# Patient Record
Sex: MALE | Race: WHITE | NOT HISPANIC OR LATINO | Employment: OTHER | URBAN - METROPOLITAN AREA
[De-identification: names, ages, dates, MRNs, and addresses within clinical notes are randomized per-mention and may not be internally consistent; named-entity substitution may affect disease eponyms.]

---

## 2017-01-30 ASSESSMENT — ENCOUNTER SYMPTOMS
CONSTIPATION: 1
SINUS PAIN: 1
SINUS CONGESTION: 1
SORE THROAT: 1
DIARRHEA: 1
HEARTBURN: 1
ABDOMINAL PAIN: 1
VOMITING: 0

## 2017-02-09 ENCOUNTER — PRE VISIT (OUTPATIENT)
Dept: CARDIOLOGY | Facility: CLINIC | Age: 62
End: 2017-02-09

## 2017-02-09 DIAGNOSIS — Z13.6 CARDIOVASCULAR SCREENING; LDL GOAL LESS THAN 160: Primary | ICD-10-CM

## 2017-02-10 ENCOUNTER — OFFICE VISIT (OUTPATIENT)
Dept: CARDIOLOGY | Facility: CLINIC | Age: 62
End: 2017-02-10
Attending: INTERNAL MEDICINE
Payer: COMMERCIAL

## 2017-02-10 VITALS
SYSTOLIC BLOOD PRESSURE: 147 MMHG | BODY MASS INDEX: 22.9 KG/M2 | DIASTOLIC BLOOD PRESSURE: 91 MMHG | HEART RATE: 52 BPM | WEIGHT: 151.1 LBS | HEIGHT: 68 IN | OXYGEN SATURATION: 98 %

## 2017-02-10 DIAGNOSIS — Z13.6 CARDIOVASCULAR SCREENING; LDL GOAL LESS THAN 160: ICD-10-CM

## 2017-02-10 PROCEDURE — 99214 OFFICE O/P EST MOD 30 MIN: CPT | Mod: ZP | Performed by: NURSE PRACTITIONER

## 2017-02-10 PROCEDURE — 93005 ELECTROCARDIOGRAM TRACING: CPT | Mod: ZF

## 2017-02-10 PROCEDURE — 99213 OFFICE O/P EST LOW 20 MIN: CPT | Mod: ZF

## 2017-02-10 PROCEDURE — 93010 ELECTROCARDIOGRAM REPORT: CPT | Mod: ZP | Performed by: INTERNAL MEDICINE

## 2017-02-10 ASSESSMENT — PAIN SCALES - GENERAL: PAINLEVEL: NO PAIN (0)

## 2017-02-10 NOTE — NURSING NOTE
"Chief Complaint   Patient presents with     Follow Up For     EKG; last visit 11/16; negative ECHO     /86 mmHg  Pulse 50  Ht 1.724 m (5' 7.88\")  Wt 68.539 kg (151 lb 1.6 oz)  BMI 23.06 kg/m2  SpO2 98%  /91 mmHg  Pulse 52  Ht 1.724 m (5' 7.88\")  Wt 68.539 kg (151 lb 1.6 oz)  BMI 23.06 kg/m2  SpO2 98%    "

## 2017-02-10 NOTE — PROGRESS NOTES
Clinical Cardiac Electrophysiology      CC- palpitations    HPI-  Happy to see Mr. Begum back in the office today. He has had symptoms of dyspnea on exertion associated with blunted HR response to exercise. He was also having a sensation of irregular HRs with exercise. He had a treadmill study that showed excellent exercise capacity. The report mentions PACs and PVCs during exercise and rest. The note I sent him mentions atrial fibrillation during exercise. I will have the stress test ECGs pulled to review again.    Overall, he reports he has been feeling well. He was on vacation, walking up hills and climbing stairs without difficulty. He will occasion notice some shortness of breath with exercise or stairs but this is much less frequent than before. He initially noted this symptom last winter while cross country skiing. He is wondering if it will return this winter.    [id number]: Jogging again, stairs don't bother him like they use to. It had been a couple years since he was in to see me and felt he should touch base.     18Nov2016:  Mr. Begum feels well since his last visit in 09/2015. He continues to bicycle, jog, ski, and run frequently. His symptoms of dizziness/lightheadedness have not precluded him from his personal hobbies or his professional work as an  for the [place] [employer]. He did not a period of dyspnea on exertion over the last few summers that actually improved when winter came about. Mr. Begum feels that his exercise tolerance is now at baseline. Mr. Begum still has some dizziness/lightheadedness upon waking, but he prevents this by standing slowly. He had a single episode of pre-syncope several months ago. No syncope, chest pain, exertional dyspnea at present.    2/10/17: Since last visit, he was admitted under ED observation for chest pain rule out on 11/20/16. Serial troponin x3 negative, stress echocardiogram with normal baseline echo and appropriate rise in LV systolic function at  peak exercise. He did not have any symptoms during the exercise portion of the study. Since discharge, he has felt well, and denies any limitations to exercise capacity such as chest pain/pressure or dyspnea. He is not experiencing palpitations. He is not on a B-blocker as his resting HR is very low at baseline. He exercises regularly, including [employer] and running. He feels well and denies any complaints currently. Unfortunately, his wife was recently diagnosed with frontotemporal dementia, so he plans to retire soon and travel back to [facility] more frequently.       Current Outpatient Prescriptions   Medication Sig Dispense Refill     ibuprofen (ADVIL,MOTRIN) 200 MG tablet Take 400 mg by mouth every 6 hours as needed for mild pain       Acetaminophen (TYLENOL ARTHRITIS PAIN PO) Take 650 mg by mouth every 6 hours as needed       buPROPion (WELLBUTRIN SR) 150 MG 12 hr tablet Take 1 tablet (150 mg) by mouth daily 1 time daily 90 tablet 3     FLUoxetine (PROZAC) 20 MG capsule Take 1 capsule (20 mg) by mouth daily 90 capsule 3     amphetamine-dextroamphetamine (ADDERALL XR) 10 MG per capsule Take 1 capsule (10 mg) by mouth daily 90 capsule 0     Multiple Vitamin (MULTI-DAY VITAMINS) TABS Take  by mouth.       Calcium Carbonate-Vitamin D (CALCIUM + D) 600-200 MG-UNIT per tablet Take 1 tablet by mouth daily.       Allergies   Allergen Reactions     Metoclopramide Rash     Past Medical History   Diagnosis Date     Backache, unspecified      Allergic rhinitis, cause unspecified      Junctional ectopic contractions (H)      Narcolepsy      Sleep apnea      Diverticulitis      Palpitations      Mild intermittent asthma 9/28/2005     Depressive disorder      Osteoarthritis      Past Surgical History   Procedure Laterality Date     C nonspecific procedure       s/p Appendectomy     Appendectomy       Orthopedic surgery       left hip replacement, right ankle,     Septorhinoplasty  4/7/2011     Procedure:SEPTORHINOPLASTY;  "Septoplasty withNasal Reconstruction with  Grafts Harvested from [employer]; Surgeon:ALFREDO UMANA; Location: OR     Social History   Substance Use Topics     Smoking status: Never Smoker      Smokeless tobacco: Never Used     Alcohol Use: Yes      Comment: 14 glass of wine     His an  with the [place] [employer].    Family History   Problem Relation Age of Onset     Arthritis Mother      osteo     Dementia Mother      Rheumatoid Arthritis Father      Depression Sister      Hypertension Mother      CEREBROVASCULAR DISEASE Mother      Not confirmed     Depression Father      Depression Sister      Substance Abuse Sister        ROS- the ten system review is negative except as noted in the HPI. He has noted increased episodes of feeling light-headed with standing.     Physical examination:  /91 mmHg  Pulse 52  Ht 1.724 m (5' 7.88\")  Wt 68.539 kg (151 lb 1.6 oz)  BMI 23.06 kg/m2  SpO2 98%  GEN: A & O, healthy appearing male, resting comfortably in exam chair, NAD, cooperative and conversational   HEENT: PERRL, no scleral icterus   NECK: Supple, no LAD, JVP not elevated   RESP: CTA bilaterally, no wheezing, no crackles, no increased work of breathing   CV: Regular, S1 and S2 without m/r/g   ABD: Soft, nontender to palpation, no HSM, +BS   EXT: No peripheral edema, warm/well perfused   NEURO: CN II-XII intact  SKIN: Normal skin turgor    Laboratory:  EKG 2/10/17:  Sinus bradycardia, normal axis, normal intervals, tall precordial T-waves (not peaked) which are unchanged from prior, no ischemic ST-T changes or Q-waves. 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 Personally reviewed [id number]    Exercise Echocardiogram... 11/22/16:  Normal exercise echocardiogram without evidence of inducible ischemia. Target " "heart rate was achieved. Heart rate and blood pressure response to exercise were normal. With stress, the left ventricular ejection fraction increased from 55-60% to greater than 65% and the left ventricular size decreased appropriately. There was no ECG evidence of ischemia.  Stress ECG is difficult to interpret due to artifact. There are frequent PAC's with excercise follow by the development of a narrow complex tachycardia, unknown type.  There is mild to moderate mitral regurgitation. MR does not appear to worsen with stress.    48 hr HOLTER... 7 September 2012  The rhythm was predominately sinus with periods of accelerated junctional pacemaker manifesting in normal sinus rhythm. There were 21 pauses greater than 2.0 sec in length. Non-sustained atrial tachycardia noted. The heart rate varied though not always appropriate for the activities described in the diary. Min rate was 30 bpm, max rate 176 bpm, average 53 bpm. Occasional supraventricular ectopy noted (2%). Numerous atrial pairs which may be capture beats followed by a sinus beat during periods of accelerated junctional pacemaker manifestations. No ventricular ectopy was observed. No ST-T wave changes. Pt reported two symptoms of \"shortness of breath\" which correlated with sinus bradycardia during exercise.   STRESS ECHO ([employer])... 29 May 2009   No significant worsening of MR noted with exercise. However, the patient developed isorhythmic AV dissociation with peak exercise, and coincident with the rhythm disturbance developed shortness of breath. The AV node tachycardia (and AV dissociation) started at a heart rate of 80 and persisted throughout exercise and resolved after exercise at a heart rate of 100 bpm. In retrospect, this same abnormality was present on the first stress test. Baseline EKG/SymptomsSinus rhythm, no pathologic Q waves or resting ST segment abnormalities. Left Ventricle systolic function is normal.The left ventricle is normal in " size.There is normal left ventricular wall thickness. The left atrial size is normal.Right atrial size is normal.   ECHOCARDIOGRAM... 22 April 2009   Left ventricular function, chamber size, wall motion, and wall thickness are normal. The EF is 55-60%. Mild to moderate mitral insufficiency is present. Global right ventricular function is normal. Pulmonary artery systolic pressure is normal.    =====================================================================    Assessment and recommendations:  60 y/o M with history of exercise-induced palpitations and resting sinus bradycardia, dyspnea on exertion. Recent CAD evaluation with exercise echocardiography was reassuring. 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 Overall, he is doing well, and has not current limiting cardiovascular symptoms. We encouraged him to continue to exercise as able. No medication changes today.    1] Palpitations - currently asymptomatic. Of note, during exercise echocardiogram he did experience a brief run of narrow complex tachycardia, but did not have symptoms. This has been demonstrated on prior Holter monitor as likely atrial tachycardia. He is intolerant to B-blocker due to slow resting HR.      2] Sins Bradycardia - asymptomatic    3] Mitral Regurgitation - mild MR (quantified on recent echo w/ ERO 0.09 cm2, regurgitant volume 20 mL/beat). Not symptomatic; notably did not worsen at peak exercise. Continue to monitor w/ periodic echocardiograms.     RTC 1 year, sooner if problems arise.     Seen and examined with Dr. Marquez.    Natali Gallegos MD  Cardiology Fellow  [phone]    Attending: Patient seen and examined with Dr. Gallegos. The H&P is accurate as recorded. Any additional findings have been incorporated into the body of the note.  All labs and imaging studies reviewed personally. The assessment and recommendations outlined reflect our joint plans, arrived at after careful review and discussion.    You Marquez MD

## 2017-02-10 NOTE — MR AVS SNAPSHOT
After Visit Summary   2/10/2017    Salbador Begum    MRN: [id number]           Patient Information     Date Of Birth          1955        Visit Information        Provider Department      2/10/2017 4:30 PM You Marquez MD [facility]        Today's Diagnoses     CARDIOVASCULAR SCREENING; LDL GOAL LESS THAN 160           Care Instructions    Cardiology Provider you saw in clinic today: Dr. Marquez  Medication Changes:  None    Labs/Tests needed:  None     Follow-up Visit:  1 year    Further Instructions:      You will receive all normal lab and testing results via MyChart or mail if not reviewed in clinic today. Please contact our office if you need assistance with setting up MyChart.    If you need a medication refill please contact your pharmacy. Please allow 3 business days for your refill to be completed.     As always, thank you for trusting us with your health care needs!    If you have any questions regarding your visit please contact your care team:   Cardiology  Telephone Number    EP RN  Electrophysiology Nurse Coordinator [phone]     Call for EP procedure scheduling concerns  TRUE Munoz  [phone]           Device Clinic (Pacemakers, ICDs, Loop)   RN's : Mary Jo Vasquez Connie, Dawn During business hours: [phone]    After business hours:   [phone]- select option 4 and ask for job code [phone].              Follow-ups after your visit        Follow-up notes from your care team     Return in about 1 year (around 2/10/2018) for Jimmy.      Who to contact     If you have questions or need follow up information about today's clinic visit or your schedule please contact [facility] directly at [phone].  Normal or non-critical lab and imaging results will be communicated to you by MyChart, letter or phone within 4 business days after the clinic has received the results. If you do not hear from us within 7 days, please contact  "the clinic through ALLG[employer] or phone. If you have a critical or abnormal lab result, we will notify you by phone as soon as possible.  Submit refill requests through 66. [employer] or call your pharmacy and they will forward the refill request to us. Please allow 3 business days for your refill to be completed.          Additional Information About Your Visit        ALLG[employer] Information     [employer] gives you secure access to your electronic health record. If you see a primary care provider, you can also send messages to your care team and make appointments. If you have questions, please call your primary care clinic.  If you do not have a primary care provider, please call [phone] and they will assist you.        Care EveryWhere ID     This is your Care EveryWhere ID. This could be used by other organizations to access your [place] medical records  [id number]        Your Vitals Were     Pulse Height BMI (Body Mass Index) Pulse Oximetry          52 1.724 m (5' 7.88\") 23.06 kg/m2 98%         Blood Pressure from Last 3 Encounters:   02/10/17 147/91   11/21/16 108/73   11/18/16 138/81    Weight from Last 3 Encounters:   02/10/17 68.539 kg (151 lb 1.6 oz)   11/18/16 68.266 kg (150 lb 8 oz)   11/11/16 63.957 kg (141 lb)              We Performed the Following     EKG 12-lead, tracing only (Future)        Primary Care Provider Office Phone # Fax #    Kobe Pierce [phone]       [address] [address]        Thank you!     Thank you for choosing [facility]  for your care. Our goal is always to provide you with excellent care. Hearing back from our patients is one way we can continue to improve our services. Please take a few minutes to complete the written survey that you may receive in the mail after your visit with us. Thank you!             Your Updated Medication List - Protect others around you: Learn how to safely use, store and throw away your medicines " at www.disposemymeds.org.          This list is accurate as of: 2/10/17  5:01 PM.  Always use your most recent med list.                   Brand Name Dispense Instructions for use    amphetamine-dextroamphetamine 10 MG per 24 hr capsule    ADDERALL XR    90 capsule    Take 1 capsule (10 mg) by mouth daily       buPROPion 150 MG 12 hr tablet    WELLBUTRIN SR    90 tablet    Take 1 tablet (150 mg) by mouth daily 1 time daily       calcium + D 600-200 MG-UNIT Tabs   Generic drug:  calcium carbonate-vitamin D      Take 1 tablet by mouth daily.       FLUoxetine 20 MG capsule    PROZAC    90 capsule    Take 1 capsule (20 mg) by mouth daily       ibuprofen 200 MG tablet    ADVIL/MOTRIN     Take 400 mg by mouth every 6 hours as needed for mild pain       MULTI-DAY vitamin  S Tabs      Take  by mouth.       TYLENOL ARTHRITIS PAIN PO      Take 650 mg by mouth every 6 hours as needed

## 2017-02-10 NOTE — NURSING NOTE
EKG: Patient was given results of EKG performed in clinic.   Patient demonstrated understanding of this information and agreed to call with further questions or concerns.  Med Reconcile: Reviewed and verified all current medications with the patient. The updated medication list was printed and given to the patient.  Return Appointment: 1 year with Dr. Marquez.  Patient given instructions regarding scheduling next clinic visit. Patient demonstrated understanding of this information and agreed to call with further questions or concerns.  Patient stated he understood all health information given and agreed to call with further questions or concerns.

## 2017-02-10 NOTE — PATIENT INSTRUCTIONS
Cardiology Provider you saw in clinic today: Dr. Marquez  Medication Changes:  None    Labs/Tests needed:  None     Follow-up Visit:  1 year    Further Instructions:      You will receive all normal lab and testing results via Sk[employer] or mail if not reviewed in clinic today. Please contact our office if you need assistance with setting up MyChart.    If you need a medication refill please contact your pharmacy. Please allow 3 business days for your refill to be completed.     As always, thank you for trusting us with your health care needs!    If you have any questions regarding your visit please contact your care team:   Cardiology  Telephone Number    EP RN  Electrophysiology Nurse Coordinator [phone]     Call for EP procedure scheduling concerns  TRUE Munoz  [phone]           Device Clinic (Pacemakers, ICDs, Loop)   RN's : Mary Jo Vasquez Connie, Dawn During business hours: [phone]    After business hours:   [phone]- select option 4 and ask for job code [phone].

## 2017-02-10 NOTE — PROGRESS NOTES
Reason for Visit - Follow up of palpitations    HPI-  Happy to see Mr. Begum back in the office today. He has had symptoms of dyspnea on exertion associated with blunted HR response to exercise. He was also having a sensation of irregular HRs with exercise. He had a treadmill study that showed excellent exercise capacity. The report mentions PACs and PVCs during exercise and rest. The note I sent him mentions atrial fibrillation during exercise. I will have the stress test ECGs pulled to review again.    Overall, he reports he has been feeling well. He was on vacation, walking up hills and climbing stairs without difficulty. He will occasion notice some shortness of breath with exercise or stairs but this is much less frequent than before. He initially noted this symptom last winter while cross country skiing. He is wondering if it will return this winter.    09Sep2015: Jogging again, stairs don't bother him like they use to. It had been a couple years since he was in to see me and felt he should touch base.     18Nov2016 Interval History: Mr. Begum feels well since his last visit in 09/2015. He continues to bicycle, jog, ski, and run frequently. His symptoms of dizziness/lightheadedness have not precluded him from his personal hobbies or his professional work as an  for the [place] [employer]. He did not a period of dyspnea on exertion over the last few summers that actually improved when winter came about. Mr. Begum feels that his exercise tolerance is now at baseline. Mr. Begum still has some dizziness/lightheadedness upon waking, but he prevents this by standing slowly. He had a single episode of pre-syncope several months ago. No syncope, chest pain, exertional dyspnea at present.    2/10/2017   Today he presents after being in the ED in November 2016.    He presented to the ED due to a dull sensation described as pressure behind his midsternum.  He had these sensations in the past which he would ignore  and attribute to gas. He when to the ED day because the before had an EKG which indicated Q wave.   He regularly exercises (running, Nord[employer]rack) and denies chest pain, PHELPS, dizziness, syncope, or another symptoms.   In the past he had been monitoring his heart rate with exercise and had intermittent spikes in HR at initiation of exercise.   Denies any SOB walking up stairs (this was a complaint in the past).         Current Outpatient Prescriptions   Medication Sig Dispense Refill     ibuprofen (ADVIL,MOTRIN) 200 MG tablet Take 400 mg by mouth every 6 hours as needed for mild pain       Acetaminophen (TYLENOL ARTHRITIS PAIN PO) Take 650 mg by mouth every 6 hours as needed       buPROPion (WELLBUTRIN SR) 150 MG 12 hr tablet Take 1 tablet (150 mg) by mouth daily 1 time daily 90 tablet 3     FLUoxetine (PROZAC) 20 MG capsule Take 1 capsule (20 mg) by mouth daily 90 capsule 3     amphetamine-dextroamphetamine (ADDERALL XR) 10 MG per capsule Take 1 capsule (10 mg) by mouth daily 90 capsule 0     Multiple Vitamin (MULTI-DAY VITAMINS) TABS Take  by mouth.       Calcium Carbonate-Vitamin D (CALCIUM + D) 600-200 MG-UNIT per tablet Take 1 tablet by mouth daily.       Allergies   Allergen Reactions     Metoclopramide Rash     Past Medical History   Diagnosis Date     Backache, unspecified      Allergic rhinitis, cause unspecified      Junctional ectopic contractions (H)      Narcolepsy      Sleep apnea      Diverticulitis      Palpitations      Mild intermittent asthma 9/28/2005     Depressive disorder      Osteoarthritis      Past Surgical History   Procedure Laterality Date     C nonspecific procedure       s/p Appendectomy     Appendectomy       Orthopedic surgery       left hip replacement, right ankle,     Septorhinoplasty  4/7/2011     Procedure:SEPTORHINOPLASTY; Septoplasty withNasal Reconstruction with  Grafts Harvested from [employer]; Surgeon:ALFREDO UMANA; Location: OR     Social History   Substance Use  "Topics     Smoking status: Never Smoker      Smokeless tobacco: Never Used     Alcohol Use: Yes      Comment: 14 glass of wine     His an  with the [place] [employer].    Family History   Problem Relation Age of Onset     Arthritis Mother      osteo     Dementia Mother      Rheumatoid Arthritis Father      Depression Sister      Hypertension Mother      CEREBROVASCULAR DISEASE Mother      Not confirmed     Depression Father      Depression Sister      Substance Abuse Sister        ROS- the ten system review is negative except as noted in the HPI. He has noted increased episodes of feeling light-headed with standing.     Answers for HPI/ROS submitted by the patient on 1/30/2017   General Symptoms: No  Skin Symptoms: No  HENT Symptoms: Yes  EYE SYMPTOMS: No  HEART SYMPTOMS: No  LUNG SYMPTOMS: No  INTESTINAL SYMPTOMS: Yes  URINARY SYMPTOMS: No  REPRODUCTIVE SYMPTOMS: No  SKELETAL SYMPTOMS: No  BLOOD SYMPTOMS: No  NERVOUS SYSTEM SYMPTOMS: No  MENTAL HEALTH SYMPTOMS: No  Congestion: Yes  Sinus pain: Yes  Sore throat: Yes  Heart burn or indigestion: Yes  Vomiting: No  Abdominal pain: Yes  Constipation: Yes  Diarrhea: Yes  Hemorrhoids: Yes          Physical examination:  /91 mmHg  Pulse 52  Ht 1.724 m (5' 7.88\")  Wt 68.539 kg (151 lb 1.6 oz)  BMI 23.06 kg/m2  SpO2 98%     GENERAL APPEARANCE: healthy, alert and no distress  NECK: no venous distention  RESPIRATORY: lungs clear to auscultation - no rales, rhonchi or wheezes  ABDO: ND, S, NT, +BS  CARDIOVASCULAR: regular, normal S1 S2, with 2/6 PSM loudest at apex without radiation to axilla; no S3/S4, click or rub, with normal PMI      Laboratory:  ECHO Stress 11/20/2016  Procedure  Stress Echo Bike with two dimensional, color and spectral Doppler performed.  Contrast Definity.  ______________________________________________________________________________     Interpretation Summary  Normal exercise echocardiogram without evidence of inducible ischemia. " Target  heart rate was achieved. Heart rate and blood pressure response to exercise  were normal. With stress, the left ventricular ejection fraction increased  from 55-60% to greater than 65% and the left ventricular size decreased  appropriately. There was no ECG evidence of ischemia.  Stress ECG is difficult to interpret due to artifact. There are frequent  PAC's with excercise follow by the development of a narrow complex  tachycardia, unknown type.  There is mild to moderate mitral regurgitation.  MR does not appear to worsen with stress.  ______________________________________________________________________________     Stress  There was a normal BP response to exercise.  Definity (NDC #44573-693-10) given intravenously.  Patient was given 5ml mixture of 1.5ml Definity and 8.5ml saline.  5 ml wasted.  Stress ECG is difficult to interpret due to artifact. There are frequent  PAC's with excercise follow by the development of a narrow complex  tachycardia, unknown type.  Limiting Sympton: fatigue.  Peak MVO2 30.9 ml/kg/min .  Percent predicted MVO2 119 %.  RPP [id number].  Maximum workload 150 christian.  Target Heart Rate was achieved.  Exercise was stopped due to fatigue.  The patient did not exhibit any symptoms during exercise.     Rest  Sinus rhythm, no pathologic Q waves or resting ST segment abnormalities.     Stress Results                                       Maximum Predicted HR:  160 bpm            Target HR: 136 bpm        % Maximum Predicted HR:  98 %                      +--------+--------+----------+------+----+                   :  Stage :Duration:Heart Rate:  BPDos   e:                   :        : (mm:ss):   (bpm)  :      :    :                   +--------+--------+----------+------+----+                   :BASELINE:  0:00 15      6   :190/84:0.00:                   +--------+--------+----------+------+----+                   :  PEAK  :  8:53 15      6   :190/84:0.00:                    "+--------+--------+----------+------+----+                         Stress Duration:  8:53 mm:ss *                     Maximum Stress HR:  156 bpm *        Left Ventricle  Left ventricular systolic function is normal. Ejection Fraction = >55%.  Aortic Valve  The aortic valve is normal in structure and function.     Mitral Valve  There is mild to moderate mitral regurgitation.     Tricuspid Valve  The tricuspid valve is normal.     Right Ventricle  The right ventricular systolic function is normal.  ______________________________________________________________________________     MMode/2D Measurements & Calculations  IVSd: 0.98 cm  LVIDd: 5.5 cm  LVIDs: 3.5 cm  LVPWd: 0.77 cm  FS: 36.8 %  EDV(Teich): 149.6 ml  ESV(Teich): 50.8 ml  asc Aorta Diam: 2.9 cm     48 hr HOLTER... 7 September 2012  The rhythm was predominately sinus with periods of accelerated junctional pacemaker manifesting in normal sinus rhythm. There were 21 pauses greater than 2.0 sec in length. Non-sustained atrial tachycardia noted. The heart rate varied though not always appropriate for the activities described in the diary. Min rate was 30 bpm, max rate 176 bpm, average 53 bpm. Occasional supraventricular ectopy noted (2%). Numerous atrial pairs which may be capture beats followed by a sinus beat during periods of accelerated junctional pacemaker manifestations. No ventricular ectopy was observed. No ST-T wave changes. Pt reported two symptoms of \"shortness of breath\" which correlated with sinus bradycardia during exercise.   STRESS ECHO ([employer])... 29 May 2009   No significant worsening of MR noted with exercise. However, the patient developed isorhythmic AV dissociation with peak exercise, and coincident with the rhythm disturbance developed shortness of breath. The AV node tachycardia (and AV dissociation) started at a heart rate of 80 and persisted throughout exercise and resolved after exercise at a heart rate of 100 bpm. In retrospect, this " same abnormality was present on the first stress test. Baseline EKG/SymptomsSinus rhythm, no pathologic Q waves or resting ST segment abnormalities. Left Ventricle systolic function is normal.The left ventricle is normal in size.There is normal left ventricular wall thickness. The left atrial size is normal.Right atrial size is normal.   ECHOCARDIOGRAM... 22 April 2009   Left ventricular function, chamber size, wall motion, and wall thickness are normal. The EF is 55-60%. Mild to moderate mitral insufficiency is present. Global right ventricular function is normal. Pulmonary artery systolic pressure is normal.    Assessment and recommendations:    1)  Pathologic Q waves on EKG on 11/2016 EKG -  Today's EKG does not have Q waves; the thought is on the 11/2016 EKG L arm and L leg were switched and The Q wave in AVF is a combination of L arm and L leg vectors.  This does not explain the ST elevation in leads V1 and V2.    Today's EKG Sinus Bradycardia with ventricular rate of 48.      2) Palpitations - no palpitations since last visit.    3) Sinus bradycardia - asymptomatic    4) Mitral regurgitaiton - no changes from previous ECHO.  Will continue to follow with an ECHO in 2018 or if he becomes more symptomatic.      Return to clinic in 1 year.          AYDEN Landry

## 2017-02-10 NOTE — LETTER
2/10/2017      RE: Salbador Begum  [address] [place] [address]       Dear Colleague,    Thank you for the opportunity to participate in the care of your patient, Salbador Begum, at the [facility] HEART [facility] at [facility]. Please see a copy of my visit note below.    Reason for Visit - Follow up of palpitations    HPI-  Happy to see Mr. Begum back in the office today. He has had symptoms of dyspnea on exertion associated with blunted HR response to exercise. He was also having a sensation of irregular HRs with exercise. He had a treadmill study that showed excellent exercise capacity. The report mentions PACs and PVCs during exercise and rest. The note I sent him mentions atrial fibrillation during exercise. I will have the stress test ECGs pulled to review again.    Overall, he reports he has been feeling well. He was on vacation, walking up hills and climbing stairs without difficulty. He will occasion notice some shortness of breath with exercise or stairs but this is much less frequent than before. He initially noted this symptom last winter while cross country skiing. He is wondering if it will return this winter.    [id number]: Jogging again, stairs don't bother him like they use to. It had been a couple years since he was in to see me and felt he should touch base.     [id number] Interval History: Mr. Begum feels well since his last visit in 09/2015. He continues to bicycle, jog, ski, and run frequently. His symptoms of dizziness/lightheadedness have not precluded him from his personal hobbies or his professional work as an  for the [place] [employer]. He did not a period of dyspnea on exertion over the last few summers that actually improved when winter came about. Mr. Begum feels that his exercise tolerance is now at baseline. Mr. Begum still has some dizziness/lightheadedness upon waking, but he prevents this by standing slowly. He  had a single episode of pre-syncope several months ago. No syncope, chest pain, exertional dyspnea at present.    2/10/2017   Today he presents after being in the ED in November 2016.    He presented to the ED due to a dull sensation described as pressure behind his midsternum.  He had these sensations in the past which he would ignore and attribute to gas. He when to the ED day because the before had an EKG which indicated Q wave.   He regularly exercises (running, Nordatrack) and denies chest pain, PHELPS, dizziness, syncope, or another symptoms.   In the past he had been monitoring his heart rate with exercise and had intermittent spikes in HR at initiation of exercise.   Denies any SOB walking up stairs (this was a complaint in the past).         Current Outpatient Prescriptions   Medication Sig Dispense Refill     ibuprofen (ADVIL,MOTRIN) 200 MG tablet Take 400 mg by mouth every 6 hours as needed for mild pain       Acetaminophen (TYLENOL ARTHRITIS PAIN PO) Take 650 mg by mouth every 6 hours as needed       buPROPion (WELLBUTRIN SR) 150 MG 12 hr tablet Take 1 tablet (150 mg) by mouth daily 1 time daily 90 tablet 3     FLUoxetine (PROZAC) 20 MG capsule Take 1 capsule (20 mg) by mouth daily 90 capsule 3     amphetamine-dextroamphetamine (ADDERALL XR) 10 MG per capsule Take 1 capsule (10 mg) by mouth daily 90 capsule 0     Multiple Vitamin (MULTI-DAY VITAMINS) TABS Take  by mouth.       Calcium Carbonate-Vitamin D (CALCIUM + D) 600-200 MG-UNIT per tablet Take 1 tablet by mouth daily.       Allergies   Allergen Reactions     Metoclopramide Rash     Past Medical History   Diagnosis Date     Backache, unspecified      Allergic rhinitis, cause unspecified      Junctional ectopic contractions (H)      Narcolepsy      Sleep apnea      Diverticulitis      Palpitations      Mild intermittent asthma 9/28/2005     Depressive disorder      Osteoarthritis      Past Surgical History   Procedure Laterality Date     C nonspecific  "procedure       s/p Appendectomy     Appendectomy       Orthopedic surgery       left hip replacement, right ankle,     Septorhinoplasty  4/7/2011     Procedure:SEPTORHINOPLASTY; Septoplasty withNasal Reconstruction with  Grafts Harvested from [employer]; Surgeon:ALFREDO UMANA; Location: OR     Social History   Substance Use Topics     Smoking status: Never Smoker      Smokeless tobacco: Never Used     Alcohol Use: Yes      Comment: 14 glass of wine     His an  with the [place] [employer].    Family History   Problem Relation Age of Onset     Arthritis Mother      osteo     Dementia Mother      Rheumatoid Arthritis Father      Depression Sister      Hypertension Mother      CEREBROVASCULAR DISEASE Mother      Not confirmed     Depression Father      Depression Sister      Substance Abuse Sister        ROS- the ten system review is negative except as noted in the HPI. He has noted increased episodes of feeling light-headed with standing.     Answers for HPI/ROS submitted by the patient on 1/30/2017   General Symptoms: No  Skin Symptoms: No  HENT Symptoms: Yes  EYE SYMPTOMS: No  HEART SYMPTOMS: No  LUNG SYMPTOMS: No  INTESTINAL SYMPTOMS: Yes  URINARY SYMPTOMS: No  REPRODUCTIVE SYMPTOMS: No  SKELETAL SYMPTOMS: No  BLOOD SYMPTOMS: No  NERVOUS SYSTEM SYMPTOMS: No  MENTAL HEALTH SYMPTOMS: No  Congestion: Yes  Sinus pain: Yes  Sore throat: Yes  Heart burn or indigestion: Yes  Vomiting: No  Abdominal pain: Yes  Constipation: Yes  Diarrhea: Yes  Hemorrhoids: Yes          Physical examination:  /91 mmHg  Pulse 52  Ht 1.724 m (5' 7.88\")  Wt 68.539 kg (151 lb 1.6 oz)  BMI 23.06 kg/m2  SpO2 98%     GENERAL APPEARANCE: healthy, alert and no distress  NECK: no venous distention  RESPIRATORY: lungs clear to auscultation - no rales, rhonchi or wheezes  ABDO: ND, S, NT, +BS  CARDIOVASCULAR: regular, normal S1 S2, with 2/6 PSM loudest at apex without radiation to axilla; no S3/S4, click or rub, with " normal PMI      Laboratory:  ECHO Stress 11/20/2016  Procedure  Stress Echo Bike with two dimensional, color and spectral Doppler performed.  Contrast Definity.  ______________________________________________________________________________     Interpretation Summary  Normal exercise echocardiogram without evidence of inducible ischemia. Target  heart rate was achieved. Heart rate and blood pressure response to exercise  were normal. With stress, the left ventricular ejection fraction increased  from 55-60% to greater than 65% and the left ventricular size decreased  appropriately. There was no ECG evidence of ischemia.  Stress ECG is difficult to interpret due to artifact. There are frequent  PAC's with excercise follow by the development of a narrow complex  tachycardia, unknown type.  There is mild to moderate mitral regurgitation.  MR does not appear to worsen with stress.  ______________________________________________________________________________     Stress  There was a normal BP response to exercise.  Definity (NDC #88496-761-28) given intravenously.  Patient was given 5ml mixture of 1.5ml Definity and 8.5ml saline.  5 ml wasted.  Stress ECG is difficult to interpret due to artifact. There are frequent  PAC's with excercise follow by the development of a narrow complex  tachycardia, unknown type.  Limiting Sympton: fatigue.  Peak MVO2 30.9 ml/kg/min .  Percent predicted MVO2 119 %.  RPP 80420.  Maximum workload 150 christian.  Target Heart Rate was achieved.  Exercise was stopped due to fatigue.  The patient did not exhibit any symptoms during exercise.     Rest  Sinus rhythm, no pathologic Q waves or resting ST segment abnormalities.     Stress Results                                       Maximum Predicted HR:  160 bpm            Target HR: 136 bpm        % Maximum Predicted HR:  98 %                      +--------+--------+----------+------+----+                   :  Stage :Duration:Heart Rate:  BPDos    "e:                   :        : (mm:ss):   (bpm)  :      :    :                   +--------+--------+----------+------+----+                   :BASELINE:  0:00 15      6   :190/84:0.00:                   +--------+--------+----------+------+----+                   :  PEAK  :  8:53 15      6   :190/84:0.00:                   +--------+--------+----------+------+----+                         Stress Duration:  8:53 mm:ss *                     Maximum Stress HR:  156 bpm *        Left Ventricle  Left ventricular systolic function is normal. Ejection Fraction = >55%.  Aortic Valve  The aortic valve is normal in structure and function.     Mitral Valve  There is mild to moderate mitral regurgitation.     Tricuspid Valve  The tricuspid valve is normal.     Right Ventricle  The right ventricular systolic function is normal.  ______________________________________________________________________________     MMode/2D Measurements & Calculations  IVSd: 0.98 cm  LVIDd: 5.5 cm  LVIDs: 3.5 cm  LVPWd: 0.77 cm  FS: 36.8 %  EDV(Teich): 149.6 ml  ESV(Teich): 50.8 ml  asc Aorta Diam: 2.9 cm     48 hr HOLTER... 7 September 2012  The rhythm was predominately sinus with periods of accelerated junctional pacemaker manifesting in normal sinus rhythm. There were 21 pauses greater than 2.0 sec in length. Non-sustained atrial tachycardia noted. The heart rate varied though not always appropriate for the activities described in the diary. Min rate was 30 bpm, max rate 176 bpm, average 53 bpm. Occasional supraventricular ectopy noted (2%). Numerous atrial pairs which may be capture beats followed by a sinus beat during periods of accelerated junctional pacemaker manifestations. No ventricular ectopy was observed. No ST-T wave changes. Pt reported two symptoms of \"shortness of breath\" which correlated with sinus bradycardia during exercise.   STRESS ECHO ([facility])... 29 May 2009   No significant worsening of MR noted with exercise. However, the " patient developed isorhythmic AV dissociation with peak exercise, and coincident with the rhythm disturbance developed shortness of breath. The AV node tachycardia (and AV dissociation) started at a heart rate of 80 and persisted throughout exercise and resolved after exercise at a heart rate of 100 bpm. In retrospect, this same abnormality was present on the first stress test. Baseline EKG/SymptomsSinus rhythm, no pathologic Q waves or resting ST segment abnormalities. Left Ventricle systolic function is normal.The left ventricle is normal in size.There is normal left ventricular wall thickness. The left atrial size is normal.Right atrial size is normal.   ECHOCARDIOGRAM... 22 April 2009   Left ventricular function, chamber size, wall motion, and wall thickness are normal. The EF is 55-60%. Mild to moderate mitral insufficiency is present. Global right ventricular function is normal. Pulmonary artery systolic pressure is normal.    Assessment and recommendations:    1)  Pathologic Q waves on EKG on 11/2016 EKG -  Today's EKG does not have Q waves; the thought is on the 11/2016 EKG L arm and L leg were switched and The Q wave in AVF is a combination of L arm and L leg vectors.  This does not explain the ST elevation in leads V1 and V2.    Today's EKG Sinus Bradycardia with ventricular rate of 48.      2) Palpitations - no palpitations since last visit.    3) Sinus bradycardia - asymptomatic    4) Mitral regurgitaiton - no changes from previous ECHO.  Will continue to follow with an ECHO in 2018 or if he becomes more symptomatic.      Return to clinic in 1 year.          AYDEN Landry            Clinical Cardiac Electrophysiology      CC- palpitations    HPI-  Happy to see Mr. Begum back in the office today. He has had symptoms of dyspnea on exertion associated with blunted HR response to exercise. He was also having a sensation of irregular HRs with exercise. He had a treadmill study that showed excellent  exercise capacity. The report mentions PACs and PVCs during exercise and rest. The note I sent him mentions atrial fibrillation during exercise. I will have the stress test ECGs pulled to review again.    Overall, he reports he has been feeling well. He was on vacation, walking up hills and climbing stairs without difficulty. He will occasion notice some shortness of breath with exercise or stairs but this is much less frequent than before. He initially noted this symptom last winter while cross country skiing. He is wondering if it will return this winter.    09Sep2015: Jogging again, stairs don't bother him like they use to. It had been a couple years since he was in to see me and felt he should touch base.     18Nov2016:  Mr. Begum feels well since his last visit in 09/2015. He continues to bicycle, jog, ski, and run frequently. His symptoms of dizziness/lightheadedness have not precluded him from his personal hobbies or his professional work as an  for the [place] [employer]. He did not a period of dyspnea on exertion over the last few summers that actually improved when winter came about. Mr. Begum feels that his exercise tolerance is now at baseline. Mr. Begum still has some dizziness/lightheadedness upon waking, but he prevents this by standing slowly. He had a single episode of pre-syncope several months ago. No syncope, chest pain, exertional dyspnea at present.    2/10/17: Since last visit, he was admitted under ED observation for chest pain rule out on 11/20/16. Serial troponin x3 negative, stress echocardiogram with normal baseline echo and appropriate rise in LV systolic function at peak exercise. He did not have any symptoms during the exercise portion of the study. Since discharge, he has felt well, and denies any limitations to exercise capacity such as chest pain/pressure or dyspnea. He is not experiencing palpitations. He is not on a B-blocker as his resting HR is very low at baseline. He  exercises regularly, including NordicTrak and running. He feels well and denies any complaints currently. Unfortunately, his wife was recently diagnosed with frontotemporal dementia, so he plans to retire soon and travel back to [place] more frequently.       Current Outpatient Prescriptions   Medication Sig Dispense Refill     ibuprofen (ADVIL,MOTRIN) 200 MG tablet Take 400 mg by mouth every 6 hours as needed for mild pain       Acetaminophen (TYLENOL ARTHRITIS PAIN PO) Take 650 mg by mouth every 6 hours as needed       buPROPion (WELLBUTRIN SR) 150 MG 12 hr tablet Take 1 tablet (150 mg) by mouth daily 1 time daily 90 tablet 3     FLUoxetine (PROZAC) 20 MG capsule Take 1 capsule (20 mg) by mouth daily 90 capsule 3     amphetamine-dextroamphetamine (ADDERALL XR) 10 MG per capsule Take 1 capsule (10 mg) by mouth daily 90 capsule 0     Multiple Vitamin (MULTI-DAY VITAMINS) TABS Take  by mouth.       Calcium Carbonate-Vitamin D (CALCIUM + D) 600-200 MG-UNIT per tablet Take 1 tablet by mouth daily.       Allergies   Allergen Reactions     Metoclopramide Rash     Past Medical History   Diagnosis Date     Backache, unspecified      Allergic rhinitis, cause unspecified      Junctional ectopic contractions (H)      Narcolepsy      Sleep apnea      Diverticulitis      Palpitations      Mild intermittent asthma 9/28/2005     Depressive disorder      Osteoarthritis      Past Surgical History   Procedure Laterality Date     C nonspecific procedure       s/p Appendectomy     Appendectomy       Orthopedic surgery       left hip replacement, right ankle,     Septorhinoplasty  4/7/2011     Procedure:SEPTORHINOPLASTY; Septoplasty withNasal Reconstruction with  Grafts Harvested from [employer]; Surgeon:ALFREDO UMANA; Location: OR     Social History   Substance Use Topics     Smoking status: Never Smoker      Smokeless tobacco: Never Used     Alcohol Use: Yes      Comment: 14 glass of wine     His an  with the [place]  [employer] [employer].    Family History   Problem Relation Age of Onset     Arthritis Mother      osteo     Dementia Mother      Rheumatoid Arthritis Father      Depression Sister      Hypertension Mother      CEREBROVASCULAR DISEASE Mother      Not confirmed     Depression Father      Depression Sister      Substance Abuse Sister        ROS- the ten system review is negative except as noted in the HPI. He has noted increased episodes of feeling light-headed with standing.     Physical examination:  /91 mmHg  Pulse 52  Ht 1.724 m (5' 7.88\")  Wt 68.539 kg (151 lb 1.6 oz)  BMI 23.06 kg/m2  SpO2 98%  GEN: A & O, healthy appearing male, resting comfortably in exam chair, NAD, cooperative and conversational   HEENT: PERRL, no scleral icterus   NECK: Supple, no LAD, JVP not elevated   RESP: CTA bilaterally, no wheezing, no crackles, no increased work of breathing   CV: Regular, S1 and S2 without m/r/g   ABD: Soft, nontender to palpation, no HSM, +BS   EXT: No peripheral edema, warm/well perfused   NEURO: CN II-XII intact  SKIN: Normal skin turgor    Laboratory:  EKG 2/10/17:  Sinus bradycardia, normal axis, normal intervals, tall precordial T-waves (not peaked) which are unchanged from prior, no ischemic ST-T changes or Q-waves. 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 Personally reviewed [id number]    Exercise Echocardiogram... 11/22/16:  Normal exercise echocardiogram without evidence of inducible ischemia. Target heart rate was achieved. Heart rate and blood pressure response to exercise were normal. With stress, the left ventricular ejection fraction increased from 55-60% to greater than 65% and the left ventricular size decreased appropriately. There was no ECG evidence of ischemia.  Stress ECG is difficult to interpret due to " "artifact. There are frequent PAC's with excercise follow by the development of a narrow complex tachycardia, unknown type.  There is mild to moderate mitral regurgitation. MR does not appear to worsen with stress.    48 hr HOLTER... 7 September 2012  The rhythm was predominately sinus with periods of accelerated junctional pacemaker manifesting in normal sinus rhythm. There were 21 pauses greater than 2.0 sec in length. Non-sustained atrial tachycardia noted. The heart rate varied though not always appropriate for the activities described in the diary. Min rate was 30 bpm, max rate 176 bpm, average 53 bpm. Occasional supraventricular ectopy noted (2%). Numerous atrial pairs which may be capture beats followed by a sinus beat during periods of accelerated junctional pacemaker manifestations. No ventricular ectopy was observed. No ST-T wave changes. Pt reported two symptoms of \"shortness of breath\" which correlated with sinus bradycardia during exercise.   STRESS ECHO ([employer])... 29 May 2009   No significant worsening of MR noted with exercise. However, the patient developed isorhythmic AV dissociation with peak exercise, and coincident with the rhythm disturbance developed shortness of breath. The AV node tachycardia (and AV dissociation) started at a heart rate of 80 and persisted throughout exercise and resolved after exercise at a heart rate of 100 bpm. In retrospect, this same abnormality was present on the first stress test. Baseline EKG/SymptomsSinus rhythm, no pathologic Q waves or resting ST segment abnormalities. Left Ventricle systolic function is normal.The left ventricle is normal in size.There is normal left ventricular wall thickness. The left atrial size is normal.Right atrial size is normal.   ECHOCARDIOGRAM... 22 April 2009   Left ventricular function, chamber size, wall motion, and wall thickness are normal. The EF is 55-60%. Mild to moderate mitral insufficiency is present. Global right ventricular " function is normal. Pulmonary artery systolic pressure is normal.    =====================================================================    Assessment and recommendations:  60 y/o M with history of exercise-induced palpitations and resting sinus bradycardia, dyspnea on exertion. Recent CAD evaluation with exercise echocardiography was reassuring. 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 Overall, he is doing well, and has not current limiting cardiovascular symptoms. We encouraged him to continue to exercise as able. No medication changes today.    1] Palpitations - currently asymptomatic. Of note, during exercise echocardiogram he did experience a brief run of narrow complex tachycardia, but did not have symptoms. This has been demonstrated on prior Holter monitor as likely atrial tachycardia. He is intolerant to B-blocker due to slow resting HR.      2] Sins Bradycardia - asymptomatic    3] Mitral Regurgitation - mild MR (quantified on recent echo w/ ERO 0.09 cm2, regurgitant volume 20 mL/beat). Not symptomatic; notably did not worsen at peak exercise. Continue to monitor w/ periodic echocardiograms.     RTC 1 year, sooner if problems arise.     Seen and examined with Dr. Marquez.    Natali Gallegos MD  Cardiology Fellow  [phone]    Attending: Patient seen and examined with Dr. Gallegos. The H&P is accurate as recorded. Any additional findings have been incorporated into the body of the note. All labs and imaging studies reviewed personally. The assessment and recommendations outlined reflect our joint plans, arrived at after careful review and discussion.    You Marquez MD

## 2017-02-14 ENCOUNTER — E-VISIT (OUTPATIENT)
Dept: FAMILY MEDICINE | Facility: CLINIC | Age: 62
End: 2017-02-14
Payer: COMMERCIAL

## 2017-02-14 DIAGNOSIS — G47.11 IDIOPATHIC HYPERSOMNOLENCE: ICD-10-CM

## 2017-02-14 LAB — INTERPRETATION ECG - MUSE: NORMAL

## 2017-02-14 PROCEDURE — 99444 ZZC PHYSICIAN ONLINE EVALUATION & MANAGEMENT SERVICE: CPT | Performed by: FAMILY MEDICINE

## 2017-02-14 RX ORDER — DEXTROAMPHETAMINE SACCHARATE, AMPHETAMINE ASPARTATE MONOHYDRATE, DEXTROAMPHETAMINE SULFATE AND AMPHETAMINE SULFATE 2.5; 2.5; 2.5; 2.5 MG/1; MG/1; MG/1; MG/1
10 CAPSULE, EXTENDED RELEASE ORAL DAILY
Qty: 90 CAPSULE | Refills: 0 | Status: SHIPPED | OUTPATIENT
Start: 2017-02-14 | End: 2017-05-19

## 2017-05-19 ENCOUNTER — E-VISIT (OUTPATIENT)
Dept: FAMILY MEDICINE | Facility: CLINIC | Age: 62
End: 2017-05-19
Payer: COMMERCIAL

## 2017-05-19 DIAGNOSIS — G47.11 IDIOPATHIC HYPERSOMNOLENCE: ICD-10-CM

## 2017-05-19 PROCEDURE — 99444 ZZC PHYSICIAN ONLINE EVALUATION & MANAGEMENT SERVICE: CPT | Performed by: FAMILY MEDICINE

## 2017-05-19 NOTE — MR AVS SNAPSHOT
After Visit Summary   5/19/2017    Salbador Begum    MRN: [id number]           Patient Information     Date Of Birth          1955        Visit Information        Provider Department      5/19/2017 2:23 PM Kobe Pierce MD [facility]        Today's Diagnoses     Idiopathic hypersomnolence           Follow-ups after your visit        Who to contact     If you have questions or need follow up information about today's clinic visit or your schedule please contact [facility] directly at [phone].  Normal or non-critical lab and imaging results will be communicated to you by [employer], letter or phone within 4 business days after the clinic has received the results. If you do not hear from us within 7 days, please contact the clinic through YUPIQt or phone. If you have a critical or abnormal lab result, we will notify you by phone as soon as possible.  Submit refill requests through [employer] or call your pharmacy and they will forward the refill request to us. Please allow 3 business days for your refill to be completed.          Additional Information About Your Visit        MyChart Information     [employer] gives you secure access to your electronic health record. If you see a primary care provider, you can also send messages to your care team and make appointments. If you have questions, please call your primary care clinic.  If you do not have a primary care provider, please call [phone] and they will assist you.        Care EveryWhere ID     This is your Care EveryWhere ID. This could be used by other organizations to access your [place] medical records  [id number]         Blood Pressure from Last 3 Encounters:   02/10/17 (!) 147/91   11/21/16 108/73   11/18/16 138/81    Weight from Last 3 Encounters:   02/10/17 151 lb 1.6 oz (68.5 kg)   11/18/16 150 lb 8 oz (68.3 kg)   11/11/16 141 lb (64 kg)              Today, you had the following      No orders found for display         Where to get your medicines      Some of these will need a paper prescription and others can be bought over the counter.  Ask your nurse if you have questions.     Bring a paper prescription for each of these medications     amphetamine-dextroamphetamine 10 MG per 24 hr capsule          Primary Care Provider Office Phone # Fax #    Kobe Pierce [phone]       [address] [address]        Thank you!     Thank you for choosing [facility]  for your care. Our goal is always to provide you with excellent care. Hearing back from our patients is one way we can continue to improve our services. Please take a few minutes to complete the written survey that you may receive in the mail after your visit with us. Thank you!             Your Updated Medication List - Protect others around you: Learn how to safely use, store and throw away your medicines at www.disposemymeds.org.          This list is accurate as of: 5/19/17 11:59 PM.  Always use your most recent med list.                   Brand Name Dispense Instructions for use    amphetamine-dextroamphetamine 10 MG per 24 hr capsule    ADDERALL XR    90 capsule    Take 1 capsule (10 mg) by mouth daily       buPROPion 150 MG 12 hr tablet    WELLBUTRIN SR    90 tablet    Take 1 tablet (150 mg) by mouth daily 1 time daily       calcium + D 600-200 MG-UNIT Tabs   Generic drug:  calcium carbonate-vitamin D      Take 1 tablet by mouth daily.       FLUoxetine 20 MG capsule    PROZAC    90 capsule    Take 1 capsule (20 mg) by mouth daily       ibuprofen 200 MG tablet    ADVIL/MOTRIN     Take 400 mg by mouth every 6 hours as needed for mild pain       MULTI-DAY vitamin  S Tabs      Take  by mouth.       TYLENOL ARTHRITIS PAIN PO      Take 650 mg by mouth every 6 hours as needed

## 2017-05-22 NOTE — TELEPHONE ENCOUNTER
Just seeing this now.   Routing to dr thurston  Pt hasn't responded yet, ok to wait for Dr Thurston today or tomorrow.Tere Chacon RN

## 2017-05-23 RX ORDER — DEXTROAMPHETAMINE SACCHARATE, AMPHETAMINE ASPARTATE MONOHYDRATE, DEXTROAMPHETAMINE SULFATE AND AMPHETAMINE SULFATE 2.5; 2.5; 2.5; 2.5 MG/1; MG/1; MG/1; MG/1
10 CAPSULE, EXTENDED RELEASE ORAL DAILY
Qty: 90 CAPSULE | Refills: 0 | Status: SHIPPED | OUTPATIENT
Start: 2017-05-23 | End: 2017-08-28

## 2017-05-23 NOTE — TELEPHONE ENCOUNTER
Patient, Spouse Kimberly or son Malcom will  Rx Adderall. I called and talked to patient to let the other family members know if they pick it up they will need an ID. Janay Sapp CMA

## 2017-08-28 ENCOUNTER — E-VISIT (OUTPATIENT)
Dept: FAMILY MEDICINE | Facility: CLINIC | Age: 62
End: 2017-08-28
Payer: COMMERCIAL

## 2017-08-28 DIAGNOSIS — G47.11 IDIOPATHIC HYPERSOMNOLENCE: ICD-10-CM

## 2017-08-28 PROCEDURE — 99444 ZZC PHYSICIAN ONLINE EVALUATION & MANAGEMENT SERVICE: CPT | Performed by: FAMILY MEDICINE

## 2017-08-28 NOTE — MR AVS SNAPSHOT
After Visit Summary   8/28/2017    Salbador Begum    MRN: [id number]           Patient Information     Date Of Birth          1955        Visit Information        Provider Department      8/28/2017 12:05 PM Kobe Pierce MD [facility]        Today's Diagnoses     Idiopathic hypersomnolence           Follow-ups after your visit        Your next 10 appointments already scheduled     Aug 31, 2017  8:10 AM CDT   (Arrive by 7:55 AM)   BERTHA Spine with Terence Jackson PT   [facility] [employer] [facility] Physical Therapy ([facility]  )    [address] [place] [address]   [phone]            Sep 12, 2017  7:30 AM CDT   BERTHA Spine with Terence Jackson PT   [facility] Physical Therapy ([facility]  )    [address] [place] [address]   [phone]            Sep 14, 2017  7:30 AM CDT   BERTHA Spine with Terence Jackson PT   [facility] ([facility]  )    [address] [place] [address]   [phone]              Who to contact     If you have questions or need follow up information about today's clinic visit or your schedule please contact [facility] directly at [phone].  Normal or non-critical lab and imaging results will be communicated to you by MyChart, letter or phone within 4 business days after the clinic has received the results. If you do not hear from us within 7 days, please contact the clinic through [employer] or phone. If you have a critical or abnormal lab result, we will notify you by phone as soon as possible.  Submit refill requests through [employer] or call your pharmacy and they will forward the refill request to us. Please allow 3 business days for your refill to be completed.          Additional Information About Your Visit        [employer] Information     [employer] gives you secure  access to your electronic health record. If you see a primary care provider, you can also send messages to your care team and make appointments. If you have questions, please call your primary care clinic.  If you do not have a primary care provider, please call [phone] and they will assist you.        Care EveryWhere ID     This is your Care EveryWhere ID. This could be used by other organizations to access your [place] medical records  [id number]         Blood Pressure from Last 3 Encounters:   02/10/17 (!) 147/91   11/21/16 108/73   11/18/16 138/81    Weight from Last 3 Encounters:   02/10/17 151 lb 1.6 oz (68.5 kg)   11/18/16 150 lb 8 oz (68.3 kg)   11/11/16 141 lb (64 kg)              Today, you had the following     No orders found for display         Where to get your medicines      Some of these will need a paper prescription and others can be bought over the counter.  Ask your nurse if you have questions.     Bring a paper prescription for each of these medications     amphetamine-dextroamphetamine 10 MG per 24 hr capsule          Primary Care Provider Office Phone # Fax #    Kobe Pierce [phone]       [address] [facility] [address]        Equal Access to Services     ANDRES MACKEY : Hadii dewayne clayton hadasho Soomaali, waaxda luqadaha, qaybta kaalmada adeegyada, lalita grey. So [facility] [phone].    ATENCIÓN: Si habla español, tiene a razo disposición servicios gratuitos de asistencia lingüística. Llame al [phone].    We comply with applicable federal civil rights laws and [place] laws. We do not discriminate on the basis of race, color, national origin, age, disability sex, sexual orientation or gender identity.            Thank you!     Thank you for choosing [facility]  for your care. Our goal is always to provide you with excellent care. Hearing back from our patients is one way we can continue to improve our services.  Please take a few minutes to complete the written survey that you may receive in the mail after your visit with us. Thank you!             Your Updated Medication List - Protect others around you: Learn how to safely use, store and throw away your medicines at www.disposemymeds.org.          This list is accurate as of: 8/28/17 11:59 PM.  Always use your most recent med list.                   Brand Name Dispense Instructions for use Diagnosis    amphetamine-dextroamphetamine 10 MG per 24 hr capsule    ADDERALL XR    90 capsule    Take 1 capsule (10 mg) by mouth daily    Idiopathic hypersomnolence       buPROPion 150 MG 12 hr tablet    WELLBUTRIN SR    90 tablet    Take 1 tablet (150 mg) by mouth daily 1 time daily    Major depressive disorder, recurrent, mild (H)       calcium + D 600-200 MG-UNIT Tabs   Generic drug:  calcium carbonate-vitamin D      Take 1 tablet by mouth daily.        FLUoxetine 20 MG capsule    PROZAC    90 capsule    Take 1 capsule (20 mg) by mouth daily    Major depressive disorder, recurrent, mild (H)       ibuprofen 200 MG tablet    ADVIL/MOTRIN     Take 400 mg by mouth every 6 hours as needed for mild pain        MULTI-DAY vitamin  S Tabs      Take  by mouth.        TYLENOL ARTHRITIS PAIN PO      Take 650 mg by mouth every 6 hours as needed

## 2017-08-29 RX ORDER — DEXTROAMPHETAMINE SACCHARATE, AMPHETAMINE ASPARTATE MONOHYDRATE, DEXTROAMPHETAMINE SULFATE AND AMPHETAMINE SULFATE 2.5; 2.5; 2.5; 2.5 MG/1; MG/1; MG/1; MG/1
10 CAPSULE, EXTENDED RELEASE ORAL DAILY
Qty: 90 CAPSULE | Refills: 0 | Status: SHIPPED | OUTPATIENT
Start: 2017-08-29 | End: 2017-11-28

## 2017-08-29 NOTE — TELEPHONE ENCOUNTER
Tried calling phone, but patient's voice mail is full. My chart patient that script is ready and place up front for pick-up. Script is at the .    Lisa Dawkins MA

## 2017-08-31 ENCOUNTER — THERAPY VISIT (OUTPATIENT)
Dept: PHYSICAL THERAPY | Facility: CLINIC | Age: 62
End: 2017-08-31
Payer: COMMERCIAL

## 2017-08-31 DIAGNOSIS — M25.552 BILATERAL HIP PAIN: ICD-10-CM

## 2017-08-31 DIAGNOSIS — M54.50 ACUTE BILATERAL LOW BACK PAIN WITHOUT SCIATICA: Primary | ICD-10-CM

## 2017-08-31 DIAGNOSIS — M25.551 BILATERAL HIP PAIN: ICD-10-CM

## 2017-08-31 PROCEDURE — 97161 PT EVAL LOW COMPLEX 20 MIN: CPT | Mod: GP | Performed by: PHYSICAL THERAPIST

## 2017-08-31 PROCEDURE — 97112 NEUROMUSCULAR REEDUCATION: CPT | Mod: GP | Performed by: PHYSICAL THERAPIST

## 2017-08-31 PROCEDURE — 97110 THERAPEUTIC EXERCISES: CPT | Mod: GP | Performed by: PHYSICAL THERAPIST

## 2017-08-31 NOTE — PROGRESS NOTES
Subjective:    Patient is a 61 year old male presenting with rehab back hpi.   Salbador Begum is a 61 year old male with a lumbar condition.  Condition occurred with:  Insidious onset.  Condition occurred: for unknown reasons.  This is a new condition  July 2017. Patient started having B anterior hip pain about 1 month ago. His pain started with jogging. He has stopped running. After 1 week of onset, he started having low back pain..    Patient reports pain:  Lumbar spine right and lumbar spine left.  Radiates to:  Thigh right and thigh left (bilateral hips and groins).   and is intermittent and reported as 5/10.  Associated symptoms:  Tingling and numbness (dorsal aspect of L forefoot). Pain is worse in the A.M..  Symptoms are exacerbated by walking and relieved by rest and other (shortening walking stride).  Since onset symptoms are unchanged.  Special tests:  X-ray.      General health as reported by patient is excellent.  Pertinent medical history includes:  Osteoarthritis, heart problems, depression, implanted device and sleep disorder/apnea.  Medical allergies: no.  Other surgeries include:  Orthopedic surgery (R ankle, L ARTIE).  Current medications:  Anti-depressants, anti-inflammatory and other (Adderall).  Current occupation is .  Patient is working in normal job without restrictions.  Primary job tasks include:  Prolonged sitting (computer work).    Barriers include:  None as reported by the patient.    Red flags:  None as reported by the patient.                        Objective:    Standing Alignment:    Cervical/Thoracic:  Forward head  Shoulder/UE:  Depressed scapula L  Lumbar:  Lordosis incr                           Lumbar/SI Evaluation  ROM:    AROM Lumbar:   Flexion:          WNL -  Ext:                    Mod loss + B low back and posterior L thigh   Side Bend:        Left:     Right:   Rotation:           Left:     Right:   Side Glide:        Left:  Min loss -/+    Right:  Min loss  -/+          Lumbar Myotomes:    T12-L3 (Hip Flex):  Left: 5    Right: 5  L2-4 (Quads):  Left:  5    Right:  5  L4 (Ankle DF):  Left:  5    Right:  5  L5 (Great Toe Ext): Left: 5    Right: 5-   S1 (Toe Raise):  Left: 5    Right: 5  Lumbar DTR's:    L4 (Quad):  Left:  2   Right:  2  S1 (Achilles):  Left:  1   Right:  2    Lumbar Dermtomes:        L2 Left:  Normal-light touch     L2 Right:  Normal-light touch  L3 Left:  Normal-light touch     L3 Right:  Normal-light touch  L4 Left:  Normal-light touch       L4 Right:  Normal-light touch  L5 Left:  Normal-light touch     L5 Right:  Normal-light touch  S1 Left:  Normal-light touch     S1 Right:  Normal-light touch  Neural Tension/Mobility:      Left side:Slump  negative.     Right side:   Slump  negative.                                                          Frankie Lumbar Evaluation        Test Movements:        EIL: During: decreases  After: no better    Repeat EIL: During: decreases  After: better  Mechanical Response: IncROM      Static Tests:          Lying Prone in Extension: pain abolished with prone and BALJIT, no better    Conclusion: derangement  Principle of Treatment:  Posture Correction: posture correction    Extension: REIL                                           ROS    Assessment/Plan:      Patient is a 61 year old male with lumbar complaints.    Patient has the following significant findings with corresponding treatment plan.                Diagnosis 1:  Low back, bilateral inguinal pain evidence on x-ray of degneration of L1-2, possible spinal stenosis  Pain -  hot/cold therapy, manual therapy, self management, education, directional preference exercise and home program  Decreased ROM/flexibility - manual therapy, therapeutic exercise and home program  Decreased strength - therapeutic exercise, therapeutic activities and home program  Decreased proprioception - neuro re-education, therapeutic activities and home program  Impaired muscle performance -  neuro re-education and home program  Decreased function - therapeutic activities and home program  Impaired posture - neuro re-education and home program    Therapy Evaluation Codes:   1) History comprised of:   Personal factors that impact the plan of care:      None.    Comorbidity factors that impact the plan of care are:      Depression, Heart problems, Osteoarthritis and Sleep disorder/apnea.     Medications impacting care: Anti-depressant and Anti-inflammatory.  2) Examination of Body Systems comprised of:   Body structures and functions that impact the plan of care:      Hip and Lumbar spine.   Activity limitations that impact the plan of care are:      Running and Walking.  3) Clinical presentation characteristics are:   Stable/Uncomplicated.  4) Decision-Making    Low complexity using standardized patient assessment instrument and/or measureable assessment of functional outcome.  Cumulative Therapy Evaluation is: Low complexity.    Previous and current functional limitations:  (See Goal Flow Sheet for this information)    Short term and Long term goals: (See Goal Flow Sheet for this information)     Communication ability:  Patient appears to be able to clearly communicate and understand verbal and written communication and follow directions correctly.  Treatment Explanation - The following has been discussed with the patient:   RX ordered/plan of care  Anticipated outcomes  Possible risks and side effects  This patient would benefit from PT intervention to resume normal activities.   Rehab potential is good.    Frequency:  1 X week, once daily  Duration:  for 6 weeks tapering to 1 X every other week over 4 weeks  Discharge Plan:  Achieve all LTG.  Independent in home treatment program.  Reach maximal therapeutic benefit.    Please refer to the daily flowsheet for treatment today, total treatment time and time spent performing 1:1 timed codes.

## 2017-08-31 NOTE — MR AVS SNAPSHOT
After Visit Summary   8/31/2017    Salbador Begum    MRN: [id number]           Patient Information     Date Of Birth          1955        Visit Information        Provider Department      8/31/2017 8:10 AM Terence Jackson, PT [facility]        Today's Diagnoses     Acute bilateral low back pain without sciatica    -  1    Bilateral hip pain           Follow-ups after your visit        Your next 10 appointments already scheduled     Sep 07, 2017 11:30 AM CDT   BERTHA Spine with Terence Jackson PT   [facility] Physical Therapy ([facility]  )    [address] [place] [address]   [phone]            Sep 14, 2017  7:30 AM CDT   BERTHA Spine with Terence Jackson, PT   [facility] Physical Therapy ([facility]  )    [address] [place] [id number] [phone]            Sep 21, 2017  8:10 AM CDT   BERTHA Spine with Terence Jackson, PT   [facility] Physical Therapy ([facility]  )    [address] [place] [address]   [phone]              Who to contact     If you have questions or need follow up information about today's clinic visit or your schedule please contact [facility] PHYSICAL THERAPY directly at [phone].  Normal or non-critical lab and imaging results will be communicated to you by MyChart, letter or phone within 4 business days after the clinic has received the results. If you do not hear from us within 7 days, please contact the clinic through MyChart or phone. If you have a critical or abnormal lab result, we will notify you by phone as soon as possible.  Submit refill requests through [employer] or call your pharmacy and they will forward the refill request to us. Please allow 3 business days for your refill to be completed.           Additional Information About Your Visit        MyChart Information     [employer] gives you secure access to your electronic health record. If you see a primary care provider, you can also send messages to your care team and make appointments. If you have questions, please call your primary care clinic.  If you do not have a primary care provider, please call [phone] and they will assist you.        Care EveryWhere ID     This is your Care EveryWhere ID. This could be used by other organizations to access your [place] medical records  [id number]         Blood Pressure from Last 3 Encounters:   02/10/17 (!) 147/91   11/21/16 108/73   11/18/16 138/81    Weight from Last 3 Encounters:   02/10/17 68.5 kg (151 lb 1.6 oz)   11/18/16 68.3 kg (150 lb 8 oz)   11/11/16 64 kg (141 lb)              We Performed the Following     BERTHA Inital Eval Report     Neuromuscular Re-Education     PT Eval, Low Complexity (41638)     Therapeutic Exercises        Primary Care Provider Office Phone # Fax #    Kobe Phani Pierce [phone]       [address]  [facility] [address]        Equal Access to Services     [facility] AH: Hadii aad ku hadasho Soomaali, waaxda luqadaha, qaybta kaalmada adeegyada, waxay leonardin haypebblesn bryan avelar . So [facility] [phone].    ATENCIÓN: Si habla español, tiene a razo disposición servicios gratuitos de asistencia lingüística. Llame al [phone].    We comply with applicable federal civil rights laws and [place] laws. We do not discriminate on the basis of race, color, national origin, age, disability sex, sexual orientation or gender identity.            Thank you!     Thank you for choosing [facility] PHYSICAL THERAP[facility]  for your care. Our goal is always to provide you with excellent care. Hearing back from our patients is one way we can continue to improve our services. Please take a few minutes to complete the written survey that you may  receive in the mail after your visit with us. Thank you!             Your Updated Medication List - Protect others around you: Learn how to safely use, store and throw away your medicines at www.disposemymeds.org.          This list is accurate as of: 8/31/17 11:59 PM.  Always use your most recent med list.                   Brand Name Dispense Instructions for use Diagnosis    amphetamine-dextroamphetamine 10 MG per 24 hr capsule    ADDERALL XR    90 capsule    Take 1 capsule (10 mg) by mouth daily    Idiopathic hypersomnolence       buPROPion 150 MG 12 hr tablet    WELLBUTRIN SR    90 tablet    Take 1 tablet (150 mg) by mouth daily 1 time daily    Major depressive disorder, recurrent, mild (H)       calcium + D 600-200 MG-UNIT Tabs   Generic drug:  calcium carbonate-vitamin D      Take 1 tablet by mouth daily.        FLUoxetine 20 MG capsule    PROZAC    90 capsule    Take 1 capsule (20 mg) by mouth daily    Major depressive disorder, recurrent, mild (H)       ibuprofen 200 MG tablet    ADVIL/MOTRIN     Take 400 mg by mouth every 6 hours as needed for mild pain        MULTI-DAY vitamin  S Tabs      Take  by mouth.        TYLENOL ARTHRITIS PAIN PO      Take 650 mg by mouth every 6 hours as needed

## 2017-08-31 NOTE — LETTER
[facility] ATHLETIC [facility] PHYSICAL [facility]  [address] [address]  [phone]    2017    Re: Salbador Begum   :   1955  MRN:  [id number]   REFERRING PHYSICIAN:   Norris Spencer    [facility] ATHLETIC [facility] PHYSICAL [facility]    Date of Initial Evaluation:  2017  Visits:  1  Rxs Used: 1  Reason for Referral:     Acute bilateral low back pain without sciatica  Bilateral hip pain    EVALUATION SUMMARY    Subjective:  Patient is a 61 year old male presenting with rehab back hpi.   Salbador Begum is a 61 year old male with a lumbar condition.  Condition occurred with:  Insidious onset.  Condition occurred: for unknown reasons.  This is a new condition  2017. Patient started having B anterior hip pain about 1 month ago. His pain started with jogging. He has stopped running. After 1 week of onset, he started having low back pain..    Patient reports pain:  Lumbar spine right and lumbar spine left.  Radiates to:  Thigh right and thigh left (bilateral hips and groins).   and is intermittent and reported as 5/10.  Associated symptoms:  Tingling and numbness (dorsal aspect of L forefoot). Pain is worse in the A.M..  Symptoms are exacerbated by walking and relieved by rest and other (shortening walking stride).  Since onset symptoms are unchanged.  Special tests:  X-ray.      General health as reported by patient is excellent.  Pertinent medical history includes:  Osteoarthritis, heart problems, depression, implanted device and sleep disorder/apnea.  Medical allergies: no.  Other surgeries include:  Orthopedic surgery (R ankle, L ARTIE).  Current medications:  Anti-depressants, anti-inflammatory and other (Adderall).  Current occupation is .  Patient is working in normal job without restrictions.  Primary job tasks include:  Prolonged sitting (computer work).  Barriers include:  None as reported by the patient.  Red  flags:  None as reported by the patient.             Objective:  Standing Alignment:    Cervical/Thoracic:  Forward head  Shoulder/UE:  Depressed scapula L  Lumbar:  Lordosis incr  Lumbar/SI Evaluation  ROM:    AROM Lumbar:   Flexion:          WNL -  Ext:                    Mod loss + B low back and posterior L thigh   Re: Salbador Begum   :   1955    Side Bend:        Left:     Right:   Rotation:           Left:     Right:   Side Glide:        Left:  Min loss -/+    Right:  Min loss -/+  Lumbar Myotomes:    T12-L3 (Hip Flex):  Left: 5    Right: 5  L2-4 (Quads):  Left:  5    Right:  5  L4 (Ankle DF):  Left:  5    Right:  5  L5 (Great Toe Ext): Left: 5    Right: 5-   S1 (Toe Raise):  Left: 5    Right: 5  Lumbar DTR's:    L4 (Quad):  Left:  2   Right:  2  S1 (Achilles):  Left:  1   Right:  2  Lumbar Dermtomes:    L2 Left:  Normal-light touch     L2 Right:  Normal-light touch  L3 Left:  Normal-light touch     L3 Right:  Normal-light touch  L4 Left:  Normal-light touch  L4 Right:  Normal-light touch  L5 Left:  Normal-light touch     L5 Right:  Normal-light touch  S1 Left:  Normal-light touch     S1 Right:  Normal-light touch  Neural Tension/Mobility:    Left side:Slump  negative.   Right side:   Slump  negative.   Frankie Lumbar Evaluation  Test Movements:  EIL: During: decreases  After: no better    Repeat EIL: During: decreases  After: better  Mechanical Response: IncROM  Static Tests:  Lying Prone in Extension: pain abolished with prone and BALJIT, no better  Conclusion: derangement  Principle of Treatment:  Posture Correction: posture correction  Extension: REIL    Assessment/Plan:    Patient is a 61 year old male with lumbar complaints.    Patient has the following significant findings with corresponding treatment plan.                Diagnosis 1:  Low back, bilateral inguinal pain evidence on x-ray of degneration of L1-2, possible spinal stenosis  Pain -  hot/cold therapy, manual therapy, self  management, education, directional preference exercise and home program  Decreased ROM/flexibility - manual therapy, therapeutic exercise and home program  Decreased strength - therapeutic exercise, therapeutic activities and home program  Decreased proprioception - neuro re-education, therapeutic activities and home program  Impaired muscle performance - neuro re-education and home program  Decreased function - therapeutic activities and home program  Impaired posture - neuro re-education and home program  Therapy Evaluation Codes:   1) History comprised of:   Personal factors that impact the plan of care:      None.    Comorbidity factors that impact the plan of care are:      Re: Salbador Begum   :   1955      Depression, Heart problems, Osteoarthritis and Sleep disorder/apnea.     Medications impacting care: Anti-depressant and Anti-inflammatory.  2) Examination of Body Systems comprised of:   Body structures and functions that impact the plan of care:      Hip and Lumbar spine.   Activity limitations that impact the plan of care are:      Running and Walking.  3) Clinical presentation characteristics are:   Stable/Uncomplicated.  4) Decision-Making    Low complexity using standardized patient assessment instrument and/or measureable assessment of functional outcome.  Cumulative Therapy Evaluation is: Low complexity.  Previous and current functional limitations:  (See Goal Flow Sheet for this information)    Short term and Long term goals: (See Goal Flow Sheet for this information)   Communication ability:  Patient appears to be able to clearly communicate and understand verbal and written communication and follow directions correctly.  Treatment Explanation - The following has been discussed with the patient:   RX ordered/plan of care  Anticipated outcomes  Possible risks and side effects  This patient would benefit from PT intervention to resume normal activities.   Rehab potential is  good.  Frequency:  1 X week, once daily  Duration:  for 6 weeks tapering to 1 X every other week over 4 weeks  Discharge Plan:  Achieve all LTG.  Independent in home treatment program.  Reach maximal therapeutic benefit.              Thank you for your referral.    INQUIRIES  Therapist: Terence Jackson,   [facility]  [address] [place] [address]  Phone: [phone]  Fax: [phone]

## 2017-09-07 ENCOUNTER — THERAPY VISIT (OUTPATIENT)
Dept: PHYSICAL THERAPY | Facility: CLINIC | Age: 62
End: 2017-09-07
Payer: COMMERCIAL

## 2017-09-07 DIAGNOSIS — M54.50 ACUTE BILATERAL LOW BACK PAIN WITHOUT SCIATICA: ICD-10-CM

## 2017-09-07 DIAGNOSIS — M25.552 BILATERAL HIP PAIN: ICD-10-CM

## 2017-09-07 DIAGNOSIS — M25.551 BILATERAL HIP PAIN: ICD-10-CM

## 2017-09-07 PROCEDURE — 97112 NEUROMUSCULAR REEDUCATION: CPT | Mod: GP | Performed by: PHYSICAL THERAPIST

## 2017-09-07 PROCEDURE — 97110 THERAPEUTIC EXERCISES: CPT | Mod: GP | Performed by: PHYSICAL THERAPIST

## 2017-09-14 ENCOUNTER — THERAPY VISIT (OUTPATIENT)
Dept: PHYSICAL THERAPY | Facility: CLINIC | Age: 62
End: 2017-09-14
Payer: COMMERCIAL

## 2017-09-14 DIAGNOSIS — M25.551 BILATERAL HIP PAIN: ICD-10-CM

## 2017-09-14 DIAGNOSIS — M54.50 ACUTE BILATERAL LOW BACK PAIN WITHOUT SCIATICA: ICD-10-CM

## 2017-09-14 DIAGNOSIS — M25.552 BILATERAL HIP PAIN: ICD-10-CM

## 2017-09-14 PROCEDURE — 97112 NEUROMUSCULAR REEDUCATION: CPT | Mod: GP | Performed by: PHYSICAL THERAPIST

## 2017-09-14 PROCEDURE — 97110 THERAPEUTIC EXERCISES: CPT | Mod: GP | Performed by: PHYSICAL THERAPIST

## 2017-09-16 ENCOUNTER — TRANSFERRED RECORDS (OUTPATIENT)
Dept: HEALTH INFORMATION MANAGEMENT | Facility: CLINIC | Age: 62
End: 2017-09-16

## 2017-09-21 ENCOUNTER — THERAPY VISIT (OUTPATIENT)
Dept: PHYSICAL THERAPY | Facility: CLINIC | Age: 62
End: 2017-09-21
Payer: COMMERCIAL

## 2017-09-21 DIAGNOSIS — M25.552 BILATERAL HIP PAIN: ICD-10-CM

## 2017-09-21 DIAGNOSIS — M25.551 BILATERAL HIP PAIN: ICD-10-CM

## 2017-09-21 DIAGNOSIS — M54.50 ACUTE BILATERAL LOW BACK PAIN WITHOUT SCIATICA: ICD-10-CM

## 2017-09-21 PROCEDURE — 97112 NEUROMUSCULAR REEDUCATION: CPT | Mod: GP | Performed by: PHYSICAL THERAPIST

## 2017-09-21 PROCEDURE — 97110 THERAPEUTIC EXERCISES: CPT | Mod: GP | Performed by: PHYSICAL THERAPIST

## 2017-09-21 PROCEDURE — 97140 MANUAL THERAPY 1/> REGIONS: CPT | Mod: GP | Performed by: PHYSICAL THERAPIST

## 2017-09-28 ENCOUNTER — TRANSFERRED RECORDS (OUTPATIENT)
Dept: HEALTH INFORMATION MANAGEMENT | Facility: CLINIC | Age: 62
End: 2017-09-28

## 2017-10-05 ENCOUNTER — THERAPY VISIT (OUTPATIENT)
Dept: PHYSICAL THERAPY | Facility: CLINIC | Age: 62
End: 2017-10-05
Payer: COMMERCIAL

## 2017-10-05 DIAGNOSIS — M25.551 BILATERAL HIP PAIN: ICD-10-CM

## 2017-10-05 DIAGNOSIS — M54.50 ACUTE BILATERAL LOW BACK PAIN WITHOUT SCIATICA: ICD-10-CM

## 2017-10-05 DIAGNOSIS — M25.552 BILATERAL HIP PAIN: ICD-10-CM

## 2017-10-05 PROCEDURE — 97110 THERAPEUTIC EXERCISES: CPT | Mod: GP | Performed by: PHYSICAL THERAPIST

## 2017-10-05 PROCEDURE — 97112 NEUROMUSCULAR REEDUCATION: CPT | Mod: GP | Performed by: PHYSICAL THERAPIST

## 2017-10-05 NOTE — PROGRESS NOTES
Subjective:    HPI                    Objective:    Standing Alignment:    Cervical/Thoracic:  Forward head  Shoulder/UE:  Rounded shoulders  Lumbar:  Normal            Gait:    Assistive Devices:  None  Deviations:  Hip:  Excessive flexion L and excessive flexion R               Lumbar/SI Evaluation    Lumbar Myotomes:    T12-L3 (Hip Flex):  Left: 5    Right: 5  L2-4 (Quads):  Left:  5    Right:  5  L4 (Ankle DF):  Left:  5    Right:  5  L5 (Great Toe Ext): Left: 5    Right: 5   S1 (Toe Raise):  Left: 5    Right: 5                                                               General     ROS    Assessment/Plan:      PROGRESS  REPORT    Progress reporting period is from 8/31/17 to 10/5/17.       SUBJECTIVE  Subjective changes noted by patient:  Patient had L low back injection at L4-5 yesterday. He did not note significant pain improvement yesterday. His L leg felt weak afterward. L leg feels good this morning. He denies L low back pain this morning.       Current Pain level: 0/10.     Initial Pain level: 5/10.   Changes in function:  Yes (See Goal flowsheet attached for changes in current functional level)  Adverse reaction to treatment or activity: None    OBJECTIVE  Changes noted in objective findings:  Yes, see objective findings.    ASSESSMENT/PLAN  Updated problem list and treatment plan: Diagnosis 1:  Low back, bilateral inguinal pain evidence on x-ray of degneration of L1-2, possible spinal stenosis  Pain -  hot/cold therapy, manual therapy, self management, education, directional preference exercise and home program  Decreased ROM/flexibility - manual therapy, therapeutic exercise and home program  Decreased strength - therapeutic exercise, therapeutic activities and home program  Decreased proprioception - neuro re-education, therapeutic activities and home program  Impaired muscle performance - neuro re-education and home program  Decreased function - therapeutic activities and home program  Impaired  posture - neuro re-education and home program  STG/LTGs have been met or progress has been made towards goals:  Yes (See Goal flow sheet completed today.)  Assessment of Progress: The patient's condition is improving.  Self Management Plans:  Patient has been instructed in a home treatment program.  Patient  has been instructed in self management of symptoms.  I have re-evaluated this patient and find that the nature, scope, duration and intensity of the therapy is appropriate for the medical condition of the patient.  Salbador continues to require the following intervention to meet STG and LTG's:  PT    Recommendations:  This patient would benefit from continued therapy.     Frequency:  1 X week, once daily  Duration:  for 2 weeks tapering to 1 X every other week over 4 weeks          Please refer to the daily flowsheet for treatment today, total treatment time and time spent performing 1:1 timed codes.

## 2017-10-05 NOTE — MR AVS SNAPSHOT
After Visit Summary   10/5/2017    Salbador Begum    MRN: [id number]           Patient Information     Date Of Birth          1955        Visit Information        Provider Department      10/5/2017 7:30 AM Terence Jackson, PT [facility] Physical [facility]        Today's Diagnoses     Acute bilateral low back pain without sciatica        Bilateral hip pain           Follow-ups after your visit        Your next 10 appointments already scheduled     Oct 12, 2017  8:10 AM CDT   BERTHA Spine with Terence Jackson PT   [facility] Physical Therapy ([facility]  )    [address] [place] [address]   [phone]            Oct 26, 2017  8:50 AM CDT   BERTHA Spine with Terence Jackson PT   [facility] ([facility]  )    [address] [place] [id number] [phone]              Who to contact     If you have questions or need follow up information about today's clinic visit or your schedule please contact [facility] PHYSICAL THERAPY directly at [phone].  Normal or non-critical lab and imaging results will be communicated to you by MyChart, letter or phone within 4 business days after the clinic has received the results. If you do not hear from us within 7 days, please contact the clinic through [employer] or phone. If you have a critical or abnormal lab result, we will notify you by phone as soon as possible.  Submit refill requests through [employer] or call your pharmacy and they will forward the refill request to us. Please allow 3 business days for your refill to be completed.          Additional Information About Your Visit        MyChart Information     [employer] gives you secure access to your electronic health record. If you see a primary care provider, you can also send messages to your care team and make  appointments. If you have questions, please call your primary care clinic.  If you do not have a primary care provider, please call [phone] and they will assist you.        Care EveryWhere ID     This is your Care EveryWhere ID. This could be used by other organizations to access your [facility] medical records  [id number]         Blood Pressure from Last 3 Encounters:   02/10/17 (!) 147/91   11/21/16 108/73   11/18/16 138/81    Weight from Last 3 Encounters:   02/10/17 68.5 kg (151 lb 1.6 oz)   11/18/16 68.3 kg (150 lb 8 oz)   11/11/16 64 kg (141 lb)              We Performed the Following     Neuromuscular Re-Education     Therapeutic Exercises        Primary Care Provider Office Phone # Fax #    Kobe Pierce [phone] [address]  [facility] [address]        Equal Access to Services     ANDRES MACKEY : Hadii aad ku hadasho Soomaali, waaxda luqadaha, qaybta kaalmada adeegyada, waxay leonardin haypebblesn bryan avelar . So [facility] [phone].    ATENCIÓN: Si habla español, tiene a razo disposición servicios gratuitos de asistencia lingüística. Monae al [phone].    We comply with applicable federal civil rights laws and [place] laws. We do not discriminate on the basis of race, color, national origin, age, disability, sex, sexual orientation, or gender identity.            Thank you!     Thank you for choosing [facility] PHYSICAL THERAPY  for your care. Our goal is always to provide you with excellent care. Hearing back from our patients is one way we can continue to improve our services. Please take a few minutes to complete the written survey that you may receive in the mail after your visit with us. Thank you!             Your Updated Medication List - Protect others around you: Learn how to safely use, store and throw away your medicines at www.disposemymeds.org.          This list is accurate as of: 10/5/17 11:59 PM.  Always use your most  recent med list.                   Brand Name Dispense Instructions for use Diagnosis    amphetamine-dextroamphetamine 10 MG per 24 hr capsule    ADDERALL XR    90 capsule    Take 1 capsule (10 mg) by mouth daily    Idiopathic hypersomnolence       buPROPion 150 MG 12 hr tablet    WELLBUTRIN SR    90 tablet    Take 1 tablet (150 mg) by mouth daily 1 time daily    Major depressive disorder, recurrent, mild (H)       calcium + D 600-200 MG-UNIT Tabs   Generic drug:  calcium carbonate-vitamin D      Take 1 tablet by mouth daily.        FLUoxetine 20 MG capsule    PROZAC    90 capsule    Take 1 capsule (20 mg) by mouth daily    Major depressive disorder, recurrent, mild (H)       ibuprofen 200 MG tablet    ADVIL/MOTRIN     Take 400 mg by mouth every 6 hours as needed for mild pain        MULTI-DAY vitamin  S Tabs      Take  by mouth.        TYLENOL ARTHRITIS PAIN PO      Take 650 mg by mouth every 6 hours as needed

## 2017-10-12 ENCOUNTER — THERAPY VISIT (OUTPATIENT)
Dept: PHYSICAL THERAPY | Facility: CLINIC | Age: 62
End: 2017-10-12
Payer: COMMERCIAL

## 2017-10-12 DIAGNOSIS — M25.552 BILATERAL HIP PAIN: ICD-10-CM

## 2017-10-12 DIAGNOSIS — M25.551 BILATERAL HIP PAIN: ICD-10-CM

## 2017-10-12 DIAGNOSIS — M54.50 ACUTE BILATERAL LOW BACK PAIN WITHOUT SCIATICA: ICD-10-CM

## 2017-10-12 PROCEDURE — 97140 MANUAL THERAPY 1/> REGIONS: CPT | Mod: GP | Performed by: PHYSICAL THERAPIST

## 2017-10-12 PROCEDURE — 97110 THERAPEUTIC EXERCISES: CPT | Mod: GP | Performed by: PHYSICAL THERAPIST

## 2017-10-12 PROCEDURE — 97112 NEUROMUSCULAR REEDUCATION: CPT | Mod: GP | Performed by: PHYSICAL THERAPIST

## 2017-10-20 ENCOUNTER — MYC MEDICAL ADVICE (OUTPATIENT)
Dept: FAMILY MEDICINE | Facility: CLINIC | Age: 62
End: 2017-10-20

## 2017-10-26 ENCOUNTER — THERAPY VISIT (OUTPATIENT)
Dept: PHYSICAL THERAPY | Facility: CLINIC | Age: 62
End: 2017-10-26
Payer: COMMERCIAL

## 2017-10-26 DIAGNOSIS — M25.551 BILATERAL HIP PAIN: ICD-10-CM

## 2017-10-26 DIAGNOSIS — M25.552 BILATERAL HIP PAIN: ICD-10-CM

## 2017-10-26 DIAGNOSIS — M54.50 ACUTE BILATERAL LOW BACK PAIN WITHOUT SCIATICA: ICD-10-CM

## 2017-10-26 PROCEDURE — 97112 NEUROMUSCULAR REEDUCATION: CPT | Mod: GP | Performed by: PHYSICAL THERAPIST

## 2017-10-26 PROCEDURE — 97140 MANUAL THERAPY 1/> REGIONS: CPT | Mod: GP | Performed by: PHYSICAL THERAPIST

## 2017-10-26 PROCEDURE — 97110 THERAPEUTIC EXERCISES: CPT | Mod: GP | Performed by: PHYSICAL THERAPIST

## 2017-11-09 ENCOUNTER — THERAPY VISIT (OUTPATIENT)
Dept: PHYSICAL THERAPY | Facility: CLINIC | Age: 62
End: 2017-11-09
Payer: COMMERCIAL

## 2017-11-09 DIAGNOSIS — M25.552 BILATERAL HIP PAIN: ICD-10-CM

## 2017-11-09 DIAGNOSIS — M25.551 BILATERAL HIP PAIN: ICD-10-CM

## 2017-11-09 DIAGNOSIS — M54.50 ACUTE BILATERAL LOW BACK PAIN WITHOUT SCIATICA: ICD-10-CM

## 2017-11-09 PROCEDURE — 97110 THERAPEUTIC EXERCISES: CPT | Mod: GP | Performed by: PHYSICAL THERAPIST

## 2017-11-09 PROCEDURE — 97530 THERAPEUTIC ACTIVITIES: CPT | Mod: GP | Performed by: PHYSICAL THERAPIST

## 2017-11-09 NOTE — LETTER
[facility] ATHLETIC MEDICINE [facility] PHYSICAL [facility]  [address] [address]  [phone]    2017    Re: Salbador Begum   :   1955  MRN:  [id number]   REFERRING PHYSICIAN:   Norris Spencer    [facility]THLETIC [facility]    Date of Initial Evaluation:  2017  Visits:  8  Rxs Used: 8  Reason for Referral:     Acute bilateral low back pain without sciatica  Bilateral hip pain    PROGRESS  REPORT    Progress reporting period is from 10/5/17 to 17.       SUBJECTIVE  Subjective changes noted by patient: Patient reports his low back, hip, and L leg symptoms worsened in afternoon on 17. He recalls working longer hours that day and was working in less normal positions. Feels better for about 1 hour after performing press ups. His pain increases w/ bending forward and standing. Locates pain over central low back which radiates out to B low back, hips, and groin (L>R). Feels better in morning and gets worse later in day. Locates numbness over dorsum of L foot.    Current Pain level: 5-7/10.     Initial Pain level: 5/10.   Changes in function:  Yes (See Goal flowsheet attached for changes in current functional level)  Adverse reaction to treatment or activity: None    OBJECTIVE  Changes noted in objective findings:  Yes, see objective findings.    ASSESSMENT/PLAN  Updated problem list and treatment plan: Diagnosis 1:  Low back, bilateral inguinal pain evidence on x-ray of degneration of L1-2, possible spinal stenosis  Pain -  hot/cold therapy, manual therapy, self management, education, directional preference exercise and home program  Decreased ROM/flexibility - manual therapy, therapeutic exercise and home program  Decreased strength - therapeutic exercise, therapeutic activities and home program  Decreased proprioception - neuro re-education, therapeutic activities and home program  Impaired muscle performance - neuro  re-education and home program  Decreased function - therapeutic activities and home program  Impaired posture - neuro re-education and home program  STG/LTGs have been met or progress has been made towards goals:  See Goal flow sheet completed today.  Re: Salbador Begum   :   1955    Assessment of Progress: The patient's condition has potential to improve.  The patient has had set backs in their progress.  Self Management Plans:  Patient has been instructed in a home treatment program.  Patient  has been instructed in self management of symptoms.  I have re-evaluated this patient and find that the nature, scope, duration and intensity of the therapy is appropriate for the medical condition of the patient.  Salbador continues to require the following intervention to meet STG and LTG's:  PT    Recommendations:  This patient would benefit from further evaluation.  Patient was instructed to return to MD for further evaluation. Patient agreed with this plan and may return to PT pending MD orders.              Thank you for your referral.    INQUIRIES  Therapist: Terence Jackson, PT  [facility] PHYSICAL THERAPY  [address] [place] [address]  Phone: [phone]  Fax: [phone]

## 2017-11-09 NOTE — MR AVS SNAPSHOT
After Visit Summary   11/9/2017    Salbador Begum    MRN: [id number]           Patient Information     Date Of Birth          1955        Visit Information        Provider Department      11/9/2017 8:10 AM Terence Jackson, PT [facility] Physical Therapy        Today's Diagnoses     Acute bilateral low back pain without sciatica        Bilateral hip pain           Follow-ups after your visit        Your next 10 appointments already scheduled     Nov 30, 2017  8:10 AM CST   BERTHA Spine with Terence Jackson PT   [facility] Physical Therapy ([facility]  )    [address] [place] [id number] [phone]              Who to contact     If you have questions or need follow up information about today's clinic visit or your schedule please contact [facility] ATHLETIC [facility] PHYSICAL THERAPY directly at [phone].  Normal or non-critical lab and imaging results will be communicated to you by [employer], letter or phone within 4 business days after the clinic has received the results. If you do not hear from us within 7 days, please contact the clinic through [employer] or phone. If you have a critical or abnormal lab result, we will notify you by phone as soon as possible.  Submit refill requests through [employer] or call your pharmacy and they will forward the refill request to us. Please allow 3 business days for your refill to be completed.          Additional Information About Your Visit        MyChart Information     [employer] gives you secure access to your electronic health record. If you see a primary care provider, you can also send messages to your care team and make appointments. If you have questions, please call your primary care clinic.  If you do not have a primary care provider, please call [phone] and they will assist you.        Care EveryWhere ID     This is your Care  EveryWhere ID. This could be used by other organizations to access your [facility] medical records  [id number]         Blood Pressure from Last 3 Encounters:   02/10/17 (!) 147/91   11/21/16 108/73   11/18/16 138/81    Weight from Last 3 Encounters:   02/10/17 68.5 kg (151 lb 1.6 oz)   11/18/16 68.3 kg (150 lb 8 oz)   11/11/16 64 kg (141 lb)              We Performed the Following     BERTHA Progress Notes Report     Therapeutic Activities     Therapeutic Exercises        Primary Care Provider Office Phone # Fax #    Kobe Pierce [phone] [address]  [facility] [address]        Equal Access to Services     ANDRES MACKEY : Hadii aad ku hadasho Soomaali, waaxda luqadaha, qaybta kaalmada adeegyada, waxay leonardin jimmy avelar . So [facility] [phone].    ATENCIÓN: Si habla español, tiene a razo disposición servicios gratuitos de asistencia lingüística. [facility] [phone].    We comply with applicable federal civil rights laws and [place] laws. We do not discriminate on the basis of race, color, national origin, age, disability, sex, sexual orientation, or gender identity.            Thank you!     Thank you for choosing [facility] PHYSICAL THERAPY  for your care. Our goal is always to provide you with excellent care. Hearing back from our patients is one way we can continue to improve our services. Please take a few minutes to complete the written survey that you may receive in the mail after your visit with us. Thank you!             Your Updated Medication List - Protect others around you: Learn how to safely use, store and throw away your medicines at www.disposemymeds.org.          This list is accurate as of: 11/9/17 11:59 PM.  Always use your most recent med list.                   Brand Name Dispense Instructions for use Diagnosis    amphetamine-dextroamphetamine 10 MG per 24 hr capsule    ADDERALL XR    90 capsule    Take 1 capsule (10  mg) by mouth daily    Idiopathic hypersomnolence       buPROPion 150 MG 12 hr tablet    WELLBUTRIN SR    90 tablet    Take 1 tablet (150 mg) by mouth daily 1 time daily    Major depressive disorder, recurrent, mild (H)       calcium + D 600-200 MG-UNIT Tabs   Generic drug:  calcium carbonate-vitamin D      Take 1 tablet by mouth daily.        FLUoxetine 20 MG capsule    PROZAC    90 capsule    Take 1 capsule (20 mg) by mouth daily    Major depressive disorder, recurrent, mild (H)       ibuprofen 200 MG tablet    ADVIL/MOTRIN     Take 400 mg by mouth every 6 hours as needed for mild pain        MULTI-DAY vitamin  S Tabs      Take  by mouth.        TYLENOL ARTHRITIS PAIN PO      Take 650 mg by mouth every 6 hours as needed

## 2017-11-09 NOTE — PROGRESS NOTES
Subjective:    HPI                    Objective:    System         Lumbar/SI Evaluation  ROM:    AROM Lumbar:   Flexion:          WNL + L low back, hip, and groin  Ext:                    Mod loss + low back and L leg   Side Bend:        Left:     Right:   Rotation:           Left:     Right:   Side Glide:        Left:  Min loss -/+    Right:  Min loss -/+          Lumbar Myotomes:  normal                Lumbar Dermtomes:        L2 Left:  Normal-light touch     L2 Right:  Normal-light touch  L3 Left:  Normal-light touch     L3 Right:  Normal-light touch  L4 Left:  Hypo-light touch       L4 Right:  Normal-light touch  L5 Left:  Normal-light touch     L5 Right:  Normal-light touch  S1 Left:  Normal-light touch     S1 Right:  Normal-light touch                                                         General     ROS    Assessment/Plan:      PROGRESS  REPORT    Progress reporting period is from 10/5/17 to 11/9/17.       SUBJECTIVE  Subjective changes noted by patient: Patient reports his low back, hip, and L leg symptoms worsened in afternoon on 11/7/17. He recalls working longer hours that day and was working in less normal positions. Feels better for about 1 hour after performing press ups. His pain increases w/ bending forward and standing. Locates pain over central low back which radiates out to B low back, hips, and groin (L>R). Feels better in morning and gets worse later in day. Locates numbness over dorsum of L foot.    Current Pain level: 5-7/10.     Initial Pain level: 5/10.   Changes in function:  Yes (See Goal flowsheet attached for changes in current functional level)  Adverse reaction to treatment or activity: None    OBJECTIVE  Changes noted in objective findings:  Yes, see objective findings.    ASSESSMENT/PLAN  Updated problem list and treatment plan: Diagnosis 1:  Low back, bilateral inguinal pain evidence on x-ray of degneration of L1-2, possible spinal stenosis  Pain -  hot/cold therapy, manual  therapy, self management, education, directional preference exercise and home program  Decreased ROM/flexibility - manual therapy, therapeutic exercise and home program  Decreased strength - therapeutic exercise, therapeutic activities and home program  Decreased proprioception - neuro re-education, therapeutic activities and home program  Impaired muscle performance - neuro re-education and home program  Decreased function - therapeutic activities and home program  Impaired posture - neuro re-education and home program  STG/LTGs have been met or progress has been made towards goals:  See Goal flow sheet completed today.  Assessment of Progress: The patient's condition has potential to improve.  The patient has had set backs in their progress.  Self Management Plans:  Patient has been instructed in a home treatment program.  Patient  has been instructed in self management of symptoms.  I have re-evaluated this patient and find that the nature, scope, duration and intensity of the therapy is appropriate for the medical condition of the patient.  Salbador continues to require the following intervention to meet STG and LTG's:  PT    Recommendations:  This patient would benefit from further evaluation.  Patient was instructed to return to MD for further evaluation. Patient agreed with this plan and may return to PT pending MD orders.    Please refer to the daily flowsheet for treatment today, total treatment time and time spent performing 1:1 timed codes.

## 2017-11-28 ENCOUNTER — E-VISIT (OUTPATIENT)
Dept: FAMILY MEDICINE | Facility: CLINIC | Age: 62
End: 2017-11-28
Payer: COMMERCIAL

## 2017-11-28 DIAGNOSIS — G47.11 IDIOPATHIC HYPERSOMNOLENCE: ICD-10-CM

## 2017-11-28 PROCEDURE — 99444 ZZC PHYSICIAN ONLINE EVALUATION & MANAGEMENT SERVICE: CPT | Performed by: FAMILY MEDICINE

## 2017-11-28 RX ORDER — DEXTROAMPHETAMINE SACCHARATE, AMPHETAMINE ASPARTATE MONOHYDRATE, DEXTROAMPHETAMINE SULFATE AND AMPHETAMINE SULFATE 2.5; 2.5; 2.5; 2.5 MG/1; MG/1; MG/1; MG/1
10 CAPSULE, EXTENDED RELEASE ORAL DAILY
Qty: 90 CAPSULE | Refills: 0 | Status: SHIPPED | OUTPATIENT
Start: 2017-11-28 | End: 2018-01-09

## 2017-11-30 ENCOUNTER — TELEPHONE (OUTPATIENT)
Dept: FAMILY MEDICINE | Facility: CLINIC | Age: 62
End: 2017-11-30

## 2017-11-30 ENCOUNTER — THERAPY VISIT (OUTPATIENT)
Dept: PHYSICAL THERAPY | Facility: CLINIC | Age: 62
End: 2017-11-30
Payer: COMMERCIAL

## 2017-11-30 DIAGNOSIS — M25.551 BILATERAL HIP PAIN: ICD-10-CM

## 2017-11-30 DIAGNOSIS — M54.5 LOW BACK PAIN, UNSPECIFIED BACK PAIN LATERALITY, UNSPECIFIED CHRONICITY, WITH SCIATICA PRESENCE UNSPECIFIED: Primary | ICD-10-CM

## 2017-11-30 DIAGNOSIS — M25.552 BILATERAL HIP PAIN: ICD-10-CM

## 2017-11-30 DIAGNOSIS — M54.50 ACUTE BILATERAL LOW BACK PAIN WITHOUT SCIATICA: ICD-10-CM

## 2017-11-30 PROCEDURE — 97112 NEUROMUSCULAR REEDUCATION: CPT | Mod: GP | Performed by: PHYSICAL THERAPIST

## 2017-11-30 PROCEDURE — 97110 THERAPEUTIC EXERCISES: CPT | Mod: GP | Performed by: PHYSICAL THERAPIST

## 2017-11-30 PROCEDURE — 97140 MANUAL THERAPY 1/> REGIONS: CPT | Mod: GP | Performed by: PHYSICAL THERAPIST

## 2017-11-30 NOTE — TELEPHONE ENCOUNTER
Dr. Ramirez from [facility] is req an order for pt to be seen for PT regarding his lower back pain.  Beatrice Gaytan

## 2018-01-09 ENCOUNTER — OFFICE VISIT (OUTPATIENT)
Dept: FAMILY MEDICINE | Facility: CLINIC | Age: 63
End: 2018-01-09
Payer: COMMERCIAL

## 2018-01-09 ENCOUNTER — RADIANT APPOINTMENT (OUTPATIENT)
Dept: GENERAL RADIOLOGY | Facility: CLINIC | Age: 63
End: 2018-01-09
Attending: FAMILY MEDICINE
Payer: COMMERCIAL

## 2018-01-09 VITALS
DIASTOLIC BLOOD PRESSURE: 76 MMHG | HEART RATE: 53 BPM | WEIGHT: 149.8 LBS | TEMPERATURE: 97.6 F | SYSTOLIC BLOOD PRESSURE: 117 MMHG | BODY MASS INDEX: 22.7 KG/M2 | RESPIRATION RATE: 16 BRPM | HEIGHT: 68 IN | OXYGEN SATURATION: 97 %

## 2018-01-09 DIAGNOSIS — Z23 NEED FOR PROPHYLACTIC VACCINATION AND INOCULATION AGAINST INFLUENZA: ICD-10-CM

## 2018-01-09 DIAGNOSIS — Z12.5 SPECIAL SCREENING FOR MALIGNANT NEOPLASM OF PROSTATE: Primary | ICD-10-CM

## 2018-01-09 DIAGNOSIS — M25.50 PAIN IN JOINT, MULTIPLE SITES: ICD-10-CM

## 2018-01-09 DIAGNOSIS — Z00.00 ROUTINE GENERAL MEDICAL EXAMINATION AT A HEALTH CARE FACILITY: ICD-10-CM

## 2018-01-09 DIAGNOSIS — F33.0 MAJOR DEPRESSIVE DISORDER, RECURRENT, MILD (H): ICD-10-CM

## 2018-01-09 LAB
ERYTHROCYTE [DISTWIDTH] IN BLOOD BY AUTOMATED COUNT: 11.8 % (ref 10–15)
ERYTHROCYTE [SEDIMENTATION RATE] IN BLOOD BY WESTERGREN METHOD: 5 MM/H (ref 0–20)
HCT VFR BLD AUTO: 40.7 % (ref 40–53)
HGB BLD-MCNC: 14.3 G/DL (ref 13.3–17.7)
MCH RBC QN AUTO: 31 PG (ref 26.5–33)
MCHC RBC AUTO-ENTMCNC: 35.1 G/DL (ref 31.5–36.5)
MCV RBC AUTO: 88 FL (ref 78–100)
PLATELET # BLD AUTO: 162 10E9/L (ref 150–450)
RBC # BLD AUTO: 4.62 10E12/L (ref 4.4–5.9)
WBC # BLD AUTO: 5.9 10E9/L (ref 4–11)

## 2018-01-09 PROCEDURE — G0103 PSA SCREENING: HCPCS | Performed by: FAMILY MEDICINE

## 2018-01-09 PROCEDURE — 36415 COLL VENOUS BLD VENIPUNCTURE: CPT | Performed by: FAMILY MEDICINE

## 2018-01-09 PROCEDURE — 84550 ASSAY OF BLOOD/URIC ACID: CPT | Performed by: FAMILY MEDICINE

## 2018-01-09 PROCEDURE — 85027 COMPLETE CBC AUTOMATED: CPT | Performed by: FAMILY MEDICINE

## 2018-01-09 PROCEDURE — 80053 COMPREHEN METABOLIC PANEL: CPT | Performed by: FAMILY MEDICINE

## 2018-01-09 PROCEDURE — 86708 HEPATITIS A ANTIBODY: CPT | Performed by: FAMILY MEDICINE

## 2018-01-09 PROCEDURE — 73140 X-RAY EXAM OF FINGER(S): CPT | Mod: RT

## 2018-01-09 PROCEDURE — 99396 PREV VISIT EST AGE 40-64: CPT | Performed by: FAMILY MEDICINE

## 2018-01-09 PROCEDURE — 85652 RBC SED RATE AUTOMATED: CPT | Performed by: FAMILY MEDICINE

## 2018-01-09 PROCEDURE — 86431 RHEUMATOID FACTOR QUANT: CPT | Performed by: FAMILY MEDICINE

## 2018-01-09 RX ORDER — CIPROFLOXACIN 500 MG/1
500 TABLET, FILM COATED ORAL 2 TIMES DAILY
Qty: 6 TABLET | Refills: 0 | Status: SHIPPED | OUTPATIENT
Start: 2018-01-09 | End: 2018-06-29

## 2018-01-09 NOTE — LETTER
My Depression Action Plan  Name: Salbador Begum   Date of Birth 1955  Date: 1/9/2018    My doctor: Kobe Pierce   My clinic: [address] [id number] [phone]          GREEN    ZONE   Good Control    What it looks like:     Things are going generally well. You have normal up s and down s. You may even feel depressed from time to time, but bad moods usually last less than a day.   What you need to do:  1. Continue to care for yourself (see self care plan)  2. Check your depression survival kit and update it as needed  3. Follow your physician s recommendations including any medication.  4. Do not stop taking medication unless you consult with your physician first.           YELLOW         ZONE Getting Worse    What it looks like:     Depression is starting to interfere with your life.     It may be hard to get out of bed; you may be starting to isolate yourself from others.    Symptoms of depression are starting to last most all day and this has happened for several days.     You may have suicidal thoughts but they are not constant.   What you need to do:     1. Call your care team, your response to treatment will improve if you keep your care team informed of your progress. Yellow periods are signs an adjustment may need to be made.     2. Continue your self-care, even if you have to fake it!    3. Talk to someone in your support network    4. Open up your depression survival kit           RED    ZONE Medical Alert - Get Help    What it looks like:     Depression is seriously interfering with your life.     You may experience these or other symptoms: You can t get out of bed most days, can t work or engage in other necessary activities, you have trouble taking care of basic hygiene, or basic responsibilities, thoughts of suicide or death that will not go away, self-injurious behavior.     What you need to do:  1. Call your care team  and request a same-day appointment. If they are not available (weekends or after hours) call your local crisis line, emergency room or 911.      Electronically signed by: Lisa Dawkins, January 9, 2018    Depression Self Care Plan / Survival Kit    Self-Care for Depression  Here s the deal. Your body and mind are really not as separate as most people think.  What you do and think affects how you feel and how you feel influences what you do and think. This means if you do things that people who feel good do, it will help you feel better.  Sometimes this is all it takes.  There is also a place for medication and therapy depending on how severe your depression is, so be sure to consult with your medical provider and/ or Behavioral Health Consultant if your symptoms are worsening or not improving.     In order to better manage my stress, I will:    Exercise  Get some form of exercise, every day. This will help reduce pain and release endorphins, the  feel good  chemicals in your brain. This is almost as good as taking antidepressants!  This is not the same as joining a gym and then never going! (they count on that by the way ) It can be as simple as just going for a walk or doing some gardening, anything that will get you moving.      Hygiene   Maintain good hygiene (Get out of bed in the morning, Make your bed, Brush your teeth, Take a shower, and Get dressed like you were going to work, even if you are unemployed).  If your clothes don't fit try to get ones that do.    Diet  I will strive to eat foods that are good for me, drink plenty of water, and avoid excessive sugar, caffeine, alcohol, and other mood-altering substances.  Some foods that are helpful in depression are: complex carbohydrates, B vitamins, flaxseed, fish or fish oil, fresh fruits and vegetables.    Psychotherapy  I agree to participate in Individual Therapy (if recommended).    Medication  If prescribed medications, I agree to take them.  Missing doses  can result in serious side effects.  I understand that drinking alcohol, or other illicit drug use, may cause potential side effects.  I will not stop my medication abruptly without first discussing it with my provider.    Staying Connected With Others  I will stay in touch with my friends, family members, and my primary care provider/team.    Use your imagination  Be creative.  We all have a creative side; it doesn t matter if it s oil painting, sand castles, or mud pies! This will also kick up the endorphins.    Witness Beauty  (AKA stop and smell the roses) Take a look outside, even in mid-winter. Notice colors, textures. Watch the squirrels and birds.     Service to others  Be of service to others.  There is always someone else in need.  By helping others we can  get out of ourselves  and remember the really important things.  This also provides opportunities for practicing all the other parts of the program.    Humor  Laugh and be silly!  Adjust your TV habits for less news and crime-drama and more comedy.    Control your stress  Try breathing deep, massage therapy, biofeedback, and meditation. Find time to relax each day.     My support system    Clinic Contact:  Phone number:    Contact 1:  Phone number:    Contact 2:  Phone number:    Amish/:  Phone number:    Therapist:  Phone number:    Local crisis center:    Phone number:    Other community support:  Phone number:

## 2018-01-09 NOTE — PROGRESS NOTES
SUBJECTIVE:   CC: Salbador Begum is an 62 year old male who presents for preventative health visit.     Physical   Annual:     Getting at least 3 servings of Calcium per day::  NO    Bi-annual eye exam::  Yes    Dental care twice a year::  Yes    Sleep apnea or symptoms of sleep apnea::  Daytime drowsiness and Sleep apnea    Diet::  Regular (no restrictions)    Taking medications regularly::  Yes    Medication side effects::  None    Additional concerns today::  YES            Other Concern: Discuss about right hand pain and arthritis pain management-using ibuprofen most every day due to pain in his right index finger. Does not swell nor warm nor red. No injury nor overuse.      travel[place] to [place] 6 weeks in about a month.     Today's PHQ-2 Score: PHQ-2 ( 1999 Pfizer) 1/9/2018   Q1: Little interest or pleasure in doing things 0   Q2: Feeling down, depressed or hopeless 0   PHQ-2 Score 0   Q1: Little interest or pleasure in doing things Not at all   Q2: Feeling down, depressed or hopeless Not at all   PHQ-2 Score 0       Abuse: Current or Past(Physical, Sexual or Emotional)- No  Do you feel safe in your environment - Yes    Social History   Substance Use Topics     Smoking status: Never Smoker     Smokeless tobacco: Never Used     Alcohol use Yes      Comment: 14 glass of wine     Alcohol Use 1/9/2018   If you drink alcohol, do you typically have greater than 3 drinks per day OR greater than 7 drinks per week?   Yes   AUDIT SCORE  4     AUDIT - Alcohol Use Disorders Identification Test - Reproduced from the World Health Organization Audit 2001 (Second Edition) 1/9/2018   1.  How often do you have a drink containing alcohol? 4 or more times a week   2.  How many drinks containing alcohol do you have on a typical day when you are drinking? 1 or 2   3.  How often do you have five or more drinks on one occasion? Never   4.  How often during the last year have you found that you were not  "able to stop drinking once you had started? Never   5.  How often during the last year have you failed to do what was normally expected of you because of drinking? Never   6.  How often during the last year have you needed a first drink in the morning to get yourself going after a heavy drinking session? Never   7.  How often during the last year have you had a feeling of guilt or remorse after drinking? Never   8.  How often during the last year have you been unable to remember what happened the night before because of your drinking? Never   9.  Have you or someone else been injured because of your drinking? No   10. Has a relative, friend, doctor or other health care worker been concerned about your drinking or suggested you cut down? No   TOTAL SCORE 4         Last PSA: No results found for: PSA    Reviewed orders with patient. Reviewed health maintenance and updated orders accordingly - Yes  Labs reviewed in EPIC    Reviewed and updated as needed this visit by clinical staff  Tobacco  Allergies  Med Hx  Surg Hx  Fam Hx  Soc Hx        Reviewed and updated as needed this visit by Provider            Review of Systems  C: NEGATIVE for fever, chills, change in weight  I: NEGATIVE for worrisome rashes, moles or lesions  E: NEGATIVE for vision changes or irritation  ENT: NEGATIVE for ear, mouth and throat problems  R: NEGATIVE for significant cough or SOB  CV: NEGATIVE for chest pain, palpitations or peripheral edema  GI: NEGATIVE for nausea, abdominal pain, heartburn, or change in bowel habits   male: negative for dysuria, hematuria, decreased urinary stream, erectile dysfunction, urethral discharge  M: NEGATIVE for significant arthralgias or myalgia  N: NEGATIVE for weakness, dizziness or paresthesias  P: NEGATIVE for changes in mood or affect    OBJECTIVE:   /76 (BP Location: Left arm, Patient Position: Sitting, Cuff Size: Adult Regular)  Pulse 53  Temp 97.6  F (36.4  C) (Oral)  Resp 16  Ht 5' 7.5\" " (1.715 m)  Wt 149 lb 12.8 oz (67.9 kg)  SpO2 97%  BMI 23.12 kg/m2    Physical Exam  GENERAL: healthy, alert and no distress  EYES: Eyes grossly normal to inspection, PERRL and conjunctivae and sclerae normal  HENT: ear canals and TM's normal, nose and mouth without ulcers or lesions  NECK: no adenopathy, no asymmetry, masses, or scars and thyroid normal to palpation  RESP: lungs clear to auscultation - no rales, rhonchi or wheezes  CV: regular rate and rhythm, normal S1 S2, no S3 or S4, no murmur, click or rub, no peripheral edema and peripheral pulses strong  ABDOMEN: soft, nontender, no hepatosplenomegaly, no masses and bowel sounds normal  MS: finger not red nor swollen but there is bony enlargement.   SKIN: no suspicious lesions or rashes  NEURO: Normal strength and tone, mentation intact and speech normal  PSYCH: mentation appears normal, affect normal/bright    xryay shows bony djd changes.   ASSESSMENT/PLAN:       ICD-10-CM    1. Special screening for malignant neoplasm of prostate Z12.5 PSA, screen   2. Routine general medical examination at a health care facility Z00.00 Hepatitis Antibody A IgG     Comprehensive metabolic panel     CBC with platelets     ciprofloxacin (CIPRO) 500 MG tablet     typhoid (VIVOTIF) CR capsule   3. Need for prophylactic vaccination and inoculation against influenza Z23    4. Major depressive disorder, recurrent, mild (H) F33.0    5. Pain in joint, multiple sites M25.50 Hepatitis Antibody A IgG     Comprehensive metabolic panel     CBC with platelets     Uric acid     ESR: Erythrocyte sedimentation rate     Rheumatoid factor     XR Finger Right G/E 2 Views   ? Degernative. Doubt it is gout related. Symptomatic therapy and follow up discussed. Conservative treatment measures discussed.      His depression .are controlled without meds now that not working.     COUNSELING:   Reviewed preventive health counseling, as reflected in patient instructions       Regular exercise        "Healthy diet/nutrition       Immunizations    Vaccinated for: typhoid sent in.            Colon cancer screening       Prostate cancer screening             reports that he has never smoked. He has never used smokeless tobacco.      Estimated body mass index is 23.12 kg/(m^2) as calculated from the following:    Height as of this encounter: 5' 7.5\" (1.715 m).    Weight as of this encounter: 149 lb 12.8 oz (67.9 kg).         Counseling Resources:  ATP IV Guidelines  Pooled Cohorts Equation Calculator  FRAX Risk Assessment  ICSI Preventive Guidelines  Dietary Guidelines for Americans, 2010  [email or web address]'s MyPlate  ASA Prophylaxis  Lung CA Screening    Kobe Pierce MD  [facility] for HPI/ROS submitted by the patient on 1/9/2018   PHQ-2 Score: 0    "

## 2018-01-09 NOTE — MR AVS SNAPSHOT
After Visit Summary   1/9/2018    Salbador Begum    MRN: [id number]           Patient Information     Date Of Birth          1955        Visit Information        Provider Department      1/9/2018 1:20 PM Kobe Pierce MD [facility]        Today's Diagnoses     Special screening for malignant neoplasm of prostate    -  1    Routine general medical examination at a health care facility        Need for prophylactic vaccination and inoculation against influenza        Major depressive disorder, recurrent, mild (H)        Pain in joint, multiple sites          Care Instructions      Preventive Health Recommendations  Male Ages 50 - 64    Yearly exam:             See your health care provider every year in order to  o   Review health changes.   o   Discuss preventive care.    o   Review your medicines if your doctor has prescribed any.     Have a cholesterol test every 5 years, or more frequently if you are at risk for high cholesterol/heart disease.     Have a diabetes test (fasting glucose) every three years. If you are at risk for diabetes, you should have this test more often.     Have a colonoscopy at age 50, or have a yearly FIT test (stool test). These exams will check for colon cancer.      Talk with your health care provider about whether or not a prostate cancer screening test (PSA) is right for you.    You should be tested each year for STDs (sexually transmitted diseases), if you re at risk.     Shots: Get a flu shot each year. Get a tetanus shot every 10 years.     Nutrition:    Eat at least 5 servings of fruits and vegetables daily.     Eat whole-grain bread, whole-wheat pasta and brown rice instead of white grains and rice.     Talk to your provider about Calcium and Vitamin D.     Lifestyle    Exercise for at least 150 minutes a week (30 minutes a day, 5 days a week). This will help you control your weight and prevent disease.     Limit alcohol to one drink  "per day.     No smoking.     Wear sunscreen to prevent skin cancer.     See your dentist every six months for an exam and cleaning.     See your eye doctor every 1 to 2 years.            Follow-ups after your visit        Who to contact     If you have questions or need follow up information about today's clinic visit or your schedule please contact [facility] directly at [phone].  Normal or non-critical lab and imaging results will be communicated to you by KRAFTWERKhart, letter or phone within 4 business days after the clinic has received the results. If you do not hear from us within 7 days, please contact the clinic through [email or web address] or phone. If you have a critical or abnormal lab result, we will notify you by phone as soon as possible.  Submit refill requests through OwlTing ??? or call your pharmacy and they will forward the refill request to us. Please allow 3 business days for your refill to be completed.          Additional Information About Your Visit        KRAFTWERKharOverinteractive Media Information     OwlTing ??? gives you secure access to your electronic health record. If you see a primary care provider, you can also send messages to your care team and make appointments. If you have questions, please call your primary care clinic.  If you do not have a primary care provider, please call [phone] and they will assist you.        Care EveryWhere ID     This is your Care EveryWhere ID. This could be used by other organizations to access your [place] medical records  [id number]        Your Vitals Were     Pulse Temperature Respirations Height Pulse Oximetry BMI (Body Mass Index)    53 97.6  F (36.4  C) (Oral) 16 5' 7.5\" (1.715 m) 97% 23.12 kg/m2       Blood Pressure from Last 3 Encounters:   01/09/18 117/76   02/10/17 (!) 147/91   11/21/16 108/73    Weight from Last 3 Encounters:   01/09/18 149 lb 12.8 oz (67.9 kg)   02/10/17 151 lb 1.6 oz (68.5 kg)   11/18/16 150 lb 8 oz (68.3 kg)              We Performed the " Following     CBC with platelets     Comprehensive metabolic panel     ESR: Erythrocyte sedimentation rate     Hepatitis Antibody A IgG     PSA, screen     Rheumatoid factor     Uric acid          Today's Medication Changes          These changes are accurate as of: 1/9/18  2:39 PM.  If you have any questions, ask your nurse or doctor.               Start taking these medicines.        Dose/Directions    ciprofloxacin 500 MG tablet   Commonly known as:  CIPRO   Used for:  Routine general medical examination at a health care facility   Started by:  Kobe Pierce MD        Dose:  500 mg   Take 1 tablet (500 mg) by mouth 2 times daily   Quantity:  6 tablet   Refills:  0       typhoid CR capsule   Commonly known as:  VIVOTIF   Used for:  Routine general medical examination at a health care facility   Started by:  Kobe Pierce MD        Dose:  1 capsule   Take 1 capsule by mouth every other day   Quantity:  4 capsule   Refills:  0            Where to get your medicines      These medications were sent to [facility] - [place], [place] - [address]  [address], [place] [address]     Phone:  [phone]     ciprofloxacin 500 MG tablet    typhoid CR capsule                Primary Care Provider Office Phone # Fax #    Kobe Pierce [phone]       [address]  [facility] [address]        Equal Access to Services     ANDRES MACKEY AH: Hadii dewayne clayton hadasho Soomaali, waaxda luqadaha, qaybta kaalmada adeegyada, lalita grey. So [facility] [phone].    ATENCIÓN: Si habla español, tiene a razo disposición servicios gratuitos de asistencia lingüística. Monae al [phone].    We comply with applicable federal civil rights laws and [place] laws. We do not discriminate on the basis of race, color, national origin, age, disability, sex, sexual orientation, or gender identity.            Thank you!     Thank you for choosing [facility]  for your care. Our goal is always to provide you with excellent care. Hearing back from our patients is one way we can continue to improve our services. Please take a few minutes to complete the written survey that you may receive in the mail after your visit with us. Thank you!             Your Updated Medication List - Protect others around you: Learn how to safely use, store and throw away your medicines at www.disposemymeds.org.          This list is accurate as of: 1/9/18  2:39 PM.  Always use your most recent med list.                   Brand Name Dispense Instructions for use Diagnosis    calcium + D 600-200 MG-UNIT Tabs   Generic drug:  calcium carbonate-vitamin D      Take 1 tablet by mouth daily.        ciprofloxacin 500 MG tablet    CIPRO    6 tablet    Take 1 tablet (500 mg) by mouth 2 times daily    Routine general medical examination at a health care facility       ibuprofen 200 MG tablet    ADVIL/MOTRIN     Take 400 mg by mouth every 6 hours as needed for mild pain        MULTI-DAY vitamin  S Tabs      Take  by mouth.        TYLENOL ARTHRITIS PAIN PO      Take 650 mg by mouth every 6 hours as needed        typhoid CR capsule    VIVOTIF    4 capsule    Take 1 capsule by mouth every other day    Routine general medical examination at a health care facility

## 2018-01-09 NOTE — NURSING NOTE
"Chief Complaint   Patient presents with     Physical       Initial /76 (BP Location: Left arm, Patient Position: Sitting, Cuff Size: Adult Regular)  Pulse 53  Temp 97.6  F (36.4  C) (Oral)  Resp 16  Ht 5' 7.5\" (1.715 m)  Wt 149 lb 12.8 oz (67.9 kg)  SpO2 97%  BMI 23.12 kg/m2 Estimated body mass index is 23.12 kg/(m^2) as calculated from the following:    Height as of this encounter: 5' 7.5\" (1.715 m).    Weight as of this encounter: 149 lb 12.8 oz (67.9 kg).  Medication Reconciliation: complete     Lisa Dawkins MA      "

## 2018-01-10 LAB
ALBUMIN SERPL-MCNC: 3.8 G/DL (ref 3.4–5)
ALP SERPL-CCNC: 67 U/L (ref 40–150)
ALT SERPL W P-5'-P-CCNC: 25 U/L (ref 0–70)
ANION GAP SERPL CALCULATED.3IONS-SCNC: 10 MMOL/L (ref 3–14)
AST SERPL W P-5'-P-CCNC: 26 U/L (ref 0–45)
BILIRUB SERPL-MCNC: 0.7 MG/DL (ref 0.2–1.3)
BUN SERPL-MCNC: 18 MG/DL (ref 7–30)
CALCIUM SERPL-MCNC: 8.3 MG/DL (ref 8.5–10.1)
CHLORIDE SERPL-SCNC: 100 MMOL/L (ref 94–109)
CO2 SERPL-SCNC: 23 MMOL/L (ref 20–32)
CREAT SERPL-MCNC: 1.14 MG/DL (ref 0.66–1.25)
GFR SERPL CREATININE-BSD FRML MDRD: 65 ML/MIN/1.7M2
GLUCOSE SERPL-MCNC: 81 MG/DL (ref 70–99)
HAV IGG SER QL IA: NONREACTIVE
POTASSIUM SERPL-SCNC: 4.6 MMOL/L (ref 3.4–5.3)
PROT SERPL-MCNC: 6.8 G/DL (ref 6.8–8.8)
PSA SERPL-ACNC: 0.2 UG/L (ref 0–4)
RHEUMATOID FACT SER NEPH-ACNC: <20 IU/ML (ref 0–20)
SODIUM SERPL-SCNC: 133 MMOL/L (ref 133–144)
URATE SERPL-MCNC: 7.6 MG/DL (ref 3.5–7.2)

## 2018-01-13 ENCOUNTER — OFFICE VISIT (OUTPATIENT)
Dept: URGENT CARE | Facility: URGENT CARE | Age: 63
End: 2018-01-13
Payer: COMMERCIAL

## 2018-01-13 VITALS
HEART RATE: 55 BPM | TEMPERATURE: 97.9 F | WEIGHT: 149 LBS | OXYGEN SATURATION: 99 % | HEIGHT: 68 IN | SYSTOLIC BLOOD PRESSURE: 134 MMHG | DIASTOLIC BLOOD PRESSURE: 76 MMHG | BODY MASS INDEX: 22.58 KG/M2

## 2018-01-13 DIAGNOSIS — R07.0 THROAT PAIN: Primary | ICD-10-CM

## 2018-01-13 DIAGNOSIS — J11.1 INFLUENZA-LIKE ILLNESS: ICD-10-CM

## 2018-01-13 LAB
DEPRECATED S PYO AG THROAT QL EIA: NORMAL
SPECIMEN SOURCE: NORMAL

## 2018-01-13 PROCEDURE — 99213 OFFICE O/P EST LOW 20 MIN: CPT | Performed by: FAMILY MEDICINE

## 2018-01-13 PROCEDURE — 87880 STREP A ASSAY W/OPTIC: CPT | Performed by: FAMILY MEDICINE

## 2018-01-13 PROCEDURE — 87081 CULTURE SCREEN ONLY: CPT | Performed by: FAMILY MEDICINE

## 2018-01-13 RX ORDER — OSELTAMIVIR PHOSPHATE 75 MG/1
75 CAPSULE ORAL 2 TIMES DAILY
Qty: 10 CAPSULE | Refills: 0 | Status: SHIPPED | OUTPATIENT
Start: 2018-01-13 | End: 2018-02-08

## 2018-01-13 NOTE — NURSING NOTE
"Chief Complaint   Patient presents with     Urgent Care     Cough     c/o cough,sore throat and congestion for 1 day       Initial /76  Pulse 55  Temp 97.9  F (36.6  C) (Tympanic)  Ht 5' 7.5\" (1.715 m)  Wt 149 lb (67.6 kg)  SpO2 99%  BMI 22.99 kg/m2 Estimated body mass index is 22.99 kg/(m^2) as calculated from the following:    Height as of this encounter: 5' 7.5\" (1.715 m).    Weight as of this encounter: 149 lb (67.6 kg).  Medication Reconciliation: complete   Pastora Fisher MA    "

## 2018-01-13 NOTE — MR AVS SNAPSHOT
After Visit Summary   1/13/2018    Salbadro Begum    MRN: [id number]           Patient Information     Date Of Birth          1955        Visit Information        Provider Department      1/13/2018 8:40 AM Danielle Olivier MD [facility]        Today's Diagnoses     Throat pain    -  1    Influenza-like illness          Care Instructions      The Flu (Influenza)     The virus that causes the flu spreads through the air in droplets when someone who has the flu coughs, sneezes, laughs, or talks.   The flu (influenza) is an infection that affects your respiratory tract. This tract is made up of your mouth, nose, and lungs, and the passages between them. Unlike a cold, the flu can make you very ill. And it can lead to pneumonia, a serious lung infection. The flu can have serious complications and even cause death.  Who is at risk for the flu?  Anyone can get the flu. But you are more likely to become infected if you:    Have a weakened immune system    Work in a healthcare setting where you may be exposed to flu germs    Live or work with someone who has the flu    Haven t had an annual flu shot  How does the flu spread?  The flu is caused by a virus. The virus spreads through the air in droplets when someone who has the flu coughs, sneezes, laughs, or talks. You can become infected when you inhale these viruses directly. You can also become infected when you touch a surface on which the droplets have landed and then transfer the germs to your eyes, nose, or mouth. Touching used tissues, or sharing utensils, drinking glasses, or a toothbrush from an infected person can expose you to flu viruses, too.  What are the symptoms of the flu?  Flu symptoms tend to come on quickly and may last a few days to a few weeks. They include:    Fever usually higher than 100.4 F  (38 C) and chills    Sore throat and headache    Dry cough    Runny nose    Tiredness and weakness    Muscle  aches  Who is at risk for flu complications?  For some people, the flu can be very serious. The risk for complications is greater for:    Children younger than age 5    Adults ages 65 and older    People with a chronic illness such as diabetes or heart, kidney, or lung disease    People who live in a nursing home or long-term care facility   How is the flu treated?  The flu usually gets better after 7 days or so. In some cases, your healthcare provider may prescribe an antiviral medicine. This may help you get well a little sooner. For the medicine to help, you need to take it as soon as possible (ideally within 48 hours) after your symptoms start. If you develop pneumonia or other serious illness, you may need to stay in the hospital.  Easing flu symptoms    Drink lots of fluids such as water, juice, and warm soup. A good rule is to drink enough so that you urinate your normal amount.    Get plenty of rest.    Ask your healthcare provider what to take for fever and pain.    Call your provider if your fever is 100.4 F (38 C) or higher, or you become dizzy, lightheaded, or short of breath.  Taking steps to protect others    Wash your hands often, especially after coughing or sneezing. Or clean your hands with an alcohol-based hand  containing at least 60% alcohol.    Cough or sneeze into a tissue. Then throw the tissue away and wash your hands. If you don t have a tissue, cough and sneeze into your elbow.    Stay home until at least 24 hours after you no longer have a fever or chills. Be sure the fever isn t being hidden by fever-reducing medicine.    Don t share food, utensils, drinking glasses, or a toothbrush with others.    Ask your healthcare provider if others in your household should get antiviral medicine to help them avoid infection.  How can the flu be prevented?    One of the best ways to avoid the flu is to get a flu vaccine each year. The virus that causes the flu changes from year to year. For  that reason, healthcare providers recommend getting the flu vaccine each year, as soon as it's available in your area. The vaccine is given as a shot. Your healthcare provider can tell you which vaccine is right for you. A nasal spray is also available but is not recommended for the 9117-1471 flu season. The CDC says this is because the nasal spray did not seem to protect against the flu over the last several flu seasons. In the past, it was meant for people ages 2 to 49.    Wash your hands often. Frequent handwashing is a proven way to help prevent infection.    Carry an alcohol-based hand gel containing at least 60% alcohol. Use it when you can't use soap and water. Then wash your hands as soon as you can.    Avoid touching your eyes, nose, and mouth.    At home and work, clean phones, computer keyboards, and toys often with disinfectant wipes.    If possible, avoid close contact with others who have the flu or symptoms of the flu.  Handwashing tips  Handwashing is one of the best ways to prevent many common infections. If you are caring for or visiting someone with the flu, wash your hands each time you enter and leave the room. Follow these steps:    Use warm water and plenty of soap. Rub your hands together well.    Clean the whole hand, including under your nails, between your fingers, and up the wrists.    Wash for at least 15 seconds.    Rinse, letting the water run down your fingers, not up your wrists.    Dry your hands well. Use a paper towel to turn off the faucet and open the door.  Using alcohol-based hand   Alcohol-based hand  are also a good choice. Use them when you can't use soap and water. Follow these steps:    Squeeze about a tablespoon of gel into the palm of one hand.    Rub your hands together briskly, cleaning the backs of your hands, the palms, between your fingers, and up the wrists.    Rub until the gel is gone and your hands are completely dry.  Preventing the flu in  healthcare settings  The flu is a special concern for people in hospitals and long-term care facilities. To help prevent the spread of flu, many hospitals and nursing homes take these steps:    Healthcare providers wash their hands or use an alcohol-based hand  before and after treating each patient.    People with the flu have private rooms and bathrooms or share a room with someone with the same infection.    People who are at high risk for the flu but don't have it are encouraged to get the flu and pneumonia vaccines.    All healthcare workers are encouraged or required to get flu shots.   Date Last Reviewed: 12/1/2016 2000-2017 [employer]. [address] [place] [address], [place] [address]. All rights reserved. This information is not intended as a substitute for professional medical care. Always follow your healthcare professional's instructions.        Self-Care for Sore Throats    Sore throats happen for many reasons, such as colds, allergies, and infections caused by viruses or bacteria. In any case, your throat becomes red and sore. Your goal for self-care is to reduce your discomfort while giving your throat a chance to heal.  Moisten and soothe your throat  Tips include the following:    Try a sip of water first thing after waking up.    Keep your throat moist by drinking 6 or more glasses of clear liquids every day.    Run a cool-air humidifier in your room overnight.    Avoid cigarette smoke.     Suck on throat lozenges, cough drops, hard candy, ice chips, or frozen fruit-juice bars. Use the sugar-free versions if your diet or medical condition requires them.  Gargle to ease irritation  Gargling every hour or 2 can ease irritation. Try gargling with 1 of these solutions:    1/4 teaspoon of salt in 1/2 cup of warm water    An over-the-counter anesthetic gargle  Use medicine for more relief  Over-the-counter medicine can reduce sore throat symptoms. Ask your pharmacist if you have  questions about which medicine to use:    Ease pain with anesthetic sprays. Aspirin or an aspirin substitute also helps. Remember, never give aspirin to anyone 18 or younger, or if you are already taking blood thinners.     For sore throats caused by allergies, try antihistamines to block the allergic reaction.    Remember: unless a sore throat is caused by a bacterial infection, antibiotics won t help you.  Prevent future sore throats  Prevention tips include the following:    Stop smoking or reduce contact with secondhand smoke. Smoke irritates the tender throat lining.    Limit contact with pets and with allergy-causing substances, such as pollen and mold.    When you re around someone with a sore throat or cold, wash your hands often to keep viruses or bacteria from spreading.    Don t strain your vocal cords.  Call your healthcare provider  Contact your healthcare provider if you have:    A temperature over 101 F (38.3 C)    White spots on the throat    Great difficulty swallowing    Trouble breathing    A skin rash    Recent exposure to someone else with strep bacteria    Severe hoarseness and swollen glands in the neck or jaw   Date Last Reviewed: 8/1/2016 2000-2017 [employer]. [address] [place] [address]. All rights reserved. This information is not intended as a substitute for professional medical care. Always follow your healthcare professional's instructions.                Follow-ups after your visit        Who to contact     If you have questions or need follow up information about today's clinic visit or your schedule please contact [facility] directly at [phone].  Normal or non-critical lab and imaging results will be communicated to you by MyChart, letter or phone within 4 business days after the clinic has received the results. If you do not hear from us within 7 days, please contact the clinic through MyChart or phone. If you have a  "critical or abnormal lab result, we will notify you by phone as soon as possible.  Submit refill requests through [employer] or call your pharmacy and they will forward the refill request to us. Please allow 3 business days for your refill to be completed.          Additional Information About Your Visit        [employer] Information     [employer] gives you secure access to your electronic health record. If you see a primary care provider, you can also send messages to your care team and make appointments. If you have questions, please call your primary care clinic.  If you do not have a primary care provider, please call [phone] and they will assist you.        Care EveryWhere ID     This is your Care EveryWhere ID. This could be used by other organizations to access your [facility] medical records  [id number]        Your Vitals Were     Pulse Temperature Height Pulse Oximetry BMI (Body Mass Index)       55 97.9  F (36.6  C) (Tympanic) 5' 7.5\" (1.715 m) 99% 22.99 kg/m2        Blood Pressure from Last 3 Encounters:   01/13/18 134/76   01/09/18 117/76   02/10/17 (!) 147/91    Weight from Last 3 Encounters:   01/13/18 149 lb (67.6 kg)   01/09/18 149 lb 12.8 oz (67.9 kg)   02/10/17 151 lb 1.6 oz (68.5 kg)              We Performed the Following     Beta strep group A culture     Strep, Rapid Screen          Today's Medication Changes          These changes are accurate as of: 1/13/18 10:05 AM.  If you have any questions, ask your nurse or doctor.               Start taking these medicines.        Dose/Directions    oseltamivir 75 MG capsule   Commonly known as:  TAMIFLU   Used for:  Influenza-like illness   Started by:  Danielle Olivier MD        Dose:  75 mg   Take 1 capsule (75 mg) by mouth 2 times daily   Quantity:  10 capsule   Refills:  0            Where to get your medicines      These medications were sent to [facility] - [place], [place] - [address] [facility]  [address], [place] [address] "     Phone:  [phone]     oseltamivir 75 MG capsule                Primary Care Provider Office Phone # Fax #    Kobe Pierce [phone] [address] [facility] [address]        Equal Access to Services     ANDRES MACKEY : Hadii dewayne clayton hadminnieo Sochanelali, waaxda luqadaha, qaybta kaalmada adeegyada, lalita leonardin philipn valeriejarrod bridges rosio grey. So [facility] [phone].    ATENCIÓN: Si habla español, tiene a razo disposición servicios gratuitos de asistencia lingüística. Llame al [phone].    We comply with applicable federal civil rights laws and [place] laws. We do not discriminate on the basis of race, color, national origin, age, disability, sex, sexual orientation, or gender identity.            Thank you!     Thank you for choosing [facility]  for your care. Our goal is always to provide you with excellent care. Hearing back from our patients is one way we can continue to improve our services. Please take a few minutes to complete the written survey that you may receive in the mail after your visit with us. Thank you!             Your Updated Medication List - Protect others around you: Learn how to safely use, store and throw away your medicines at www.disposemymeds.org.          This list is accurate as of: 1/13/18 10:05 AM.  Always use your most recent med list.                   Brand Name Dispense Instructions for use Diagnosis    calcium + D 600-200 MG-UNIT Tabs   Generic drug:  calcium carbonate-vitamin D      Take 1 tablet by mouth daily.        ciprofloxacin 500 MG tablet    CIPRO    6 tablet    Take 1 tablet (500 mg) by mouth 2 times daily    Routine general medical examination at a health care facility       ibuprofen 200 MG tablet    ADVIL/MOTRIN     Take 400 mg by mouth every 6 hours as needed for mild pain        MULTI-DAY vitamin  S Tabs      Take  by mouth.        oseltamivir 75 MG capsule    TAMIFLU    10 capsule    Take 1 capsule (75 mg) by mouth  2 times daily    Influenza-like illness       TYLENOL ARTHRITIS PAIN PO      Take 650 mg by mouth every 6 hours as needed        typhoid CR capsule    VIVOTIF    4 capsule    Take 1 capsule by mouth every other day    Routine general medical examination at a health care facility

## 2018-01-13 NOTE — PATIENT INSTRUCTIONS
The Flu (Influenza)     The virus that causes the flu spreads through the air in droplets when someone who has the flu coughs, sneezes, laughs, or talks.   The flu (influenza) is an infection that affects your respiratory tract. This tract is made up of your mouth, nose, and lungs, and the passages between them. Unlike a cold, the flu can make you very ill. And it can lead to pneumonia, a serious lung infection. The flu can have serious complications and even cause death.  Who is at risk for the flu?  Anyone can get the flu. But you are more likely to become infected if you:    Have a weakened immune system    Work in a healthcare setting where you may be exposed to flu germs    Live or work with someone who has the flu    Haven t had an annual flu shot  How does the flu spread?  The flu is caused by a virus. The virus spreads through the air in droplets when someone who has the flu coughs, sneezes, laughs, or talks. You can become infected when you inhale these viruses directly. You can also become infected when you touch a surface on which the droplets have landed and then transfer the germs to your eyes, nose, or mouth. Touching used tissues, or sharing utensils, drinking glasses, or a toothbrush from an infected person can expose you to flu viruses, too.  What are the symptoms of the flu?  Flu symptoms tend to come on quickly and may last a few days to a few weeks. They include:    Fever usually higher than 100.4 F  (38 C) and chills    Sore throat and headache    Dry cough    Runny nose    Tiredness and weakness    Muscle aches  Who is at risk for flu complications?  For some people, the flu can be very serious. The risk for complications is greater for:    Children younger than age 5    Adults ages 65 and older    People with a chronic illness such as diabetes or heart, kidney, or lung disease    People who live in a nursing home or long-term care facility   How is the flu treated?  The flu usually gets  better after 7 days or so. In some cases, your healthcare provider may prescribe an antiviral medicine. This may help you get well a little sooner. For the medicine to help, you need to take it as soon as possible (ideally within 48 hours) after your symptoms start. If you develop pneumonia or other serious illness, you may need to stay in the hospital.  Easing flu symptoms    Drink lots of fluids such as water, juice, and warm soup. A good rule is to drink enough so that you urinate your normal amount.    Get plenty of rest.    Ask your healthcare provider what to take for fever and pain.    Call your provider if your fever is 100.4 F (38 C) or higher, or you become dizzy, lightheaded, or short of breath.  Taking steps to protect others    Wash your hands often, especially after coughing or sneezing. Or clean your hands with an alcohol-based hand  containing at least 60% alcohol.    Cough or sneeze into a tissue. Then throw the tissue away and wash your hands. If you don t have a tissue, cough and sneeze into your elbow.    Stay home until at least 24 hours after you no longer have a fever or chills. Be sure the fever isn t being hidden by fever-reducing medicine.    Don t share food, utensils, drinking glasses, or a toothbrush with others.    Ask your healthcare provider if others in your household should get antiviral medicine to help them avoid infection.  How can the flu be prevented?    One of the best ways to avoid the flu is to get a flu vaccine each year. The virus that causes the flu changes from year to year. For that reason, healthcare providers recommend getting the flu vaccine each year, as soon as it's available in your area. The vaccine is given as a shot. Your healthcare provider can tell you which vaccine is right for you. A nasal spray is also available but is not recommended for the 8771-7334 flu season. The CDC says this is because the nasal spray did not seem to protect against the flu  over the last several flu seasons. In the past, it was meant for people ages 2 to 49.    Wash your hands often. Frequent handwashing is a proven way to help prevent infection.    Carry an alcohol-based hand gel containing at least 60% alcohol. Use it when you can't use soap and water. Then wash your hands as soon as you can.    Avoid touching your eyes, nose, and mouth.    At home and work, clean phones, computer keyboards, and toys often with disinfectant wipes.    If possible, avoid close contact with others who have the flu or symptoms of the flu.  Handwashing tips  Handwashing is one of the best ways to prevent many common infections. If you are caring for or visiting someone with the flu, wash your hands each time you enter and leave the room. Follow these steps:    Use warm water and plenty of soap. Rub your hands together well.    Clean the whole hand, including under your nails, between your fingers, and up the wrists.    Wash for at least 15 seconds.    Rinse, letting the water run down your fingers, not up your wrists.    Dry your hands well. Use a paper towel to turn off the faucet and open the door.  Using alcohol-based hand   Alcohol-based hand  are also a good choice. Use them when you can't use soap and water. Follow these steps:    Squeeze about a tablespoon of gel into the palm of one hand.    Rub your hands together briskly, cleaning the backs of your hands, the palms, between your fingers, and up the wrists.    Rub until the gel is gone and your hands are completely dry.  Preventing the flu in healthcare settings  The flu is a special concern for people in hospitals and long-term care facilities. To help prevent the spread of flu, many hospitals and nursing homes take these steps:    Healthcare providers wash their hands or use an alcohol-based hand  before and after treating each patient.    People with the flu have private rooms and bathrooms or share a room with someone  with the same infection.    People who are at high risk for the flu but don't have it are encouraged to get the flu and pneumonia vaccines.    All healthcare workers are encouraged or required to get flu shots.   Date Last Reviewed: 12/1/2016 2000-2017 [employer]. [address] [place] [address]. All rights reserved. This information is not intended as a substitute for professional medical care. Always follow your healthcare professional's instructions.        Self-Care for Sore Throats    Sore throats happen for many reasons, such as colds, allergies, and infections caused by viruses or bacteria. In any case, your throat becomes red and sore. Your goal for self-care is to reduce your discomfort while giving your throat a chance to heal.  Moisten and soothe your throat  Tips include the following:    Try a sip of water first thing after waking up.    Keep your throat moist by drinking 6 or more glasses of clear liquids every day.    Run a cool-air humidifier in your room overnight.    Avoid cigarette smoke.     Suck on throat lozenges, cough drops, hard candy, ice chips, or frozen fruit-juice bars. Use the sugar-free versions if your diet or medical condition requires them.  Gargle to ease irritation  Gargling every hour or 2 can ease irritation. Try gargling with 1 of these solutions:    1/4 teaspoon of salt in 1/2 cup of warm water    An over-the-counter anesthetic gargle  Use medicine for more relief  Over-the-counter medicine can reduce sore throat symptoms. Ask your pharmacist if you have questions about which medicine to use:    Ease pain with anesthetic sprays. Aspirin or an aspirin substitute also helps. Remember, never give aspirin to anyone 18 or younger, or if you are already taking blood thinners.     For sore throats caused by allergies, try antihistamines to block the allergic reaction.    Remember: unless a sore throat is caused by a bacterial infection, antibiotics won t  help you.  Prevent future sore throats  Prevention tips include the following:    Stop smoking or reduce contact with secondhand smoke. Smoke irritates the tender throat lining.    Limit contact with pets and with allergy-causing substances, such as pollen and mold.    When you re around someone with a sore throat or cold, wash your hands often to keep viruses or bacteria from spreading.    Don t strain your vocal cords.  Call your healthcare provider  Contact your healthcare provider if you have:    A temperature over 101 F (38.3 C)    White spots on the throat    Great difficulty swallowing    Trouble breathing    A skin rash    Recent exposure to someone else with strep bacteria    Severe hoarseness and swollen glands in the neck or jaw   Date Last Reviewed: 8/1/2016 2000-2017 [employer]. [address] [place] [address], [place] [address]. All rights reserved. This information is not intended as a substitute for professional medical care. Always follow your healthcare professional's instructions.

## 2018-01-14 LAB
BACTERIA SPEC CULT: NORMAL
SPECIMEN SOURCE: NORMAL

## 2018-01-14 NOTE — PROGRESS NOTES
SUBJECTIVE:   Chief Complaint   Patient presents with     Urgent Care     Cough     c/o cough,sore throat and congestion for 1 day     Salbador Begum is a 62 year old male who present complaining of flu-like symptoms: fevers, chills, myalgias, stomach cramps,  headache,  congestion, sore throat and cough for 1 days. Denies    dyspnea /  wheezing.  Has   known exposure to people with influenza      Past Medical History:   Diagnosis Date     Allergic rhinitis, cause unspecified      Backache, unspecified      Depressive disorder      Diverticulitis      Junctional ectopic contractions (H)      Mild intermittent asthma 9/28/2005     Narcolepsy      Osteoarthritis      Palpitations      Sleep apnea        ALLERGIES:  Metoclopramide        Current Outpatient Prescriptions on File Prior to Visit:  ciprofloxacin (CIPRO) 500 MG tablet Take 1 tablet (500 mg) by mouth 2 times daily   typhoid (VIVOTIF) CR capsule Take 1 capsule by mouth every other day   ibuprofen (ADVIL,MOTRIN) 200 MG tablet Take 400 mg by mouth every 6 hours as needed for mild pain   Acetaminophen (TYLENOL ARTHRITIS PAIN PO) Take 650 mg by mouth every 6 hours as needed   Multiple Vitamin (MULTI-DAY VITAMINS) TABS Take  by mouth.   Calcium Carbonate-Vitamin D (CALCIUM + D) 600-200 MG-UNIT per tablet Take 1 tablet by mouth daily.     No current facility-administered medications on file prior to visit.     Social History   Substance Use Topics     Smoking status: Never Smoker     Smokeless tobacco: Never Used     Alcohol use Yes      Comment: 14 glass of wine       Family History   Problem Relation Age of Onset     Arthritis Mother      osteo     Dementia Mother      Hypertension Mother      CEREBROVASCULAR DISEASE Mother      Not confirmed     Rheumatoid Arthritis Father      Depression Father      Depression Sister      Depression Sister      Substance Abuse Sister          ROS:  CONSTITUTIONAL: fever, chills,    INTEGUMENTARY/SKIN: NEGATIVE for  "worrisome rashes,    EYES: NEGATIVE for vision changes or irritation  ENT/MOUTH: NEGATIVE for ear, mouth   Problems-  Has sore throat  RESP:NEGATIVE for significant cough or SOB  GI: NEGATIVE for nausea,   change in bowel habits,  Some stomach cramps     OBJECTIVE  :/76  Pulse 55  Temp 97.9  F (36.6  C) (Tympanic)  Ht 5' 7.5\" (1.715 m)  Wt 149 lb (67.6 kg)  SpO2 99%  BMI 22.99 kg/m2  GENERAL APPEARANCE: alert, moderate distress, flushed and fatigued,  occasional cough  EYES: EOMI,  PERRL, conjunctiva clear  HENT: ear canals and TM's normal.  Nose and mouth without ulcers, erythema or lesions  NECK: supple, nontender, no lymphadenopathy  RESP: lungs clear to auscultation - no rales, rhonchi or wheezes  CV: regular rates and rhythm, normal S1 S2, no murmur noted  NEURO: Normal strength and tone, sensory exam grossly normal,  normal speech and mentation  SKIN: no suspicious lesions or rashes    Results for orders placed or performed in visit on 01/13/18   Strep, Rapid Screen   Result Value Ref Range    Specimen Description Throat     Rapid Strep A Screen       NEGATIVE: No Group A streptococcal antigen detected by immunoassay, await culture report.          ASSESSMENT:  Throat pain     - Strep, Rapid Screen  - Beta strep group A culture    Influenza-like illness     - oseltamivir (TAMIFLU) 75 MG capsule; Take 1 capsule (75 mg) by mouth 2 times daily     We discussed the limitations of influenza testing and limitations of  antiviral therapy against influenza, that treatment should usually be initiated within 2 days of the start of symptoms and is most critical for persons with weak immunity and chronic respiratory illnesses     Symptomatic therapy suggested:  Rest, drink lots of fluids,  Acetaminophen / ibuprofen for body aches and fever,  Salt water gargles for sore throat,  OTC anesthetic lozenges for sore throat,  OTC cough medications.   Call or return to clinic prn if these symptoms worsen or fail to " improve as anticipated.    Treatment and prophylaxis for close contacts  was discussed  Advised that influenza illness usually lasts a week, sometimes more and that the patient should avoid contact with others, and cover the mouth when coughing to limit spread of the infection.    Discussed that influenza is a potent virus that can cause damage to airways making increased risk for developing bronchitis or pneumonia.  In severe cases of chest symptoms and antibiotic can treat the bacterial superinfection, but I explained that the antibiotic is not effective against the influenza virus.

## 2018-01-15 ENCOUNTER — NURSE TRIAGE (OUTPATIENT)
Dept: NURSING | Facility: CLINIC | Age: 63
End: 2018-01-15

## 2018-01-16 ENCOUNTER — E-VISIT (OUTPATIENT)
Dept: FAMILY MEDICINE | Facility: CLINIC | Age: 63
End: 2018-01-16
Payer: COMMERCIAL

## 2018-01-16 DIAGNOSIS — R05.9 COUGH: Primary | ICD-10-CM

## 2018-01-16 PROCEDURE — 99444 ZZC PHYSICIAN ONLINE EVALUATION & MANAGEMENT SERVICE: CPT | Performed by: FAMILY MEDICINE

## 2018-01-16 RX ORDER — CODEINE PHOSPHATE AND GUAIFENESIN 10; 100 MG/5ML; MG/5ML
1 SOLUTION ORAL EVERY 4 HOURS PRN
Qty: 120 ML | Refills: 0 | Status: SHIPPED | OUTPATIENT
Start: 2018-01-16 | End: 2018-06-29

## 2018-01-16 NOTE — TELEPHONE ENCOUNTER
"States, \"I was dx with influenza on 1/13/18, and am coughing a lot.\" \"When I cough my chest hurts a lot.\" Wants cough medicine with codeine since OTC medicine not helping. Informed would need to be seen by a provider for that prescription. He wants to call his clinic in the morning to see if they will write him one to .     Maura Ernst RN, [facility]    Reason for Disposition    [1] Continuous (nonstop) coughing interferes with work or school AND [2] no improvement using cough treatment per protocol    Additional Information    Negative: Severe difficulty breathing (e.g., struggling for each breath, speaks in single words)    Negative: Bluish lips, tongue, or face now    Negative: [1] Rapid onset of cough AND [2] has hives    Negative: Coughing started suddenly after medicine, an allergic food or bee sting    Negative: [1] Difficulty breathing AND [2] exposure to flames, smoke, or fumes    Negative: [1] Stridor AND [2] difficulty breathing    Negative: Sounds like a life-threatening emergency to the triager    Negative: Choked on object of food that could be caught in the throat    Negative: Chest pain is main symptom    Negative: [1] Previous asthma attacks AND [2] this feels like asthma attack    Negative: Cough lasts > 3 weeks    Negative: Wet (productive) cough (i.e., white-yellow, yellow, green, or teresa colored sputum)    Negative: Chest pain  (Exception: MILD central chest pain, present only when coughing)    Negative: Difficulty breathing    Negative: Patient sounds very sick or weak to the triager    Negative: [1] Coughed up blood AND [2] > 1 tablespoon (15 ml) (Exception: blood-tinged sputum)    Negative: Fever > 103 F (39.4 C)    Negative: [1] Fever > 101 F (38.3 C) AND [2] age > 60    Negative: [1] Fever > 101 F (38.3 C) AND [2] bedridden (e.g., nursing home patient, CVA, chronic illness, recovering from surgery)    Negative: [1] Fever > 100.5 F (38.1 C) AND [2] diabetes mellitus or " weak immune system (e.g., HIV positive, cancer chemo, splenectomy, organ transplant, chronic steroids)    Negative: Wheezing is present    Negative: [1] Ankle swelling AND [2] swelling is increasing    Negative: SEVERE coughing spells (e.g., whooping sound after coughing, vomiting after coughing)    Protocols used: COUGH - ACUTE NON-PRODUCTIVE-ADULT-AH

## 2018-01-16 NOTE — TELEPHONE ENCOUNTER
Regarding: patient requesting a prescription for a cough  ----- Message from Temica Sandifer sent at 1/15/2018  7:11 PM CST -----  Reason for call:  Other   Patient called regarding (reason for call): prescription  Additional comments: Patient was diagnosed with influenza and requesting to have a prescription sent for a cough    Phone number to reach patient:  [phone]      Best Time:  any    Can we leave a detailed message on this number?  YES

## 2018-01-26 ENCOUNTER — TELEPHONE (OUTPATIENT)
Dept: FAMILY MEDICINE | Facility: CLINIC | Age: 63
End: 2018-01-26

## 2018-01-26 DIAGNOSIS — R06.2 WHEEZING: Primary | ICD-10-CM

## 2018-01-26 RX ORDER — ALBUTEROL SULFATE 90 UG/1
2 AEROSOL, METERED RESPIRATORY (INHALATION) EVERY 6 HOURS PRN
Qty: 1 INHALER | Refills: 0 | Status: SHIPPED | OUTPATIENT
Start: 2018-01-26 | End: 2018-06-29

## 2018-01-26 NOTE — TELEPHONE ENCOUNTER
Left message on voicemail with details about how to use the inhaler. Call back with questions and/or any worsening of sx.  Tere Chacon RN

## 2018-02-07 ENCOUNTER — E-VISIT (OUTPATIENT)
Dept: FAMILY MEDICINE | Facility: CLINIC | Age: 63
End: 2018-02-07
Payer: COMMERCIAL

## 2018-02-07 DIAGNOSIS — F33.0 MAJOR DEPRESSIVE DISORDER, RECURRENT, MILD (H): Primary | ICD-10-CM

## 2018-02-07 PROCEDURE — 99444 ZZC PHYSICIAN ONLINE EVALUATION & MANAGEMENT SERVICE: CPT | Performed by: FAMILY MEDICINE

## 2018-02-07 ASSESSMENT — ANXIETY QUESTIONNAIRES
6. BECOMING EASILY ANNOYED OR IRRITABLE: SEVERAL DAYS
5. BEING SO RESTLESS THAT IT IS HARD TO SIT STILL: NOT AT ALL
GAD7 TOTAL SCORE: 5
7. FEELING AFRAID AS IF SOMETHING AWFUL MIGHT HAPPEN: NOT AT ALL
2. NOT BEING ABLE TO STOP OR CONTROL WORRYING: SEVERAL DAYS
7. FEELING AFRAID AS IF SOMETHING AWFUL MIGHT HAPPEN: NOT AT ALL
3. WORRYING TOO MUCH ABOUT DIFFERENT THINGS: SEVERAL DAYS
GAD7 TOTAL SCORE: 5
GAD7 TOTAL SCORE: 5
1. FEELING NERVOUS, ANXIOUS, OR ON EDGE: SEVERAL DAYS
4. TROUBLE RELAXING: SEVERAL DAYS

## 2018-02-07 ASSESSMENT — PATIENT HEALTH QUESTIONNAIRE - PHQ9
SUM OF ALL RESPONSES TO PHQ QUESTIONS 1-9: 3
SUM OF ALL RESPONSES TO PHQ QUESTIONS 1-9: 3
10. IF YOU CHECKED OFF ANY PROBLEMS, HOW DIFFICULT HAVE THESE PROBLEMS MADE IT FOR YOU TO DO YOUR WORK, TAKE CARE OF THINGS AT HOME, OR GET ALONG WITH OTHER PEOPLE: NOT DIFFICULT AT ALL

## 2018-02-08 ENCOUNTER — OFFICE VISIT (OUTPATIENT)
Dept: URGENT CARE | Facility: URGENT CARE | Age: 63
End: 2018-02-08
Payer: COMMERCIAL

## 2018-02-08 ENCOUNTER — RADIANT APPOINTMENT (OUTPATIENT)
Dept: GENERAL RADIOLOGY | Facility: CLINIC | Age: 63
End: 2018-02-08
Attending: INTERNAL MEDICINE
Payer: COMMERCIAL

## 2018-02-08 VITALS
OXYGEN SATURATION: 97 % | BODY MASS INDEX: 22.28 KG/M2 | WEIGHT: 147 LBS | HEART RATE: 46 BPM | SYSTOLIC BLOOD PRESSURE: 122 MMHG | TEMPERATURE: 98.3 F | HEIGHT: 68 IN | DIASTOLIC BLOOD PRESSURE: 60 MMHG

## 2018-02-08 DIAGNOSIS — J20.9 ACUTE BRONCHITIS WITH SYMPTOMS > 10 DAYS: Primary | ICD-10-CM

## 2018-02-08 DIAGNOSIS — R05.9 COUGH: ICD-10-CM

## 2018-02-08 PROCEDURE — 71046 X-RAY EXAM CHEST 2 VIEWS: CPT

## 2018-02-08 PROCEDURE — 99214 OFFICE O/P EST MOD 30 MIN: CPT | Performed by: INTERNAL MEDICINE

## 2018-02-08 RX ORDER — AZITHROMYCIN 250 MG/1
TABLET, FILM COATED ORAL
Qty: 6 TABLET | Refills: 0 | Status: SHIPPED | OUTPATIENT
Start: 2018-02-08 | End: 2018-02-14

## 2018-02-08 ASSESSMENT — PATIENT HEALTH QUESTIONNAIRE - PHQ9: SUM OF ALL RESPONSES TO PHQ QUESTIONS 1-9: 3

## 2018-02-08 ASSESSMENT — ANXIETY QUESTIONNAIRES: GAD7 TOTAL SCORE: 5

## 2018-02-08 NOTE — MR AVS SNAPSHOT
After Visit Summary   2/8/2018    Salbador Begum    MRN: [id number]           Patient Information     Date Of Birth          1955        Visit Information        Provider Department      2/8/2018 6:25 PM Kianna Munguia MD [facility]        Today's Diagnoses     Acute bronchitis with symptoms > 10 days    -  1    Cough           Follow-ups after your visit        Your next 10 appointments already scheduled     Feb 14, 2018 10:30 AM CST   SHORT with Ana Manzanares NP   [facility]    [address] [place] [address] [phone]              Who to contact     If you have questions or need follow up information about today's clinic visit or your schedule please contact [facility] directly at [phone].  Normal or non-critical lab and imaging results will be communicated to you by MyChart, letter or phone within 4 business days after the clinic has received the results. If you do not hear from us within 7 days, please contact the clinic through Lovejuicehart or phone. If you have a critical or abnormal lab result, we will notify you by phone as soon as possible.  Submit refill requests through PocketMobile or call your pharmacy and they will forward the refill request to us. Please allow 3 business days for your refill to be completed.          Additional Information About Your Visit        MyChart Information     PocketMobile gives you secure access to your electronic health record. If you see a primary care provider, you can also send messages to your care team and make appointments. If you have questions, please call your primary care clinic.  If you do not have a primary care provider, please call [phone] and they will assist you.        Care EveryWhere ID     This is your Care EveryWhere ID. This could be used by other organizations to access your [facility]  "records  [id number]        Your Vitals Were     Pulse Temperature Height Pulse Oximetry BMI (Body Mass Index)       46 98.3  F (36.8  C) (Oral) 5' 8\" (1.727 m) 97% 22.35 kg/m2        Blood Pressure from Last 3 Encounters:   02/08/18 122/60   01/13/18 134/76   01/09/18 117/76    Weight from Last 3 Encounters:   02/08/18 147 lb (66.7 kg)   01/13/18 149 lb (67.6 kg)   01/09/18 149 lb 12.8 oz (67.9 kg)                 Today's Medication Changes          These changes are accurate as of 2/8/18  7:47 PM.  If you have any questions, ask your nurse or doctor.               Start taking these medicines.        Dose/Directions    azithromycin 250 MG tablet   Commonly known as:  ZITHROMAX   Used for:  Acute bronchitis with symptoms > 10 days   Started by:  Kianna Munguia MD        Two tablets first day, then one tablet daily for four days.   Quantity:  6 tablet   Refills:  0            Where to get your medicines      These medications were sent to [id number] - [place], [place] - [address] [facility] [address]  [facility] [address], [place] [address]    Hours:  24-hours Phone:  [phone]     azithromycin 250 MG tablet                Primary Care Provider Office Phone # Fax #    Kobe Phani Pierce [phone] [address]  [facility] [address]        Equal Access to Services     [facility] KEY AH: Hadii aad ku hadasho Soomaali, waaxda luqadaha, qaybta kaalmada adeegyada, waxay brooks grey. So [facility] [phone].    ATENCIÓN: Si habla español, tiene a razo disposición servicios gratuitos de asistencia lingüística. Llame al [phone].    We comply with applicable federal civil rights laws and [place] laws. We do not discriminate on the basis of race, color, national origin, age, disability, sex, sexual orientation, or gender identity.            Thank you!     Thank you for choosing [facility]  for your care. Our goal " is always to provide you with excellent care. Hearing back from our patients is one way we can continue to improve our services. Please take a few minutes to complete the written survey that you may receive in the mail after your visit with us. Thank you!             Your Updated Medication List - Protect others around you: Learn how to safely use, store and throw away your medicines at www.disposemymeds.org.          This list is accurate as of 2/8/18  7:47 PM.  Always use your most recent med list.                   Brand Name Dispense Instructions for use Diagnosis    albuterol 108 (90 BASE) MCG/ACT Inhaler    PROAIR HFA/PROVENTIL HFA/VENTOLIN HFA    1 Inhaler    Inhale 2 puffs into the lungs every 6 hours as needed for shortness of breath / dyspnea or wheezing    Wheezing       azithromycin 250 MG tablet    ZITHROMAX    6 tablet    Two tablets first day, then one tablet daily for four days.    Acute bronchitis with symptoms > 10 days       ciprofloxacin 500 MG tablet    CIPRO    6 tablet    Take 1 tablet (500 mg) by mouth 2 times daily    Routine general medical examination at a health care facility       guaiFENesin-codeine 100-10 MG/5ML Soln solution    ROBITUSSIN AC    120 mL    Take 5 mLs by mouth every 4 hours as needed for cough    Cough       ibuprofen 200 MG tablet    ADVIL/MOTRIN     Take 400 mg by mouth every 6 hours as needed for mild pain        MULTI-DAY vitamin  S Tabs      Take  by mouth.        TYLENOL ARTHRITIS PAIN PO      Take 650 mg by mouth every 6 hours as needed        typhoid CR capsule    VIVOTIF    4 capsule    Take 1 capsule by mouth every other day    Routine general medical examination at a health care facility

## 2018-02-09 NOTE — PROGRESS NOTES
SUBJECTIVE:   Salbador Begum is a 62 year old male presenting with a chief complaint of   Chief Complaint   Patient presents with     Cough     3 weeks ago had a flu like illness, continued with wheeze and cough, 2 weeks ago got albuterol inhaler and that does help, today feeling lousy again - body aches, sore throat, nausea - feels that the cough is shallow and different has a hard time describing it      Urgent Care   He has been sick for approximately 1 month  Seen 1/13 &  influenza like illness  treatment tamiflu  Lupillo codiene  Then Rx albuterol 1/26/2018 was called in for him    Course of illness is same.    Still wheeZing    He felt a little worse today: cough - non-productive, facial pain/pressure, body aches, fatigue and nausea  Predisposing factors include wheezing and past requiring albuterol inhaler.  Although pulmonary function tests were normal.    Wife worried about heart attack as he was having some nausea    ROS      Past Medical History:   Diagnosis Date     Allergic rhinitis, cause unspecified      Backache, unspecified      Depressive disorder      Diverticulitis      Junctional ectopic contractions (H)      Mild intermittent asthma 9/28/2005     Narcolepsy      Osteoarthritis      Palpitations      Sleep apnea      Current Outpatient Prescriptions   Medication Sig Dispense Refill     azithromycin (ZITHROMAX) 250 MG tablet Two tablets first day, then one tablet daily for four days. 6 tablet 0     albuterol (PROAIR HFA/PROVENTIL HFA/VENTOLIN HFA) 108 (90 BASE) MCG/ACT Inhaler Inhale 2 puffs into the lungs every 6 hours as needed for shortness of breath / dyspnea or wheezing 1 Inhaler 0     ibuprofen (ADVIL,MOTRIN) 200 MG tablet Take 400 mg by mouth every 6 hours as needed for mild pain       Multiple Vitamin (MULTI-DAY VITAMINS) TABS Take  by mouth.       guaiFENesin-codeine (ROBITUSSIN AC) 100-10 MG/5ML SOLN solution Take 5 mLs by mouth every 4 hours as needed for cough (Patient not taking:  "Reported on 2/8/2018) 120 mL 0     ciprofloxacin (CIPRO) 500 MG tablet Take 1 tablet (500 mg) by mouth 2 times daily (Patient not taking: Reported on 2/8/2018) 6 tablet 0     typhoid (VIVOTIF) CR capsule Take 1 capsule by mouth every other day (Patient not taking: Reported on 2/8/2018) 4 capsule 0     Acetaminophen (TYLENOL ARTHRITIS PAIN PO) Take 650 mg by mouth every 6 hours as needed       Social History   Substance Use Topics     Smoking status: Never Smoker     Smokeless tobacco: Never Used     Alcohol use Yes      Comment: 14 glass of wine       OBJECTIVE  /60 (BP Location: Left arm, Patient Position: Chair, Cuff Size: Adult Regular)  Pulse (!) 46  Temp 98.3  F (36.8  C) (Oral)  Ht 5' 8\" (1.727 m)  Wt 147 lb (66.7 kg)  SpO2 97%  BMI 22.35 kg/m2    Physical Exam   Constitutional: He is well-developed, well-nourished, and in no distress.   HENT:   Nose: Nose normal.   Mouth/Throat: Oropharynx is clear and moist. No oropharyngeal exudate.   TM B   Cardiovascular: Regular rhythm, normal heart sounds and intact distal pulses.    Heart rate - deven  No carotid bruits   Pulmonary/Chest: Effort normal and breath sounds normal. No respiratory distress. He has no wheezes.       Labs:  No results found for this or any previous visit (from the past 24 hour(s)).    X-Ray was done, my findings are: clear    ASSESSMENT:      ICD-10-CM    1. Acute bronchitis with symptoms > 10 days J20.9 azithromycin (ZITHROMAX) 250 MG tablet   2. Cough R05 XR Chest 2 Views      Discussed with wife his symptoms are not concerning for cardiac ischemia and consistent with post viral syndrome cough.  Now that his symptoms are worsening it is reasonable to put him on Z-Hosea antibiotic for persistent infectious secondary infection and treat bronchitis    Patient is not worried about his heart, he does have a cardiologist.  Also he states that he has asymptomatic long-standing bradycardia  Medical Decision Making:    Serious Comorbid " Conditions:  None    PLAN:  Rx bronchitis  Amoxicillin 500 mg tid x 7 days    Followup:    Request that he follow-up at end affect next week with clinic appointment for this prolonged cough.  He plans to be traveling out of country the next few weeks.

## 2018-02-09 NOTE — NURSING NOTE
"Salbador Begum;   Chief Complaint   Patient presents with     Cough     3 weeks ago had a flu like illness, continued with wheeze and cough, 2 weeks ago got albuterol inhaler and that does help, today feeling lousy again - body aches, sore throat, nausea - feels that the cough is shallow and different has a hard time describing it      Urgent Care     Initial /60 (BP Location: Left arm, Patient Position: Chair, Cuff Size: Adult Regular)  Pulse (!) 46  Temp 98.3  F (36.8  C) (Oral)  Ht 5' 8\" (1.727 m)  Wt 147 lb (66.7 kg)  SpO2 97%  BMI 22.35 kg/m2 Estimated body mass index is 22.35 kg/(m^2) as calculated from the following:    Height as of this encounter: 5' 8\" (1.727 m).    Weight as of this encounter: 147 lb (66.7 kg)..  BP completed using cuff size regular.  Liberty Dior R.N.  "

## 2018-02-14 ENCOUNTER — OFFICE VISIT (OUTPATIENT)
Dept: FAMILY MEDICINE | Facility: CLINIC | Age: 63
End: 2018-02-14
Payer: COMMERCIAL

## 2018-02-14 VITALS
WEIGHT: 147 LBS | HEART RATE: 44 BPM | BODY MASS INDEX: 22.35 KG/M2 | SYSTOLIC BLOOD PRESSURE: 121 MMHG | OXYGEN SATURATION: 98 % | DIASTOLIC BLOOD PRESSURE: 63 MMHG | TEMPERATURE: 97.8 F

## 2018-02-14 DIAGNOSIS — J20.9 ACUTE BRONCHITIS, UNSPECIFIED ORGANISM: Primary | ICD-10-CM

## 2018-02-14 DIAGNOSIS — M54.2 NECK PAIN: ICD-10-CM

## 2018-02-14 PROCEDURE — 99214 OFFICE O/P EST MOD 30 MIN: CPT | Performed by: NURSE PRACTITIONER

## 2018-02-14 NOTE — MR AVS SNAPSHOT
After Visit Summary   2/14/2018    Salbador Begum    MRN: [id number]           Patient Information     Date Of Birth          1955        Visit Information        Provider Department      2/14/2018 10:30 AM Ana Manzanares NP [facility]        Today's Diagnoses     Acute bronchitis, unspecified organism    -  1    Neck pain           Follow-ups after your visit        Who to contact     If you have questions or need follow up information about today's clinic visit or your schedule please contact [facility] directly at [phone].  Normal or non-critical lab and imaging results will be communicated to you by Vendavohart, letter or phone within 4 business days after the clinic has received the results. If you do not hear from us within 7 days, please contact the clinic through JAB Broadbandt or phone. If you have a critical or abnormal lab result, we will notify you by phone as soon as possible.  Submit refill requests through Pure Technologies or call your pharmacy and they will forward the refill request to us. Please allow 3 business days for your refill to be completed.          Additional Information About Your Visit        MyChart Information     [employer] gives you secure access to your electronic health record. If you see a primary care provider, you can also send messages to your care team and make appointments. If you have questions, please call your primary care clinic.  If you do not have a primary care provider, please call [phone] and they will assist you.        Care EveryWhere ID     This is your Care EveryWhere ID. This could be used by other organizations to access your [place] medical records  [id number]        Your Vitals Were     Pulse Temperature Pulse Oximetry BMI (Body Mass Index)          44 97.8  F (36.6  C) (Oral) 98% 22.35 kg/m2         Blood Pressure from Last 3 Encounters:   02/14/18 121/63   02/08/18 122/60   01/13/18 134/76     Weight from Last 3 Encounters:   02/14/18 147 lb (66.7 kg)   02/08/18 147 lb (66.7 kg)   01/13/18 149 lb (67.6 kg)              Today, you had the following     No orders found for display       Primary Care Provider Office Phone # Fax #    Kobe Pierce [phone]       [address]  [facility] [address]        Equal Access to Services     ANDRES MACKEY : Hadii aad ku hadasho Soomaali, waaxda luqadaha, qaybta kaalmada adeegyada, waxay idiin hayaan adeeg kharash lasingh . So [facility] [place] [phone].    ATENCIÓN: Si shreela espglenna, tiene a razo disposición servicios gratuitos de asistencia lingüística. Llame al [phone].    We comply with applicable federal civil rights laws and [place] laws. We do not discriminate on the basis of race, color, national origin, age, disability, sex, sexual orientation, or gender identity.            Thank you!     Thank you for choosing [facility]  for your care. Our goal is always to provide you with excellent care. Hearing back from our patients is one way we can continue to improve our services. Please take a few minutes to complete the written survey that you may receive in the mail after your visit with us. Thank you!             Your Updated Medication List - Protect others around you: Learn how to safely use, store and throw away your medicines at www.disposemymeds.org.          This list is accurate as of 2/14/18 11:05 AM.  Always use your most recent med list.                   Brand Name Dispense Instructions for use Diagnosis    albuterol 108 (90 BASE) MCG/ACT Inhaler    PROAIR HFA/PROVENTIL HFA/VENTOLIN HFA    1 Inhaler    Inhale 2 puffs into the lungs every 6 hours as needed for shortness of breath / dyspnea or wheezing    Wheezing       ciprofloxacin 500 MG tablet    CIPRO    6 tablet    Take 1 tablet (500 mg) by mouth 2 times daily    Routine general medical examination at a health care facility       guaiFENesin-codeine 100-10 MG/5ML  Soln solution    ROBITUSSIN AC    120 mL    Take 5 mLs by mouth every 4 hours as needed for cough    Cough       ibuprofen 200 MG tablet    ADVIL/MOTRIN     Take 400 mg by mouth every 6 hours as needed for mild pain        MULTI-DAY vitamin  S Tabs      Take  by mouth.        TYLENOL ARTHRITIS PAIN PO      Take 650 mg by mouth every 6 hours as needed        typhoid CR capsule    VIVOTIF    4 capsule    Take 1 capsule by mouth every other day    Routine general medical examination at a health care facility

## 2018-02-14 NOTE — PROGRESS NOTES
SUBJECTIVE:   Salbador Begum is a 62 year old male who presents to clinic today for the following health issues:      UC Followup:    Facility:  [facility]  Date of visit: 2/8/2017  Reason for visit: Bronchitis   Current Status:       Used albuterol inhaler today, finished Zithromax      He is feeling much better.  Cough is improved, still intermittent, worse after skiing in the cold.  No fevers or shortness of breath.   Mild neck pain this morning when he woke up.  No radiating pain, paresthesias, weakness, or dizziness.         Problem list and histories reviewed & adjusted, as indicated.  Additional history: as documented        Reviewed and updated as needed this visit by clinical staff       Reviewed and updated as needed this visit by Provider         ROS:  C: NEGATIVE for fever, chills, change in weight  E/M: NEGATIVE for ear, mouth and throat problems  RESP:see HPI  CV: NEGATIVE for chest pain, palpitations or peripheral edema  GI: NEGATIVE for nausea, abdominal pain, heartburn, or change in bowel habits  MUSCULOSKELETAL: see HPI  NEURO: NEGATIVE for weakness, dizziness or paresthesias    OBJECTIVE:     /63  Pulse (!) 44  Temp 97.8  F (36.6  C) (Oral)  Wt 147 lb (66.7 kg)  SpO2 98%  BMI 22.35 kg/m2  Body mass index is 22.35 kg/(m^2).  GENERAL: healthy, alert and no distress  NECK: no adenopathy, no asymmetry, masses, or scars and thyroid normal to palpation  RESP: lungs clear to auscultation - no rales, rhonchi or wheezes  CV: regular rate and rhythm, normal S1 S2, no S3 or S4, no murmur, click or rub, no peripheral edema and peripheral pulses strong  ABDOMEN: soft, nontender, no hepatosplenomegaly, no masses and bowel sounds normal  MS: no gross musculoskeletal defects noted, no edema  SKIN: no suspicious lesions or rashes  NEURO: Normal strength and tone, mentation intact and speech normal        ASSESSMENT/PLAN:             1. Acute bronchitis, unspecified  organism  Improving.  Discussed using the albuterol inhaler 15-20 minutes prior to exercise.    2. Neck pain  Musculoskeletal.  Use ice, he will do stretches he has learned in PT previously.    Follow up if symptoms are not improving over the next 1-2 weeks.         Ana Manzanares, [place]  [facility]

## 2018-06-29 ENCOUNTER — OFFICE VISIT (OUTPATIENT)
Dept: FAMILY MEDICINE | Facility: CLINIC | Age: 63
End: 2018-06-29
Payer: COMMERCIAL

## 2018-06-29 VITALS
TEMPERATURE: 98.5 F | HEART RATE: 44 BPM | OXYGEN SATURATION: 98 % | SYSTOLIC BLOOD PRESSURE: 124 MMHG | DIASTOLIC BLOOD PRESSURE: 60 MMHG | BODY MASS INDEX: 21.24 KG/M2 | WEIGHT: 143.4 LBS | HEIGHT: 69 IN

## 2018-06-29 DIAGNOSIS — D69.6 THROMBOCYTOPENIA (H): ICD-10-CM

## 2018-06-29 DIAGNOSIS — R50.9 FEVER, UNSPECIFIED FEVER CAUSE: Primary | ICD-10-CM

## 2018-06-29 DIAGNOSIS — F33.0 MAJOR DEPRESSIVE DISORDER, RECURRENT, MILD (H): ICD-10-CM

## 2018-06-29 DIAGNOSIS — M79.10 MYALGIA: ICD-10-CM

## 2018-06-29 LAB
ALBUMIN SERPL-MCNC: 3.5 G/DL (ref 3.4–5)
ALBUMIN UR-MCNC: NEGATIVE MG/DL
ALP SERPL-CCNC: 69 U/L (ref 40–150)
ALT SERPL W P-5'-P-CCNC: 20 U/L (ref 0–70)
ANION GAP SERPL CALCULATED.3IONS-SCNC: 9 MMOL/L (ref 3–14)
APPEARANCE UR: CLEAR
AST SERPL W P-5'-P-CCNC: 19 U/L (ref 0–45)
BASOPHILS # BLD AUTO: 0 10E9/L (ref 0–0.2)
BASOPHILS NFR BLD AUTO: 0.5 %
BILIRUB SERPL-MCNC: 0.5 MG/DL (ref 0.2–1.3)
BILIRUB UR QL STRIP: NEGATIVE
BUN SERPL-MCNC: 10 MG/DL (ref 7–30)
CALCIUM SERPL-MCNC: 9.3 MG/DL (ref 8.5–10.1)
CHLORIDE SERPL-SCNC: 102 MMOL/L (ref 94–109)
CO2 SERPL-SCNC: 26 MMOL/L (ref 20–32)
COLOR UR AUTO: YELLOW
CREAT SERPL-MCNC: 1.03 MG/DL (ref 0.66–1.25)
DIFFERENTIAL METHOD BLD: NORMAL
EOSINOPHIL # BLD AUTO: 0.1 10E9/L (ref 0–0.7)
EOSINOPHIL NFR BLD AUTO: 2.7 %
ERYTHROCYTE [DISTWIDTH] IN BLOOD BY AUTOMATED COUNT: 11.9 % (ref 10–15)
GFR SERPL CREATININE-BSD FRML MDRD: 73 ML/MIN/1.7M2
GLUCOSE SERPL-MCNC: 90 MG/DL (ref 70–99)
GLUCOSE UR STRIP-MCNC: NEGATIVE MG/DL
HCT VFR BLD AUTO: 41.5 % (ref 40–53)
HGB BLD-MCNC: 14.6 G/DL (ref 13.3–17.7)
HGB UR QL STRIP: NEGATIVE
KETONES UR STRIP-MCNC: NEGATIVE MG/DL
LEUKOCYTE ESTERASE UR QL STRIP: NEGATIVE
LYMPHOCYTES # BLD AUTO: 1.2 10E9/L (ref 0.8–5.3)
LYMPHOCYTES NFR BLD AUTO: 28.7 %
MCH RBC QN AUTO: 31.1 PG (ref 26.5–33)
MCHC RBC AUTO-ENTMCNC: 35.2 G/DL (ref 31.5–36.5)
MCV RBC AUTO: 88 FL (ref 78–100)
MONOCYTES # BLD AUTO: 0.6 10E9/L (ref 0–1.3)
MONOCYTES NFR BLD AUTO: 13.9 %
NEUTROPHILS # BLD AUTO: 2.2 10E9/L (ref 1.6–8.3)
NEUTROPHILS NFR BLD AUTO: 54.2 %
NITRATE UR QL: NEGATIVE
PH UR STRIP: 6 PH (ref 5–7)
PLATELET # BLD AUTO: 168 10E9/L (ref 150–450)
POTASSIUM SERPL-SCNC: 4.7 MMOL/L (ref 3.4–5.3)
PROT SERPL-MCNC: 7.5 G/DL (ref 6.8–8.8)
RBC # BLD AUTO: 4.7 10E12/L (ref 4.4–5.9)
RBC #/AREA URNS AUTO: NORMAL /HPF
SODIUM SERPL-SCNC: 137 MMOL/L (ref 133–144)
SOURCE: NORMAL
SP GR UR STRIP: 1.01 (ref 1–1.03)
TSH SERPL DL<=0.005 MIU/L-ACNC: 1.35 MU/L (ref 0.4–4)
UROBILINOGEN UR STRIP-ACNC: 0.2 EU/DL (ref 0.2–1)
WBC # BLD AUTO: 4.1 10E9/L (ref 4–11)
WBC #/AREA URNS AUTO: NORMAL /HPF

## 2018-06-29 PROCEDURE — 80053 COMPREHEN METABOLIC PANEL: CPT | Performed by: FAMILY MEDICINE

## 2018-06-29 PROCEDURE — 99213 OFFICE O/P EST LOW 20 MIN: CPT | Performed by: FAMILY MEDICINE

## 2018-06-29 PROCEDURE — 84443 ASSAY THYROID STIM HORMONE: CPT | Performed by: FAMILY MEDICINE

## 2018-06-29 PROCEDURE — 81001 URINALYSIS AUTO W/SCOPE: CPT | Performed by: FAMILY MEDICINE

## 2018-06-29 PROCEDURE — 36415 COLL VENOUS BLD VENIPUNCTURE: CPT | Performed by: FAMILY MEDICINE

## 2018-06-29 PROCEDURE — 85025 COMPLETE CBC W/AUTO DIFF WBC: CPT | Performed by: FAMILY MEDICINE

## 2018-06-29 NOTE — MR AVS SNAPSHOT
After Visit Summary   6/29/2018    Salbador Begum    MRN: [id number]           Patient Information     Date Of Birth          1955        Visit Information        Provider Department      6/29/2018 9:00 AM Brody Major MD [facility]        Today's Diagnoses     Fever, unspecified fever cause    -  1    Myalgia        Major depressive disorder, recurrent, mild (H)           Follow-ups after your visit        Your next 10 appointments already scheduled     Jul 02, 2018  5:00 PM CDT   (Arrive by 4:45 PM)   RETURN ARRHYTHMIA with You Marquez MD   [facility] ([facility])    [address] [place] [address]  [address] [place] [id number] [phone]              Who to contact     If you have questions or need follow up information about today's clinic visit or your schedule please contact [facility] directly at [phone].  Normal or non-critical lab and imaging results will be communicated to you by MyChart, letter or phone within 4 business days after the clinic has received the results. If you do not hear from us within 7 days, please contact the clinic through [employer] or phone. If you have a critical or abnormal lab result, we will notify you by phone as soon as possible.  Submit refill requests through [employer] or call your pharmacy and they will forward the refill request to us. Please allow 3 business days for your refill to be completed.          Additional Information About Your Visit        MyChart Information     [employer] gives you secure access to your electronic health record. If you see a primary care provider, you can also send messages to your care team and make appointments. If you have questions, please call your primary care clinic.  If you do not have a primary care provider, please call [phone] and they will assist you.        Care EveryWhere ID     This is your Care  "EveryWhere ID. This could be used by other organizations to access your [place] medical records  [id number]        Your Vitals Were     Pulse Temperature Height Pulse Oximetry BMI (Body Mass Index)       44 98.5  F (36.9  C) (Oral) 5' 8.5\" (1.74 m) 98% 21.49 kg/m2        Blood Pressure from Last 3 Encounters:   06/29/18 124/60   02/14/18 121/63   02/08/18 122/60    Weight from Last 3 Encounters:   06/29/18 143 lb 6.4 oz (65 kg)   02/14/18 147 lb (66.7 kg)   02/08/18 147 lb (66.7 kg)              We Performed the Following     CBC with platelets and differential     Comprehensive metabolic panel (BMP + Alb, Alk Phos, ALT, AST, Total. Bili, TP)     TSH with free T4 reflex     UA with Microscopic reflex to Culture        Primary Care Provider Office Phone # Fax #    Kobe Pierce [phone] [address] [facility] [address]        Equal Access to Services     CASSIE [facility] : Hadii aad ku hadasho Soomaali, waaxda luqadaha, qaybta kaalmada adeegyada, lalita guy hayric avelar . So [facility] [phone].    ATENCIÓN: Si habla español, tiene a razo disposición servicios gratuitos de asistencia lingüística. Llame al [phone].    We comply with applicable federal civil rights laws and [place] laws. We do not discriminate on the basis of race, color, national origin, age, disability, sex, sexual orientation, or gender identity.            Thank you!     Thank you for choosing [facility]  for your care. Our goal is always to provide you with excellent care. Hearing back from our patients is one way we can continue to improve our services. Please take a few minutes to complete the written survey that you may receive in the mail after your visit with us. Thank you!             Your Updated Medication List - Protect others around you: Learn how to safely use, store and throw away your medicines at www.disposemymeds.org.          This list is accurate as of 6/29/18  " 1:34 PM.  Always use your most recent med list.                   Brand Name Dispense Instructions for use Diagnosis    ibuprofen 200 MG tablet    ADVIL/MOTRIN     Take 600 mg by mouth every 6 hours as needed for mild pain        MULTI-DAY vitamin  S Tabs      Take  by mouth.        TYLENOL ARTHRITIS PAIN PO      Take 650 mg by mouth every 6 hours as needed

## 2018-06-29 NOTE — PROGRESS NOTES
SUBJECTIVE:   Salbador Begum is a 62 year old male who presents to clinic today for the following health issues:    Concern - fever  Onset: since last week    Description:    tired, achy, chill, interferes with daily activities, it is still continuing, he is concerned that he may have a rheumatoid (seeing a rheumatologist in 2 weeks) , he had a rash last week on his right arm (now gone) he believes that it might be related    Intensity: ranges depending on when he has a fever r     Progression of Symptoms:  Improves overnight then throughout the day it worsens     Previous history of similar problem:   Has arthritis, father has history of rheumatoid     Precipitating factors:   Worsened by: is unsure      Alleviating factors:  Improved by: unsure if anything is helping     Therapies Tried and outcome: has tried to continue on with his daily activities.    Lasts until he goes to sleep. Normal pattern is that when he wakes up he feels OK, but by the end of the day he feels like he's been hit by a truck.    Monday it started early. He went for a run with his son that day in the late morning. Maybe about 6pm or 5:30 on other days it starts. Tuesday evening he took his temperature to be 101.4.    Temperature was 100.3 at 9:30 in the morning Tuesday.     Wednesday - 100.8 evening  Thursday - 100.8 evening    When the fever comes on he feels a pressure in his head     ROS  No palpitations  No cough  He does get muscle aches  He does have chills  No nausea  No diarrhea  No pain with urination  No urinary frequency  He did have abdominal discomfort on Saturday/Sunday - more of a discomfort in his abdomen. Didn't feel like eating anyway.  He does have back pain ongoing  He had a rash on his right arm, itchy, thought it was an insect bite - it was flat. It didn't blister.   No mood concerns - depression has been stable  Hand pain is an issue for him.    EXAM:  /60  Pulse (!) 44  Temp 98.5  F (36.9  C) (Oral)   "Ht 5' 8.5\" (1.74 m)  Wt 143 lb 6.4 oz (65 kg)  SpO2 98%  BMI 21.49 kg/m2  Constitutional: Healthy, alert, no distress   EENT: PERRL, TM's clear bilaterally, oropharynx without erythema, no anterior cervical lymphadenopathy   Cardiovascular: RRR. No murmurs   Respiratory: Clear to auscultation   Gastrointestinal: Bowel sounds active, no masses, rebound, guarding or organomegally           ASSESSMENT    ICD-10-CM    1. Fever, unspecified fever cause R50.9 UA with Microscopic reflex to Culture     CBC with platelets and differential     Comprehensive metabolic panel (BMP + Alb, Alk Phos, ALT, AST, Total. Bili, TP)     TSH with free T4 reflex   2. Myalgia M79.1 UA with Microscopic reflex to Culture     CBC with platelets and differential     Comprehensive metabolic panel (BMP + Alb, Alk Phos, ALT, AST, Total. Bili, TP)     TSH with free T4 reflex   3. Major depressive disorder, recurrent, mild (H) F33.0    4. Thrombocytopenia (H) D69.6       Plan:  Suspect viral gastroenteritis which he seems to be managing well.  Continue rest, fluids.  If not resolving in next week - follow-up.    It is a good idea to follow-up as planned with rheumatology though current symptoms not likely related.    Ok to use ibuprofen/tylenol for symptoms.    Depression stable  Differential could include atypical infection or lyme, but not suggestive of this.    Given his history of thrombocytopenia it seemed reasonable to check for blood dyscrasias, but CBC w diff appears normal and platelet count stable. If not resolving, could consider non-urgent consult with hematology - not expected to contribute to his current symptoms.    Brody Casas MD  Family Medicine Physician   "

## 2018-07-10 ENCOUNTER — TRANSFERRED RECORDS (OUTPATIENT)
Dept: HEALTH INFORMATION MANAGEMENT | Facility: CLINIC | Age: 63
End: 2018-07-10

## 2018-07-30 ENCOUNTER — OFFICE VISIT (OUTPATIENT)
Dept: CARDIOLOGY | Facility: CLINIC | Age: 63
End: 2018-07-30
Attending: INTERNAL MEDICINE
Payer: COMMERCIAL

## 2018-07-30 VITALS
DIASTOLIC BLOOD PRESSURE: 75 MMHG | BODY MASS INDEX: 21.99 KG/M2 | HEART RATE: 46 BPM | WEIGHT: 145.1 LBS | SYSTOLIC BLOOD PRESSURE: 124 MMHG | OXYGEN SATURATION: 98 % | HEIGHT: 68 IN

## 2018-07-30 DIAGNOSIS — I49.8 CHRONOTROPIC INCOMPETENCE WITH SINUS NODE DYSFUNCTION: ICD-10-CM

## 2018-07-30 DIAGNOSIS — R00.1 BRADYCARDIA: Primary | ICD-10-CM

## 2018-07-30 PROCEDURE — 99215 OFFICE O/P EST HI 40 MIN: CPT | Mod: 25 | Performed by: INTERNAL MEDICINE

## 2018-07-30 PROCEDURE — 93010 ELECTROCARDIOGRAM REPORT: CPT | Mod: ZP | Performed by: INTERNAL MEDICINE

## 2018-07-30 ASSESSMENT — PAIN SCALES - GENERAL: PAINLEVEL: NO PAIN (0)

## 2018-07-30 NOTE — LETTER
7/30/2018      RE: Salbador Begum  [address] [place] [address]       Dear Colleague,    Thank you for the opportunity to participate in the care of your patient, Salbador Begum, at the [facility]. Please see a copy of my visit note below.        Clinical Cardiac Electrophysiology      CC- palpitations    HPI-  Happy to see Mr. Begum back in the office today. He has had symptoms of dyspnea on exertion associated with blunted HR response to exercise. He was also having a sensation of irregular HRs with exercise. He had a treadmill study that showed excellent exercise capacity. The report mentions PACs and PVCs during exercise and rest. The note I sent him mentions atrial fibrillation during exercise. I will have the stress test ECGs pulled to review again.    Overall, he reports he has been feeling well. He was on vacation, walking up hills and climbing stairs without difficulty. He will occasion notice some shortness of breath with exercise or stairs but this is much less frequent than before. He initially noted this symptom last winter while cross country skiing. He is wondering if it will return this winter.    [id number]: Jogging again, stairs don't bother him like they use to. It had been a couple years since he was in to see me and felt he should touch base.     18Nov2016:  Mr. Begum feels well since his last visit in 09/2015. He continues to bicycle, jog, ski, and run frequently. His symptoms of dizziness/lightheadedness have not precluded him from his personal hobbies or his professional work as an  for the [place] [employer]. He did not a period of dyspnea on exertion over the last few summers that actually improved when winter came about. Mr. Begum feels that his exercise tolerance is now at baseline. Mr. Begum still has some dizziness/lightheadedness upon waking, but he prevents this by standing slowly. He  had a single episode of pre-syncope several months ago. No syncope, chest pain, exertional dyspnea at present.    2/10/17: Since last visit, he was admitted under ED observation for chest pain rule out on 11/20/16. Serial troponin x3 negative, stress echocardiogram with normal baseline echo and appropriate rise in LV systolic function at peak exercise. He did not have any symptoms during the exercise portion of the study. Since discharge, he has felt well, and denies any limitations to exercise capacity such as chest pain/pressure or dyspnea. He is not experiencing palpitations. He is not on a B-blocker as his resting HR is very low at baseline. He exercises regularly, including [employer] and running. He feels well and denies any complaints currently. Unfortunately, his wife was recently diagnosed with frontotemporal dementia, so he plans to retire soon and travel back to [facility] more frequently.     [id number] Interval history: He reports no increase in his palpitations.  He has noticed that his exercise abilities are beginning to decline, more than he would anticipate related to simple aging.  He notices that on the days where he is feeling a worse with exercise his heart rate tends to be in the 110s to may be 120 using his Fitbit.  Other days he is able to get his heart rate to the 140s 150s and feels much better with exercise.  He has had no syncope or near syncope.    I reviewed the ECG obtained today.  It shows marked sinus bradycardia competing with junctional rhythm.      Current Outpatient Prescriptions   Medication Sig Dispense Refill     Acetaminophen (TYLENOL ARTHRITIS PAIN PO) Take 650 mg by mouth every 6 hours as needed       ibuprofen (ADVIL,MOTRIN) 200 MG tablet Take 600 mg by mouth every 6 hours        Multiple Vitamin (MULTI-DAY VITAMINS) TABS Take  by mouth.       Allergies   Allergen Reactions     Metoclopramide Rash     Past Medical History:   Diagnosis Date     Allergic rhinitis, cause  "unspecified      Backache, unspecified      Depressive disorder      Diverticulitis      Junctional ectopic contractions (H)      Mild intermittent asthma 9/28/2005     Narcolepsy      Osteoarthritis      Palpitations      Sleep apnea      Past Surgical History:   Procedure Laterality Date     APPENDECTOMY       C NONSPECIFIC PROCEDURE      s/p Appendectomy     ORTHOPEDIC SURGERY      left hip replacement, right ankle,     SEPTORHINOPLASTY  4/7/2011    Procedure:SEPTORHINOPLASTY; Septoplasty withNasal Reconstruction with  Grafts Harvested from [employer]; Surgeon:ALFREDO UMANA; Location: OR     Social History   Substance Use Topics     Smoking status: Never Smoker     Smokeless tobacco: Never Used     Alcohol use Yes      Comment: 14 glass of wine     His an  with the [place] [employer].    Family History   Problem Relation Age of Onset     Arthritis Mother      osteo     Dementia Mother      Hypertension Mother      Cerebrovascular Disease Mother      Not confirmed     Seizure Disorder Mother      Rheumatoid Arthritis Father      Depression Father      Coronary Artery Disease Father      Depression Sister      Substance Abuse Sister      Myocardial Infarction Sister        ROS- the ten system review is negative except as noted in the HPI. He has noted occasional episodes of feeling light-headed with standing. [id number]/woa    Physical examination:  /75 (BP Location: Left arm, Patient Position: Chair, Cuff Size: Adult Regular)  Pulse (!) 46  Ht 1.727 m (5' 8\")  Wt 65.8 kg (145 lb 1.6 oz)  SpO2 98%  BMI 22.06 kg/m2  GEN: A & O, healthy appearing male, resting comfortably in exam chair, NAD, cooperative and conversational   HEENT: PERRL, no scleral icterus   NECK: Supple, no LAD, JVP not elevated   RESP: CTA bilaterally, no wheezing, no crackles, no increased work of breathing   CV: Regular, S1 and S2 without m/r/g   ABD: Soft, nontender to palpation, no HSM, +BS   EXT: No peripheral " edema, warm/well perfused   NEURO: CN II-XII intact  SKIN: Normal skin turgor    Laboratory and diagnostic data reviewed [id number]:    ECG obtained today reviewed personally and noted in HPI.    Pre-procedure labs ordered.    Previous studies reviewed 30Jul2018:   EKG 2/10/17:  Sinus bradycardia, normal axis, normal intervals, tall precordial T-waves (not peaked) which are unchanged from prior, no ischemic ST-T changes or Q-waves. 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 Personally reviewed 10Feb2017/woa    Exercise Echocardiogram... 11/22/16:  Normal exercise echocardiogram without evidence of inducible ischemia. Target heart rate was achieved. Heart rate and blood pressure response to exercise were normal. With stress, the left ventricular ejection fraction increased from 55-60% to greater than 65% and the left ventricular size decreased appropriately. There was no ECG evidence of ischemia.  Stress ECG is difficult to interpret due to artifact. There are frequent PAC's with excercise follow by the development of a narrow complex tachycardia, unknown type.  There is mild to moderate mitral regurgitation. MR does not appear to worsen with stress.    48 hr HOLTER... 7 September 2012  The rhythm was predominately sinus with periods of accelerated junctional pacemaker manifesting in normal sinus rhythm. There were 21 pauses greater than 2.0 sec in length. Non-sustained atrial tachycardia noted. The heart rate varied though not always appropriate for the activities described in the diary. Min rate was 30 bpm, max rate 176 bpm, average 53 bpm. Occasional supraventricular ectopy noted (2%). Numerous atrial pairs which may be capture beats followed by a sinus beat during periods of accelerated junctional pacemaker manifestations. No  "ventricular ectopy was observed. No ST-T wave changes. Pt reported two symptoms of \"shortness of breath\" which correlated with sinus bradycardia during exercise.   STRESS ECHO (Bike)... 29 May 2009   No significant worsening of MR noted with exercise. However, the patient developed isorhythmic AV dissociation with peak exercise, and coincident with the rhythm disturbance developed shortness of breath. The AV node tachycardia (and AV dissociation) started at a heart rate of 80 and persisted throughout exercise and resolved after exercise at a heart rate of 100 bpm. In retrospect, this same abnormality was present on the first stress test. Baseline EKG/SymptomsSinus rhythm, no pathologic Q waves or resting ST segment abnormalities. Left Ventricle systolic function is normal.The left ventricle is normal in size.There is normal left ventricular wall thickness. The left atrial size is normal.Right atrial size is normal.   ECHOCARDIOGRAM... 22 April 2009   Left ventricular function, chamber size, wall motion, and wall thickness are normal. The EF is 55-60%. Mild to moderate mitral insufficiency is present. Global right ventricular function is normal. Pulmonary artery systolic pressure is normal.    =====================================================================    Assessment and recommendations:  61 y/o M with history of exercise-induced palpitations and resting sinus bradycardia, dyspnea on exertion.  CAD evaluation with exercise echocardiography was reassuring.     1] Palpitations - unchanged     2] Sins Bradycardia (new symptoms) -sinus bradycardia is now causing symptoms of exercise intolerance consistent with chronotropic incompetence.  On today's resting ECG he has a competing junctional rhythm.  I had a long discussion with Mr. vega as well as his wife regarding possible benefits of permanent pacemaker placement.  We also discussed in detail the risk associated with pacemaker placement.  After considering his " options he would like to proceed with pacemaker placement.  Arrangements for this will be made at his convenience at the [facility].    3] Mitral Regurgitation - mild MR (quantified on recent echo w/ ERO 0.09 cm2, regurgitant volume 20 mL/beat). Not symptomatic; notably did not worsen at peak exercise. Continue to monitor w/ periodic echocardiograms.         Please do not hesitate to contact me if you have any questions/concerns.     Sincerely,     You Marquez MD

## 2018-07-30 NOTE — PROGRESS NOTES
Clinical Cardiac Electrophysiology      CC- palpitations    HPI-  Happy to see Mr. Begum back in the office today. He has had symptoms of dyspnea on exertion associated with blunted HR response to exercise. He was also having a sensation of irregular HRs with exercise. He had a treadmill study that showed excellent exercise capacity. The report mentions PACs and PVCs during exercise and rest. The note I sent him mentions atrial fibrillation during exercise. I will have the stress test ECGs pulled to review again.    Overall, he reports he has been feeling well. He was on vacation, walking up hills and climbing stairs without difficulty. He will occasion notice some shortness of breath with exercise or stairs but this is much less frequent than before. He initially noted this symptom last winter while cross country skiing. He is wondering if it will return this winter.    [id number]: Jogging again, stairs don't bother him like they use to. It had been a couple years since he was in to see me and felt he should touch base.     18Nov2016:  Mr. Begum feels well since his last visit in 09/2015. He continues to bicycle, jog, ski, and run frequently. His symptoms of dizziness/lightheadedness have not precluded him from his personal hobbies or his professional work as an  for the [place] [employer]. He did not a period of dyspnea on exertion over the last few summers that actually improved when winter came about. Mr. Begum feels that his exercise tolerance is now at baseline. Mr. Begum still has some dizziness/lightheadedness upon waking, but he prevents this by standing slowly. He had a single episode of pre-syncope several months ago. No syncope, chest pain, exertional dyspnea at present.    2/10/17: Since last visit, he was admitted under ED observation for chest pain rule out on 11/20/16. Serial troponin x3 negative, stress echocardiogram with normal baseline echo and appropriate rise in LV systolic function at  peak exercise. He did not have any symptoms during the exercise portion of the study. Since discharge, he has felt well, and denies any limitations to exercise capacity such as chest pain/pressure or dyspnea. He is not experiencing palpitations. He is not on a B-blocker as his resting HR is very low at baseline. He exercises regularly, including [employer] and running. He feels well and denies any complaints currently. Unfortunately, his wife was recently diagnosed with frontotemporal dementia, so he plans to retire soon and travel back to [facility] more frequently.     [id number] Interval history: He reports no increase in his palpitations.  He has noticed that his exercise abilities are beginning to decline, more than he would anticipate related to simple aging.  He notices that on the days where he is feeling a worse with exercise his heart rate tends to be in the 110s to may be 120 using his Fitbit.  Other days he is able to get his heart rate to the 140s 150s and feels much better with exercise.  He has had no syncope or near syncope.    I reviewed the ECG obtained today.  It shows marked sinus bradycardia competing with junctional rhythm.      Current Outpatient Prescriptions   Medication Sig Dispense Refill     Acetaminophen (TYLENOL ARTHRITIS PAIN PO) Take 650 mg by mouth every 6 hours as needed       ibuprofen (ADVIL,MOTRIN) 200 MG tablet Take 600 mg by mouth every 6 hours        Multiple Vitamin (MULTI-DAY VITAMINS) TABS Take  by mouth.       Allergies   Allergen Reactions     Metoclopramide Rash     Past Medical History:   Diagnosis Date     Allergic rhinitis, cause unspecified      Backache, unspecified      Depressive disorder      Diverticulitis      Junctional ectopic contractions (H)      Mild intermittent asthma 9/28/2005     Narcolepsy      Osteoarthritis      Palpitations      Sleep apnea      Past Surgical History:   Procedure Laterality Date     APPENDECTOMY       C NONSPECIFIC PROCEDURE      s/p  "Appendectomy     ORTHOPEDIC SURGERY      left hip replacement, right ankle,     SEPTORHINOPLASTY  4/7/2011    Procedure:SEPTORHINOPLASTY; Septoplasty withNasal Reconstruction with  Grafts Harvested from [employer]; Surgeon:ALFREDO UMANA; Location: OR     Social History   Substance Use Topics     Smoking status: Never Smoker     Smokeless tobacco: Never Used     Alcohol use Yes      Comment: 14 glass of wine     His an  with the [place] [employer].    Family History   Problem Relation Age of Onset     Arthritis Mother      osteo     Dementia Mother      Hypertension Mother      Cerebrovascular Disease Mother      Not confirmed     Seizure Disorder Mother      Rheumatoid Arthritis Father      Depression Father      Coronary Artery Disease Father      Depression Sister      Substance Abuse Sister      Myocardial Infarction Sister        ROS- the ten system review is negative except as noted in the HPI. He has noted occasional episodes of feeling light-headed with standing. [id number]/woa    Physical examination:  /75 (BP Location: Left arm, Patient Position: Chair, Cuff Size: Adult Regular)  Pulse (!) 46  Ht 1.727 m (5' 8\")  Wt 65.8 kg (145 lb 1.6 oz)  SpO2 98%  BMI 22.06 kg/m2  GEN: A & O, healthy appearing male, resting comfortably in exam chair, NAD, cooperative and conversational   HEENT: PERRL, no scleral icterus   NECK: Supple, no LAD, JVP not elevated   RESP: CTA bilaterally, no wheezing, no crackles, no increased work of breathing   CV: Regular, S1 and S2 without m/r/g   ABD: Soft, nontender to palpation, no HSM, +BS   EXT: No peripheral edema, warm/well perfused   NEURO: CN II-XII intact  SKIN: Normal skin turgor    Laboratory and diagnostic data reviewed [id number]:    ECG obtained today reviewed personally and noted in HPI.    Pre-procedure labs ordered.    Previous studies reviewed [id number]:   EKG 2/10/17:  Sinus bradycardia, normal axis, normal intervals, tall precordial " "T-waves (not peaked) which are unchanged from prior, no ischemic ST-T changes or Q-waves. 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 Personally reviewed [id number]/woa    Exercise Echocardiogram... 11/22/16:  Normal exercise echocardiogram without evidence of inducible ischemia. Target heart rate was achieved. Heart rate and blood pressure response to exercise were normal. With stress, the left ventricular ejection fraction increased from 55-60% to greater than 65% and the left ventricular size decreased appropriately. There was no ECG evidence of ischemia.  Stress ECG is difficult to interpret due to artifact. There are frequent PAC's with excercise follow by the development of a narrow complex tachycardia, unknown type.  There is mild to moderate mitral regurgitation. MR does not appear to worsen with stress.    48 hr HOLTER... 7 September 2012  The rhythm was predominately sinus with periods of accelerated junctional pacemaker manifesting in normal sinus rhythm. There were 21 pauses greater than 2.0 sec in length. Non-sustained atrial tachycardia noted. The heart rate varied though not always appropriate for the activities described in the diary. Min rate was 30 bpm, max rate 176 bpm, average 53 bpm. Occasional supraventricular ectopy noted (2%). Numerous atrial pairs which may be capture beats followed by a sinus beat during periods of accelerated junctional pacemaker manifestations. No ventricular ectopy was observed. No ST-T wave changes. Pt reported two symptoms of \"shortness of breath\" which correlated with sinus bradycardia during exercise.   STRESS ECHO (Bike)... 29 May 2009   No significant worsening of MR noted with exercise. However, the patient developed isorhythmic AV dissociation with peak exercise, and coincident with the " rhythm disturbance developed shortness of breath. The AV node tachycardia (and AV dissociation) started at a heart rate of 80 and persisted throughout exercise and resolved after exercise at a heart rate of 100 bpm. In retrospect, this same abnormality was present on the first stress test. Baseline EKG/SymptomsSinus rhythm, no pathologic Q waves or resting ST segment abnormalities. Left Ventricle systolic function is normal.The left ventricle is normal in size.There is normal left ventricular wall thickness. The left atrial size is normal.Right atrial size is normal.   ECHOCARDIOGRAM... 22 April 2009   Left ventricular function, chamber size, wall motion, and wall thickness are normal. The EF is 55-60%. Mild to moderate mitral insufficiency is present. Global right ventricular function is normal. Pulmonary artery systolic pressure is normal.    =====================================================================    Assessment and recommendations:  63 y/o M with history of exercise-induced palpitations and resting sinus bradycardia, dyspnea on exertion.  CAD evaluation with exercise echocardiography was reassuring.     1] Palpitations - unchanged     2] Sins Bradycardia (new symptoms) -sinus bradycardia is now causing symptoms of exercise intolerance consistent with chronotropic incompetence.  On today's resting ECG he has a competing junctional rhythm.  I had a long discussion with Mr. vega as well as his wife regarding possible benefits of permanent pacemaker placement.  We also discussed in detail the risk associated with pacemaker placement.  After considering his options he would like to proceed with pacemaker placement.  Arrangements for this will be made at his convenience at the [facility].    3] Mitral Regurgitation - mild MR (quantified on recent echo w/ ERO 0.09 cm2, regurgitant volume 20 mL/beat). Not symptomatic; notably did not worsen at peak exercise. Continue to monitor w/  periodic echocardiograms.

## 2018-07-30 NOTE — NURSING NOTE
Chief Complaint   Patient presents with     Follow Up For      reason for visit: Jimmy: annual FU 92/10/17)- asymptomatic bradycardia, PAC     Vitals were taken and medications were reconciled. EKG was performed.    CHAGO Montalvo  4:38 PM

## 2018-07-30 NOTE — MR AVS SNAPSHOT
After Visit Summary   7/30/2018    Salbador Begum    MRN: [id number]           Patient Information     Date Of Birth          1955        Visit Information        Provider Department      7/30/2018 4:40 PM You Marquez MD [facility]        Today's Diagnoses     Bradycardia    -  1      Care Instructions    Jessi will contact you to discuss your procedure. If you do not hear from her by Wednesday, please call at the number below.     We encourage you to use My Chart as your primary form of communication if possible. If you need assistance in setting this up, please contact our office or ask at your follow up visit.     If you need a medication refill please contact your pharmacy. Please allow at least 3 business days for your refill to be completed.       Cardiology  Telephone Number    Lucila Lopez [phone]   Or send a message to your provider via my chart.   For scheduling procedures:    [place]    Clinic appointments       [phone]   For the Device Clinic (Pacemakers and ICD's)   RN's :   Christina Lopez  During business hours: [phone]    After business hours:   [phone]- select option 4 and ask for job code [id number].          As always, Thank you for trusting us with your health care needs!          Follow-ups after your visit        Who to contact     If you have questions or need follow up information about today's clinic visit or your schedule please contact [facility] directly at [phone].  Normal or non-critical lab and imaging results will be communicated to you by MyChart, letter or phone within 4 business days after the clinic has received the results. If you do not hear from us within 7 days, please contact the clinic through MyChart or phone. If you have a critical or abnormal lab result, we will notify you by phone as soon as possible.  Submit refill requests through [employer] or call your pharmacy  "and they will forward the refill request to us. Please allow 3 business days for your refill to be completed.          Additional Information About Your Visit        Inclinixhart Information     [employer] gives you secure access to your electronic health record. If you see a primary care provider, you can also send messages to your care team and make appointments. If you have questions, please call your primary care clinic.  If you do not have a primary care provider, please call [phone] and they will assist you.        Care EveryWhere ID     This is your Care EveryWhere ID. This could be used by other organizations to access your [facility] medical records  [id number]        Your Vitals Were     Pulse Height Pulse Oximetry BMI (Body Mass Index)          46 1.727 m (5' 8\") 98% 22.06 kg/m2         Blood Pressure from Last 3 Encounters:   07/30/18 124/75   06/29/18 124/60   02/14/18 121/63    Weight from Last 3 Encounters:   07/30/18 65.8 kg (145 lb 1.6 oz)   06/29/18 65 kg (143 lb 6.4 oz)   02/14/18 66.7 kg (147 lb)              We Performed the Following     EKG 12-lead, tracing only (Same Day)        Primary Care Provider Office Phone # Fax #    Kobe Pirece [phone] [address]  [facility] [address]        Equal Access to Services     ANDRES MACKEY : Hadii aad ku hadasho Soomaali, waaxda luqadaha, qaybta kaalmada adeeglobitoda, lalita grey. So [facility] [phone].    ATENCIÓN: Si habla español, tiene a razo disposición servicios gratuitos de asistencia lingüística. Lltan al [phone].    We comply with applicable federal civil rights laws and [place] laws. We do not discriminate on the basis of race, color, national origin, age, disability, sex, sexual orientation, or gender identity.            Thank you!     Thank you for choosing [facility]  for your care. Our goal is always to provide you with excellent care. Hearing back from our patients is one " way we can continue to improve our services. Please take a few minutes to complete the written survey that you may receive in the mail after your visit with us. Thank you!             Your Updated Medication List - Protect others around you: Learn how to safely use, store and throw away your medicines at www.disposemymeds.org.          This list is accurate as of 7/30/18  5:43 PM.  Always use your most recent med list.                   Brand Name Dispense Instructions for use Diagnosis    ibuprofen 200 MG tablet    ADVIL/MOTRIN     Take 600 mg by mouth every 6 hours        MULTI-DAY vitamin  S Tabs      Take  by mouth.        TYLENOL ARTHRITIS PAIN PO      Take 650 mg by mouth every 6 hours as needed

## 2018-07-30 NOTE — PATIENT INSTRUCTIONS
Jessi will contact you to discuss your procedure. If you do not hear from her by Wednesday, please call at the number below.     We encourage you to use My Chart as your primary form of communication if possible. If you need assistance in setting this up, please contact our office or ask at your follow up visit.     If you need a medication refill please contact your pharmacy. Please allow at least 3 business days for your refill to be completed.       Cardiology  Telephone Number    Lucila Lopez [phone]   Or send a message to your provider via my chart.   For scheduling procedures:    Jessi Aranda    Clinic appointments       [phone]   For the Device Clinic (Pacemakers and ICD's)   RN's :   Christina Lopez  During business hours: [phone]    After business hours:   [phone]- select option 4 and ask for job code [id number].          As always, Thank you for trusting us with your health care needs!

## 2018-07-31 LAB — INTERPRETATION ECG - MUSE: NORMAL

## 2018-08-01 ENCOUNTER — CARE COORDINATION (OUTPATIENT)
Dept: CARDIOLOGY | Facility: CLINIC | Age: 63
End: 2018-08-01

## 2018-08-01 ENCOUNTER — HOSPITAL ENCOUNTER (OUTPATIENT)
Facility: CLINIC | Age: 63
End: 2018-08-01
Admitting: INTERNAL MEDICINE
Payer: COMMERCIAL

## 2018-08-01 DIAGNOSIS — Z95.0 CARDIAC PACEMAKER IN SITU: ICD-10-CM

## 2018-08-01 DIAGNOSIS — I49.5 SINOATRIAL NODE DYSFUNCTION (H): Primary | ICD-10-CM

## 2018-08-01 RX ORDER — LIDOCAINE 40 MG/G
CREAM TOPICAL
Status: CANCELLED | OUTPATIENT
Start: 2018-08-01 | End: 2019-08-01

## 2018-08-10 ENCOUNTER — E-VISIT (OUTPATIENT)
Dept: FAMILY MEDICINE | Facility: CLINIC | Age: 63
End: 2018-08-10
Payer: COMMERCIAL

## 2018-08-10 DIAGNOSIS — F33.0 MAJOR DEPRESSIVE DISORDER, RECURRENT, MILD (H): Primary | ICD-10-CM

## 2018-08-10 PROCEDURE — 99444 ZZC PHYSICIAN ONLINE EVALUATION & MANAGEMENT SERVICE: CPT | Performed by: FAMILY MEDICINE

## 2018-08-10 ASSESSMENT — ANXIETY QUESTIONNAIRES
7. FEELING AFRAID AS IF SOMETHING AWFUL MIGHT HAPPEN: NOT AT ALL
1. FEELING NERVOUS, ANXIOUS, OR ON EDGE: MORE THAN HALF THE DAYS
6. BECOMING EASILY ANNOYED OR IRRITABLE: MORE THAN HALF THE DAYS
3. WORRYING TOO MUCH ABOUT DIFFERENT THINGS: MORE THAN HALF THE DAYS
GAD7 TOTAL SCORE: 9
GAD7 TOTAL SCORE: 9
7. FEELING AFRAID AS IF SOMETHING AWFUL MIGHT HAPPEN: NOT AT ALL
2. NOT BEING ABLE TO STOP OR CONTROL WORRYING: MORE THAN HALF THE DAYS
4. TROUBLE RELAXING: SEVERAL DAYS
5. BEING SO RESTLESS THAT IT IS HARD TO SIT STILL: NOT AT ALL
GAD7 TOTAL SCORE: 9

## 2018-08-10 ASSESSMENT — PATIENT HEALTH QUESTIONNAIRE - PHQ9
10. IF YOU CHECKED OFF ANY PROBLEMS, HOW DIFFICULT HAVE THESE PROBLEMS MADE IT FOR YOU TO DO YOUR WORK, TAKE CARE OF THINGS AT HOME, OR GET ALONG WITH OTHER PEOPLE: SOMEWHAT DIFFICULT
SUM OF ALL RESPONSES TO PHQ QUESTIONS 1-9: 4
SUM OF ALL RESPONSES TO PHQ QUESTIONS 1-9: 4

## 2018-08-11 ASSESSMENT — ANXIETY QUESTIONNAIRES: GAD7 TOTAL SCORE: 9

## 2018-08-11 ASSESSMENT — PATIENT HEALTH QUESTIONNAIRE - PHQ9: SUM OF ALL RESPONSES TO PHQ QUESTIONS 1-9: 4

## 2018-08-13 RX ORDER — BUPROPION HYDROCHLORIDE 300 MG/1
300 TABLET ORAL EVERY MORNING
Qty: 90 TABLET | Refills: 1 | Status: SHIPPED | OUTPATIENT
Start: 2018-08-13 | End: 2019-02-04

## 2018-09-07 ENCOUNTER — MYC MEDICAL ADVICE (OUTPATIENT)
Dept: CARDIOLOGY | Facility: CLINIC | Age: 63
End: 2018-09-07

## 2018-09-10 NOTE — TELEPHONE ENCOUNTER
[facility] Call Center    Phone Message    May a detailed message be left on voicemail: yes    Reason for Call: Other: Please read PT's [employer] message.  Per PT is still waiting for a call back and is requesting a call back ASAP due to his procedure is coming up soon.     Action Taken: Message routed to:  [facility] ([facility] Cardiology

## 2018-09-25 ENCOUNTER — ALLIED HEALTH/NURSE VISIT (OUTPATIENT)
Dept: NURSING | Facility: CLINIC | Age: 63
End: 2018-09-25
Payer: COMMERCIAL

## 2018-09-25 DIAGNOSIS — Z23 NEED FOR PROPHYLACTIC VACCINATION AND INOCULATION AGAINST INFLUENZA: Primary | ICD-10-CM

## 2018-09-25 PROCEDURE — 99207 ZZC NO CHARGE NURSE ONLY: CPT

## 2018-09-25 PROCEDURE — 90471 IMMUNIZATION ADMIN: CPT

## 2018-09-25 PROCEDURE — 90682 RIV4 VACC RECOMBINANT DNA IM: CPT

## 2018-09-25 NOTE — PROGRESS NOTES

## 2018-09-25 NOTE — MR AVS SNAPSHOT
After Visit Summary   9/25/2018    Salbador Begum    MRN: [id number]           Patient Information     Date Of Birth          1955        Visit Information        Provider Department      9/25/2018 3:00 PM [facility] MEDICAL ASSISTANT [facility]        Today's Diagnoses     Need for prophylactic vaccination and inoculation against influenza    -  1       Follow-ups after your visit        Who to contact     If you have questions or need follow up information about today's clinic visit or your schedule please contact [facility] directly at [phone].  Normal or non-critical lab and imaging results will be communicated to you by [employer]art, letter or phone within 4 business days after the clinic has received the results. If you do not hear from us within 7 days, please contact the clinic through Zedmo or phone. If you have a critical or abnormal lab result, we will notify you by phone as soon as possible.  Submit refill requests through Zedmo or call your pharmacy and they will forward the refill request to us. Please allow 3 business days for your refill to be completed.          Additional Information About Your Visit        MyChart Information     [employer] gives you secure access to your electronic health record. If you see a primary care provider, you can also send messages to your care team and make appointments. If you have questions, please call your primary care clinic.  If you do not have a primary care provider, please call [phone] and they will assist you.        Care EveryWhere ID     This is your Care EveryWhere ID. This could be used by other organizations to access your [facility] medical records  [id number]         Blood Pressure from Last 3 Encounters:   07/30/18 124/75   06/29/18 124/60   02/14/18 121/63    Weight from Last 3 Encounters:   07/30/18 145 lb 1.6 oz (65.8 kg)   06/29/18 143 lb 6.4 oz (65 kg)   02/14/18 147 lb (66.7 kg)               We Performed the Following     FLU VACCINE, (RIV4) RECOMBINANT HA  , IM (FluBlok, egg free) [77532]- >18 YRS (FMG recommended  50-64 YRS)     Vaccine Administration, Initial [35114]        Primary Care Provider Office Phone # Fax #    Kobe Pierce [phone] [address]  [facility] [address]        Equal Access to Services     ANDRES MACKEY : Hadii aad ku hadasho Soomaali, waaxda luqadaha, qaybta kaalmada adeegyada, waxay idiin hayaan adeeg khshantellsh lalianan ah. So [facility] [phone].    ATENCIÓN: Si rosita casper, tiene a razo disposición servicios gratuitos de asistencia lingüística. Llame al [phone].    We comply with applicable federal civil rights laws and [place] laws. We do not discriminate on the basis of race, color, national origin, age, disability, sex, sexual orientation, or gender identity.            Thank you!     Thank you for choosing [facility]  for your care. Our goal is always to provide you with excellent care. Hearing back from our patients is one way we can continue to improve our services. Please take a few minutes to complete the written survey that you may receive in the mail after your visit with us. Thank you!             Your Updated Medication List - Protect others around you: Learn how to safely use, store and throw away your medicines at www.disposemymeds.org.          This list is accurate as of 9/25/18  3:47 PM.  Always use your most recent med list.                   Brand Name Dispense Instructions for use Diagnosis    buPROPion 300 MG 24 hr tablet    WELLBUTRIN XL    90 tablet    Take 1 tablet (300 mg) by mouth every morning    Major depressive disorder, recurrent, mild (H)       ibuprofen 200 MG tablet    ADVIL/MOTRIN     Take 600 mg by mouth every 6 hours        MULTI-DAY vitamin  S Tabs      Take  by mouth.        TYLENOL ARTHRITIS PAIN PO      Take 650 mg by mouth every 6 hours as needed

## 2018-12-05 ENCOUNTER — OFFICE VISIT (OUTPATIENT)
Dept: FAMILY MEDICINE | Facility: CLINIC | Age: 63
End: 2018-12-05
Payer: COMMERCIAL

## 2018-12-05 VITALS
SYSTOLIC BLOOD PRESSURE: 127 MMHG | OXYGEN SATURATION: 100 % | DIASTOLIC BLOOD PRESSURE: 79 MMHG | HEART RATE: 48 BPM | RESPIRATION RATE: 16 BRPM | TEMPERATURE: 97 F | BODY MASS INDEX: 21.29 KG/M2 | WEIGHT: 140.5 LBS | HEIGHT: 68 IN

## 2018-12-05 DIAGNOSIS — S16.1XXA STRAIN OF NECK MUSCLE, INITIAL ENCOUNTER: Primary | ICD-10-CM

## 2018-12-05 PROCEDURE — 96372 THER/PROPH/DIAG INJ SC/IM: CPT | Performed by: FAMILY MEDICINE

## 2018-12-05 PROCEDURE — 99214 OFFICE O/P EST MOD 30 MIN: CPT | Mod: 25 | Performed by: FAMILY MEDICINE

## 2018-12-05 RX ORDER — KETOROLAC TROMETHAMINE 30 MG/ML
60 INJECTION, SOLUTION INTRAMUSCULAR; INTRAVENOUS ONCE
Qty: 2 ML | Refills: 0 | OUTPATIENT
Start: 2018-12-05 | End: 2018-12-05

## 2018-12-05 NOTE — NURSING NOTE
The following medication was given:     MEDICATION:  Ketorolac Tromethamine 60MG/2ML (30 mg/mL) (Toradol)  ROUTE: IM  SITE: [facility]  DOSE: 60mg/2mL  LOT #: [id number]   : [employer]  EXPIRATION DATE: 08/19  NDC#: 78044-479-72   Was there drug waste? No  Multi-dose vial: No    Lb Gutierrez MA  December 5, 2018

## 2018-12-05 NOTE — PROGRESS NOTES
SUBJECTIVE:   Salbador Begum is a 63 year old male who presents to clinic today for the following health issues:    Neck Pain    Onset: 3 day(s) ago                  Any trauma: YES- possibly sleeping wrong                      Description:   Location: right side of neck   Radiation: radiates into the right shoulder and back of head  Constant/intermittent: constant                 Does movement of neck increase pain: YES    Pain scale: 10/10    History:        Previous neck pain: YES  Previous surgery or injections: no        Previous Imaging (MRI-X ray): no    Accompanying Signs & Symptoms:   Have any of the following:       Fever: no       Headache: YES- back of head        Nausea and/or vomiting: no       Numbness, Burning, prickly sensation (Paresthesia) in arms: no       Any weakness in arms: no    Therapies tried and outcome: Meds ibuprofen and muscle relaxant with minor relief    Tired and sleepy with muscle relaxar.     Neck stiffness that comes and goes. This it not going away. Similar issue in 2014.   Hard to sleep last night. Pain on back of neck and head. No hand numbness or weakness.   Neck movement is painful, worse on right than left.   Wants to settle it down.   Tried some exercises.     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     Problem list and histories reviewed & adjusted, as indicated.  Additional history: as documented    Labs reviewed in EPIC    Reviewed and updated as needed this visit by clinical staff    Reviewed and updated as needed this visit by Provider      Social History     Social History     Marital status:      Spouse name: N/A     Number of children: N/A     Years of education: N/A     Occupational History     Not on file.     Social History Main Topics     Smoking status: Never Smoker     Smokeless tobacco: Never Used     Alcohol use Yes      Comment: 14 glass of wine     Drug use: No     Sexual activity: Yes     Partners: Female     Birth control/  "protection: Post-menopausal      Comment: Other     Other Topics Concern     Parent/Sibling W/ Cabg, Mi Or Angioplasty Before 65f 55m? No     Social History Narrative    Social Documentation:        Balanced Diet: YES    Calcium intake: milk and food  per day, occas mtv    Caffeine: 1 cup per day    Exercise:  type of activity varies;  3-5 times per week    Sunscreen: No -     Seatbelts:  Yes    Self Breast Exam:  No - na    Self Testicular Exam: No -     Physical/Emotional/Sexual Abuse: No -     Do you feel safe in your environment? Yes        Cholesterol screen up to date: Yes    Eye Exam up to date: Yes    Dental Exam up to date: Yes    Pap smear up to date: Does Not Apply    Mammogram up to date: Does Not Apply    Dexa Scan up to date: Does Not Apply    Colonoscopy up to date: Due    Immunizations up to date: Yes    Glucose screen if over 40:  Yes                 Allergies   Allergen Reactions     Metoclopramide Rash     Patient Active Problem List   Diagnosis     Backache     Allergic rhinitis     Major depressive disorder, recurrent, mild (H)     Sinus arrhythmia     Sleep apnea     Diverticulitis of colon     Advanced directives, counseling/discussion     Essential and other specified forms of tremor     Idiopathic hypersomnolence     Acute bilateral low back pain without sciatica     Bilateral hip pain     Reviewed medications, social history and  past medical and surgical history.    Review of system: for general, respiratory, CVS, GI and psychiatry negative except for noted above.     EXAM:  /79 (BP Location: Right arm, Patient Position: Sitting, Cuff Size: Adult Regular)  Pulse (!) 48  Temp 97  F (36.1  C) (Tympanic)  Resp 16  Ht 5' 8\" (1.727 m)  Wt 140 lb 8 oz (63.7 kg)  SpO2 100%  BMI 21.36 kg/m2  Constitutional: healthy, alert and no distress   Psychiatric: mentation appears normal and affect normal/bright  Neck ROM restricted. Both trapezius tenderness, both upper extremities sensation, " strength normal. No midline tenderness.     ASSESSMENT / PLAN:  (S16.1XXA) Strain of neck muscle, initial encounter  (primary encounter diagnosis)  Comment: Acute on chronic.  I suspect he could have some cervical osteoarthritis and it may be contributing to his symptoms too.  He has used Flexeril in the past but made him really groggy and sleepy and wishes to avoid that.  We discussed about trying a different muscle relaxer.  Offered him Norflex but now he says that he is retired he does not mind feeling sleepy or tired and we will try Zanaflex.  Side effects discussed. We discussed about meloxicam versus other pain killer.  He is struggling with her range of motion and he wishes to have something more.  We discussed about getting a Toradol shot and he agreed.  We discussed about side effects.  He has had a good benefit with physical therapy in the past and I strongly recommend him to consider physical therapy again.  Plan: [facility] REFERRAL,         tiZANidine (ZANAFLEX) 4 MG tablet, KETOROLAC         TROMETHAMINE 15MG, INJECTION INTRAMUSCULAR OR         SUB-Q, ketorolac (TORADOL) 60 MG/2ML injection          Follow up: With the physical therapist and if not improving see a sports medicine doctor.    The above note was dictated using voice recognition. Although reviewed after completion, some word and grammatical error may remain .      Patient Instructions   Check your insurance coverage for SHINGRIX vaccine. It is a new shingles vaccine. It is very effective in cutting down chances of shingles. It is  2 shot series that is administered atleast 2 months apart. If it is covered, call our clinic and schedule nurse only appointment to get the vaccine. Some insurance covers it if it is given in the pharmacy. Check with your pharmacy if that is the case.

## 2018-12-17 ENCOUNTER — THERAPY VISIT (OUTPATIENT)
Dept: PHYSICAL THERAPY | Facility: CLINIC | Age: 63
End: 2018-12-17
Payer: COMMERCIAL

## 2018-12-17 DIAGNOSIS — S16.1XXA STRAIN OF NECK MUSCLE, INITIAL ENCOUNTER: ICD-10-CM

## 2018-12-17 PROCEDURE — 97110 THERAPEUTIC EXERCISES: CPT | Mod: GP | Performed by: PHYSICAL THERAPIST

## 2018-12-17 PROCEDURE — 97161 PT EVAL LOW COMPLEX 20 MIN: CPT | Mod: GP | Performed by: PHYSICAL THERAPIST

## 2018-12-17 PROCEDURE — 97140 MANUAL THERAPY 1/> REGIONS: CPT | Mod: GP | Performed by: PHYSICAL THERAPIST

## 2018-12-17 NOTE — PROGRESS NOTES
[facility]  Physical Exam  General   [facility]      Physical Therapy Initial Examination/Evaluation December 17, 2018   Salbador Begum is a 63 year old male referred to physical therapy by Dr. Kieran Zuluaga for treatment of Neck pain  with Precautions/Restrictions/MD instructions E&T    Therapist Assessment:   Clinical Impression: Pt presents to [facility] with primary complaint of neck pain .  Per clinical examination, pt does have limitation in cervical motion.   He did appear to have some relief with repeated cervical retraction.    Pt will benefit from skilled physical therapy for stretching and postural stabilization program as well as trial of directional preference exercise.      Subjective: Two weeks ago today woke up with some pain in neck but was able to go about his day. By the next day, pt had significant pain, so much so that getting out of bed was difficult. Pain did radiate into shoulders. Pain is worse when turning to the R. Leaning head back is painful.   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; has 2 sons; 1 that lives in [place], 1 that lives in [place]   Occupation: Retired   Job duties: Walking    Current HEP/exercise regimen: Stretches for back and hip, walking with wife   Patient's goals are Decrease pain    Other pertinent PMH: Depression, heart problems, apnea General health as reported by patient: Good   Return to MD: prn      Cervical Spine  Evaluation    Posture  Forward Head: WNL  Rounded Shoulders: slight      Range of Motion  Flexion: 17  Extension: 62  Sidebend Left: 22 tightness  Right: 23 pain   Rotation Left: 60  Right: 52 pain   Protraction: WNL  Retraction: Stretch feeling   Upper extremity screen: WNL    Upper Extremity Strength  Shoulder MMT Flexion Scaption ER IR   Left 4+/5 4+/5 4+/5 4+/5   Right 4+/5 4+/5 4+/5 4+/5     Special Tests  Spurling's: slight reproduction of pain with extension and extension/sidebend to the L   Reflexes: NT  Dermatomes: WNL  Myotomes: WNL      Palpation  Tightness in upper trap, cervical paraspinals, along occiput        Assessment/Plan:  Patient is a 63 year old male with cervical complaints.    Patient has the following significant findings with corresponding treatment plan.                Diagnosis 1:  Neck Pain   Pain -  hot/cold therapy, manual therapy, self management, education, directional preference exercise and home program  Decreased ROM/flexibility - manual therapy, therapeutic exercise, therapeutic activity and home program  Decreased strength - therapeutic exercise, therapeutic activities and home program  Impaired muscle performance - neuro re-education and home program  Decreased function - therapeutic activities and home program  Impaired posture - neuro re-education, therapeutic activities and home program    Therapy Evaluation Codes:   1) History comprised of:   Personal factors that impact the plan of care:      Living environment, Past/current experiences and Time since onset of symptoms.    Comorbidity factors that impact the plan of care are:      Depression, Heart problems and history of fracture, apnea.     Medications impacting care: Anti-depressant, Anti-inflammatory and Muscle relaxant.  2) Examination of Body Systems comprised of:   Body structures and functions that impact the plan of care:      Cervical spine.   Activity limitations that impact the plan of care are:      Driving,  Walking and Sleeping.  3) Clinical presentation characteristics are:   Stable/Uncomplicated.  4) Decision-Making    Low complexity using standardized patient assessment instrument and/or measureable assessment of functional outcome.  Cumulative Therapy Evaluation is: Low complexity.    Previous and current functional limitations:  (See Goal Flow Sheet for this information)    Short term and Long term goals: (See Goal Flow Sheet for this information)     Communication ability:  Patient appears to be able to clearly communicate and understand verbal and written communication and follow directions correctly.  Treatment Explanation - The following has been discussed with the patient:   RX ordered/plan of care  Anticipated outcomes  Possible risks and side effects  This patient would benefit from PT intervention to resume normal activities.   Rehab potential is good.    Frequency:  1 X week, once daily  Duration:  for 6 weeks  Discharge Plan:  Achieve all LTG.  Independent in home treatment program.  Reach maximal therapeutic benefit.    Please refer to the daily flowsheet for treatment today, total treatment time and time spent performing 1:1 timed codes.

## 2019-02-04 ENCOUNTER — MYC REFILL (OUTPATIENT)
Dept: FAMILY MEDICINE | Facility: CLINIC | Age: 64
End: 2019-02-04

## 2019-02-04 DIAGNOSIS — F33.0 MAJOR DEPRESSIVE DISORDER, RECURRENT, MILD (H): ICD-10-CM

## 2019-02-06 RX ORDER — BUPROPION HYDROCHLORIDE 300 MG/1
300 TABLET ORAL EVERY MORNING
Qty: 30 TABLET | Refills: 0 | Status: SHIPPED | OUTPATIENT
Start: 2019-02-06 | End: 2019-03-11

## 2019-02-06 NOTE — TELEPHONE ENCOUNTER
Pending Prescriptions:                       Disp   Refills    buPROPion (WELLBUTRIN XL) 300 MG 24 hr ta*30 tab*0            Sig: Take 1 tablet (300 mg) by mouth every morning    [place] per [facility] [place] - 30 day supply - note placed on refill to schedule visit     Due for PHQ9 and office visit / evisit per last notes from provider     My chart message sent to patient with PHQ to update and asked to schedule evisit/office visit     PHQ-9 SCORE 1/16/2018 2/7/2018 8/10/2018   PHQ-9 Total Score - - -   PHQ-9 Total Score MyChart - 3 (Minimal depression) 4 (Minimal depression)   PHQ-9 Total Score 0 3 4     Liberty Dior, Registered Nurse   [facility]

## 2019-03-09 ENCOUNTER — E-VISIT (OUTPATIENT)
Dept: FAMILY MEDICINE | Facility: CLINIC | Age: 64
End: 2019-03-09
Payer: COMMERCIAL

## 2019-03-09 DIAGNOSIS — F33.0 MAJOR DEPRESSIVE DISORDER, RECURRENT, MILD (H): ICD-10-CM

## 2019-03-09 PROCEDURE — 99207 ZZC NO BILLABLE SERVICE THIS VISIT: CPT | Performed by: FAMILY MEDICINE

## 2019-03-09 ASSESSMENT — ANXIETY QUESTIONNAIRES
6. BECOMING EASILY ANNOYED OR IRRITABLE: SEVERAL DAYS
7. FEELING AFRAID AS IF SOMETHING AWFUL MIGHT HAPPEN: NOT AT ALL
7. FEELING AFRAID AS IF SOMETHING AWFUL MIGHT HAPPEN: NOT AT ALL
GAD7 TOTAL SCORE: 3
3. WORRYING TOO MUCH ABOUT DIFFERENT THINGS: NOT AT ALL
4. TROUBLE RELAXING: SEVERAL DAYS
2. NOT BEING ABLE TO STOP OR CONTROL WORRYING: NOT AT ALL
GAD7 TOTAL SCORE: 3
GAD7 TOTAL SCORE: 3
5. BEING SO RESTLESS THAT IT IS HARD TO SIT STILL: NOT AT ALL
1. FEELING NERVOUS, ANXIOUS, OR ON EDGE: SEVERAL DAYS

## 2019-03-09 ASSESSMENT — PATIENT HEALTH QUESTIONNAIRE - PHQ9
SUM OF ALL RESPONSES TO PHQ QUESTIONS 1-9: 3
10. IF YOU CHECKED OFF ANY PROBLEMS, HOW DIFFICULT HAVE THESE PROBLEMS MADE IT FOR YOU TO DO YOUR WORK, TAKE CARE OF THINGS AT HOME, OR GET ALONG WITH OTHER PEOPLE: SOMEWHAT DIFFICULT
SUM OF ALL RESPONSES TO PHQ QUESTIONS 1-9: 3

## 2019-03-10 ASSESSMENT — PATIENT HEALTH QUESTIONNAIRE - PHQ9: SUM OF ALL RESPONSES TO PHQ QUESTIONS 1-9: 3

## 2019-03-10 ASSESSMENT — ANXIETY QUESTIONNAIRES: GAD7 TOTAL SCORE: 3

## 2019-03-11 DIAGNOSIS — F33.0 MAJOR DEPRESSIVE DISORDER, RECURRENT, MILD (H): ICD-10-CM

## 2019-03-11 RX ORDER — BUPROPION HYDROCHLORIDE 300 MG/1
300 TABLET ORAL EVERY MORNING
Qty: 90 TABLET | Refills: 3 | Status: SHIPPED | OUTPATIENT
Start: 2019-03-11 | End: 2020-03-31

## 2019-03-12 RX ORDER — BUPROPION HYDROCHLORIDE 300 MG/1
TABLET ORAL
Qty: 0.01 TABLET | Refills: 0 | OUTPATIENT
Start: 2019-03-12

## 2019-03-21 ENCOUNTER — MYC MEDICAL ADVICE (OUTPATIENT)
Dept: FAMILY MEDICINE | Facility: CLINIC | Age: 64
End: 2019-03-21

## 2019-03-22 ENCOUNTER — E-VISIT (OUTPATIENT)
Dept: FAMILY MEDICINE | Facility: CLINIC | Age: 64
End: 2019-03-22
Payer: COMMERCIAL

## 2019-03-22 DIAGNOSIS — M54.16 LUMBAR RADICULAR PAIN: Primary | ICD-10-CM

## 2019-03-22 PROCEDURE — 99444 ZZC PHYSICIAN ONLINE EVALUATION & MANAGEMENT SERVICE: CPT | Performed by: FAMILY MEDICINE

## 2019-03-22 RX ORDER — CYCLOBENZAPRINE HCL 5 MG
5-10 TABLET ORAL 3 TIMES DAILY PRN
Qty: 40 TABLET | Refills: 0 | Status: SHIPPED | OUTPATIENT
Start: 2019-03-22 | End: 2020-06-09

## 2019-03-22 RX ORDER — METHYLPREDNISOLONE 4 MG
TABLET, DOSE PACK ORAL
Qty: 21 TABLET | Refills: 0 | Status: SHIPPED | OUTPATIENT
Start: 2019-03-22 | End: 2020-06-09

## 2019-07-22 ENCOUNTER — THERAPY VISIT (OUTPATIENT)
Dept: PHYSICAL THERAPY | Facility: CLINIC | Age: 64
End: 2019-07-22
Attending: FAMILY MEDICINE
Payer: COMMERCIAL

## 2019-07-22 DIAGNOSIS — M54.16 LUMBAR RADICULAR PAIN: ICD-10-CM

## 2019-07-22 PROCEDURE — 97530 THERAPEUTIC ACTIVITIES: CPT | Mod: GP | Performed by: PHYSICAL THERAPIST

## 2019-07-22 PROCEDURE — 97110 THERAPEUTIC EXERCISES: CPT | Mod: GP | Performed by: PHYSICAL THERAPIST

## 2019-07-22 PROCEDURE — 97161 PT EVAL LOW COMPLEX 20 MIN: CPT | Mod: GP | Performed by: PHYSICAL THERAPIST

## 2019-07-24 NOTE — PROGRESS NOTES
[facility] Initial Evaluation  Subjective:  Pt presents to PT with c/o groin , lateral hip pain, and anterior thigh pain radiating all the way down to his foot AT TIMES.    Pt reports he also has chronic LBP.  Pt also reports numbness and tingling which Goes away as he walks.    Pt reports his hip suddenly got stiff 2-3 weeks ago.    Pt reports pain with ER and flexion stretches.      Pts wife has dementia who he walks with 10 miles every day .  He reports he has been able to take off the last few days.     PMH: L ARTIE 9 years ago, depression, sleep apnea, bradycardia, CSI lumbar spine      Type of problem:  Lumbar    This is a new condition     Radiates to: n/a. Associated symptoms:  Loss of motion/stiffness, loss of strength and loss of motion. Symptoms are exacerbated by bending, lying down, walking, standing and lifting and relieved by rest.                  Pain is described as aching and is intermittent.    Past treatment: none. There was none improvement following previous treatment.                              Objective:  System         Lumbar/SI Evaluation  ROM:  AROM Lumbar: normal      Lumbar Myotomes:            S1 (Toe Raise):  Left: 5-    Right: 5                                                    Hip Evaluation  Hip PROM:  : 50% limitation throughout. : 100%                          Hip Strength:    Flexion:   Left: 4+/5   Pain:  Right: 5-/5   Pain:                    Extension:  Left: 4-/5  Pain:Right: 5-/5    Pain:    Abduction:  Right: 5-/5    Pain:  Adduction:  Left: 4-/5    Pain:                    Functional Testing:  Functional test hip: Squat anterior knee excursion, SLS L>R unsteadiness  '                           General Evaluation:                              Gait:  Gait wnl general: Pt amb with LLE IR at initial contact.                                         ROS    Assessment/Plan:    Patient is a 63 year old male with lumbar complaints.    Patient has the  following significant findings with corresponding treatment plan.                Diagnosis 1:  LBP  Pain -  hot/cold therapy  Decreased joint mobility - manual therapy and therapeutic exercise  Decreased strength - therapeutic exercise and therapeutic activities  Inflammation - cold therapy  Impaired gait - gait training  Impaired muscle performance - neuro re-education  Decreased function - therapeutic activities  Impaired posture - neuro re-education    Therapy Evaluation Codes:   1) History comprised of:   Personal factors that impact the plan of care:      None.    Comorbidity factors that impact the plan of care are:      Weakness.     Medications impacting care: None.  2) Examination of Body Systems comprised of:   Body structures and functions that impact the plan of care:      Hip.   Activity limitations that impact the plan of care are:      Sitting, Stairs, Standing and Walking.  3) Clinical presentation characteristics are:   Stable/Uncomplicated.  4) Decision-Making    Low complexity using standardized patient assessment instrument and/or measureable assessment of functional outcome.  Cumulative Therapy Evaluation is: Low complexity.    Previous and current functional limitations:  (See Goal Flow Sheet for this information)    Short term and Long term goals: (See Goal Flow Sheet for this information)     Communication ability:  Patient appears to be able to clearly communicate and understand verbal and written communication and follow directions correctly.  Treatment Explanation - The following has been discussed with the patient:   RX ordered/plan of care  Anticipated outcomes  Possible risks and side effects  This patient would benefit from PT intervention to resume normal activities.   Rehab potential is good.    Frequency:  1 X week, once daily  Duration:  for 6 weeks  Discharge Plan:  Achieve all LTG.  Independent in home treatment program.  Return to previous functional level by discharge.  Reach  maximal therapeutic benefit.    Please refer to the daily flowsheet for treatment today, total treatment time and time spent performing 1:1 timed codes.

## 2019-08-16 ENCOUNTER — OFFICE VISIT (OUTPATIENT)
Dept: URGENT CARE | Facility: URGENT CARE | Age: 64
End: 2019-08-16
Payer: COMMERCIAL

## 2019-08-16 ENCOUNTER — THERAPY VISIT (OUTPATIENT)
Dept: PHYSICAL THERAPY | Facility: CLINIC | Age: 64
End: 2019-08-16
Payer: COMMERCIAL

## 2019-08-16 VITALS
TEMPERATURE: 98.3 F | HEIGHT: 68 IN | OXYGEN SATURATION: 100 % | BODY MASS INDEX: 20.46 KG/M2 | RESPIRATION RATE: 12 BRPM | HEART RATE: 54 BPM | WEIGHT: 135 LBS | SYSTOLIC BLOOD PRESSURE: 124 MMHG | DIASTOLIC BLOOD PRESSURE: 82 MMHG

## 2019-08-16 DIAGNOSIS — M54.42 ACUTE LEFT-SIDED LOW BACK PAIN WITH LEFT-SIDED SCIATICA: Primary | ICD-10-CM

## 2019-08-16 DIAGNOSIS — M54.16 LUMBAR RADICULAR PAIN: ICD-10-CM

## 2019-08-16 PROCEDURE — 97140 MANUAL THERAPY 1/> REGIONS: CPT | Mod: GP | Performed by: PHYSICAL THERAPIST

## 2019-08-16 PROCEDURE — 97110 THERAPEUTIC EXERCISES: CPT | Mod: GP | Performed by: PHYSICAL THERAPIST

## 2019-08-16 PROCEDURE — 99214 OFFICE O/P EST MOD 30 MIN: CPT | Performed by: FAMILY MEDICINE

## 2019-08-16 RX ORDER — HYDROCODONE BITARTRATE AND ACETAMINOPHEN 5; 325 MG/1; MG/1
1-2 TABLET ORAL
Qty: 6 TABLET | Refills: 0 | Status: SHIPPED | OUTPATIENT
Start: 2019-08-16 | End: 2019-08-16

## 2019-08-16 RX ORDER — HYDROCODONE BITARTRATE AND ACETAMINOPHEN 5; 325 MG/1; MG/1
TABLET ORAL
Refills: 0 | COMMUNITY
Start: 2019-02-04 | End: 2020-09-18

## 2019-08-16 RX ORDER — HYDROCODONE BITARTRATE AND ACETAMINOPHEN 5; 325 MG/1; MG/1
1 TABLET ORAL
Qty: 6 TABLET | Refills: 0 | Status: SHIPPED | OUTPATIENT
Start: 2019-08-16 | End: 2019-08-22

## 2019-08-16 ASSESSMENT — PAIN SCALES - GENERAL: PAINLEVEL: EXTREME PAIN (9)

## 2019-08-16 ASSESSMENT — MIFFLIN-ST. JEOR: SCORE: 1386.86

## 2019-08-16 NOTE — PROGRESS NOTES
"SUBJECTIVE  Salbador Begum is a 63 year old male is here requesting pain medications.  He is complaining of ongoing left hip pain with radiation to the left LE and difficulty sleeping, requesting narcotic pain medication for the weekend.   Location: low back left region.  Radiating: radiates into the left leg with burning \"nerve\" pain from hip to knee and at times down into shin.  Onset was:   Chronic BL LBP x 30+ years.  Has had issues with left sided hip pain and sciatica since January of this year, but flared up this week to point where he is having difficulty sleeping.  Has been to physical therapy this year and has been prescribed steroids when he's had flares in the past, but shared that he did not take those medications.  Went to his orthopedic Dr 2 weeks ago about the hip and has been seen by his PCP for this as well multiple times.  Has to walk with his wife every day and is exacerbating his pain with these walks, but unable to find help with this.      No bowel/bladder changes, no saddle anesthesia, no fevers, no foot drop, no leg numbness.      OBJECTIVE:  /82 (BP Location: Right arm, Patient Position: Sitting, Cuff Size: Adult Regular)   Pulse 54   Temp 98.3  F (36.8  C) (Oral)   Resp 12   Ht 1.727 m (5' 8\")   Wt 61.2 kg (135 lb)   SpO2 100%   BMI 20.53 kg/m    General examGENERAL APPEARANCE: healthy, alert and no distress,   NEURO: Normal strength and tone with no weakness or sensory deficit noted, reflexes normal   LLE:  Ambulating without assistance.  No swelling, bruising, erythema or warmth.  Pedal pulses intact and no compromise to distal circulation.  SKIN: no suspicious lesions or rashes    ASSESSMENT/PLAN    ICD-10-CM    1. Acute left-sided low back pain with left-sided sciatica M54.42 HYDROcodone-acetaminophen (NORCO) 5-325 MG tablet     DISCONTINUED: HYDROcodone-acetaminophen (NORCO) 5-325 MG tablet     Medication request.   Did provide 6 tablets of hydrocodone for this " weekend.  He is not interested in steroids at this time.  Discussed no further refills of this medication would be provided through  and he should f/u with his PCP next week. Reviewed red flag symptoms for back pain/sciatica and to return to care immediately if these develop.

## 2019-08-16 NOTE — PATIENT INSTRUCTIONS
You have the prescription to fill, as requested for bedtime use.   Do not take with alcohol, sedatives or when driving or at work.  Follow up with your primary provider for further evaluation next week.  Go to the ER if you have any worsening symptoms over the weekend.

## 2019-08-19 ENCOUNTER — THERAPY VISIT (OUTPATIENT)
Dept: PHYSICAL THERAPY | Facility: CLINIC | Age: 64
End: 2019-08-19
Payer: COMMERCIAL

## 2019-08-19 DIAGNOSIS — M54.16 LUMBAR RADICULAR PAIN: ICD-10-CM

## 2019-08-19 PROCEDURE — 97530 THERAPEUTIC ACTIVITIES: CPT | Mod: GP | Performed by: PHYSICAL THERAPIST

## 2019-08-19 PROCEDURE — 97140 MANUAL THERAPY 1/> REGIONS: CPT | Mod: GP | Performed by: PHYSICAL THERAPIST

## 2019-08-19 PROCEDURE — 97110 THERAPEUTIC EXERCISES: CPT | Mod: GP | Performed by: PHYSICAL THERAPIST

## 2019-08-22 ENCOUNTER — OFFICE VISIT (OUTPATIENT)
Dept: FAMILY MEDICINE | Facility: CLINIC | Age: 64
End: 2019-08-22
Payer: COMMERCIAL

## 2019-08-22 VITALS
BODY MASS INDEX: 20.37 KG/M2 | HEART RATE: 48 BPM | WEIGHT: 134 LBS | DIASTOLIC BLOOD PRESSURE: 80 MMHG | SYSTOLIC BLOOD PRESSURE: 144 MMHG | RESPIRATION RATE: 16 BRPM | TEMPERATURE: 95.2 F

## 2019-08-22 DIAGNOSIS — M54.42 ACUTE LEFT-SIDED LOW BACK PAIN WITH LEFT-SIDED SCIATICA: ICD-10-CM

## 2019-08-22 PROCEDURE — 99213 OFFICE O/P EST LOW 20 MIN: CPT | Performed by: FAMILY MEDICINE

## 2019-08-22 RX ORDER — HYDROCODONE BITARTRATE AND ACETAMINOPHEN 5; 325 MG/1; MG/1
1 TABLET ORAL
Qty: 45 TABLET | Refills: 0 | Status: SHIPPED | OUTPATIENT
Start: 2019-08-22 | End: 2020-06-09

## 2019-08-22 ASSESSMENT — PATIENT HEALTH QUESTIONNAIRE - PHQ9
10. IF YOU CHECKED OFF ANY PROBLEMS, HOW DIFFICULT HAVE THESE PROBLEMS MADE IT FOR YOU TO DO YOUR WORK, TAKE CARE OF THINGS AT HOME, OR GET ALONG WITH OTHER PEOPLE: SOMEWHAT DIFFICULT
SUM OF ALL RESPONSES TO PHQ QUESTIONS 1-9: 5
SUM OF ALL RESPONSES TO PHQ QUESTIONS 1-9: 5

## 2019-08-22 NOTE — PROGRESS NOTES
Subjective     Salbador Begum is a 63 year old male who presents to clinic today for the following health issues:    HPI     Presents requesting pain medication to help with recently flared radicular pain.  When radicular pain flares- he cannot sleep exacerbating his pain.  Was given Medrol dosepak this spring and never took it-afraid of the side effects he read.    Using Tylenol and ibuprofen for pain- -just does not touch the radicular pain when it occurs.    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   Two adult sons- one in the [facility] to help 1-2x/week.    Two trips to [place] and [facility] in next 6 weeks- is worried about flares.    Last MRI 9-2017 shows multilevel degenerative disc disease with disc herniation at L1-L2 and L2 L3 compressing the LEFT L2 and L3 nerve roots and resulting in moderate central stenosis.  Multilevel foraminal stenosis noted most severe on the LEFT at L4L5 and L5-S1.    Pain today continues with LLE radicular pain.    Patient Active Problem List   Diagnosis     Backache     Allergic rhinitis     Major depressive disorder, recurrent, mild (H)     Sinus arrhythmia     Sleep apnea     Diverticulitis of colon     Advanced directives, counseling/discussion     Essential and other specified forms of tremor     Idiopathic hypersomnolence     Acute bilateral low back pain without sciatica     Bilateral hip pain     Lumbar radicular pain     Past Surgical History:   Procedure Laterality Date     APPENDECTOMY       C NONSPECIFIC PROCEDURE      s/p Appendectomy     ORTHOPEDIC SURGERY      left hip replacement, right ankle,     SEPTORHINOPLASTY  4/7/2011    Procedure:SEPTORHINOPLASTY; Septoplasty withNasal Reconstruction with  Grafts Harvested from [employer]; Surgeon:ALFREDO UMANA; Location: OR       Social History     Tobacco Use     Smoking status: Never Smoker     Smokeless tobacco: Never Used   Substance Use Topics      Alcohol use: Yes     Comment: 14 glass of wine     Family History   Problem Relation Age of Onset     Arthritis Mother         osteo     Dementia Mother      Hypertension Mother      Cerebrovascular Disease Mother         Not confirmed     Seizure Disorder Mother      Rheumatoid Arthritis Father      Depression Father      Coronary Artery Disease Father      Depression Sister      Substance Abuse Sister      Myocardial Infarction Sister          Current Outpatient Medications   Medication Sig Dispense Refill     Acetaminophen (TYLENOL ARTHRITIS PAIN PO) Take 650 mg by mouth every 6 hours as needed       buPROPion (WELLBUTRIN XL) 300 MG 24 hr tablet Take 1 tablet (300 mg) by mouth every morning 90 tablet 3     HYDROcodone-acetaminophen (NORCO) 5-325 MG tablet TAKE 1-2 TABLETS BY MOUTH EVERY 6 HOURS AS NEEDED FOR PAIN. NOT TO EXCEED 6 TABLETS PER DAY.  0     ibuprofen (ADVIL,MOTRIN) 200 MG tablet Take 600 mg by mouth every 6 hours        Multiple Vitamin (MULTI-DAY VITAMINS) TABS Take  by mouth.       cyclobenzaprine (FLEXERIL) 5 MG tablet Take 1-2 tablets (5-10 mg) by mouth 3 times daily as needed for muscle spasms (Patient not taking: Reported on 8/22/2019) 40 tablet 0     methylPREDNISolone (MEDROL DOSEPAK) 4 MG tablet therapy pack Follow Package Directions (Patient not taking: Reported on 8/16/2019) 21 tablet 0     tiZANidine (ZANAFLEX) 4 MG tablet Take 0.5-1 tablets (2-4 mg) by mouth 3 times daily (Patient not taking: Reported on 8/16/2019) 30 tablet 0     Allergies   Allergen Reactions     Metoclopramide Rash     Recent Labs   Lab Test 06/29/18  0951 01/09/18  1418 11/21/16  0353 11/20/16  1014 11/11/16  0920  04/18/13  1618   LDL  --   --   --   --  70  --  60   HDL  --   --   --   --  82  --  61   TRIG  --   --   --   --  56  --   --    ALT 20 25 31 29 44   < >  --    CR 1.03 1.14 1.04 1.16 1.09   < >  --    GFRESTIMATED 73 65 73 64 69   < >  --    GFRESTBLACK 89 79 88 78 83   < >  --    POTASSIUM 4.7 4.6 4.1  4.1 4.8   < >  --    TSH 1.35  --   --  0.70  --    < >  --     < > = values in this interval not displayed.      BP Readings from Last 3 Encounters:   08/22/19 (!) 144/80   08/16/19 124/82   12/05/18 127/79    Wt Readings from Last 3 Encounters:   08/22/19 60.8 kg (134 lb)   08/16/19 61.2 kg (135 lb)   12/05/18 63.7 kg (140 lb 8 oz)        Reviewed and updated as needed this visit by Provider         Review of Systems   ROS COMP: Constitutional, HEENT, cardiovascular, pulmonary, gi and gu systems are negative, except as otherwise noted.      Objective    BP (!) 144/80   Pulse (!) 48   Temp 95.2  F (35.1  C) (Tympanic)   Resp 16   Wt 60.8 kg (134 lb)   BMI 20.37 kg/m    Body mass index is 20.37 kg/m .  Physical Exam   GENERAL: healthy, alert and no distress  EYES: Eyes grossly normal to inspection, PERRL and conjunctivae and sclerae normal  NECK: no adenopathy, no asymmetry, masses, or scars and thyroid normal to palpation  MS: no gross musculoskeletal defects noted, no edema  SKIN: no suspicious lesions or rashes  NEURO: Normal gait, mentation intact and speech normal  PSYCH: mentation appears normal, affect normal/bright            Assessment & Plan     1. Acute left-sided low back pain with left-sided sciatica    - HYDROcodone-acetaminophen (NORCO) 5-325 MG tablet; Take 1 tablet by mouth nightly as needed for pain  Dispense: 45 tablet; Refill: 0      Will allow him #45 Norco to have on hand with travel coming up-- to be used sparingly-- otherwise recommend continued management with Tylenol and ibuprofen as he has been doing.  May want to consider back injections in the future prn.    Judith Mauricio MD  [facility]    Answers for HPI/ROS submitted by the patient on 8/22/2019   If you checked off any problems, how difficult have these problems made it for you to do your work, take care of things at home, or get along with other people?: Somewhat difficult  PHQ9 TOTAL SCORE: 5

## 2019-08-27 ENCOUNTER — THERAPY VISIT (OUTPATIENT)
Dept: PHYSICAL THERAPY | Facility: CLINIC | Age: 64
End: 2019-08-27
Payer: COMMERCIAL

## 2019-08-27 DIAGNOSIS — M54.16 LUMBAR RADICULAR PAIN: ICD-10-CM

## 2019-08-27 PROCEDURE — 97112 NEUROMUSCULAR REEDUCATION: CPT | Mod: GP | Performed by: PHYSICAL THERAPIST

## 2019-08-27 PROCEDURE — 97140 MANUAL THERAPY 1/> REGIONS: CPT | Mod: GP | Performed by: PHYSICAL THERAPIST

## 2019-08-27 PROCEDURE — 97110 THERAPEUTIC EXERCISES: CPT | Mod: GP | Performed by: PHYSICAL THERAPIST

## 2019-09-06 ENCOUNTER — THERAPY VISIT (OUTPATIENT)
Dept: PHYSICAL THERAPY | Facility: CLINIC | Age: 64
End: 2019-09-06
Payer: COMMERCIAL

## 2019-09-06 DIAGNOSIS — M54.16 LUMBAR RADICULAR PAIN: ICD-10-CM

## 2019-09-06 PROCEDURE — 97140 MANUAL THERAPY 1/> REGIONS: CPT | Mod: GP | Performed by: PHYSICAL THERAPIST

## 2019-09-06 PROCEDURE — 97112 NEUROMUSCULAR REEDUCATION: CPT | Mod: GP | Performed by: PHYSICAL THERAPIST

## 2019-09-06 PROCEDURE — 97110 THERAPEUTIC EXERCISES: CPT | Mod: GP | Performed by: PHYSICAL THERAPIST

## 2019-09-10 ENCOUNTER — COMMUNICATION - HEALTHEAST (OUTPATIENT)
Dept: HEALTH INFORMATION MANAGEMENT | Facility: CLINIC | Age: 64
End: 2019-09-10

## 2019-09-20 ENCOUNTER — THERAPY VISIT (OUTPATIENT)
Dept: PHYSICAL THERAPY | Facility: CLINIC | Age: 64
End: 2019-09-20
Payer: COMMERCIAL

## 2019-09-20 DIAGNOSIS — M54.16 LUMBAR RADICULAR PAIN: ICD-10-CM

## 2019-09-20 PROCEDURE — 97110 THERAPEUTIC EXERCISES: CPT | Mod: GP | Performed by: PHYSICAL THERAPIST

## 2019-09-20 PROCEDURE — 97112 NEUROMUSCULAR REEDUCATION: CPT | Mod: GP | Performed by: PHYSICAL THERAPIST

## 2019-09-20 PROCEDURE — 97140 MANUAL THERAPY 1/> REGIONS: CPT | Mod: GP | Performed by: PHYSICAL THERAPIST

## 2019-09-20 NOTE — PROGRESS NOTES
Subjective:  The history is provided by the patient. No  was used.                       Objective:  System    Physical Exam    General     ROS    Assessment/Plan:    PROGRESS  REPORT    Progress reporting period is from 7/22/19 to /20/19.       SUBJECTIVE  Subjective changes noted by patient:  Patient reports his L low back, hip, and leg feel better overall. He notes improved flexibility of his L LE. He felt better last week when he was walking less frequent and had days off from walking. He has been walking more again this week which increases his pain. Patient walks daily with wife for up to 12 miles.       Current Pain level: 0/10.     Previous pain level was 7/10.   Changes in function:  Yes (See Goal flowsheet attached for changes in current functional level)  Adverse reaction to treatment or activity: None    OBJECTIVE  Changes noted in objective findings:  Yes, see objective findings.    ASSESSMENT/PLAN  Updated problem list and treatment plan: Diagnosis 1:  LBP  Pain -  hot/cold therapy, manual therapy, self management, education, directional preference exercise and home program  Decreased ROM/flexibility - manual therapy, therapeutic exercise and home program  Decreased proprioception - neuro re-education, therapeutic activities and home program  Impaired muscle performance - neuro re-education and home program  Decreased function - therapeutic activities and home program  Impaired posture - neuro re-education and home program  STG/LTGs have been met or progress has been made towards goals:  Yes (See Goal flow sheet completed today.)  Assessment of Progress: The patient's condition is improving.  Self Management Plans:  Patient has been instructed in a home treatment program.  Patient  has been instructed in self management of symptoms.  I have re-evaluated this patient and find that the nature, scope, duration and intensity of the therapy is appropriate for the medical condition of the  patientCullen Siu continues to require the following intervention to meet STG and LTG's:  PT    Recommendations:  This patient would benefit from continued therapy.     Frequency:  1 X every other week, once daily  Duration:  for 8 weeks        Please refer to the daily flowsheet for treatment today, total treatment time and time spent performing 1:1 timed codes.

## 2019-09-27 ENCOUNTER — THERAPY VISIT (OUTPATIENT)
Dept: PHYSICAL THERAPY | Facility: CLINIC | Age: 64
End: 2019-09-27
Payer: COMMERCIAL

## 2019-09-27 DIAGNOSIS — M54.16 LUMBAR RADICULAR PAIN: ICD-10-CM

## 2019-09-27 PROCEDURE — 97112 NEUROMUSCULAR REEDUCATION: CPT | Mod: GP | Performed by: PHYSICAL THERAPIST

## 2019-09-27 PROCEDURE — 97140 MANUAL THERAPY 1/> REGIONS: CPT | Mod: GP | Performed by: PHYSICAL THERAPIST

## 2019-09-27 PROCEDURE — 97110 THERAPEUTIC EXERCISES: CPT | Mod: GP | Performed by: PHYSICAL THERAPIST

## 2019-10-02 ENCOUNTER — HEALTH MAINTENANCE LETTER (OUTPATIENT)
Age: 64
End: 2019-10-02

## 2019-10-25 ENCOUNTER — ALLIED HEALTH/NURSE VISIT (OUTPATIENT)
Dept: NURSING | Facility: CLINIC | Age: 64
End: 2019-10-25
Payer: COMMERCIAL

## 2019-10-25 DIAGNOSIS — Z23 NEED FOR PROPHYLACTIC VACCINATION AND INOCULATION AGAINST INFLUENZA: Primary | ICD-10-CM

## 2019-10-25 PROCEDURE — 90682 RIV4 VACC RECOMBINANT DNA IM: CPT

## 2019-10-25 PROCEDURE — 90471 IMMUNIZATION ADMIN: CPT

## 2020-02-24 NOTE — PROGRESS NOTES
Discharge Note    Progress reporting period is from last progress note on 09/20/19 to Sep 27, 2019.    Salbador failed to follow up and current status is unknown.  Please see information below for last relevant information on current status.  Patient seen for 7 visits.    SUBJECTIVE  Subjective changes noted by patient:  Pt started lengthening stride w/ walking which has reduced his anterior L hip pain. He notes is L hip feels more stiff w/ sitting and resting after walking. C/o anterolateral L hip tightness and discomfort w/ Yoga prayer stretch.  .  Current pain level is 0/10.     Previous pain level was   .   Changes in function:  Yes (See Goal flowsheet attached for changes in current functional level)  Adverse reaction to treatment or activity: None    OBJECTIVE  Changes noted in objective findings:       ASSESSMENT/PLAN  Diagnosis: LBP   Updated problem list and treatment plan:   Pain - HEP  Decreased ROM/flexibility - HEP  Decreased function - HEP  Impaired muscle performance - HEP  Decreased proprioception - HEP  Impaired posture - HEP  STG/LTGs have been met or progress has been made towards goals:  Yes, please see goal flowsheet for most current information  Assessment of Progress: current status is unknown.    Last current status:     Self Management Plans:  HEP  I have re-evaluated this patient and find that the nature, scope, duration and intensity of the therapy is appropriate for the medical condition of the patient.  Salbador continues to require the following intervention to meet STG and LTG's:  HEP.    Recommendations:  Discharge with current home program.  Patient to follow up with MD as needed.    Please refer to the daily flowsheet for treatment today, total treatment time and time spent performing 1:1 timed codes.

## 2020-03-27 PROBLEM — M54.16 LUMBAR RADICULAR PAIN: Status: RESOLVED | Noted: 2019-08-16 | Resolved: 2020-03-27

## 2020-05-04 ENCOUNTER — VIRTUAL VISIT (OUTPATIENT)
Dept: FAMILY MEDICINE | Facility: CLINIC | Age: 65
End: 2020-05-04
Payer: COMMERCIAL

## 2020-05-04 VITALS — HEIGHT: 69 IN | WEIGHT: 140 LBS | BODY MASS INDEX: 20.73 KG/M2

## 2020-05-04 DIAGNOSIS — F33.0 MAJOR DEPRESSIVE DISORDER, RECURRENT, MILD (H): ICD-10-CM

## 2020-05-04 PROBLEM — D69.6 THROMBOCYTOPENIA (H): Status: ACTIVE | Noted: 2020-05-04

## 2020-05-04 PROBLEM — I49.8 CHRONOTROPIC INCOMPETENCE WITH SINUS NODE DYSFUNCTION: Status: ACTIVE | Noted: 2020-05-04

## 2020-05-04 PROCEDURE — 96127 BRIEF EMOTIONAL/BEHAV ASSMT: CPT | Performed by: FAMILY MEDICINE

## 2020-05-04 PROCEDURE — 99213 OFFICE O/P EST LOW 20 MIN: CPT | Mod: 95 | Performed by: FAMILY MEDICINE

## 2020-05-04 ASSESSMENT — ANXIETY QUESTIONNAIRES
IF YOU CHECKED OFF ANY PROBLEMS ON THIS QUESTIONNAIRE, HOW DIFFICULT HAVE THESE PROBLEMS MADE IT FOR YOU TO DO YOUR WORK, TAKE CARE OF THINGS AT HOME, OR GET ALONG WITH OTHER PEOPLE: SOMEWHAT DIFFICULT
GAD7 TOTAL SCORE: 6
6. BECOMING EASILY ANNOYED OR IRRITABLE: SEVERAL DAYS
1. FEELING NERVOUS, ANXIOUS, OR ON EDGE: SEVERAL DAYS
3. WORRYING TOO MUCH ABOUT DIFFERENT THINGS: SEVERAL DAYS
2. NOT BEING ABLE TO STOP OR CONTROL WORRYING: SEVERAL DAYS
5. BEING SO RESTLESS THAT IT IS HARD TO SIT STILL: SEVERAL DAYS
7. FEELING AFRAID AS IF SOMETHING AWFUL MIGHT HAPPEN: NOT AT ALL

## 2020-05-04 ASSESSMENT — PATIENT HEALTH QUESTIONNAIRE - PHQ9
5. POOR APPETITE OR OVEREATING: SEVERAL DAYS
SUM OF ALL RESPONSES TO PHQ QUESTIONS 1-9: 7

## 2020-05-04 ASSESSMENT — MIFFLIN-ST. JEOR: SCORE: 1420.42

## 2020-05-04 NOTE — PROGRESS NOTES
"  Salbador Begum is a 64 year old male who is being evaluated via a billable video visit.      The patient has been notified of following:     \"This video visit will be conducted via a call between you and your physician/provider. We have found that certain health care needs can be provided without the need for an in-person physical exam.  This service lets us provide the care you need with a video conversation.  If a prescription is necessary we can send it directly to your pharmacy.  If lab work is needed we can place an order for that and you can then stop by our lab to have the test done at a later time.    Video visits are billed at different rates depending on your insurance coverage.  Please reach out to your insurance provider with any questions.    If during the course of the call the physician/provider feels a video visit is not appropriate, you will not be charged for this service.\"    Patient has given verbal consent for Video visit? Yes    How would you like to obtain your AVS? Danehart    Patient would like the video invitation sent by: Send to e-mail at: [email or web address]    Will anyone else be joining your video visit? No      Subjective     Salbador Begum is a 64 year old male who presents to clinic today for the following health issues:    HPI  Depression Followup    How are you doing with your depression since your last visit? Worsened     Are you having other symptoms that might be associated with depression? Yes:  everything on PHQ9 and GAD7    Have you had a significant life event?  OTHER:  wife 's illness and Covid 19     Are you feeling anxious or having panic attacks?   Yes:  anxitey    Do you have any concerns with your use of alcohol or other drugs? Yes:  drinking    Social History     Tobacco Use     Smoking status: Never Smoker     Smokeless tobacco: Never Used   Substance Use Topics     Alcohol use: Yes     Comment: 14 glass of wine     Drug use: No     PHQ 8/22/2019 " 4/1/2020 5/4/2020   PHQ-9 Total Score 5 12 7   Q9: Thoughts of better off dead/self-harm past 2 weeks Not at all Not at all Not at all     KENNEDY-7 SCORE 3/9/2019 4/1/2020 5/4/2020   Total Score 3 (minimal anxiety) 11 (moderate anxiety) -   Total Score 3 11 6           Suicide Assessment Five-step Evaluation and Treatment (SAFE-T)      How many servings of fruits and vegetables do you eat daily?  4 or more    On average, how many sweetened beverages do you drink each day (Examples: soda, juice, sweet tea, etc.  Do NOT count diet or artificially sweetened beverages)?   0    How many days per week do you exercise enough to make your heart beat faster? 7    How many minutes a day do you exercise enough to make your heart beat faster? 60 or more  How many days per week do you miss taking your medication? 1    What makes it hard for you to take your medications?  remembering to take       PCP Dr. Pierce.  Last visit > 1 year ago for med check.  On Wellbutrin  mg once daily.  Wife Cathleen Chester with frontotemporal dementia-- needs to walk up to 10 miles a day with wife as best way to manage her behaviors.  She could not qualify for a day program [id number].  Was scheduled to go into a memory care facility 4-1-2020 but could not take her in midst of pandemic- then said they could but  would not be allowed to see her during the pandemic.  Recently saw Dr. Renteria  NEURO in March- offered further Care Coordination for family.  Patient did not qualify for a medication trial due to reduced cognitive functioning.    Patient not sure a dose change to his meds would make a difference right now as his issues are circumstantial.  Does not need refills at this time.    Video Start Time: 942am        Patient Active Problem List   Diagnosis     Backache     Allergic rhinitis     Major depressive disorder, recurrent, mild (H)     Sinus arrhythmia     Sleep apnea     Diverticulitis of colon     Advanced directives,  counseling/discussion     Essential and other specified forms of tremor     Idiopathic hypersomnolence     Acute bilateral low back pain without sciatica     Bilateral hip pain     Past Surgical History:   Procedure Laterality Date     APPENDECTOMY       C NONSPECIFIC PROCEDURE      s/p Appendectomy     ORTHOPEDIC SURGERY      left hip replacement, right ankle,     SEPTORHINOPLASTY  4/7/2011    Procedure:SEPTORHINOPLASTY; Septoplasty withNasal Reconstruction with  Grafts Harvested from [employer]; Surgeon:ALFREDO UMANA; Location: OR       Social History     Tobacco Use     Smoking status: Never Smoker     Smokeless tobacco: Never Used   Substance Use Topics     Alcohol use: Yes     Comment: 14 glass of wine     Family History   Problem Relation Age of Onset     Arthritis Mother         osteo     Dementia Mother      Hypertension Mother      Cerebrovascular Disease Mother         Not confirmed     Seizure Disorder Mother      Rheumatoid Arthritis Father      Depression Father      Coronary Artery Disease Father      Depression Sister      Substance Abuse Sister      Myocardial Infarction Sister          Current Outpatient Medications   Medication Sig Dispense Refill     Acetaminophen (TYLENOL ARTHRITIS PAIN PO) Take 650 mg by mouth every 6 hours as needed       buPROPion (WELLBUTRIN XL) 300 MG 24 hr tablet TAKE 1 TABLET (300 MG) BY MOUTH EVERY MORNING 90 tablet 0     cyclobenzaprine (FLEXERIL) 5 MG tablet Take 1-2 tablets (5-10 mg) by mouth 3 times daily as needed for muscle spasms 40 tablet 0     HYDROcodone-acetaminophen (NORCO) 5-325 MG tablet Take 1 tablet by mouth nightly as needed for pain 45 tablet 0     HYDROcodone-acetaminophen (NORCO) 5-325 MG tablet TAKE 1-2 TABLETS BY MOUTH EVERY 6 HOURS AS NEEDED FOR PAIN. NOT TO EXCEED 6 TABLETS PER DAY.  0     ibuprofen (ADVIL,MOTRIN) 200 MG tablet Take 600 mg by mouth every 6 hours        methylPREDNISolone (MEDROL DOSEPAK) 4 MG tablet therapy pack Follow  "Package Directions 21 tablet 0     Multiple Vitamin (MULTI-DAY VITAMINS) TABS Take  by mouth.       tiZANidine (ZANAFLEX) 4 MG tablet Take 0.5-1 tablets (2-4 mg) by mouth 3 times daily (Patient not taking: Reported on 8/16/2019) 30 tablet 0     Allergies   Allergen Reactions     Metoclopramide Rash     Recent Labs   Lab Test 06/29/18  0951 01/09/18  1418 11/21/16  0353 11/20/16  1014 11/11/16  0920  04/18/13  1618   LDL  --   --   --   --  70  --  60   HDL  --   --   --   --  82  --  61   TRIG  --   --   --   --  56  --   --    ALT 20 25 31 29 44   < >  --    CR 1.03 1.14 1.04 1.16 1.09   < >  --    GFRESTIMATED 73 65 73 64 69   < >  --    GFRESTBLACK 89 79 88 78 83   < >  --    POTASSIUM 4.7 4.6 4.1 4.1 4.8   < >  --    TSH 1.35  --   --  0.70  --    < >  --     < > = values in this interval not displayed.      BP Readings from Last 3 Encounters:   08/22/19 (!) 144/80   08/16/19 124/82   12/05/18 127/79    Wt Readings from Last 3 Encounters:   05/04/20 63.5 kg (140 lb)   08/22/19 60.8 kg (134 lb)   08/16/19 61.2 kg (135 lb)                    Reviewed and updated as needed this visit by Provider         Review of Systems   ROS COMP: Constitutional, HEENT, cardiovascular, pulmonary, gi and gu systems are negative, except as otherwise noted.      Objective    Ht 1.753 m (5' 9\")   Wt 63.5 kg (140 lb)   BMI 20.67 kg/m    Estimated body mass index is 20.67 kg/m  as calculated from the following:    Height as of this encounter: 1.753 m (5' 9\").    Weight as of this encounter: 63.5 kg (140 lb).  Physical Exam     GENERAL: healthy, alert and no distress  EYES: Eyes grossly normal to inspection, conjunctivae and sclerae normal  RESP: no audible wheeze, cough, or visible cyanosis.  No visible retractions or increased work of breathing.  Able to speak fully in complete sentences.  SKIN: no suspicious lesions or rashes  NEURO: Cranial nerves grossly intact, mentation intact and speech normal  PSYCH: mentation appears normal, " affect normal/bright, judgement and insight intact, normal speech and appearance well-groomed              Assessment & Plan     1. Major depressive disorder, recurrent, mild (H)    Obvious situational stress at this time as primary caregiver for his wife.  No change to his medication today- will work to see if wife my qualify for a hospice consult for further eval and supports for him.     No follow-ups on file.    Judith Mauricio MD  [facility]      Video-Visit Details    Type of service:  Video Visit    Video End Time:1006am    Originating Location (pt. Location): Home    Distant Location (provider location):  home    Platform used for Video Visit: Well    No follow-ups on file.       Judith Mauricio MD

## 2020-05-05 ASSESSMENT — ANXIETY QUESTIONNAIRES: GAD7 TOTAL SCORE: 6

## 2020-06-09 ENCOUNTER — VIRTUAL VISIT (OUTPATIENT)
Dept: FAMILY MEDICINE | Facility: CLINIC | Age: 65
End: 2020-06-09
Payer: COMMERCIAL

## 2020-06-09 DIAGNOSIS — I49.8 CHRONOTROPIC INCOMPETENCE WITH SINUS NODE DYSFUNCTION: ICD-10-CM

## 2020-06-09 DIAGNOSIS — F33.0 MAJOR DEPRESSIVE DISORDER, RECURRENT, MILD (H): ICD-10-CM

## 2020-06-09 DIAGNOSIS — G47.11 IDIOPATHIC HYPERSOMNOLENCE: Primary | ICD-10-CM

## 2020-06-09 PROBLEM — D69.6 THROMBOCYTOPENIA (H): Status: RESOLVED | Noted: 2020-05-04 | Resolved: 2020-06-09

## 2020-06-09 PROCEDURE — 99214 OFFICE O/P EST MOD 30 MIN: CPT | Mod: 95 | Performed by: FAMILY MEDICINE

## 2020-06-09 RX ORDER — VENLAFAXINE HYDROCHLORIDE 37.5 MG/1
37.5 CAPSULE, EXTENDED RELEASE ORAL DAILY
Qty: 30 CAPSULE | Refills: 1 | Status: SHIPPED | OUTPATIENT
Start: 2020-06-09 | End: 2020-07-30

## 2020-06-09 RX ORDER — DEXTROAMPHETAMINE SACCHARATE, AMPHETAMINE ASPARTATE, DEXTROAMPHETAMINE SULFATE AND AMPHETAMINE SULFATE 2.5; 2.5; 2.5; 2.5 MG/1; MG/1; MG/1; MG/1
10 TABLET ORAL DAILY
Qty: 90 TABLET | Refills: 0 | Status: SHIPPED | OUTPATIENT
Start: 2020-06-09 | End: 2021-02-01

## 2020-06-09 NOTE — PROGRESS NOTES
"Salbador Begum is a 64 year old male who is being evaluated via a billable video visit.      The patient has been notified of following:     \"This video visit will be conducted via a call between you and your physician/provider. We have found that certain health care needs can be provided without the need for an in-person physical exam.  This service lets us provide the care you need with a video conversation.  If a prescription is necessary we can send it directly to your pharmacy.  If lab work is needed we can place an order for that and you can then stop by our lab to have the test done at a later time.    Video visits are billed at different rates depending on your insurance coverage.  Please reach out to your insurance provider with any questions.    If during the course of the call the physician/provider feels a video visit is not appropriate, you will not be charged for this service.\"    Patient has given verbal consent for Video visit? Yes    How would you like to obtain your AVS? Daneharfrederic    Patient would like the video invitation sent by: Send to e-mail at: [email or web address]    Will anyone else be joining your video visit? No      Subjective     Salbador Begum is a 64 year old male who presents today via video visit for the following health issues:    HPI  Increased stressors with wife's advanced dementia and being her primary caregiver.  He is noting more of a depressed mood and having a hard time getting out of bed in the AM.    Hx of idiopathic hypersomnolence which was treated successfully in the past with Adderall 10 mg every day-bid.  He stopped using this once he retired but feels it might also have helped with his mood.  He currently is only on Wellbutrin  mg once daily.  He has been on Prozac as well but did not feel it did much for him.  He would be open to trying something else for mood.  He already has been back taking Adderall 10 mg once daily for the past week with some " overall improvement in mood and getting tasks around the house done.  He does have history of sinus node dysfunction and almost got a pacemaker a few years ago.  Cards did clear him to take Adderall although he feels this medication might negatively impact his heart and does not want to take it if another medication may help.  Wife did not qualify for hospice at this time as she is not in active physical decline yet.  She remains at home with patient and walks 10 miles a day to keep her active and decrease her anxieties.       Video Start Time: 8:49a AM        Patient Active Problem List   Diagnosis     Backache     Allergic rhinitis     Major depressive disorder, recurrent, mild (H)     Sinus arrhythmia     Sleep apnea     Diverticulitis of colon     Advanced directives, counseling/discussion     Essential and other specified forms of tremor     Idiopathic hypersomnolence     Acute bilateral low back pain without sciatica     Bilateral hip pain     Chronotropic incompetence with sinus node dysfunction (H)     Thrombocytopenia (H)     Past Surgical History:   Procedure Laterality Date     APPENDECTOMY       C NONSPECIFIC PROCEDURE      s/p Appendectomy     ORTHOPEDIC SURGERY      left hip replacement, right ankle,     SEPTORHINOPLASTY  4/7/2011    Procedure:SEPTORHINOPLASTY; Septoplasty withNasal Reconstruction with  Grafts Harvested from [employer]; Surgeon:ALFREDO UMANA; Location: OR       Social History     Tobacco Use     Smoking status: Never Smoker     Smokeless tobacco: Never Used   Substance Use Topics     Alcohol use: Yes     Comment: 14 glass of wine     Family History   Problem Relation Age of Onset     Arthritis Mother         osteo     Dementia Mother      Hypertension Mother      Cerebrovascular Disease Mother         Not confirmed     Seizure Disorder Mother      Rheumatoid Arthritis Father      Depression Father      Coronary Artery Disease Father      Depression Sister      Substance  Abuse Sister      Myocardial Infarction Sister          Current Outpatient Medications   Medication Sig Dispense Refill     buPROPion (WELLBUTRIN XL) 300 MG 24 hr tablet TAKE 1 TABLET (300 MG) BY MOUTH EVERY MORNING 90 tablet 0     Acetaminophen (TYLENOL ARTHRITIS PAIN PO) Take 650 mg by mouth every 6 hours as needed       cyclobenzaprine (FLEXERIL) 5 MG tablet Take 1-2 tablets (5-10 mg) by mouth 3 times daily as needed for muscle spasms (Patient not taking: Reported on 6/9/2020) 40 tablet 0     HYDROcodone-acetaminophen (NORCO) 5-325 MG tablet Take 1 tablet by mouth nightly as needed for pain (Patient not taking: Reported on 6/9/2020) 45 tablet 0     HYDROcodone-acetaminophen (NORCO) 5-325 MG tablet TAKE 1-2 TABLETS BY MOUTH EVERY 6 HOURS AS NEEDED FOR PAIN. NOT TO EXCEED 6 TABLETS PER DAY.  0     ibuprofen (ADVIL,MOTRIN) 200 MG tablet Take 600 mg by mouth every 6 hours        methylPREDNISolone (MEDROL DOSEPAK) 4 MG tablet therapy pack Follow Package Directions (Patient not taking: Reported on 6/9/2020) 21 tablet 0     Multiple Vitamin (MULTI-DAY VITAMINS) TABS Take  by mouth.       tiZANidine (ZANAFLEX) 4 MG tablet Take 0.5-1 tablets (2-4 mg) by mouth 3 times daily (Patient not taking: Reported on 8/16/2019) 30 tablet 0     Allergies   Allergen Reactions     Metoclopramide Rash     Recent Labs   Lab Test 06/29/18  0951 01/09/18  1418 11/21/16  0353 11/20/16  1014 11/11/16  0920  04/18/13  1618   LDL  --   --   --   --  70  --  60   HDL  --   --   --   --  82  --  61   TRIG  --   --   --   --  56  --   --    ALT 20 25 31 29 44   < >  --    CR 1.03 1.14 1.04 1.16 1.09   < >  --    GFRESTIMATED 73 65 73 64 69   < >  --    GFRESTBLACK 89 79 88 78 83   < >  --    POTASSIUM 4.7 4.6 4.1 4.1 4.8   < >  --    TSH 1.35  --   --  0.70  --    < >  --     < > = values in this interval not displayed.      BP Readings from Last 3 Encounters:   08/22/19 (!) 144/80   08/16/19 124/82   12/05/18 127/79    Wt Readings from Last 3  "Encounters:   05/04/20 63.5 kg (140 lb)   08/22/19 60.8 kg (134 lb)   08/16/19 61.2 kg (135 lb)                    Reviewed and updated as needed this visit by Provider         Review of Systems   Constitutional, HEENT, cardiovascular, pulmonary, GI, , musculoskeletal, neuro, skin, endocrine and psych systems are negative, except as otherwise noted.      Objective    There were no vitals taken for this visit.  Estimated body mass index is 20.67 kg/m  as calculated from the following:    Height as of 5/4/20: 1.753 m (5' 9\").    Weight as of 5/4/20: 63.5 kg (140 lb).  Physical Exam     GENERAL: Healthy, alert and no distress  EYES: Eyes grossly normal to inspection.  No discharge or erythema, or obvious scleral/conjunctival abnormalities.  RESP: No audible wheeze, cough, or visible cyanosis.  No visible retractions or increased work of breathing.    SKIN: Visible skin clear. No significant rash, abnormal pigmentation or lesions.  NEURO: Cranial nerves grossly intact.  Mentation and speech appropriate for age.  PSYCH: Mentation appears normal, affect normal/bright, judgement and insight intact, normal speech and appearance well-groomed.          Assessment & Plan     1. Idiopathic hypersomnolence    - amphetamine-dextroamphetamine (ADDERALL) 10 MG tablet; Take 1 tablet (10 mg) by mouth daily  Dispense: 90 tablet; Refill: 0    Refill of Adderall 10 mg once daily.    2. Major depressive disorder, recurrent, mild (H)    - venlafaxine (EFFEXOR-XR) 37.5 MG 24 hr capsule; Take 1 capsule (37.5 mg) by mouth daily  Dispense: 30 capsule; Refill: 1    We will add venlafaxine with patient's intent that if this begins to help his mood and energy he will then cut back on the Adderall with his of sinus node issues.  He will [place] back with me in the next month for further titration prn.    3. Chronotropic incompetence with sinus node dysfunction (H)    As above.    Judith Mauricio MD  [facility] " GENIE      Video-Visit Details    Type of service:  Video Visit    Video End Time:910a    Originating Location (pt. Location): Home    Distant Location (provider location):  [facility]     Platform used for Video Visit: [employer]    No follow-ups on file.       Judith Mauricio MD

## 2020-06-25 DIAGNOSIS — F33.0 MAJOR DEPRESSIVE DISORDER, RECURRENT, MILD (H): ICD-10-CM

## 2020-06-26 RX ORDER — BUPROPION HYDROCHLORIDE 300 MG/1
TABLET ORAL
Qty: 90 TABLET | Refills: 0 | Status: SHIPPED | OUTPATIENT
Start: 2020-06-26 | End: 2020-10-01

## 2020-06-26 NOTE — TELEPHONE ENCOUNTER
Return in about 4 weeks (around 7/7/2020) for med check.    After Visit Summary (Automatic SnapShot taken 6/9/2020)

## 2020-07-29 DIAGNOSIS — F33.0 MAJOR DEPRESSIVE DISORDER, RECURRENT, MILD (H): ICD-10-CM

## 2020-07-29 NOTE — TELEPHONE ENCOUNTER
Routing refill request to provider for review/approval because:  Labs not current:    Creatinine   Date Value Ref Range Status   06/29/2018 1.03 0.66 - 1.25 mg/dL Final     BP Readings from Last 3 Encounters:   08/22/19 (!) 144/80   08/16/19 124/82   12/05/18 127/79

## 2020-07-30 ENCOUNTER — MYC REFILL (OUTPATIENT)
Dept: FAMILY MEDICINE | Facility: CLINIC | Age: 65
End: 2020-07-30

## 2020-07-30 DIAGNOSIS — F33.0 MAJOR DEPRESSIVE DISORDER, RECURRENT, MILD (H): ICD-10-CM

## 2020-07-30 RX ORDER — VENLAFAXINE HYDROCHLORIDE 37.5 MG/1
CAPSULE, EXTENDED RELEASE ORAL
Qty: 30 CAPSULE | Refills: 1 | Status: SHIPPED | OUTPATIENT
Start: 2020-07-30 | End: 2020-07-30

## 2020-07-30 NOTE — LETTER
August 2, 2020      Salbador Alejo Cookieronnie  [address] [place] [address]        Micaela Siu,      We received a refill request for venlafaxine (EFFEXOR-XR) 37.5 MG 24 hr capsule; however, you are due for a LAB ONLY visit to receive a refill. Please call us at [phone] at your earliest convenience to schedule an appointment.       We greatly appreciate the opportunity to serve you and thank you for trusting us with your health care.        Your health care team at [facility]           Sincerely,        Judith Mauricio MD

## 2020-08-02 RX ORDER — VENLAFAXINE HYDROCHLORIDE 37.5 MG/1
37.5 CAPSULE, EXTENDED RELEASE ORAL DAILY
Qty: 30 CAPSULE | Refills: 1 | Status: SHIPPED | OUTPATIENT
Start: 2020-08-02 | End: 2020-11-17

## 2020-08-02 NOTE — TELEPHONE ENCOUNTER
Routing refill request to provider for review/approval because:  High BP.  Cr- outdated. Signed lab  5/4/10 [id number]      Reception- please have pt make lab appt.  Sending refill to pcp.  Thanks!  FRANK Engel

## 2020-08-02 NOTE — TELEPHONE ENCOUNTER
LM to call back and schedule a LAB ONLY visit. Sent [employer] message and letter.    Swetha JANE  [facility]    Routing to nurse team due to pending medication.

## 2020-08-17 ENCOUNTER — TRANSFERRED RECORDS (OUTPATIENT)
Dept: HEALTH INFORMATION MANAGEMENT | Facility: CLINIC | Age: 65
End: 2020-08-17

## 2020-08-18 DIAGNOSIS — F33.0 MAJOR DEPRESSIVE DISORDER, RECURRENT, MILD (H): ICD-10-CM

## 2020-08-18 PROCEDURE — 82565 ASSAY OF CREATININE: CPT | Performed by: FAMILY MEDICINE

## 2020-08-18 PROCEDURE — 36415 COLL VENOUS BLD VENIPUNCTURE: CPT | Performed by: FAMILY MEDICINE

## 2020-08-19 LAB
CREAT SERPL-MCNC: 1.09 MG/DL (ref 0.66–1.25)
GFR SERPL CREATININE-BSD FRML MDRD: 71 ML/MIN/{1.73_M2}

## 2020-09-17 ENCOUNTER — NURSE TRIAGE (OUTPATIENT)
Dept: NURSING | Facility: CLINIC | Age: 65
End: 2020-09-17

## 2020-09-18 ENCOUNTER — OFFICE VISIT (OUTPATIENT)
Dept: FAMILY MEDICINE | Facility: CLINIC | Age: 65
End: 2020-09-18
Payer: COMMERCIAL

## 2020-09-18 VITALS
SYSTOLIC BLOOD PRESSURE: 156 MMHG | OXYGEN SATURATION: 100 % | HEIGHT: 69 IN | RESPIRATION RATE: 14 BRPM | BODY MASS INDEX: 21.43 KG/M2 | WEIGHT: 144.7 LBS | DIASTOLIC BLOOD PRESSURE: 88 MMHG | HEART RATE: 47 BPM

## 2020-09-18 DIAGNOSIS — I10 BENIGN ESSENTIAL HYPERTENSION: Primary | ICD-10-CM

## 2020-09-18 DIAGNOSIS — I49.8 SINUS ARRHYTHMIA: ICD-10-CM

## 2020-09-18 LAB
ANION GAP SERPL CALCULATED.3IONS-SCNC: 4 MMOL/L (ref 3–14)
BUN SERPL-MCNC: 14 MG/DL (ref 7–30)
CALCIUM SERPL-MCNC: 9.3 MG/DL (ref 8.5–10.1)
CHLORIDE SERPL-SCNC: 103 MMOL/L (ref 94–109)
CO2 SERPL-SCNC: 27 MMOL/L (ref 20–32)
CREAT SERPL-MCNC: 1.02 MG/DL (ref 0.66–1.25)
ERYTHROCYTE [DISTWIDTH] IN BLOOD BY AUTOMATED COUNT: 12.3 % (ref 10–15)
GFR SERPL CREATININE-BSD FRML MDRD: 77 ML/MIN/{1.73_M2}
GLUCOSE SERPL-MCNC: 99 MG/DL (ref 70–99)
HCT VFR BLD AUTO: 40.3 % (ref 40–53)
HGB BLD-MCNC: 14.1 G/DL (ref 13.3–17.7)
MCH RBC QN AUTO: 31.8 PG (ref 26.5–33)
MCHC RBC AUTO-ENTMCNC: 35 G/DL (ref 31.5–36.5)
MCV RBC AUTO: 91 FL (ref 78–100)
PLATELET # BLD AUTO: 137 10E9/L (ref 150–450)
POTASSIUM SERPL-SCNC: 4.6 MMOL/L (ref 3.4–5.3)
RBC # BLD AUTO: 4.43 10E12/L (ref 4.4–5.9)
SODIUM SERPL-SCNC: 134 MMOL/L (ref 133–144)
WBC # BLD AUTO: 3.5 10E9/L (ref 4–11)

## 2020-09-18 PROCEDURE — 90471 IMMUNIZATION ADMIN: CPT | Performed by: FAMILY MEDICINE

## 2020-09-18 PROCEDURE — 80048 BASIC METABOLIC PNL TOTAL CA: CPT | Performed by: FAMILY MEDICINE

## 2020-09-18 PROCEDURE — 36415 COLL VENOUS BLD VENIPUNCTURE: CPT | Performed by: FAMILY MEDICINE

## 2020-09-18 PROCEDURE — 85027 COMPLETE CBC AUTOMATED: CPT | Performed by: FAMILY MEDICINE

## 2020-09-18 PROCEDURE — 99214 OFFICE O/P EST MOD 30 MIN: CPT | Mod: 25 | Performed by: FAMILY MEDICINE

## 2020-09-18 PROCEDURE — 90682 RIV4 VACC RECOMBINANT DNA IM: CPT | Performed by: FAMILY MEDICINE

## 2020-09-18 RX ORDER — AMLODIPINE BESYLATE 5 MG/1
5 TABLET ORAL DAILY
Qty: 30 TABLET | Refills: 0 | Status: SHIPPED | OUTPATIENT
Start: 2020-09-18 | End: 2020-09-23

## 2020-09-18 ASSESSMENT — MIFFLIN-ST. JEOR: SCORE: 1441.73

## 2020-09-18 ASSESSMENT — PAIN SCALES - GENERAL: PAINLEVEL: NO PAIN (0)

## 2020-09-18 NOTE — TELEPHONE ENCOUNTER
Patient calling back because his BP got up to 184/106 at 8:24pm, now is 151/84 and denies pain.  Will see provider tomorrow.  Transferred to schedulers.    Rachel Looney RN  [facility]      Additional Information    Negative: Difficult to awaken or acting confused (e.g., disoriented, slurred speech)    Negative: Severe difficulty breathing (e.g., struggling for each breath, speaks in single words)    Negative: [1] Weakness of the face, arm or leg on one side of the body AND [2] new onset    Negative: [1] Numbness (i.e., loss of sensation) of the face, arm or leg on one side of the body AND [2] new onset    Negative: [1] Chest pain lasts > 5 minutes AND [2] history of heart disease  (i.e., heart attack, bypass surgery, angina, angioplasty, CHF)    Negative: [1] Chest pain AND [2] took nitrogylcerin AND [3] pain was not relieved    Negative: Sounds like a life-threatening emergency to the triager    Negative: [1] Systolic BP  >= 160 OR Diastolic >= 100 AND [2] cardiac or neurologic symptoms (e.g., chest pain, difficulty breathing, unsteady gait, blurred vision)    Negative: [1] Pregnant > 20 weeks (or postpartum < 6 weeks) AND [2] new hand or face swelling    Negative: [1] Pregnant > 20 weeks AND [2] BP Systolic BP  >= 140 OR Diastolic >= 90    Negative: [1] Systolic BP  >= 200 OR Diastolic >= 120  AND [2] having NO cardiac or neurologic symptoms    Negative: [1] Postpartum < 6 weeks AND [2] BP Systolic BP  >= 140 OR Diastolic >= 90    Negative: [1] Systolic BP  >= 180 OR Diastolic >= 110 AND [2] missed most recent dose of blood pressure medication    Systolic BP  >= 180 OR Diastolic >= 110    Protocols used: HIGH BLOOD PRESSURE-A-AH

## 2020-09-18 NOTE — PROGRESS NOTES
"Subjective     Salbador Begum is a 64 year old male who presents to clinic today for the following health issues:    HPI     Patient in for Blood pressure concerns Patients readings last night : 150/89 at about 730pm took a series of readings and patient reports still stayed in the 150's /80 range. At about 8pm  Blood pressure was 184/76.  He states that he had normal blood pressures in the past.  He has never had a blood pressure in the 180s before.  He is concerned about it.  Patient does not have history of hypertension, Patient not aware of hypertension in family.  Currently he denies any chest pain or shortness of breath.  Denies any headaches.    Other blood pressure readings are listed below.  151/76  150/80  - Pulse is always 45  184/106 -     Review of Systems   Constitutional, HEENT, cardiovascular, pulmonary, gi and gu systems are negative, except as otherwise noted.      Objective    BP (!) 156/88 (BP Location: Right arm, Patient Position: Sitting, Cuff Size: Adult Regular)   Pulse (!) 47   Resp 14   Ht 1.753 m (5' 9\")   Wt 65.6 kg (144 lb 11.2 oz)   SpO2 100%   BMI 21.37 kg/m    Body mass index is 21.37 kg/m .  Physical Exam   GENERAL: healthy, alert and no distress  NECK: no adenopathy, no asymmetry, masses, or scars and thyroid normal to palpation  RESP: lungs clear to auscultation - no rales, rhonchi or wheezes  CV: regular rate and rhythm, normal S1 S2, no S3 or S4, no murmur, click or rub, no peripheral edema and peripheral pulses strong  ABDOMEN: soft, nontender, no hepatosplenomegaly, no masses and bowel sounds normal  MS: no gross musculoskeletal defects noted, no edema    No results found for this or any previous visit (from the past 24 hour(s)).        Assessment & Plan       ICD-10-CM    1. Benign essential hypertension  I10 **Basic metabolic panel FUTURE anytime     CBC with platelets      amLODIPine (NORVASC) 5 MG tablet   2. Sinus arrhythmia  I49.8 CBC with platelets        " started a low dose antihypertensive today.   Advised to see PCP for a follow-up.     Return in about 1 week (around 9/25/2020) for Follow-up visit - Re-check BP. .    Braulio Otero MD  [facility]

## 2020-09-18 NOTE — TELEPHONE ENCOUNTER
Pt reports exercising this evening and developed pain in his jaw.  The pain has now subsided.   BP elevated at 160/93.      Pt denies SOB, pain, dizziness/unsteady gait, vision change, numbness, weakness.     Disposition:  See provider within 3 days, cardiology recommended per history.  Advised to call back with any new/worsening symptoms.  He verbalized understanding and had no further questions.     COVID 19 Nurse Triage Plan/Patient Instructions    In-Person Visit with provider recommended. Reference Visit Selection Guide.    Thank you for taking steps to prevent the spread of this virus.  o Limit your contact with others.  o Wear a simple mask to cover your cough.  o Wash your hands well and often.    Resources    [facility]: About COVID-19: [email or web address]    CDC: What to Do If You're Sick: www.cdc.gov/coronavirus/2019-ncov/about/steps-when-sick.html    CDC: Ending Home Isolation: www.cdc.gov/coronavirus/2019-ncov/hcp/disposition-in-home-patients.html     CDC: Caring for Someone: www.cdc.gov/coronavirus/2019-ncov/if-you-are-sick/care-for-someone.html     [facility]: Interim Guidance for Hospital Discharge to Home: www.health.[facility].[place]/diseases/coronavirus/hcp/hospdischarge.pdf    [facility] clinical trials (COVID-19 research studies): clinicalaffairs.[facility]-clinical-trials     Below are the COVID-19 hotlines at the [place] Department of Health ([facility]). Interpreters are available.   o For health questions: Call 192-922-4480 or 1-747.156.3513 (7 a.m. to 7 p.m.)  o For questions about schools and childcare: Call 037-186-5673 or 1-902.674.8214 (7 a.m. to 7 p.m.)     Jemma Brown RN/RUTHANN      Additional Information    Negative: Difficult to awaken or acting confused (e.g., disoriented, slurred speech)    Negative: Severe difficulty breathing (e.g., struggling for each breath, speaks in single words)    Negative: [1] Weakness of the face, arm or leg on one side of the body AND  [2] new onset    Negative: [1] Numbness (i.e., loss of sensation) of the face, arm or leg on one side of the body AND [2] new onset    Negative: [1] Chest pain lasts > 5 minutes AND [2] history of heart disease  (i.e., heart attack, bypass surgery, angina, angioplasty, CHF)    Negative: [1] Chest pain AND [2] took nitrogylcerin AND [3] pain was not relieved    Negative: Sounds like a life-threatening emergency to the triager    Negative: [1] Pregnant > 20 weeks (or postpartum < 6 weeks) AND [2] new hand or face swelling    Negative: [1] Pregnant > 20 weeks AND [2] BP Systolic BP  >= 140 OR Diastolic >= 90    Negative: [1] Systolic BP  >= 160 OR Diastolic >= 100 AND [2] cardiac or neurologic symptoms (e.g., chest pain, difficulty breathing, unsteady gait, blurred vision)    Negative: [1] Systolic BP  >= 200 OR Diastolic >= 120  AND [2] having NO cardiac or neurologic symptoms    Negative: [1] Postpartum < 6 weeks AND [2] BP Systolic BP  >= 140 OR Diastolic >= 90    Negative: [1] Systolic BP  >= 180 OR Diastolic >= 110 AND [2] missed most recent dose of blood pressure medication    Negative: Systolic BP  >= 180 OR Diastolic >= 110    Negative: Ran out of BP medications    Systolic BP  >= 160 OR Diastolic >= 100    Protocols used: HIGH BLOOD PRESSURE-A-

## 2020-09-21 NOTE — RESULT ENCOUNTER NOTE
Dear Salbador,   Your basic metabolic panel was normal. Your complete blood count shows mild decline in your white blood cells. These findings are not new, you had similar findings 3 yrs ago. We will continue to check on your complete blood count once per year.     best regards,   Braulio Otero MD

## 2020-09-23 ENCOUNTER — OFFICE VISIT (OUTPATIENT)
Dept: FAMILY MEDICINE | Facility: CLINIC | Age: 65
End: 2020-09-23
Payer: COMMERCIAL

## 2020-09-23 VITALS
WEIGHT: 141.8 LBS | OXYGEN SATURATION: 98 % | HEIGHT: 69 IN | BODY MASS INDEX: 21 KG/M2 | SYSTOLIC BLOOD PRESSURE: 110 MMHG | DIASTOLIC BLOOD PRESSURE: 80 MMHG | TEMPERATURE: 98.3 F | RESPIRATION RATE: 16 BRPM | HEART RATE: 45 BPM

## 2020-09-23 DIAGNOSIS — I10 BENIGN ESSENTIAL HYPERTENSION: ICD-10-CM

## 2020-09-23 PROCEDURE — 99213 OFFICE O/P EST LOW 20 MIN: CPT | Performed by: FAMILY MEDICINE

## 2020-09-23 RX ORDER — AMLODIPINE BESYLATE 5 MG/1
5 TABLET ORAL DAILY
Qty: 90 TABLET | Refills: 1 | Status: SHIPPED | OUTPATIENT
Start: 2020-09-23 | End: 2021-02-01

## 2020-09-23 ASSESSMENT — MIFFLIN-ST. JEOR: SCORE: 1428.58

## 2020-09-23 NOTE — PROGRESS NOTES
Subjective     Salbador Begum is a 64 year old male who presents to clinic today for the following health issues:    HPI       Hypertension Follow-up      Do you check your blood pressure regularly outside of the clinic? Yes     Are you following a low salt diet? Yes    Are your blood pressures ever more than 140 on the top number (systolic) OR more   than 90 on the bottom number (diastolic), for example 140/90? Yes       How many servings of fruits and vegetables do you eat daily?  1-2    On average, how many sweetened beverages do you drink each day (Examples: soda, juice, sweet tea, etc.  Do NOT count diet or artificially sweetened beverages)?   0    How many days per week do you exercise enough to make your heart beat faster? 5    How many minutes a day do you exercise enough to make your heart beat faster? 60 or more  How many days per week do you miss taking your medication? 1-2 times a month     What makes it hard for you to take your medications?  remembering to take     Downloaded an patti for his phone. Monitoring his HR and BP.  Does lots of cardio exercise.     Social History     Occupational History     Not on file   Tobacco Use     Smoking status: Never Smoker     Smokeless tobacco: Never Used   Substance and Sexual Activity     Alcohol use: Yes     Comment: 14 glass of wine     Drug use: No     Sexual activity: Yes     Partners: Female     Birth control/protection: Post-menopausal     Comment: Other     Allergies   Allergen Reactions     Metoclopramide Rash     Patient Active Problem List   Diagnosis     Backache     Allergic rhinitis     Major depressive disorder, recurrent, mild (H)     Sinus arrhythmia     Sleep apnea     Diverticulitis of colon     Advanced directives, counseling/discussion     Essential and other specified forms of tremor     Idiopathic hypersomnolence     Acute bilateral low back pain without sciatica     Bilateral hip pain     Chronotropic incompetence with sinus node  "dysfunction (H)     Reviewed medications, social history and  past medical and surgical history.    Review of system: for general, respiratory, CVS, GI and psychiatry negative except for noted above.     EXAM:  /80 (BP Location: Left arm, Patient Position: Sitting, Cuff Size: Adult Regular)   Pulse (!) 45   Temp 98.3  F (36.8  C) (Oral)   Resp 16   Ht 1.753 m (5' 9\")   Wt 64.3 kg (141 lb 12.8 oz)   SpO2 98%   BMI 20.94 kg/m    Constitutional: healthy, alert and no distress   Psychiatric: mentation appears normal and affect normal/bright  Cardiovascular: RRR. No murmurs,  Respiratory: negative, Lungs clear. No crackles or wheezing. No tachypnea.        ASSESSMENT / PLAN:  (I10) Benign essential hypertension  Comment: Blood pressure nicely improved with 5 mg of amlodipine.  He is denying any side effects.  Continue the same Rx.  Reviewed his labs and previous EKG.  His heart rate has always been on lower side due to cardio exercises.  Continue to monitor.  Plan: amLODIPine (NORVASC) 5 MG tablet             Follow-up in 6 months    The above note was dictated using voice recognition. Although reviewed after completion, some word and grammatical error may remain .          "

## 2020-12-13 ENCOUNTER — MYC REFILL (OUTPATIENT)
Dept: FAMILY MEDICINE | Facility: CLINIC | Age: 65
End: 2020-12-13

## 2020-12-13 DIAGNOSIS — F33.0 MAJOR DEPRESSIVE DISORDER, RECURRENT, MILD (H): ICD-10-CM

## 2020-12-16 RX ORDER — VENLAFAXINE HYDROCHLORIDE 37.5 MG/1
37.5 CAPSULE, EXTENDED RELEASE ORAL DAILY
Qty: 90 CAPSULE | Refills: 0 | Status: SHIPPED | OUTPATIENT
Start: 2020-12-16 | End: 2021-02-01

## 2021-01-15 ENCOUNTER — HEALTH MAINTENANCE LETTER (OUTPATIENT)
Age: 66
End: 2021-01-15

## 2021-02-01 ENCOUNTER — OFFICE VISIT (OUTPATIENT)
Dept: FAMILY MEDICINE | Facility: CLINIC | Age: 66
End: 2021-02-01
Payer: COMMERCIAL

## 2021-02-01 VITALS
OXYGEN SATURATION: 100 % | HEART RATE: 54 BPM | SYSTOLIC BLOOD PRESSURE: 116 MMHG | RESPIRATION RATE: 16 BRPM | BODY MASS INDEX: 21.56 KG/M2 | DIASTOLIC BLOOD PRESSURE: 80 MMHG | TEMPERATURE: 97.5 F | HEIGHT: 69 IN | WEIGHT: 145.6 LBS

## 2021-02-01 DIAGNOSIS — G89.29 CHRONIC BACK PAIN, UNSPECIFIED BACK LOCATION, UNSPECIFIED BACK PAIN LATERALITY: ICD-10-CM

## 2021-02-01 DIAGNOSIS — Z13.6 SCREENING FOR CARDIOVASCULAR CONDITION: ICD-10-CM

## 2021-02-01 DIAGNOSIS — Z00.00 ROUTINE GENERAL MEDICAL EXAMINATION AT A HEALTH CARE FACILITY: Primary | ICD-10-CM

## 2021-02-01 DIAGNOSIS — Z13.1 SCREENING FOR DIABETES MELLITUS: ICD-10-CM

## 2021-02-01 DIAGNOSIS — M54.9 CHRONIC BACK PAIN, UNSPECIFIED BACK LOCATION, UNSPECIFIED BACK PAIN LATERALITY: ICD-10-CM

## 2021-02-01 DIAGNOSIS — N52.9 ERECTILE DYSFUNCTION, UNSPECIFIED ERECTILE DYSFUNCTION TYPE: ICD-10-CM

## 2021-02-01 DIAGNOSIS — Z12.5 SCREENING PSA (PROSTATE SPECIFIC ANTIGEN): ICD-10-CM

## 2021-02-01 PROCEDURE — G0103 PSA SCREENING: HCPCS | Performed by: FAMILY MEDICINE

## 2021-02-01 PROCEDURE — 82947 ASSAY GLUCOSE BLOOD QUANT: CPT | Performed by: FAMILY MEDICINE

## 2021-02-01 PROCEDURE — 80061 LIPID PANEL: CPT | Performed by: FAMILY MEDICINE

## 2021-02-01 PROCEDURE — 99213 OFFICE O/P EST LOW 20 MIN: CPT | Mod: 25 | Performed by: FAMILY MEDICINE

## 2021-02-01 PROCEDURE — 99396 PREV VISIT EST AGE 40-64: CPT | Performed by: FAMILY MEDICINE

## 2021-02-01 PROCEDURE — 36415 COLL VENOUS BLD VENIPUNCTURE: CPT | Performed by: FAMILY MEDICINE

## 2021-02-01 RX ORDER — SILDENAFIL CITRATE 20 MG/1
TABLET ORAL
Qty: 30 TABLET | Refills: 3 | Status: SHIPPED | OUTPATIENT
Start: 2021-02-01 | End: 2021-06-23

## 2021-02-01 ASSESSMENT — ENCOUNTER SYMPTOMS
DIZZINESS: 0
HEADACHES: 0
JOINT SWELLING: 1
NAUSEA: 0
PALPITATIONS: 0
FEVER: 0
DIARRHEA: 0
HEMATURIA: 0
SHORTNESS OF BREATH: 0
HEMATOCHEZIA: 0
DYSURIA: 0
FREQUENCY: 0
CHILLS: 0
CONSTIPATION: 0
ARTHRALGIAS: 1
HEARTBURN: 0
SORE THROAT: 0
WEAKNESS: 0
MYALGIAS: 0
COUGH: 0
EYE PAIN: 0
ABDOMINAL PAIN: 0
PARESTHESIAS: 0
NERVOUS/ANXIOUS: 0

## 2021-02-01 ASSESSMENT — ANXIETY QUESTIONNAIRES
GAD7 TOTAL SCORE: 0
3. WORRYING TOO MUCH ABOUT DIFFERENT THINGS: NOT AT ALL
5. BEING SO RESTLESS THAT IT IS HARD TO SIT STILL: NOT AT ALL
GAD7 TOTAL SCORE: 0
6. BECOMING EASILY ANNOYED OR IRRITABLE: NOT AT ALL
1. FEELING NERVOUS, ANXIOUS, OR ON EDGE: NOT AT ALL
7. FEELING AFRAID AS IF SOMETHING AWFUL MIGHT HAPPEN: NOT AT ALL
4. TROUBLE RELAXING: NOT AT ALL
2. NOT BEING ABLE TO STOP OR CONTROL WORRYING: NOT AT ALL
7. FEELING AFRAID AS IF SOMETHING AWFUL MIGHT HAPPEN: NOT AT ALL
GAD7 TOTAL SCORE: 0

## 2021-02-01 ASSESSMENT — PATIENT HEALTH QUESTIONNAIRE - PHQ9
SUM OF ALL RESPONSES TO PHQ QUESTIONS 1-9: 0
SUM OF ALL RESPONSES TO PHQ QUESTIONS 1-9: 0
10. IF YOU CHECKED OFF ANY PROBLEMS, HOW DIFFICULT HAVE THESE PROBLEMS MADE IT FOR YOU TO DO YOUR WORK, TAKE CARE OF THINGS AT HOME, OR GET ALONG WITH OTHER PEOPLE: NOT DIFFICULT AT ALL

## 2021-02-01 ASSESSMENT — MIFFLIN-ST. JEOR: SCORE: 1436.85

## 2021-02-01 NOTE — PROGRESS NOTES
SUBJECTIVE:   CC: Salbador Begum is an 65 year old male who presents for preventative health visit.       Patient has been advised of split billing requirements and indicates understanding: Yes  Healthy Habits:    Getting at least 3 servings of Calcium per day:  Yes    Diet:  Regular (no restrictions)    Frequency of exercise:  6-7 days/week    Duration of exercise:  45-60 minutes    Taking medications regularly:  Yes (Incomplete)    Medication side effects:: Incomplete.    PHQ-2 Total Score:    Additional concerns today:  Yes (bilateral ear pain, would like referral for back pian)PHQ2 Score: Incomplete.        Today's PHQ-2 Score:   PHQ-2 ( 1999 [employer]) 2/1/2021   Q1: Little interest or pleasure in doing things 0   Q2: Feeling down, depressed or hopeless 0   PHQ-2 Score 0   Q1: Little interest or pleasure in doing things Not at all   Q2: Feeling down, depressed or hopeless Not at all   PHQ-2 Score 0       Abuse: Current or Past(Physical, Sexual or Emotional)- No  Do you feel safe in your environment? Yes    Have you ever done Advance Care Planning? (For example, a Health Directive, POLST, or a discussion with a medical provider or your loved ones about your wishes): Yes, patient states has an Advance Care Planning document and will bring a copy to the clinic.    Social History     Tobacco Use     Smoking status: Never Smoker     Smokeless tobacco: Never Used   Substance Use Topics     Alcohol use: Yes     Comment: 14 glass of wine     If you drink alcohol do you typically have >3 drinks per day or >7 drinks per week? Yes      Alcohol Use 2/1/2021   Prescreen: >3 drinks/day or >7 drinks/week? Yes   Prescreen: >3 drinks/day or >7 drinks/week? -   AUDIT SCORE  4       Last PSA:   PSA   Date Value Ref Range Status   01/09/2018 0.20 0 - 4 ug/L Final     Comment:     Assay Method:  Chemiluminescence using Siemens Vista analyzer       Reviewed orders with patient. Reviewed health maintenance and updated orders  accordingly - Yes      Reviewed and updated as needed this visit by clinical staff  Tobacco  Allergies  Meds   Med Hx  Surg Hx  Fam Hx  Soc Hx      Reviewed and updated as needed this visit by Provider    Wife moved in to memory care - difficult decision but it is helping his mood now.     felix had bp med for weeks - checking bp daily at home and most of his home readings are in excellent shape.   Had some erectile dysfunction.   Tapered off antidepressant as well. Felt it was all situational with his wifes health.   Does not feel he needs to stay awake after stopped working as he does not need to stay up during day time.   Has moderate sleep apnea. Seeing sleep specialist - Mitesh. Right now seeing [facility] specialist.   Back - chronic issue. Flared up again - already scheduled to see PT.   Using ibuprofen on regular basis for his back pain. Also taking acetaminophen along with it.     Erectile dysfunction - trouble getting and maintaining erection.   Become sexually active again last month. No sex in last 3 yrs.     Review of Systems  CONSTITUTIONAL: NEGATIVE for fever, chills, change in weight  INTEGUMENTARY/SKIN: NEGATIVE for worrisome rashes, moles or lesions  EYES: NEGATIVE for vision changes or irritation  ENT: NEGATIVE for ear, mouth and throat problems  RESP: NEGATIVE for significant cough or SOB  CV: NEGATIVE for chest pain, palpitations or peripheral edema  GI: NEGATIVE for nausea, abdominal pain, heartburn, or change in bowel habits   male: negative for dysuria, hematuria, decreased urinary stream, erectile dysfunction, urethral discharge  MUSCULOSKELETAL: NEGATIVE for significant arthralgias or myalgia  NEURO: NEGATIVE for weakness, dizziness or paresthesias  PSYCHIATRIC: NEGATIVE for changes in mood or affect    OBJECTIVE:   /80 (BP Location: Left arm, Patient Position: Sitting, Cuff Size: Adult Regular)   Pulse 54   Temp 97.5  F (36.4  C) (Oral)   Resp 16   Ht 1.746 m (5'  "8.75\")   Wt 66 kg (145 lb 9.6 oz)   SpO2 100%   BMI 21.66 kg/m      Physical Exam  GENERAL: healthy, alert and no distress  EYES: Eyes grossly normal to inspection, PERRL and conjunctivae and sclerae normal  HENT: ear canals and TM's normal, nose and mouth without ulcers or lesions  NECK: no adenopathy, no asymmetry, masses, or scars and thyroid normal to palpation  RESP: lungs clear to auscultation - no rales, rhonchi or wheezes  CV: regular rate and rhythm, normal S1 S2, no S3 or S4, no murmur, click or rub, no peripheral edema and peripheral pulses strong  ABDOMEN: soft, nontender, no hepatosplenomegaly, no masses and bowel sounds normal   (male): normal male genitalia without lesions or urethral discharge, no hernia  MS: no gross musculoskeletal defects noted, no edema  SKIN: no suspicious lesions or rashes  NEURO: Normal strength and tone, mentation intact and speech normal  PSYCH: mentation appears normal, affect normal/bright    Diagnostic Test Results:  Labs reviewed in Epic    ASSESSMENT/PLAN:   1. Routine general medical examination at a health care facility  He stopped using his anxiety and depression medication.  Feels it was situational.  Does not need intervention.  Also not using Adderall.  Blood pressure is fine without all of his medications.  Also stopped his blood pressure medication.  Checking his blood pressure at home regularly.  Continue to monitor.  Updated all of his medications.    2. Chronic back pain, unspecified back location, unspecified back pain laterality  Chronic issue.  Been to [facility] in the past.  Does well with physical therapy and would like to resume it.   - [facility] REFERR[facility]; Future    3. Screening for diabetes mellitus     - Glucose    4. Screening for cardiovascular condition     - Lipid panel reflex to direct LDL Fasting    5. Screening PSA (prostate specific antigen)     - PSA, screen    6. Erectile dysfunction, unspecified erectile " "dysfunction type  Discussed could be psychological vs pathological.  He is noticing worsening of his sexual performance with constant fear of not getting and maintaining erection.  We discussed about intervention and he would like to pursue it further.  We discussed generic of sildenafil side effects and how to use.  He understood.  We also discussed it is not covered by insurance but it would be the cheapest option.  We briefly discussed about Cialis and Levitra.  - sildenafil (REVATIO) 20 MG tablet; 3-5 pills 30 minutes before sex as needed.  Dispense: 30 tablet; Refill: 3    Patient has been advised of split billing requirements and indicates understanding: Yes  COUNSELING:   Reviewed preventive health counseling, as reflected in patient instructions    Estimated body mass index is 21.66 kg/m  as calculated from the following:    Height as of this encounter: 1.746 m (5' 8.75\").    Weight as of this encounter: 66 kg (145 lb 9.6 oz).         He reports that he has never smoked. He has never used smokeless tobacco.      Counseling Resources:  ATP IV Guidelines  Pooled Cohorts Equation Calculator  FRAX Risk Assessment  ICSI Preventive Guidelines  Dietary Guidelines for Americans, 2010  [employer]'s MyPlate  ASA Prophylaxis  Lung CA Screening    Kieran Zuluaga MD, MD  [facility]  Answers for HPI/ROS submitted by the patient on 2/1/2021   Annual Exam:  If you checked off any problems, how difficult have these problems made it for you to do your work, take care of things at home, or get along with other people?: Not difficult at all  PHQ9 TOTAL SCORE: 0  KENNEDY 7 TOTAL SCORE: 0    "

## 2021-02-02 LAB
CHOLEST SERPL-MCNC: 196 MG/DL
GLUCOSE SERPL-MCNC: 94 MG/DL (ref 70–99)
HDLC SERPL-MCNC: 81 MG/DL
LDLC SERPL CALC-MCNC: 89 MG/DL
NONHDLC SERPL-MCNC: 115 MG/DL
PSA SERPL-ACNC: 0.47 UG/L (ref 0–4)
TRIGL SERPL-MCNC: 130 MG/DL

## 2021-02-02 ASSESSMENT — PATIENT HEALTH QUESTIONNAIRE - PHQ9: SUM OF ALL RESPONSES TO PHQ QUESTIONS 1-9: 0

## 2021-02-02 ASSESSMENT — ANXIETY QUESTIONNAIRES: GAD7 TOTAL SCORE: 0

## 2021-02-03 ENCOUNTER — THERAPY VISIT (OUTPATIENT)
Dept: PHYSICAL THERAPY | Facility: CLINIC | Age: 66
End: 2021-02-03
Payer: COMMERCIAL

## 2021-02-03 DIAGNOSIS — M54.50 CHRONIC MIDLINE LOW BACK PAIN WITHOUT SCIATICA: ICD-10-CM

## 2021-02-03 DIAGNOSIS — M54.9 CHRONIC BACK PAIN, UNSPECIFIED BACK LOCATION, UNSPECIFIED BACK PAIN LATERALITY: ICD-10-CM

## 2021-02-03 DIAGNOSIS — G89.29 CHRONIC BACK PAIN, UNSPECIFIED BACK LOCATION, UNSPECIFIED BACK PAIN LATERALITY: ICD-10-CM

## 2021-02-03 DIAGNOSIS — G89.29 CHRONIC MIDLINE LOW BACK PAIN WITHOUT SCIATICA: ICD-10-CM

## 2021-02-03 PROCEDURE — 97110 THERAPEUTIC EXERCISES: CPT | Mod: GP | Performed by: PHYSICAL THERAPIST

## 2021-02-03 PROCEDURE — 97161 PT EVAL LOW COMPLEX 20 MIN: CPT | Mod: GP | Performed by: PHYSICAL THERAPIST

## 2021-02-03 NOTE — PROGRESS NOTES
[facility] Initial Evaluation  Subjective:  The history is provided by the patient. No  was used.   Therapist Generated HPI Evaluation  Problem details: 11/23/20. Pt experienced flare up of central low back pain on 11/23/20 while running. He was experiencing pain in posterolateral L lower leg which has resolved. He is now experiencing pain in R thigh as well as low back..         Type of problem:  Lumbar.    This is a chronic condition.  Condition occurred with:  Repetition/overuse.  Where condition occurred: during recreation/sport.  Patient reports pain:  Central lumbar spine.  Pain is described as other (pressure, nerve pain) and is intermittent.  Pain radiates to:  Thigh right. Pain is worse in the A.M..  Since onset symptoms are gradually improving.  Symptoms are exacerbated by walking and other (sitting without support, sleeping on soft mattress)  and relieved by other (press ups stretch).  Special tests included:  MRI.  Previous treatment includes physical therapy.   Barriers include:  None as reported by patient.    Patient Health History  Salbador Begum being seen for low back pain.          Pain is reported as 8/10 on pain scale.  General health as reported by patient is good.  Pertinent medical history includes: heart problems, history of fractures and osteoarthritis.   Red flags:  None as reported by patient.  Medical allergies: other. Other medical allergies details: Metoclopramide.   Surgeries include:  Orthopedic surgery. Other surgery history details: Multiple.    Current medications:  None.    Current occupation is -retired.                                       Objective:  Standing Alignment:    Cervical/Thoracic:  Forward head  Shoulder/UE:  Rounded shoulders  Lumbar:  Normal                           Lumbar/SI Evaluation  ROM:    AROM Lumbar:   Flexion:          WNL -  Ext:                    Mod loss + R low back and posterior thigh   Side  Bend:        Left:     Right:   Rotation:           Left:     Right:   Side Glide:        Left:  WNL -    Right:  WNL -/+ posterior R thigh                                                                           Frankie Lumbar Evaluation    Posture:  Sitting: fair  Standing: good  Lordosis: WNL  Lateral Shift: nil  Correction of Posture: better      Test Movements:        EIL: During: increases  After: no worse    Repeat EIL: During: decreases  After: better  Mechanical Response: IncROM        Conclusion: derangement  Principle of Treatment:  Posture Correction: posture correction    Extension: REIL                                           ROS    Assessment/Plan:    Patient is a 65 year old male with lumbar complaints.    Patient has the following significant findings with corresponding treatment plan.                Diagnosis 1:  Low back pain  Pain -  hot/cold therapy, manual therapy, self management, education, directional preference exercise and home program  Decreased ROM/flexibility - manual therapy, therapeutic exercise and home program  Decreased proprioception - neuro re-education, therapeutic activities and home program  Impaired muscle performance - neuro re-education and home program  Decreased function - therapeutic activities and home program  Impaired posture - neuro re-education and home program    Therapy Evaluation Codes:   1) History comprised of:   Personal factors that impact the plan of care:      None.    Comorbidity factors that impact the plan of care are:      Heart problems, Osteoarthritis and Sleep disorder/apnea.     Medications impacting care: None.  2) Examination of Body Systems comprised of:   Body structures and functions that impact the plan of care:      Lumbar spine.   Activity limitations that impact the plan of care are:      Lifting, Running, Sitting and Walking.  3) Clinical presentation characteristics are:   Stable/Uncomplicated.  4) Decision-Making    Low complexity  using standardized patient assessment instrument and/or measureable assessment of functional outcome.  Cumulative Therapy Evaluation is: Low complexity.    Previous and current functional limitations:  (See Goal Flow Sheet for this information)    Short term and Long term goals: (See Goal Flow Sheet for this information)     Communication ability:  Patient appears to be able to clearly communicate and understand verbal and written communication and follow directions correctly.  Treatment Explanation - The following has been discussed with the patient:   RX ordered/plan of care  Anticipated outcomes  Possible risks and side effects  This patient would benefit from PT intervention to resume normal activities.   Rehab potential is good.    Frequency:  2 X week, once daily  Duration:  for 2 weeks tapering to 1 X a week over 4 weeks  Discharge Plan:  Achieve all LTG.  Independent in home treatment program.  Reach maximal therapeutic benefit.    Please refer to the daily flowsheet for treatment today, total treatment time and time spent performing 1:1 timed codes.

## 2021-02-04 PROBLEM — M54.50 CHRONIC MIDLINE LOW BACK PAIN WITHOUT SCIATICA: Status: ACTIVE | Noted: 2021-02-04

## 2021-02-04 PROBLEM — G89.29 CHRONIC MIDLINE LOW BACK PAIN WITHOUT SCIATICA: Status: ACTIVE | Noted: 2021-02-04

## 2021-02-05 ENCOUNTER — THERAPY VISIT (OUTPATIENT)
Dept: PHYSICAL THERAPY | Facility: CLINIC | Age: 66
End: 2021-02-05
Payer: COMMERCIAL

## 2021-02-05 DIAGNOSIS — M54.50 CHRONIC MIDLINE LOW BACK PAIN WITHOUT SCIATICA: ICD-10-CM

## 2021-02-05 DIAGNOSIS — G89.29 CHRONIC MIDLINE LOW BACK PAIN WITHOUT SCIATICA: ICD-10-CM

## 2021-02-05 PROCEDURE — 97110 THERAPEUTIC EXERCISES: CPT | Mod: GP | Performed by: PHYSICAL THERAPIST

## 2021-02-05 PROCEDURE — 97112 NEUROMUSCULAR REEDUCATION: CPT | Mod: GP | Performed by: PHYSICAL THERAPIST

## 2021-02-08 ENCOUNTER — THERAPY VISIT (OUTPATIENT)
Dept: PHYSICAL THERAPY | Facility: CLINIC | Age: 66
End: 2021-02-08
Payer: COMMERCIAL

## 2021-02-08 DIAGNOSIS — M54.50 CHRONIC MIDLINE LOW BACK PAIN WITHOUT SCIATICA: ICD-10-CM

## 2021-02-08 DIAGNOSIS — G89.29 CHRONIC MIDLINE LOW BACK PAIN WITHOUT SCIATICA: ICD-10-CM

## 2021-02-08 PROCEDURE — 97112 NEUROMUSCULAR REEDUCATION: CPT | Mod: GP | Performed by: PHYSICAL THERAPIST

## 2021-02-08 PROCEDURE — 97110 THERAPEUTIC EXERCISES: CPT | Mod: GP | Performed by: PHYSICAL THERAPIST

## 2021-02-08 PROCEDURE — 97140 MANUAL THERAPY 1/> REGIONS: CPT | Mod: GP | Performed by: PHYSICAL THERAPIST

## 2021-02-10 ENCOUNTER — THERAPY VISIT (OUTPATIENT)
Dept: PHYSICAL THERAPY | Facility: CLINIC | Age: 66
End: 2021-02-10
Payer: COMMERCIAL

## 2021-02-10 DIAGNOSIS — M54.50 CHRONIC MIDLINE LOW BACK PAIN WITHOUT SCIATICA: ICD-10-CM

## 2021-02-10 DIAGNOSIS — G89.29 CHRONIC MIDLINE LOW BACK PAIN WITHOUT SCIATICA: ICD-10-CM

## 2021-02-10 PROCEDURE — 97112 NEUROMUSCULAR REEDUCATION: CPT | Mod: GP | Performed by: PHYSICAL THERAPIST

## 2021-02-10 PROCEDURE — 97110 THERAPEUTIC EXERCISES: CPT | Mod: GP | Performed by: PHYSICAL THERAPIST

## 2021-02-19 ENCOUNTER — THERAPY VISIT (OUTPATIENT)
Dept: PHYSICAL THERAPY | Facility: CLINIC | Age: 66
End: 2021-02-19
Payer: COMMERCIAL

## 2021-02-19 DIAGNOSIS — G89.29 CHRONIC MIDLINE LOW BACK PAIN WITHOUT SCIATICA: ICD-10-CM

## 2021-02-19 DIAGNOSIS — M54.50 CHRONIC MIDLINE LOW BACK PAIN WITHOUT SCIATICA: ICD-10-CM

## 2021-02-19 PROCEDURE — 97112 NEUROMUSCULAR REEDUCATION: CPT | Mod: GP | Performed by: PHYSICAL THERAPIST

## 2021-02-19 PROCEDURE — 97110 THERAPEUTIC EXERCISES: CPT | Mod: GP | Performed by: PHYSICAL THERAPIST

## 2021-02-21 ENCOUNTER — E-VISIT (OUTPATIENT)
Dept: FAMILY MEDICINE | Facility: CLINIC | Age: 66
End: 2021-02-21
Payer: COMMERCIAL

## 2021-02-21 DIAGNOSIS — G89.29 CHRONIC LOW BACK PAIN, UNSPECIFIED BACK PAIN LATERALITY, UNSPECIFIED WHETHER SCIATICA PRESENT: Primary | ICD-10-CM

## 2021-02-21 DIAGNOSIS — M54.50 CHRONIC LOW BACK PAIN, UNSPECIFIED BACK PAIN LATERALITY, UNSPECIFIED WHETHER SCIATICA PRESENT: Primary | ICD-10-CM

## 2021-02-21 PROCEDURE — 99421 OL DIG E/M SVC 5-10 MIN: CPT | Performed by: FAMILY MEDICINE

## 2021-02-22 RX ORDER — CYCLOBENZAPRINE HCL 5 MG
5 TABLET ORAL 3 TIMES DAILY PRN
Qty: 30 TABLET | Refills: 0 | Status: SHIPPED | OUTPATIENT
Start: 2021-02-22 | End: 2021-12-20

## 2021-02-22 NOTE — PATIENT INSTRUCTIONS
Thank you for choosing us for your care. I have placed an order for a prescription so that you can start treatment. View your full visit summary for details by clicking on the link below. Your pharmacist will able to address any questions you may have about the medication.      If you're not feeling better within 2-3 weeks please schedule an appointment.  You can schedule an appointment right here in [facility], or call [phone]  If the visit is for the same symptoms as your eVisit, we'll refund the cost of your eVisit if seen within seven days.    Caring for Your Back    You are not alone.    Low back pain is very common. Nearly half of all adults will have low back pain in any given year. The good news is that back pain is rarely a danger to your health. Most people can manage their back pain on their own. About half of people start feeling better within two weeks. In 9 out of 10 cases, low back pain goes away or no longer limits daily activity within 6 weeks.     Your outlook is good!     Your symptoms tell us that your low back pain is most likely not a danger to you. Most of the time we will not know the exact cause of low back pain, even if you see a doctor or have an MRI. However, treatment can still work without knowing the cause of the pain. Less than 1 in 100 people need surgery for their back pain.     What can I do about my low back pain?     There are three basic things you can do to ease low back pain and help it go away.     Use heat or cold packs.    Take medicine as directed.    Use positions, movements and exercises.    Using heat or cold packs:    Try cold packs or gentle heat to ease your pain.  Use whichever gives you the most relief. Apply the cold pack or heat for 15 minutes at a time, as often as needed.    Taking medicine:    If taking over-the-counter medicine:    Take ibuprofen (Advil, Motrin) 600 mg three times a day as needed for pain.  OR    Take Aleve (naproxen) 220 to 440 mg two  times a day as needed for pain. If your doctor prescribed a muscle relaxant (cyclobenzaprine 10 mg.):    Take   to 1 tablet at bedtime.    Do not drive when taking this medicine. This drug may make you sleepy.     Using positions, movements and exercises:    Research tells us that moving your joints and muscles can help you recover from back pain. Such activity should be simple and gentle. Use the positions below as well as walking to help relieve your pain. Try taking a short walk every 3 to 4 hours during the day. Walk for a few minutes inside your home or take longer walks outside, on a treadmill or at a mall. Slowly increase the amount of time you walk. Expect discomfort when you begin, but it should lessen as your back starts to heal. When your back feels better, walk daily to keep your back and body healthy.    Finding a position that is comfortable:    When your back pain is new, certain positions will ease your pain. Gently try each of the positions below until you find one that is helpful. Once you find a position of comfort, use it as often as you like when you are resting. You will recover faster if you combine rest with activity.    * Lie on your back with your legs bent. You can do this by placing a pillow under your knees or lie on the floor and rest your lower legs on the seat of a chair.  * Lie on your side with your knees bent and place a pillow between your knees.    Lie on your stomach over pillows.       When should I call my doctor?    Your back pain should improve over the first couple of weeks. As it improves, you should be able to return to your normal activities.  But call your doctor if:      You have a sudden change in your ability to control your bladder or bowels.    You begin to feel tingling in your groin or legs.    The pain spreads down your leg and into your foot.    Your toes, feet or leg muscles begin to feel weak.    You feel generally unwell or sick.    Your pain gets  worse.    If you are deaf or hard of hearing, please let us know. We provide many free services including sign language interpreters, oral interpreters, TTYs, telephone amplifiers, note takers and written materials.    For informational purposes only. Not to replace the advice of your health care provider. Copyright   2013 [facility]. All rights reserved. [employer] [id number] - 04/13.

## 2021-02-22 NOTE — TELEPHONE ENCOUNTER
Provider E-Visit time total (minutes): 7  7:15 AM   7:22 AM     Seen by Dr. Zuluaga on 2/1/2021. Referred to PT at that time.

## 2021-02-23 ENCOUNTER — THERAPY VISIT (OUTPATIENT)
Dept: PHYSICAL THERAPY | Facility: CLINIC | Age: 66
End: 2021-02-23
Payer: COMMERCIAL

## 2021-02-23 DIAGNOSIS — G89.29 CHRONIC MIDLINE LOW BACK PAIN WITHOUT SCIATICA: ICD-10-CM

## 2021-02-23 DIAGNOSIS — M54.50 CHRONIC MIDLINE LOW BACK PAIN WITHOUT SCIATICA: ICD-10-CM

## 2021-02-23 PROCEDURE — 97112 NEUROMUSCULAR REEDUCATION: CPT | Mod: GP | Performed by: PHYSICAL THERAPIST

## 2021-02-23 PROCEDURE — 97110 THERAPEUTIC EXERCISES: CPT | Mod: GP | Performed by: PHYSICAL THERAPIST

## 2021-02-23 NOTE — PROGRESS NOTES
Subjective:  HPI  Physical Exam                    Objective:  System         Lumbar/SI Evaluation    Lumbar Myotomes:    T12-L3 (Hip Flex):  Left: 5    Right: 5  L2-4 (Quads):  Left:  5    Right:  5  L4 (Ankle DF):  Left:  5    Right:  5  L5 (Great Toe Ext): Left: 5    Right: 5   S1 (Toe Raise):  Left: 5    Right: 5  Lumbar DTR's:    L4 (Quad):  Left:  2   Right:  2  S1 (Achilles):  Left:  2   Right:  2    Lumbar Dermtomes:        L2 Left:  Normal-light touch     L2 Right:  Normal-light touch  L3 Left:  Normal-light touch     L3 Right:  Normal-light touch  L4 Left:  Normal-light touch       L4 Right:  Normal-light touch  L5 Left:  Normal-light touch     L5 Right:  Normal-light touch  S1 Left:  Normal-light touch     S1 Right:  Normal-light touch                                                           Frankie Lumbar Evaluation    Posture:  Sitting: fair  Standing: good  Lordosis: WNL  Lateral Shift: nil  Correction of Posture: no effect      Test Movements:  FIS: During: no effect  After: no effect    Repeat FIS: During: increases  After: no worse  Mechanical Response: DecrROM          SGIS L: During: increases  After: no worse    Repeat SGIS L: During: increases  After: no worse  Mechanical Response: no effect                                             ROS    Assessment/Plan:    PROGRESS  REPORT    Progress reporting period is from 2/3/21 to 2/23/21.       SUBJECTIVE  Subjective changes noted by patient:  Low back and L calf felt worse starting the evening of 2/19/21. He went cross country skiing after his PT appointment on 2/19/21. His low back and L calf pain worsened further after going for hike on 2/20/21. His L calf pain improved with taking muscle relaxer meds evening of 2/20/21. He was able to perform stationary biking today without increased pain. He took muscle relaxer meds this morning. C/o numbness over dorsum of L foot and toes. C/o increased LBP with transferring to sit-to-stand and with standing.  Feels better with performing prayer stretch on all 4s. No effect with BALJIT stretch. Stopped performing bridging d/t pain.  Pt has appointment with Sports Medicine at [facility] on 3/2/21 for further evaluation.    Current Pain level: 4-5/10 central and R central low back.     Initial Pain level: 8/10.   Changes in function:  Yes (See Goal flowsheet attached for changes in current functional level)  Adverse reaction to treatment or activity: None    OBJECTIVE  Changes noted in objective findings:  Yes, see objective findings.    ASSESSMENT/PLAN  Updated problem list and treatment plan: Diagnosis 1:  Low back pain  Pain -  hot/cold therapy, manual therapy, self management, education, directional preference exercise and home program  Decreased ROM/flexibility - manual therapy, therapeutic exercise and home program  Decreased proprioception - neuro re-education, therapeutic activities and home program  Impaired muscle performance - neuro re-education and home program  Decreased function - therapeutic activities and home program  Impaired posture - neuro re-education and home program  STG/LTGs have been met or progress has been made towards goals:  Yes (See Goal flow sheet completed today.)  Assessment of Progress: The patient's condition has exacerbated.  Self Management Plans:  Patient has been instructed in a home treatment program.  Patient  has been instructed in self management of symptoms.  I have re-evaluated this patient and find that the nature, scope, duration and intensity of the therapy is appropriate for the medical condition of the patient.  Salbador continues to require the following intervention to meet STG and LTG's:  PT    Recommendations:  This patient would benefit from continued therapy.     Frequency:  1 X week, once daily  Duration:  for 6 weeks    Pt will follow up with PT pending Sports Medicine evaluation and recommendations.      Please refer to the daily flowsheet for treatment today, total treatment  time and time spent performing 1:1 timed codes.

## 2021-03-02 ENCOUNTER — TRANSFERRED RECORDS (OUTPATIENT)
Dept: HEALTH INFORMATION MANAGEMENT | Facility: CLINIC | Age: 66
End: 2021-03-02

## 2021-03-09 ENCOUNTER — MYC MEDICAL ADVICE (OUTPATIENT)
Dept: FAMILY MEDICINE | Facility: CLINIC | Age: 66
End: 2021-03-09

## 2021-03-16 ENCOUNTER — TRANSFERRED RECORDS (OUTPATIENT)
Dept: HEALTH INFORMATION MANAGEMENT | Facility: CLINIC | Age: 66
End: 2021-03-16

## 2021-03-17 ENCOUNTER — THERAPY VISIT (OUTPATIENT)
Dept: PHYSICAL THERAPY | Facility: CLINIC | Age: 66
End: 2021-03-17
Payer: COMMERCIAL

## 2021-03-17 DIAGNOSIS — G89.29 CHRONIC MIDLINE LOW BACK PAIN WITHOUT SCIATICA: ICD-10-CM

## 2021-03-17 DIAGNOSIS — M54.50 CHRONIC MIDLINE LOW BACK PAIN WITHOUT SCIATICA: ICD-10-CM

## 2021-03-17 PROCEDURE — 97110 THERAPEUTIC EXERCISES: CPT | Mod: GP | Performed by: PHYSICAL THERAPIST

## 2021-03-17 PROCEDURE — 97112 NEUROMUSCULAR REEDUCATION: CPT | Mod: GP | Performed by: PHYSICAL THERAPIST

## 2021-03-31 ENCOUNTER — IMMUNIZATION (OUTPATIENT)
Dept: NURSING | Facility: CLINIC | Age: 66
End: 2021-03-31
Payer: COMMERCIAL

## 2021-03-31 PROCEDURE — 0001A PR COVID VAC PFIZER DIL RECON 30 MCG/0.3 ML IM: CPT

## 2021-03-31 PROCEDURE — 91300 PR COVID VAC PFIZER DIL RECON 30 MCG/0.3 ML IM: CPT

## 2021-04-21 ENCOUNTER — IMMUNIZATION (OUTPATIENT)
Dept: NURSING | Facility: CLINIC | Age: 66
End: 2021-04-21
Attending: INTERNAL MEDICINE
Payer: COMMERCIAL

## 2021-04-21 PROCEDURE — 91300 PR COVID VAC PFIZER DIL RECON 30 MCG/0.3 ML IM: CPT

## 2021-04-21 PROCEDURE — 0002A PR COVID VAC PFIZER DIL RECON 30 MCG/0.3 ML IM: CPT

## 2021-04-27 ENCOUNTER — TELEPHONE (OUTPATIENT)
Dept: FAMILY MEDICINE | Facility: CLINIC | Age: 66
End: 2021-04-27

## 2021-04-27 NOTE — TELEPHONE ENCOUNTER
Patient was seen at [facility] on 4/26/2021 for sleep issues.  Due to the type of insurance patient has, [employer], needs Primary care to refer.  Per caller, patient's referral lists Shira Tracey DDS as referring provider.  They are asking if primary care will give referral.  Melissa Celis RN  [facility]

## 2021-04-30 NOTE — TELEPHONE ENCOUNTER
"Pt's insurance is [employer] which is clinic driven meaning any specialty referrals need to come from his PCP [facility]. No one from [facility] directed Pt to be seen at [facility] for sleep issues and therefore, no back dated referral for 4/27/21 will be granted.    [facility] Policy states:  There are no \"retroactive\" referrals, meaning [facility] will not conduct referrals for patients that have already gone to an appointment and then come to [facility] to get a referral after the appointment. If a patient goes out of the PCP's (primary care provider) network on their own or self-referred, they are responsible for the cost of that visit.    Spoke with Marcy from [facility] and gave her the above information. Marcy stated she will reach out to the Pt.    BUCK Mac [facility] Referral Rep    "

## 2021-06-20 DIAGNOSIS — N52.9 ERECTILE DYSFUNCTION, UNSPECIFIED ERECTILE DYSFUNCTION TYPE: ICD-10-CM

## 2021-06-23 RX ORDER — SILDENAFIL CITRATE 20 MG/1
TABLET ORAL
Qty: 30 TABLET | Refills: 3 | Status: SHIPPED | OUTPATIENT
Start: 2021-06-23 | End: 2021-09-17

## 2021-06-23 NOTE — TELEPHONE ENCOUNTER
Erectile Dysfuction Protocol [id number]/2021 01:59 PM   Absence of nitrates on medication list Protocol Details    Absence of Alpha Blockers on Med list     Recent (12 mo) or future (30 days) visit within the authorizing provider's specialty     Medication is active on med list     Patient is age 18 or older

## 2021-07-27 NOTE — LETTER
Letter by Lalo Pierce at      Author: Lalo Pierce Service: -- Author Type: --    Filed:  Encounter Date: 9/10/2019 Status: (Other)         September 10, 2019       Salbador Begum  [address]  [place] [address]    Dear Salbador Bgeum:    We are pleased to provide you with secure, online access to medical information for you and your family. Per your request, we have expanded your account to allow access to the records of the following family members:              Kimberly Diego (privilege never ends.)     How Do I Login?  1. In your Internet browser, go to [email or web address].  2. Click on Sign Up Now   3. Enter your Desktone Activation Code exactly as it appears below. This code will  60 days after it is generated. You will not need to use this code after you have completed the sign-up process. If you do not sign up before the expiration date, you must request a new code.     Desktone Activation Code: [email or web address]  Expires: 2019 10:05 AM    4. Enter in your date of birth and zip code.  5. Create a Desktone username. Think of one that is secure and easy to remember.  Your username must be between 6 and 20 characters.  6. Create a Desktone password. You can change your password at any time. Your password must be between 8 and 45 characters, contain at least two letters and one number, and contain both upper and lower case letters.  7. Choose a security question, enter your answer, and click Next. This can be used to access [employer]ktone if you forget your password.   8. Enter a valid e-mail address to receive e-mail notifications when new information is available in Deskt[employer].  9. Click Sign In.        How Do I Access a Family Member's Account?  10. Select the account you want to access by clicking the [place] with the appropriate patient's name at the top of your screen.   11. You will see a disclaimer page letting you know that you will be viewing a family member's record. Review  the disclaimer and then click Accept Proxy Access Disclaimer to proceed.  12. Once you switch to viewing a family member's record, you can navigate to swiftQueue pages the same way you would for yourself. You can return to your own account by clicking the [place] at the top of the screen with your name on it.    13. To customize colors and names of the linked accounts, you can select Personalize from the Profile dropdown menu at the top of the screen, then click the Edit button to make changes.      Additional Information  If you have questions, you can e-mail [email or web address] or call [phone] to talk to our [facility] swiftQueue staff. Remember, swiftQueue is NOT to be used for urgent needs. For medical emergencies, dial 911.

## 2021-09-04 ENCOUNTER — MYC MEDICAL ADVICE (OUTPATIENT)
Dept: FAMILY MEDICINE | Facility: CLINIC | Age: 66
End: 2021-09-04

## 2021-09-04 ENCOUNTER — HEALTH MAINTENANCE LETTER (OUTPATIENT)
Age: 66
End: 2021-09-04

## 2021-09-04 DIAGNOSIS — I10 BENIGN ESSENTIAL HYPERTENSION: ICD-10-CM

## 2021-09-04 DIAGNOSIS — F33.0 MAJOR DEPRESSIVE DISORDER, RECURRENT, MILD (H): Primary | ICD-10-CM

## 2021-09-09 NOTE — TELEPHONE ENCOUNTER
My Chart response sent to patient.  Asked to verify his pharmacy.  Bupropion is not on current med list.  Melissa Celis RN  [facility]

## 2021-09-10 RX ORDER — BUPROPION HYDROCHLORIDE 300 MG/1
300 TABLET ORAL EVERY MORNING
Qty: 30 TABLET | Refills: 0 | Status: SHIPPED | OUTPATIENT
Start: 2021-09-10 | End: 2021-10-08

## 2021-09-10 NOTE — TELEPHONE ENCOUNTER
I pended this med with pharmacy.  (I am only seeing 150 mg as last dosing. I will send this 300 mg to PCP.)    We still need to get pt the response when the med is sent in.  Thanks!  FRANK Engel

## 2021-09-10 NOTE — TELEPHONE ENCOUNTER
Please send rx for a month and ask him to have follow up appt with me - virtual visit is fine.   In future, please pend requested medication pharmacy before routing to avoid delay.

## 2021-09-15 DIAGNOSIS — N52.9 ERECTILE DYSFUNCTION, UNSPECIFIED ERECTILE DYSFUNCTION TYPE: ICD-10-CM

## 2021-09-17 RX ORDER — SILDENAFIL CITRATE 20 MG/1
TABLET ORAL
Qty: 30 TABLET | Refills: 0 | Status: SHIPPED | OUTPATIENT
Start: 2021-09-17 | End: 2021-10-22

## 2021-09-17 NOTE — TELEPHONE ENCOUNTER
Erectile Dysfuction Protocol Tppdow40/15/2021 03:09 PM   Absence of nitrates on medication list Protocol Details    Absence of Alpha Blockers on Med list     Recent (12 mo) or future (30 days) visit within the authorizing provider's specialty     Medication is active on med list     Patient is age 18 or older

## 2021-10-06 DIAGNOSIS — F33.0 MAJOR DEPRESSIVE DISORDER, RECURRENT, MILD (H): ICD-10-CM

## 2021-10-08 RX ORDER — BUPROPION HYDROCHLORIDE 300 MG/1
TABLET ORAL
Qty: 30 TABLET | Refills: 0 | Status: SHIPPED | OUTPATIENT
Start: 2021-10-08 | End: 2021-10-25

## 2021-10-08 NOTE — TELEPHONE ENCOUNTER
Medication is being filled for 1 time refill only due to:  Patient needs to be seen because it has been >6 mo since last seen.     Team Coordinators: Please contact patient to set up depression follow up (can be virtual) for any further refills.     LEVI VarmaN RN  [facility]

## 2021-10-25 ENCOUNTER — E-VISIT (OUTPATIENT)
Dept: FAMILY MEDICINE | Facility: CLINIC | Age: 66
End: 2021-10-25
Payer: MEDICARE

## 2021-10-25 DIAGNOSIS — F33.0 MAJOR DEPRESSIVE DISORDER, RECURRENT, MILD (H): ICD-10-CM

## 2021-10-25 PROCEDURE — 99421 OL DIG E/M SVC 5-10 MIN: CPT | Performed by: PHYSICIAN ASSISTANT

## 2021-10-25 RX ORDER — BUPROPION HYDROCHLORIDE 300 MG/1
300 TABLET ORAL EVERY MORNING
Qty: 90 TABLET | Refills: 3 | Status: SHIPPED | OUTPATIENT
Start: 2021-10-25 | End: 2021-11-15

## 2021-10-25 ASSESSMENT — ANXIETY QUESTIONNAIRES
2. NOT BEING ABLE TO STOP OR CONTROL WORRYING: MORE THAN HALF THE DAYS
7. FEELING AFRAID AS IF SOMETHING AWFUL MIGHT HAPPEN: NOT AT ALL
3. WORRYING TOO MUCH ABOUT DIFFERENT THINGS: MORE THAN HALF THE DAYS
7. FEELING AFRAID AS IF SOMETHING AWFUL MIGHT HAPPEN: NOT AT ALL
5. BEING SO RESTLESS THAT IT IS HARD TO SIT STILL: NOT AT ALL
GAD7 TOTAL SCORE: 8
6. BECOMING EASILY ANNOYED OR IRRITABLE: NOT AT ALL
8. IF YOU CHECKED OFF ANY PROBLEMS, HOW DIFFICULT HAVE THESE MADE IT FOR YOU TO DO YOUR WORK, TAKE CARE OF THINGS AT HOME, OR GET ALONG WITH OTHER PEOPLE?: SOMEWHAT DIFFICULT
GAD7 TOTAL SCORE: 8
4. TROUBLE RELAXING: MORE THAN HALF THE DAYS
GAD7 TOTAL SCORE: 8
1. FEELING NERVOUS, ANXIOUS, OR ON EDGE: MORE THAN HALF THE DAYS

## 2021-10-25 ASSESSMENT — PATIENT HEALTH QUESTIONNAIRE - PHQ9
SUM OF ALL RESPONSES TO PHQ QUESTIONS 1-9: 0
SUM OF ALL RESPONSES TO PHQ QUESTIONS 1-9: 0

## 2021-10-25 NOTE — PATIENT INSTRUCTIONS
Depression: Tips to Help Yourself    As your healthcare providers help treat your depression, you can also help yourself. Keep in mind that your illness affects you emotionally, physically, mentally, and socially. So full recovery will take time. Take care of your body and your soul, and be patient with yourself as you get better.  Self-care    Educate yourself. Read about treatment and medicine options. If you have the energy, attend local conferences or support groups. Keep a list of useful websites and helpful books and use them as needed. This illness is not your fault. Don t blame yourself for your depression.    Manage early symptoms. If you notice symptoms returning, experience triggers, or identify other factors that may lead to a depressive episode, get help as soon as possible. Ask trusted friends and family to monitor your behavior and let you know if they see anything of concern.    Work with your provider. Find a provider you can trust. Communicate honestly with that person and share information on your treatment for depression and your reaction to medicines.    Be prepared for a crisis. Know what to do if you experience a crisis. Keep the phone number of a crisis hotline and know the location of your community's urgent care centers and the closest emergency department.    Hold off on big decisions. Depression can cloud your judgment. So wait until you feel better before making major life decisions, such as changing jobs, moving, or getting  or .    Be patient. Recovering from depression is a process. Don t be discouraged if it takes some time to feel better.    Keep it simple. Depression saps your energy and concentration. So you won t be able to do all the things you used to do. Set small goals and do what you can.    Be with others. Don t isolate yourself--you ll only feel worse. Try to be with other people. And take part in fun activities when you can. Go to a movie, ballgame,  Congregational service, or social event. Talk openly with people you can trust. And accept help when it s offered.    Take care of your body  People with depression often lose the desire to take care of themselves. That only makes their problems worse. During treatment and afterward, make a point to:    Exercise. It s a great way to take care of your body. And studies have shown that exercise helps fight depression. Aim for 30 minutes of moderate activity a day. Walking in small blocks of time (5-10 minutes) is a good way to start, but anything that gets you moving (gardening, house cleaning) counts.    Don't use drugs and alcohol. These may ease the pain in the short term. But they ll only make your problems worse in the long run.    Get relief from stress. Ask your healthcare provider for relaxation exercises and techniques to help relieve stress. Consider activities like meditation, yoga, or Ap Chi.    Eat right. A balanced and healthy diet helps keep your body healthy.    Get adequate sleep. Aim for 8 hours per night. Too much or too little sleep can cause other physical and emotional problems.  [employer] last reviewed this educational content on 12/1/2019 2000-2021 [employer]. All rights reserved. This information is not intended as a substitute for professional medical care. Always follow your healthcare professional's instructions.

## 2021-10-26 ASSESSMENT — ANXIETY QUESTIONNAIRES: GAD7 TOTAL SCORE: 8

## 2021-10-26 ASSESSMENT — PATIENT HEALTH QUESTIONNAIRE - PHQ9: SUM OF ALL RESPONSES TO PHQ QUESTIONS 1-9: 0

## 2021-11-01 ENCOUNTER — OFFICE VISIT (OUTPATIENT)
Dept: CARDIOLOGY | Facility: CLINIC | Age: 66
End: 2021-11-01
Attending: INTERNAL MEDICINE
Payer: MEDICARE

## 2021-11-01 ENCOUNTER — IMMUNIZATION (OUTPATIENT)
Dept: NURSING | Facility: CLINIC | Age: 66
End: 2021-11-01
Payer: MEDICARE

## 2021-11-01 VITALS
HEART RATE: 45 BPM | SYSTOLIC BLOOD PRESSURE: 122 MMHG | WEIGHT: 137.9 LBS | DIASTOLIC BLOOD PRESSURE: 68 MMHG | BODY MASS INDEX: 21.64 KG/M2 | OXYGEN SATURATION: 99 % | HEIGHT: 67 IN

## 2021-11-01 DIAGNOSIS — I34.0 NONRHEUMATIC MITRAL VALVE REGURGITATION: ICD-10-CM

## 2021-11-01 DIAGNOSIS — I49.5 SINUS NODE DYSFUNCTION (H): Primary | ICD-10-CM

## 2021-11-01 PROCEDURE — 0004A PR COVID VAC PFIZER DIL RECON 30 MCG/0.3 ML IM: CPT

## 2021-11-01 PROCEDURE — G0463 HOSPITAL OUTPT CLINIC VISIT: HCPCS | Mod: 25

## 2021-11-01 PROCEDURE — 91300 PR COVID VAC PFIZER DIL RECON 30 MCG/0.3 ML IM: CPT

## 2021-11-01 PROCEDURE — 93005 ELECTROCARDIOGRAM TRACING: CPT

## 2021-11-01 PROCEDURE — 99203 OFFICE O/P NEW LOW 30 MIN: CPT | Performed by: INTERNAL MEDICINE

## 2021-11-01 RX ORDER — COVID-19 ANTIGEN TEST
220 KIT MISCELLANEOUS 2 TIMES DAILY WITH MEALS
COMMUNITY
End: 2023-03-01

## 2021-11-01 RX ORDER — SENNOSIDES 8.6 MG
1300 CAPSULE ORAL 2 TIMES DAILY
COMMUNITY
End: 2023-08-02

## 2021-11-01 ASSESSMENT — MIFFLIN-ST. JEOR: SCORE: 1376.14

## 2021-11-01 ASSESSMENT — PAIN SCALES - GENERAL: PAINLEVEL: NO PAIN (0)

## 2021-11-01 NOTE — PATIENT INSTRUCTIONS
You were seen in the Electrophysiology Clinic today by: Dr Marquez    Plan:     Medication Changes: none      Labs/Tests Needed: echocardiogram       Follow up visit: one year with Mary Jo Enriquez      Further Instructions: call if any changes or concerns      Your Care Team:  EP Cardiology   Telephone Number     Nurse Line  Kourtney Rivas RN  [phone]     For scheduling appts or procedures:    Jessi Aranda   [phone]   For the Device Clinic (Pacemakers, ICDs, Loop Recorders)    During business hours: [phone]  After business hours:   [phone]- select option 4 and ask for job code [id number].     On-call cardiologist for after hours or on weekends: [phone], option #4, and ask to speak to the on-call cardiologist.     Cardiovascular Clinic:   [address] [place] [address]. [place] [address]      As always, Thank you for trusting us with your health care needs!

## 2021-11-01 NOTE — PROGRESS NOTES
Clinical Cardiac Electrophysiology      CC- palpitations    HPI-  Happy to see Mr. Begum back in the office today. He has had symptoms of dyspnea on exertion associated with blunted HR response to exercise. He was also having a sensation of irregular HRs with exercise. He had a treadmill study that showed excellent exercise capacity. The report mentions PACs and PVCs during exercise and rest. The note I sent him mentions atrial fibrillation during exercise. I will have the stress test ECGs pulled to review again.    Overall, he reports he has been feeling well. He was on vacation, walking up hills and climbing stairs without difficulty. He will occasion notice some shortness of breath with exercise or stairs but this is much less frequent than before. He initially noted this symptom last winter while cross country skiing. He is wondering if it will return this winter.    [id number]: Jogging again, stairs don't bother him like they use to. It had been a couple years since he was in to see me and felt he should touch base.     18Nov2016:  Mr. Begum feels well since his last visit in 09/2015. He continues to bicycle, jog, ski, and run frequently. His symptoms of dizziness/lightheadedness have not precluded him from his personal hobbies or his professional work as an  for the [place] [employer]. He did not a period of dyspnea on exertion over the last few summers that actually improved when winter came about. Mr. Begum feels that his exercise tolerance is now at baseline. Mr. Begum still has some dizziness/lightheadedness upon waking, but he prevents this by standing slowly. He had a single episode of pre-syncope several months ago. No syncope, chest pain, exertional dyspnea at present.    2/10/17: Since last visit, he was admitted under ED observation for chest pain rule out on 11/20/16. Serial troponin x3 negative, stress echocardiogram with normal baseline echo and appropriate rise in LV systolic function at  peak exercise. He did not have any symptoms during the exercise portion of the study. Since discharge, he has felt well, and denies any limitations to exercise capacity such as chest pain/pressure or dyspnea. He is not experiencing palpitations. He is not on a B-blocker as his resting HR is very low at baseline. He exercises regularly, including [employer] and running. He feels well and denies any complaints currently. Unfortunately, his wife was recently diagnosed with frontotemporal dementia, so he plans to retire soon and travel back to [place] more frequently.     [id number]: He reports no increase in his palpitations.  He has noticed that his exercise abilities are beginning to decline, more than he would anticipate related to simple aging.  He notices that on the days where he is feeling a worse with exercise his heart rate tends to be in the 110s to may be 120 using his Fitbit.  Other days he is able to get his heart rate to the 140s 150s and feels much better with exercise.  He has had no syncope or near syncope.    I reviewed the ECG obtained today.  It shows marked sinus bradycardia competing with junctional rhythm.    November 1, 2021 Interval history: we had planned on a permanent pacemaker for chronotropic incompetence but he elected to hold on that. Today he reports he has been doing well. His wife is not in memory care due to early onset dementia. He has began running again. It takes some time to get his heart rate up but eventually reaches the 140s. He has no complaints about his exercise abilities at this time.    Current Outpatient Medications   Medication Sig Dispense Refill     acetaminophen (TYLENOL) 650 MG CR tablet Take 650 mg by mouth every 8 hours as needed for mild pain or fever       amLODIPine (NORVASC) 5 MG tablet Take 1 tablet (5 mg) by mouth daily 90 tablet 1     buPROPion (WELLBUTRIN XL) 300 MG 24 hr tablet Take 1 tablet (300 mg) by mouth every morning 90 tablet 3     calcium-vitamin D  500-125 MG-UNIT TABS Take 1 tablet by mouth 2 times daily       cyclobenzaprine (FLEXERIL) 5 MG tablet Take 1 tablet (5 mg) by mouth 3 times daily as needed for muscle spasms 30 tablet 0     Multiple Vitamin (MULTI-DAY VITAMINS) TABS Take  by mouth.       naproxen sodium 220 MG capsule Take 220 mg by mouth 2 times daily (with meals)       sildenafil (REVATIO) 20 MG tablet TAKE 3-5 TABLETS 30 MINUTES BEFORE SEX AS NEEDED. 30 tablet 3     ibuprofen (ADVIL,MOTRIN) 200 MG tablet Take 600 mg by mouth every 6 hours        Allergies   Allergen Reactions     Metoclopramide Rash     Past Medical History:   Diagnosis Date     Allergic rhinitis, cause unspecified      Backache, unspecified      Depressive disorder      Diverticulitis      Junctional ectopic contractions (H)      Mild intermittent asthma 9/28/2005     Narcolepsy      Osteoarthritis      Palpitations      Sleep apnea      Past Surgical History:   Procedure Laterality Date     APPENDECTOMY       ORTHOPEDIC SURGERY      left hip replacement, right ankle,     SEPTORHINOPLASTY  4/7/2011    Procedure:SEPTORHINOPLASTY; Septoplasty withNasal Reconstruction with  Grafts Harvested from [employer]; Surgeon:ALFREDO UMANA; Location: OR     [facility] NONSPECIFIC PROCEDURE      s/p Appendectomy     Social History     Tobacco Use     Smoking status: Never Smoker     Smokeless tobacco: Never Used   Substance Use Topics     Alcohol use: Yes     Comment: 14 glass of wine     Drug use: No     His an  with the [place] [employer].    Family History   Problem Relation Age of Onset     Arthritis Mother         osteo     Dementia Mother      Hypertension Mother      Cerebrovascular Disease Mother         Not confirmed     Seizure Disorder Mother      Rheumatoid Arthritis Father      Depression Father      Coronary Artery Disease Father      Depression Sister      Substance Abuse Sister      Myocardial Infarction Sister        Physical examination:  /68 (BP Location:  "Right arm, Patient Position: Chair, Cuff Size: Adult Regular)   Pulse (!) 45   Ht 1.713 m (5' 7.44\")   Wt 62.6 kg (137 lb 14.4 oz)   SpO2 99%   BMI 21.32 kg/m    GENERAL APPEARANCE: healthy, alert and no distress  NECK: no venous distention  RESPIRATORY: lungs clear to auscultation - no rales, rhonchi or wheezes  CARDIOVASCULAR: regular, normal S1 S2, no S3 or S4 and no murmur, click or rub, precordium quiet with normal PMI  ABDOMEN: soft, non tender with normal bowel sounds   EXTREMITIES: no edema    Laboratory and diagnostic data reviewed personally November 1, 2021:          Results for HOLLIS RIVERA (MRN [id number]) as of 11/1/2021 08:37   Ref. Range 9/18/2020 10:32   Sodium Latest Ref Range: 133 - 144 mmol/L 134   Potassium Latest Ref Range: 3.4 - 5.3 mmol/L 4.6   Chloride Latest Ref Range: 94 - 109 mmol/L 103   Carbon Dioxide Latest Ref Range: 20 - 32 mmol/L 27   Urea Nitrogen Latest Ref Range: 7 - 30 mg/dL 14   Creatinine Latest Ref Range: 0.66 - 1.25 mg/dL 1.02   GFR Estimate Latest Ref Range: >60 mL/min/1.73_m2 77     Results for HOLLIS RIVERA (MRN [id number]) as of 11/1/2021 08:37   Ref. Range 9/18/2020 10:32   WBC Latest Ref Range: 4.0 - 11.0 10e9/L 3.5 (L)   Hemoglobin Latest Ref Range: 13.3 - 17.7 g/dL 14.1   Hematocrit Latest Ref Range: 40.0 - 53.0 % 40.3   Platelet Count Latest Ref Range: 150 - 450 10e9/L 137 (L)               EKG 2/10/17:  Sinus bradycardia, normal axis, normal intervals, tall precordial T-waves (not peaked) which are unchanged from prior, no ischemic ST-T changes or Q-waves. 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 Personally reviewed [id number]    Exercise Echocardiogram... 11/22/16:  Normal exercise echocardiogram without evidence of inducible ischemia. Target heart rate was " "achieved. Heart rate and blood pressure response to exercise were normal. With stress, the left ventricular ejection fraction increased from 55-60% to greater than 65% and the left ventricular size decreased appropriately. There was no ECG evidence of ischemia.  Stress ECG is difficult to interpret due to artifact. There are frequent PAC's with excercise follow by the development of a narrow complex tachycardia, unknown type.  There is mild to moderate mitral regurgitation. MR does not appear to worsen with stress.    48 hr HOLTER... 7 September 2012  The rhythm was predominately sinus with periods of accelerated junctional pacemaker manifesting in normal sinus rhythm. There were 21 pauses greater than 2.0 sec in length. Non-sustained atrial tachycardia noted. The heart rate varied though not always appropriate for the activities described in the diary. Min rate was 30 bpm, max rate 176 bpm, average 53 bpm. Occasional supraventricular ectopy noted (2%). Numerous atrial pairs which may be capture beats followed by a sinus beat during periods of accelerated junctional pacemaker manifestations. No ventricular ectopy was observed. No ST-T wave changes. Pt reported two symptoms of \"shortness of breath\" which correlated with sinus bradycardia during exercise.   STRESS ECHO (Bike)... 29 May 2009   No significant worsening of MR noted with exercise. However, the patient developed isorhythmic AV dissociation with peak exercise, and coincident with the rhythm disturbance developed shortness of breath. The AV node tachycardia (and AV dissociation) started at a heart rate of 80 and persisted throughout exercise and resolved after exercise at a heart rate of 100 bpm. In retrospect, this same abnormality was present on the first stress test. Baseline EKG/SymptomsSinus rhythm, no pathologic Q waves or resting ST segment abnormalities. Left Ventricle systolic function is normal.The left ventricle is normal in size.There is normal " left ventricular wall thickness. The left atrial size is normal.Right atrial size is normal.   ECHOCARDIOGRAM... 22 April 2009   Left ventricular function, chamber size, wall motion, and wall thickness are normal. The EF is 55-60%. Mild to moderate mitral insufficiency is present. Global right ventricular function is normal. Pulmonary artery systolic pressure is normal.    =====================================================================    Assessment and recommendations:  61 y/o M with history of exercise-induced palpitations and resting sinus bradycardia, dyspnea on exertion.  CAD evaluation with exercise echocardiography was reassuring.     1] Sinus Bradycardia - doing well, no indication for pacing at this time    2] Mitral Regurgitation - no symptoms, no murmur heard     -follow-up echocardiogram ordered    He will return in one year unless there is a problem.    I appreciate the chance to help with Mr. Begum's care. Please let me know if you have any questions or concerns.

## 2021-11-01 NOTE — NURSING NOTE
Chief Complaint   Patient presents with     Follow Up     3 year f/u for SND, recommended in 2018 to have ppm placed      Vitals were taken and medications reconciled.    Kiel Gross, EMT  8:38 AM

## 2021-11-01 NOTE — LETTER
11/1/2021      RE: Salbador Begum  [address] [place] [address]       Dear Colleague,    Thank you for the opportunity to participate in the care of your patient, Salbador Begum, at the [facility] [place] [facility]. Please see a copy of my visit note below.        Clinical Cardiac Electrophysiology      CC- palpitations    HPI-  Happy to see Mr. Begum back in the office today. He has had symptoms of dyspnea on exertion associated with blunted HR response to exercise. He was also having a sensation of irregular HRs with exercise. He had a treadmill study that showed excellent exercise capacity. The report mentions PACs and PVCs during exercise and rest. The note I sent him mentions atrial fibrillation during exercise. I will have the stress test ECGs pulled to review again.    Overall, he reports he has been feeling well. He was on vacation, walking up hills and climbing stairs without difficulty. He will occasion notice some shortness of breath with exercise or stairs but this is much less frequent than before. He initially noted this symptom last winter while cross country skiing. He is wondering if it will return this winter.    09Sep2015: Jogging again, stairs don't bother him like they use to. It had been a couple years since he was in to see me and felt he should touch base.     18Nov2016:  Mr. Begum feels well since his last visit in 09/2015. He continues to bicycle, jog, ski, and run frequently. His symptoms of dizziness/lightheadedness have not precluded him from his personal hobbies or his professional work as an  for the [employer]. He did not a period of dyspnea on exertion over the last few summers that actually improved when winter came about. Mr. Begum feels that his exercise tolerance is now at baseline. Mr. Begum still has some dizziness/lightheadedness upon waking, but he prevents  this by standing slowly. He had a single episode of pre-syncope several months ago. No syncope, chest pain, exertional dyspnea at present.    2/10/17: Since last visit, he was admitted under ED observation for chest pain rule out on 11/20/16. Serial troponin x3 negative, stress echocardiogram with normal baseline echo and appropriate rise in LV systolic function at peak exercise. He did not have any symptoms during the exercise portion of the study. Since discharge, he has felt well, and denies any limitations to exercise capacity such as chest pain/pressure or dyspnea. He is not experiencing palpitations. He is not on a B-blocker as his resting HR is very low at baseline. He exercises regularly, including [employer] and running. He feels well and denies any complaints currently. Unfortunately, his wife was recently diagnosed with frontotemporal dementia, so he plans to retire soon and travel back to [facility] more frequently.     [id number]: He reports no increase in his palpitations.  He has noticed that his exercise abilities are beginning to decline, more than he would anticipate related to simple aging.  He notices that on the days where he is feeling a worse with exercise his heart rate tends to be in the 110s to may be 120 using his Fitbit.  Other days he is able to get his heart rate to the 140s 150s and feels much better with exercise.  He has had no syncope or near syncope.    I reviewed the ECG obtained today.  It shows marked sinus bradycardia competing with junctional rhythm.    November 1, 2021 Interval history: we had planned on a permanent pacemaker for chronotropic incompetence but he elected to hold on that. Today he reports he has been doing well. His wife is not in memory care due to early onset dementia. He has began running again. It takes some time to get his heart rate up but eventually reaches the 140s. He has no complaints about his exercise abilities at this time.    Current Outpatient  Medications   Medication Sig Dispense Refill     acetaminophen (TYLENOL) 650 MG CR tablet Take 650 mg by mouth every 8 hours as needed for mild pain or fever       amLODIPine (NORVASC) 5 MG tablet Take 1 tablet (5 mg) by mouth daily 90 tablet 1     buPROPion (WELLBUTRIN XL) 300 MG 24 hr tablet Take 1 tablet (300 mg) by mouth every morning 90 tablet 3     calcium-vitamin D 500-125 MG-UNIT TABS Take 1 tablet by mouth 2 times daily       cyclobenzaprine (FLEXERIL) 5 MG tablet Take 1 tablet (5 mg) by mouth 3 times daily as needed for muscle spasms 30 tablet 0     Multiple Vitamin (MULTI-DAY VITAMINS) TABS Take  by mouth.       naproxen sodium 220 MG capsule Take 220 mg by mouth 2 times daily (with meals)       sildenafil (REVATIO) 20 MG tablet TAKE 3-5 TABLETS 30 MINUTES BEFORE SEX AS NEEDED. 30 tablet 3     ibuprofen (ADVIL,MOTRIN) 200 MG tablet Take 600 mg by mouth every 6 hours        Allergies   Allergen Reactions     Metoclopramide Rash     Past Medical History:   Diagnosis Date     Allergic rhinitis, cause unspecified      Backache, unspecified      Depressive disorder      Diverticulitis      Junctional ectopic contractions (H)      Mild intermittent asthma 9/28/2005     Narcolepsy      Osteoarthritis      Palpitations      Sleep apnea      Past Surgical History:   Procedure Laterality Date     APPENDECTOMY       ORTHOPEDIC SURGERY      left hip replacement, right ankle,     SEPTORHINOPLASTY  4/7/2011    Procedure:SEPTORHINOPLASTY; Septoplasty withNasal Reconstruction with  Grafts Harvested from [employer]; Surgeon:ALFREDO UMANA; Location: OR     [facility] NONSPECIFIC PROCEDURE      s/p Appendectomy     Social History     Tobacco Use     Smoking status: Never Smoker     Smokeless tobacco: Never Used   Substance Use Topics     Alcohol use: Yes     Comment: 14 glass of wine     Drug use: No     His an  with the [place] [employer].    Family History   Problem Relation Age of Onset     Arthritis Mother   "       osteo     Dementia Mother      Hypertension Mother      Cerebrovascular Disease Mother         Not confirmed     Seizure Disorder Mother      Rheumatoid Arthritis Father      Depression Father      Coronary Artery Disease Father      Depression Sister      Substance Abuse Sister      Myocardial Infarction Sister        Physical examination:  /68 (BP Location: Right arm, Patient Position: Chair, Cuff Size: Adult Regular)   Pulse (!) 45   Ht 1.713 m (5' 7.44\")   Wt 62.6 kg (137 lb 14.4 oz)   SpO2 99%   BMI 21.32 kg/m    GENERAL APPEARANCE: healthy, alert and no distress  NECK: no venous distention  RESPIRATORY: lungs clear to auscultation - no rales, rhonchi or wheezes  CARDIOVASCULAR: regular, normal S1 S2, no S3 or S4 and no murmur, click or rub, precordium quiet with normal PMI  ABDOMEN: soft, non tender with normal bowel sounds   EXTREMITIES: no edema    Laboratory and diagnostic data reviewed personally November 1, 2021:          Results for HOLLIS RIVERA (MRN [id number]) as of 11/1/2021 08:37   Ref. Range 9/18/2020 10:32   Sodium Latest Ref Range: 133 - 144 mmol/L 134   Potassium Latest Ref Range: 3.4 - 5.3 mmol/L 4.6   Chloride Latest Ref Range: 94 - 109 mmol/L 103   Carbon Dioxide Latest Ref Range: 20 - 32 mmol/L 27   Urea Nitrogen Latest Ref Range: 7 - 30 mg/dL 14   Creatinine Latest Ref Range: 0.66 - 1.25 mg/dL 1.02   GFR Estimate Latest Ref Range: >60 mL/min/1.73_m2 77     Results for HOLLIS RIVERA (MRN [id number]) as of 11/1/2021 08:37   Ref. Range 9/18/2020 10:32   WBC Latest Ref Range: 4.0 - 11.0 10e9/L 3.5 (L)   Hemoglobin Latest Ref Range: 13.3 - 17.7 g/dL 14.1   Hematocrit Latest Ref Range: 40.0 - 53.0 % 40.3   Platelet Count Latest Ref Range: 150 - 450 10e9/L 137 (L)               EKG 2/10/17:  Sinus bradycardia, normal axis, normal intervals, tall precordial T-waves (not peaked) which are unchanged from prior, no ischemic ST-T changes or Q-waves. 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 Personally reviewed [id number]    Exercise Echocardiogram... 11/22/16:  Normal exercise echocardiogram without evidence of inducible ischemia. Target heart rate was achieved. Heart rate and blood pressure response to exercise were normal. With stress, the left ventricular ejection fraction increased from 55-60% to greater than 65% and the left ventricular size decreased appropriately. There was no ECG evidence of ischemia.  Stress ECG is difficult to interpret due to artifact. There are frequent PAC's with excercise follow by the development of a narrow complex tachycardia, unknown type.  There is mild to moderate mitral regurgitation. MR does not appear to worsen with stress.    48 hr HOLTER... 7 September 2012  The rhythm was predominately sinus with periods of accelerated junctional pacemaker manifesting in normal sinus rhythm. There were 21 pauses greater than 2.0 sec in length. Non-sustained atrial tachycardia noted. The heart rate varied though not always appropriate for the activities described in the diary. Min rate was 30 bpm, max rate 176 bpm, average 53 bpm. Occasional supraventricular ectopy noted (2%). Numerous atrial pairs which may be capture beats followed by a sinus beat during periods of accelerated junctional pacemaker manifestations. No ventricular ectopy was observed. No ST-T wave changes. Pt reported two symptoms of \"shortness of breath\" which correlated with sinus bradycardia during exercise.   STRESS ECHO (Bike)... 29 May 2009   No significant worsening of MR noted with exercise. However, the patient developed isorhythmic AV dissociation with peak exercise, and coincident with the rhythm disturbance developed shortness of breath. The AV node tachycardia (and AV dissociation) started at " a heart rate of 80 and persisted throughout exercise and resolved after exercise at a heart rate of 100 bpm. In retrospect, this same abnormality was present on the first stress test. Baseline EKG/SymptomsSinus rhythm, no pathologic Q waves or resting ST segment abnormalities. Left Ventricle systolic function is normal.The left ventricle is normal in size.There is normal left ventricular wall thickness. The left atrial size is normal.Right atrial size is normal.   ECHOCARDIOGRAM... 22 April 2009   Left ventricular function, chamber size, wall motion, and wall thickness are normal. The EF is 55-60%. Mild to moderate mitral insufficiency is present. Global right ventricular function is normal. Pulmonary artery systolic pressure is normal.    =====================================================================    Assessment and recommendations:  61 y/o M with history of exercise-induced palpitations and resting sinus bradycardia, dyspnea on exertion.  CAD evaluation with exercise echocardiography was reassuring.     1] Sinus Bradycardia - doing well, no indication for pacing at this time    2] Mitral Regurgitation - no symptoms, no murmur heard     -follow-up echocardiogram ordered    He will return in one year unless there is a problem.    I appreciate the chance to help with Mr. Begum's care. Please let me know if you have any questions or concerns.        Please do not hesitate to contact me if you have any questions/concerns.     Sincerely,     You Marquez MD

## 2021-11-02 LAB
ATRIAL RATE - MUSE: 45 BPM
DIASTOLIC BLOOD PRESSURE - MUSE: NORMAL MMHG
INTERPRETATION ECG - MUSE: NORMAL
P AXIS - MUSE: 72 DEGREES
PR INTERVAL - MUSE: 204 MS
QRS DURATION - MUSE: 88 MS
QT - MUSE: 426 MS
QTC - MUSE: 368 MS
R AXIS - MUSE: 31 DEGREES
SYSTOLIC BLOOD PRESSURE - MUSE: NORMAL MMHG
T AXIS - MUSE: 42 DEGREES
VENTRICULAR RATE- MUSE: 45 BPM

## 2021-11-24 ENCOUNTER — ANCILLARY PROCEDURE (OUTPATIENT)
Dept: CARDIOLOGY | Facility: CLINIC | Age: 66
End: 2021-11-24
Attending: INTERNAL MEDICINE
Payer: MEDICARE

## 2021-11-24 DIAGNOSIS — I34.0 NONRHEUMATIC MITRAL VALVE REGURGITATION: ICD-10-CM

## 2021-11-24 LAB — LVEF ECHO: NORMAL

## 2021-11-24 PROCEDURE — 93306 TTE W/DOPPLER COMPLETE: CPT | Performed by: STUDENT IN AN ORGANIZED HEALTH CARE EDUCATION/TRAINING PROGRAM

## 2021-12-18 DIAGNOSIS — I10 BENIGN ESSENTIAL HYPERTENSION: ICD-10-CM

## 2021-12-19 NOTE — TELEPHONE ENCOUNTER
Patient has appt with Dr. Zuluaga on 12/20/21.  LEVI SiegelN, RN  [facility]

## 2021-12-20 ENCOUNTER — OFFICE VISIT (OUTPATIENT)
Dept: FAMILY MEDICINE | Facility: CLINIC | Age: 66
End: 2021-12-20
Payer: MEDICARE

## 2021-12-20 VITALS
HEART RATE: 48 BPM | HEIGHT: 68 IN | SYSTOLIC BLOOD PRESSURE: 114 MMHG | OXYGEN SATURATION: 100 % | DIASTOLIC BLOOD PRESSURE: 72 MMHG | RESPIRATION RATE: 16 BRPM | WEIGHT: 142 LBS | TEMPERATURE: 98.1 F | BODY MASS INDEX: 21.52 KG/M2

## 2021-12-20 DIAGNOSIS — Z00.00 ENCOUNTER FOR MEDICARE ANNUAL WELLNESS EXAM: Primary | ICD-10-CM

## 2021-12-20 DIAGNOSIS — F33.0 MAJOR DEPRESSIVE DISORDER, RECURRENT, MILD (H): ICD-10-CM

## 2021-12-20 DIAGNOSIS — Z12.11 SCREEN FOR COLON CANCER: ICD-10-CM

## 2021-12-20 DIAGNOSIS — Z13.220 SCREENING FOR HYPERLIPIDEMIA: ICD-10-CM

## 2021-12-20 DIAGNOSIS — I10 BENIGN ESSENTIAL HYPERTENSION: ICD-10-CM

## 2021-12-20 PROCEDURE — 80048 BASIC METABOLIC PNL TOTAL CA: CPT | Performed by: FAMILY MEDICINE

## 2021-12-20 PROCEDURE — 36415 COLL VENOUS BLD VENIPUNCTURE: CPT | Performed by: FAMILY MEDICINE

## 2021-12-20 PROCEDURE — 80061 LIPID PANEL: CPT | Performed by: FAMILY MEDICINE

## 2021-12-20 PROCEDURE — G0009 ADMIN PNEUMOCOCCAL VACCINE: HCPCS | Performed by: FAMILY MEDICINE

## 2021-12-20 PROCEDURE — 90732 PPSV23 VACC 2 YRS+ SUBQ/IM: CPT | Performed by: FAMILY MEDICINE

## 2021-12-20 PROCEDURE — G0402 INITIAL PREVENTIVE EXAM: HCPCS | Performed by: FAMILY MEDICINE

## 2021-12-20 RX ORDER — BUPROPION HYDROCHLORIDE 300 MG/1
TABLET ORAL
Qty: 90 TABLET | Refills: 3 | Status: SHIPPED | OUTPATIENT
Start: 2021-12-20 | End: 2022-12-27

## 2021-12-20 RX ORDER — AMLODIPINE BESYLATE 5 MG/1
TABLET ORAL
Qty: 90 TABLET | Refills: 0 | Status: SHIPPED | OUTPATIENT
Start: 2021-12-20 | End: 2021-12-20

## 2021-12-20 RX ORDER — AMLODIPINE BESYLATE 5 MG/1
5 TABLET ORAL DAILY
Qty: 90 TABLET | Refills: 2 | Status: SHIPPED | OUTPATIENT
Start: 2022-03-08 | End: 2023-02-01

## 2021-12-20 ASSESSMENT — MIFFLIN-ST. JEOR: SCORE: 1399.64

## 2021-12-20 ASSESSMENT — ACTIVITIES OF DAILY LIVING (ADL): CURRENT_FUNCTION: NO ASSISTANCE NEEDED

## 2021-12-20 NOTE — PROGRESS NOTES
"SUBJECTIVE:   Salbador Begum is a 66 year old male who presents for Preventive Visit.    Patient has been advised of split billing requirements and indicates understanding: Yes   Are you in the first 12 months of your Medicare coverage?  Yes,  Visual Acuity:  Right Eye: 20/20   Left Eye: 20/20  Both Eyes: 20/16    Healthy Habits:     In general, how would you rate your overall health?  Fair    Frequency of exercise:  4-5 days/week    Duration of exercise:  Greater than 60 minutes    Do you usually eat at least 4 servings of fruit and vegetables a day, include whole grains    & fiber and avoid regularly eating high fat or \"junk\" foods?  No    Taking medications regularly:  Yes    Medication side effects:  None    Ability to successfully perform activities of daily living:  No assistance needed    Home Safety:  No safety concerns identified    Hearing Impairment:  No hearing concerns    In the past 6 months, have you been bothered by leaking of urine?  No    In general, how would you rate your overall mental or emotional health?  Good      PHQ-2 Total Score: 0    Do you feel safe in your environment? Yes    Have you ever done Advance Care Planning? (For example, a Health Directive, POLST, or a discussion with a medical provider or your loved ones about your wishes): No, advance care planning information given to patient to review.  Advanced care planning was discussed at today's visit.    Fall risk  Fallen 2 or more times in the past year?: No  Any fall with injury in the past year?: No    Cognitive Screening   1) Repeat 3 items (Leader, Season, Table)    2) Clock draw: NORMAL  3) 3 item recall: Recalls 3 objects  Results: 3 items recalled: COGNITIVE IMPAIRMENT LESS LIKELY    Mini-CogTM Copyright [employer]. Licensed by the author for use in [facility]; reprinted with permission (jose@.[facility]). All rights reserved.      Do you have sleep apnea, excessive snoring or daytime drowsiness?: yes-sleep " apnea    Reviewed and updated as needed this visit by clinical staff  Tobacco  Allergies  Meds   Med Hx  Surg Hx  Fam Hx  Soc Hx     Reviewed and updated as needed this visit by Provider    Social History     Tobacco Use     Smoking status: Never Smoker     Smokeless tobacco: Never Used   Substance Use Topics     Alcohol use: Yes     Comment: 14 glass of wine weeks      If you drink alcohol do you typically have >3 drinks per day or >7 drinks per week? Yes    Alcohol Use 12/20/2021   Prescreen: >3 drinks/day or >7 drinks/week? Yes   Prescreen: >3 drinks/day or >7 drinks/week? -   AUDIT SCORE  4     AUDIT - Alcohol Use Disorders Identification Test - Reproduced from the World Health Organization Audit 2001 (Second Edition) 12/20/2021   1.  How often do you have a drink containing alcohol? 4 or more times a week   2.  How many drinks containing alcohol do you have on a typical day when you are drinking? 1 or 2   3.  How often do you have five or more drinks on one occasion? Never   4.  How often during the last year have you found that you were not able to stop drinking once you had started? Never   5.  How often during the last year have you failed to do what was normally expected of you because of drinking? Never   6.  How often during the last year have you needed a first drink in the morning to get yourself going after a heavy drinking session? Never   7.  How often during the last year have you had a feeling of guilt or remorse after drinking? Never   8.  How often during the last year have you been unable to remember what happened the night before because of your drinking? Never   9.  Have you or someone else been injured because of your drinking? No   10. Has a relative, friend, doctor or other health care worker been concerned about your drinking or suggested you cut down? No   TOTAL SCORE 4     Current providers sharing in care for this patient include:   Patient Care Team:  Kieran Zuluaga MD  "as PCP - General (Family Medicine)  You Marquez MD as MD (Clinical Cardiac Electrophysiology)  Kieran Zuluaga MD as Assigned PCP  You Marquez MD as Assigned Heart and Vascular Provider    The following health maintenance items are reviewed in Epic and correct as of today:  Health Maintenance Due   Topic Date Due     FALL RISK ASSESSMENT  Never done     AORTIC ANEURYSM SCREENING (SYSTEM ASSIGNED)  10/06/2021     Pneumococcal Vaccine: 65+ Years (1 of 1 - PPSV23) 10/06/2021     Seeing eye doctor recently.     Chronic back pain. It is persistent now. Does not think intervention is needed for now.     Review of Systems  Constitutional, HEENT, cardiovascular, pulmonary, gi and gu systems are negative, except as otherwise noted.    OBJECTIVE:   /72 (BP Location: Left arm, Patient Position: Sitting, Cuff Size: Adult Regular)   Pulse (!) 48   Temp 98.1  F (36.7  C) (Temporal)   Resp 16   Ht 1.721 m (5' 7.75\")   Wt 64.4 kg (142 lb)   SpO2 100%   BMI 21.75 kg/m   Estimated body mass index is 21.75 kg/m  as calculated from the following:    Height as of this encounter: 1.721 m (5' 7.75\").    Weight as of this encounter: 64.4 kg (142 lb).  Physical Exam  GENERAL: healthy, alert and no distress  EYES: Eyes grossly normal to inspection, PERRL and conjunctivae and sclerae normal  HENT: ear canals and TM's normal, nose and mouth without ulcers or lesions  NECK: no adenopathy, no asymmetry, masses, or scars and thyroid normal to palpation  RESP: lungs clear to auscultation - no rales, rhonchi or wheezes  CV: regular rate and rhythm, normal S1 S2, no S3 or S4, no murmur, click or rub, no peripheral edema and peripheral pulses strong  ABDOMEN: soft, nontender, no hepatosplenomegaly, no masses and bowel sounds normal  MS: no gross musculoskeletal defects noted, no edema  SKIN: no suspicious lesions or rashes  NEURO: Normal strength and tone, mentation intact and speech normal  PSYCH: mentation " "appears normal, affect normal/bright        ASSESSMENT / PLAN:   (Z00.00) Encounter for Medicare annual wellness exam  (primary encounter diagnosis)  Comment:    Plan:      (Z13.220) Screening for hyperlipidemia  Comment:    Plan: Lipid panel reflex to direct LDL Non-fasting             (I10) Benign essential hypertension  Comment:    Plan: Basic metabolic panel  (Ca, Cl, CO2, Creat,         Gluc, K, Na, BUN), amLODIPine (NORVASC) 5 MG         tablet             (F33.0) Major depressive disorder, recurrent, mild (H)  Comment:    Plan: buPROPion (WELLBUTRIN XL) 300 MG 24 hr tablet             (Z12.11) Screen for colon cancer  Comment:    Plan: Adult Gastro Ref - Procedure Only               Patient has been advised of split billing requirements and indicates understanding: No  COUNSELING:  Reviewed preventive health counseling, as reflected in patient instructions  Special attention given to:       Regular exercise       Healthy diet/nutrition       Vision screening       Hearing screening       Dental care       Bladder control       Colon cancer screening       Prostate cancer screening    Estimated body mass index is 21.75 kg/m  as calculated from the following:    Height as of this encounter: 1.721 m (5' 7.75\").    Weight as of this encounter: 64.4 kg (142 lb).        He reports that he has never smoked. He has never used smokeless tobacco.      Appropriate preventive services were discussed with this patient, including applicable screening as appropriate for cardiovascular disease, diabetes, osteopenia/osteoporosis, and glaucoma.  As appropriate for age/gender, discussed screening for colorectal cancer, prostate cancer, breast cancer, and cervical cancer. Checklist reviewing preventive services available has been given to the patient.    Reviewed patients plan of care and provided an AVS. The Basic Care Plan (routine screening as documented in Health Maintenance) for Salbador meets the Care Plan requirement. " This Care Plan has been established and reviewed with the Patient.    Counseling Resources:  ATP IV Guidelines  Pooled Cohorts Equation Calculator  Breast Cancer Risk Calculator  Breast Cancer: Medication to Reduce Risk  FRAX Risk Assessment  ICSI Preventive Guidelines  Dietary Guidelines for Americans, 2010  [employer]'s MyPlate  ASA Prophylaxis  Lung CA Screening    Kieran Zuluaga MD, MD  [facility]    Identified Health Risks:

## 2021-12-20 NOTE — NURSING NOTE
Clinic Administered Medication Documentation          Injectable Medication Documentation    Patient was given ppsv 23. Prior to medication administration, verified patients identity using patient s name and date of birth. Please see MAR and medication order for additional information. Patient instructed to remain in clinic for 15 minutes.      Was entire vial of medication used? Yes  Vial/Syringe: Syringe    Was this medication supplied by the patient? No     Kelli Baum CMA

## 2021-12-20 NOTE — PATIENT INSTRUCTIONS
Patient Education   Personalized Prevention Plan  You are due for the preventive services outlined below.  Your care team is available to assist you in scheduling these services.  If you have already completed any of these items, please share that information with your care team to update in your medical record.  Health Maintenance Due   Topic Date Due     ANNUAL REVIEW OF HM ORDERS  Never done     FALL RISK ASSESSMENT  Never done     AORTIC ANEURYSM SCREENING (SYSTEM ASSIGNED)  10/06/2021     Pneumococcal Vaccine (1 of 1 - PPSV23) 10/06/2021

## 2021-12-21 LAB
ANION GAP SERPL CALCULATED.3IONS-SCNC: 6 MMOL/L (ref 3–14)
BUN SERPL-MCNC: 17 MG/DL (ref 7–30)
CALCIUM SERPL-MCNC: 8.8 MG/DL (ref 8.5–10.1)
CHLORIDE BLD-SCNC: 105 MMOL/L (ref 94–109)
CO2 SERPL-SCNC: 26 MMOL/L (ref 20–32)
CREAT SERPL-MCNC: 1.12 MG/DL (ref 0.66–1.25)
GFR SERPL CREATININE-BSD FRML MDRD: 69 ML/MIN/1.73M2
GLUCOSE BLD-MCNC: 94 MG/DL (ref 70–99)
POTASSIUM BLD-SCNC: 4.3 MMOL/L (ref 3.4–5.3)
SODIUM SERPL-SCNC: 137 MMOL/L (ref 133–144)

## 2021-12-22 LAB
CHOLEST SERPL-MCNC: 197 MG/DL
FASTING STATUS PATIENT QL REPORTED: NORMAL
HDLC SERPL-MCNC: 90 MG/DL
LDLC SERPL CALC-MCNC: 89 MG/DL
NONHDLC SERPL-MCNC: 107 MG/DL
TRIGL SERPL-MCNC: 91 MG/DL

## 2021-12-24 ENCOUNTER — MYC MEDICAL ADVICE (OUTPATIENT)
Dept: FAMILY MEDICINE | Facility: CLINIC | Age: 66
End: 2021-12-24
Payer: MEDICARE

## 2021-12-24 DIAGNOSIS — N52.9 ERECTILE DYSFUNCTION, UNSPECIFIED ERECTILE DYSFUNCTION TYPE: ICD-10-CM

## 2021-12-27 RX ORDER — SILDENAFIL CITRATE 20 MG/1
TABLET ORAL
Qty: 30 TABLET | Refills: 0 | Status: SHIPPED | OUTPATIENT
Start: 2021-12-27 | End: 2021-12-27

## 2021-12-27 RX ORDER — SILDENAFIL CITRATE 20 MG/1
TABLET ORAL
Qty: 30 TABLET | Refills: 0 | Status: SHIPPED | OUTPATIENT
Start: 2021-12-27 | End: 2022-02-14

## 2022-01-19 ENCOUNTER — TELEPHONE (OUTPATIENT)
Dept: GASTROENTEROLOGY | Facility: CLINIC | Age: 67
End: 2022-01-19
Payer: MEDICARE

## 2022-01-19 DIAGNOSIS — Z11.59 ENCOUNTER FOR SCREENING FOR OTHER VIRAL DISEASES: Primary | ICD-10-CM

## 2022-01-19 NOTE — TELEPHONE ENCOUNTER
Patient scheduled for colonoscopy on 1.27.2022.     Covid test scheduled: 1.25.2022    Arrival time: 0930    Facility location: [facility]    Sedation type: MAC    Indication for procedure: screening colonoscopy    Referring provider: Kieran Zuluaga MD    Bowel prep recommendation: Miralax/Magnesium citrate/Dulcolax    Pre visit planning completed.    Sondra Grider RN

## 2022-01-19 NOTE — TELEPHONE ENCOUNTER
Screening Questions  Blue=prep questions Red=location Green=sedation   1. Are you active on mychart? Y    2. What insurance is in the chart? Medicare     3.  Ordering/Referring Provider: Kieran Zuluaga     4. BMI 22.7 , If greater than 40 review exclusion criteria also will need EXTENDED PREP    5.  Respiratory Screening (If yes to any of the following HOSPITAL setting only):     Do you use daily home oxygen? N  Do you have mod to severe Obstructive Sleep Apnea? N (can be seen at [facility] or hospital setting)    Do you have Pulmonary Hypertension? N   Do you have UNCONTROLLED asthma? N    6. Have you had a heart or lung transplant? N  (If yes, please review exclusion criteria)    7. Are you currently on dialysis?N  (If yes, schedule in HOSPITAL setting only)(If yes, please send Golytely prep)    8. Do you have chronic kidney disease? N (If yes, please send Golytely prep)    9. Have you had a stroke or Transient ischemic attack (TIA) within 6 months? N (If yes, do not schedule at [facility])    10. In the past 6 months, have you had any heart related issues including cardiomyopathy or heart attack? N (If yes, please review exclusion criteria)           If yes, did it require cardiac stenting or other implantable device?N  (If yes, please review exclusion criteria)      11. Do you have any implantable devices in your body (pacemaker, defib, LVAD)? N (If yes, schedule at [facility])    12. Do you take nitroglycerin? If yes, how often? N (if yes, schedule at HOSPITAL setting)    13. Are you currently taking any blood thinners?N (If yes- inform patient to follow up with PCP or provider for follow up instructions)     14. Are you a diabetic? N (If yes, please send Golytely prep)    15. (Females) Are you currently pregnant?   If yes, how many weeks?      16. Are you taking any prescription pain medications on a routine schedule? N If yes, MAC sedation and patient will need EXTENDED PREP.    17. Do you have any chemical dependencies such as  alcohol, street drugs, or methadone? N If yes, MAC sedation     18. Do you have any history of post-traumatic stress syndrome, severe anxiety or history of psychosis? N  If yes, MAC sedation.     19. Do you transfer independently? Y    20.  Do you have any issues with constipation? N   If yes, pt will need EXTENDED PREP     21. Preferred Pharmacy for Pre Prescription     Scheduling Details    Which Colonoscopy Prep was Sent?: M Prep  Type of Procedure Scheduled: Colon  Surgeon: Sergiooff  Date of Procedure: 1-27  Location: [facility]  Caller (Please ask for phone number if not scheduled by patient): [facility]      Sedation Type: MAC  Conscious Sedation- Needs  for 6 hours after the procedure  MAC/General-Needs  for 24 hours after procedure    Pre-op Required at [facility] [place] [place] and OR for MAC sedation:   (if yes advise patient they will need a pre-op prior to procedure)      Informed patient they will need an adult  Y  Cannot take any type of public or medical transportation alone    Pre-Procedure Covid test to be completed at [facility] or Externally: Y  lab 1-25    Confirmed Nurse will call to complete assessment     Additional comments:  ([facility]EL[facility]'S PATIENTS NEED EXTENDED PREP)

## 2022-01-20 ENCOUNTER — TELEPHONE (OUTPATIENT)
Dept: GASTROENTEROLOGY | Facility: CLINIC | Age: 67
End: 2022-01-20
Payer: MEDICARE

## 2022-01-20 NOTE — TELEPHONE ENCOUNTER
Caller: DEVIN     Procedure: COLON    Date, Location, and Surgeon of Procedure Cancelled: 1/27/2022, [facility], [facility]    Ordering Provider:    Reason for cancel (please be detailed, any staff messages or encounters to note?): RIDE CONFLICTION         Rescheduled: YES     If rescheduled:    Date: 2/1/2022   Location: [facility]   Note any change or update to original order/sedation: NO

## 2022-01-24 NOTE — TELEPHONE ENCOUNTER
Rescheduled procedure:    Attempted to contact patient regarding upcoming colonoscopy procedure on 2.1.2022 for pre assessment questions. Pt is unable to talk at this time.    Covid test scheduled: 1.28.2022    Arrival time: 0830    Bowel prep recommendation: Miralax/Magnesium citrate/Dulcolax    Sondra Grider RN

## 2022-01-25 NOTE — TELEPHONE ENCOUNTER
Pre assessment questions completed for upcoming colonoscopy procedure scheduled on 2.1.2022    COVID test scheduled 1.28.2022    Reviewed procedural arrival time 0830 and facility location [facility].    Designated  policy reviewed. Instructed to have someone stay 24 hours post procedure.     Anticoagulation/blood thinners? no    Electronic implanted devices? no    Reviewed Miralax/Magnesium citrate/Dulcolax prep instructions with patient. No fiber/iron supplements or foods that contain nuts/seeds prior to procedure.     Patient verbalized understanding and had no questions or concerns at this time.    Sondra Grider RN

## 2022-01-28 ENCOUNTER — LAB (OUTPATIENT)
Dept: LAB | Facility: CLINIC | Age: 67
End: 2022-01-28
Attending: COLON & RECTAL SURGERY
Payer: MEDICARE

## 2022-01-28 DIAGNOSIS — Z11.59 ENCOUNTER FOR SCREENING FOR OTHER VIRAL DISEASES: ICD-10-CM

## 2022-01-28 LAB — SARS-COV-2 RNA RESP QL NAA+PROBE: NEGATIVE

## 2022-01-28 PROCEDURE — U0005 INFEC AGEN DETEC AMPLI PROBE: HCPCS | Performed by: INTERNAL MEDICINE

## 2022-01-31 NOTE — TELEPHONE ENCOUNTER
Patient wanting to know what dosage for simethicone to purchase. Informed patient that 125mg/tablet.     Patient had no further questions or concerns at this time.     Ana Childress RN

## 2022-02-01 ENCOUNTER — DOCUMENTATION ONLY (OUTPATIENT)
Dept: GASTROENTEROLOGY | Facility: OUTPATIENT CENTER | Age: 67
End: 2022-02-01
Payer: MEDICARE

## 2022-02-01 ENCOUNTER — TRANSFERRED RECORDS (OUTPATIENT)
Dept: HEALTH INFORMATION MANAGEMENT | Facility: CLINIC | Age: 67
End: 2022-02-01
Payer: MEDICARE

## 2022-02-01 DIAGNOSIS — Z12.11 SCREEN FOR COLON CANCER: ICD-10-CM

## 2022-03-08 PROBLEM — M54.50 ACUTE BILATERAL LOW BACK PAIN WITHOUT SCIATICA: Status: RESOLVED | Noted: 2017-09-07 | Resolved: 2022-03-08

## 2022-03-08 PROBLEM — M25.551 BILATERAL HIP PAIN: Status: RESOLVED | Noted: 2017-09-07 | Resolved: 2022-03-08

## 2022-03-08 PROBLEM — M25.552 BILATERAL HIP PAIN: Status: RESOLVED | Noted: 2017-09-07 | Resolved: 2022-03-08

## 2022-03-11 PROBLEM — G89.29 CHRONIC MIDLINE LOW BACK PAIN WITHOUT SCIATICA: Status: RESOLVED | Noted: 2021-02-04 | Resolved: 2022-03-11

## 2022-03-11 PROBLEM — M54.50 CHRONIC MIDLINE LOW BACK PAIN WITHOUT SCIATICA: Status: RESOLVED | Noted: 2021-02-04 | Resolved: 2022-03-11

## 2022-04-28 ENCOUNTER — OFFICE VISIT (OUTPATIENT)
Dept: FAMILY MEDICINE | Facility: CLINIC | Age: 67
End: 2022-04-28
Payer: MEDICARE

## 2022-04-28 VITALS
WEIGHT: 139 LBS | TEMPERATURE: 97.9 F | OXYGEN SATURATION: 100 % | HEART RATE: 47 BPM | DIASTOLIC BLOOD PRESSURE: 77 MMHG | RESPIRATION RATE: 16 BRPM | SYSTOLIC BLOOD PRESSURE: 129 MMHG | BODY MASS INDEX: 21.82 KG/M2 | HEIGHT: 67 IN

## 2022-04-28 DIAGNOSIS — Z23 NEED FOR VACCINATION: ICD-10-CM

## 2022-04-28 DIAGNOSIS — L60.0 INGROWN NAIL: Primary | ICD-10-CM

## 2022-04-28 PROCEDURE — 91305 COVID-19,PF,PFIZER (12+ YRS): CPT | Performed by: FAMILY MEDICINE

## 2022-04-28 PROCEDURE — 0054A COVID-19,PF,PFIZER (12+ YRS): CPT | Performed by: FAMILY MEDICINE

## 2022-04-28 PROCEDURE — 99213 OFFICE O/P EST LOW 20 MIN: CPT | Performed by: FAMILY MEDICINE

## 2022-04-28 NOTE — PROGRESS NOTES
"  Assessment & Plan     Ingrown nail  Discussed surgical options that podiatry may offer.  He is inclined to have entire nail removed but we discussed that podiatry may be a nail-preserving treatment that permanently removes the ingrown section.    - Orthopedic  Referral    Need for vaccination  - COVID-19,PF,PFIZER (12+ Yrs GRAY LABEL)      No follow-ups on file.    Michelle Allen MD  [facility] GENIE Siu is a 66 year old who presents for the following health issues     History of Present Illness       Reason for visit:  Pain under/around toenail on right big toe. Same pain has begun to appear on left big toe, though not as severe or persistent yet.  Symptom onset:  More than a month  Symptoms include:  Pain around/under nail of right big toe. Same pain now appearing on left big toe, though not as severe or persistent(yet).  Symptom intensity:  Severe  Symptom progression:  Worsening  Had these symptoms before:  No  What makes it worse:  Any pressure on top of toe  What makes it better:  Avoiding pressure on top of toe    He eats 2-3 servings of fruits and vegetables daily.He consumes 0 sweetened beverage(s) daily.He exercises with enough effort to increase his heart rate 30 to 60 minutes per day.  He exercises with enough effort to increase his heart rate 4 days per week.   He is taking medications regularly.     He get the pain when there is pressure on the top of the nail.  This includes some stretch poses he does for his low back pain.  It can also happen with bed sheets.  He does not have pain with walking.        Review of Systems         Objective    /77 (BP Location: Left arm, Patient Position: Sitting, Cuff Size: Adult Regular)   Pulse (!) 47   Temp 97.9  F (36.6  C) (Temporal)   Resp 16   Ht 1.702 m (5' 7\")   Wt 63 kg (139 lb)   SpO2 100%   BMI 21.77 kg/m    Body mass index is 21.77 kg/m .  Physical Exam   FEET: both feet with " ingrowing great toenails.  There is tenderness with palpation over medial aspect of right great toenail but no significant redness of the surrounding soft tissues.  Left great toenail with dystrophic changes.

## 2022-05-02 ENCOUNTER — OFFICE VISIT (OUTPATIENT)
Dept: PODIATRY | Facility: CLINIC | Age: 67
End: 2022-05-02
Payer: MEDICARE

## 2022-05-02 VITALS — DIASTOLIC BLOOD PRESSURE: 64 MMHG | SYSTOLIC BLOOD PRESSURE: 118 MMHG

## 2022-05-02 DIAGNOSIS — L60.0 INGROWN NAIL: ICD-10-CM

## 2022-05-02 DIAGNOSIS — B35.1 ONYCHOMYCOSIS: Primary | ICD-10-CM

## 2022-05-02 PROCEDURE — 11750 EXCISION NAIL&NAIL MATRIX: CPT | Mod: T5 | Performed by: PODIATRIST

## 2022-05-02 PROCEDURE — 99203 OFFICE O/P NEW LOW 30 MIN: CPT | Mod: 25 | Performed by: PODIATRIST

## 2022-05-02 PROCEDURE — 11720 DEBRIDE NAIL 1-5: CPT | Mod: 51 | Performed by: PODIATRIST

## 2022-05-02 RX ORDER — SILVER SULFADIAZINE 10 MG/G
CREAM TOPICAL
Qty: 50 G | Refills: 0 | Status: SHIPPED | OUTPATIENT
Start: 2022-05-02 | End: 2022-11-07

## 2022-05-02 NOTE — PATIENT INSTRUCTIONS
Thank you for choosing [facility] Podiatry / Foot & Ankle Surgery!    DR WALKER'S CLINIC:  [facility]   [address]   [place] [address]      TRIAGE LINE: [phone]  APPOINTMENTS: [phone]  RADIOLOGY: [phone]  SET UP SURGERY: [phone]  BILLING QUESTIONS: [phone]  FAX: [phone]         Follow up: as needed      Ingrown toenail Post-procedural instructions    - After the procedure, go home and elevate the involved foot for the remainder of the day/evening as able.  This is to minimize swelling, control pain, and limit post-procedural complications.  The pre-procedural injection may cause your toe to be numb anywhere from1-2 hours.    - You can take Tylenol, Ibuprofen, Advil, etc as needed for pain if tolerated.  Follow label instructions      - If you have been given a prescription for antibiotics, take them as instructed and complete the prescription.    - Keep dressing intact until the following morning.  At that point, you may remove the bandage (you may need to soak it in warm soapy water as the bandage will likely adhere to your skin).  Soaking in warm soapy water for 5-10 minutes will also facilitate healing.  Wash the toe thoroughly, dry the toe thoroughly.  Apply antibiotic wound ointment to base of wound and cover with gauze and Coban dressing (not too tightly) until it stops draining.  This may take a few days to weeks, but at that point, you may continue with antibiotic ointment and a band-aid, or you may stop applying a dressing all together.  Dressing changes and soaks should be done twice daily(morning/evening) if you had the permanent/chemical procedure done.    - You may do activities to tolerance the following day.  Find a shoe that is comfortable and minimizes the amount of rubbing on your toe, as this may increase pain, swelling, etc.    - Monitor for signs of infection.  With this procedure, it is common to have mild surrounding  redness and drainage.  If the redness involves the entire great toe or if you notice red streaks on top of your foot, or if you experience any nausea, vomiting, chills, fevers > 101 degrees, call clinic for a quick appointment.

## 2022-05-02 NOTE — LETTER
"    5/2/2022         RE: Salbador Begum  [address]  [place] [address]        Dear Colleague,    Thank you for referring your patient, Salbador Begum, to the [facility] PODIATRY. Please see a copy of my visit note below.    Foot & Ankle Surgery  May 2, 2022    CC: \"Pain around/under toenail, big toes, primarily right foot\"    I was asked to see Salbador Begum regarding the chief complaint by:  Dr. NAYANA Allen    HPI:  Pt is a 66 year old male who presents with above complaint.  2 to 3-month history of painful hallux nails bilateral.  Describes pain as \"sharp\", 8 out of 10 \"when pressure is applied to top of nail\".  Worse with \"pressure\".  \"Trim toenail\" for treatment.    ROS:   Pos for CC.  The patient denies current nausea, vomiting, chills, fevers, belly pain, calf pain, chest pain or SOB.  Complete remainder of ROS is otherwise neg.    VITALS:  There were no vitals filed for this visit.    PMH:    Past Medical History:   Diagnosis Date     Allergic rhinitis, cause unspecified      Backache, unspecified      Depressive disorder      Diverticulitis      Junctional ectopic contractions (H)      Mild intermittent asthma 9/28/2005     Narcolepsy      Osteoarthritis      Palpitations      Sleep apnea        SXHX:    Past Surgical History:   Procedure Laterality Date     APPENDECTOMY       ORTHOPEDIC SURGERY      left hip replacement, right ankle,     SEPTORHINOPLASTY  4/7/2011    Procedure:SEPTORHINOPLASTY; Septoplasty withNasal Reconstruction with  Grafts Harvested from [employer]; Surgeon:ALFREDO UMANA; Location: OR     [facility] NONSPECIFIC PROCEDURE      s/p Appendectomy        MEDS:    Current Outpatient Medications   Medication     acetaminophen (TYLENOL) 650 MG CR tablet     amLODIPine (NORVASC) 5 MG tablet     buPROPion (WELLBUTRIN XL) 300 MG 24 hr tablet     calcium-vitamin D 500-125 MG-UNIT TABS     Multiple Vitamin (MULTI-DAY VITAMINS) " TABS     naproxen sodium 220 MG capsule     sildenafil (REVATIO) 20 MG tablet     No current facility-administered medications for this visit.       ALL:     Allergies   Allergen Reactions     Metoclopramide Rash       FMH:    Family History   Problem Relation Age of Onset     Arthritis Mother         osteo     Dementia Mother      Hypertension Mother      Cerebrovascular Disease Mother         Not confirmed     Seizure Disorder Mother      Rheumatoid Arthritis Father      Depression Father      Coronary Artery Disease Father      Depression Sister      Substance Abuse Sister      Myocardial Infarction Sister        SocHx:    Social History     Socioeconomic History     Marital status:      Spouse name: Not on file     Number of children: Not on file     Years of education: Not on file     Highest education level: Not on file   Occupational History     Not on file   Tobacco Use     Smoking status: Never Smoker     Smokeless tobacco: Never Used   Vaping Use     Vaping Use: Never used   Substance and Sexual Activity     Alcohol use: Yes     Comment: 14 glass of wine weeks      Drug use: No     Sexual activity: Yes     Partners: Female     Birth control/protection: Post-menopausal     Comment: Other   Other Topics Concern     Parent/sibling w/ CABG, MI or angioplasty before 65F 55M? No   Social History Narrative    Social Documentation:        Balanced Diet: YES    Calcium intake: milk and food  per day, occas mtv    Caffeine: 1 cup per day    Exercise:  type of activity varies;  3-5 times per week    Sunscreen: No -     Seatbelts:  Yes    Self Breast Exam:  No - na    Self Testicular Exam: No -     Physical/Emotional/Sexual Abuse: No -     Do you feel safe in your environment? Yes        Cholesterol screen up to date: Yes    Eye Exam up to date: Yes    Dental Exam up to date: Yes    Pap smear up to date: Does Not Apply    Mammogram up to date: Does Not Apply    Dexa Scan up to date: Does Not Apply     Colonoscopy up to date: Due    Immunizations up to date: Yes    Glucose screen if over 40:  Yes                 Social Determinants of Health     Financial Resource Strain: Not on file   Food Insecurity: Not on file   Transportation Needs: Not on file   Physical Activity: Not on file   Stress: Not on file   Social Connections: Not on file   Intimate Partner Violence: Not on file   Housing Stability: Not on file           EXAMINATION:  Gen:   No apparent distress  Neuro:   A&Ox3, no deficits  Psych:    Answering questions appropriately for age and situation with normal affect  Head:    NCAT  Eye:    Visual scanning without deficit  Ear:    Response to auditory stimuli wnl  Lung:    Non-labored breathing on RA noted  Abd:    NTND per patient report  Lymph:    Neg for pitting/non-pitting edema BLE  Vasc:    Pulses palpable, CFT minimally delayed  Neuro:    Light touch sensation intact to all sensory nerve distributions without paresthesias  Derm:    Pronounced onychocryptosis medial right hallux.  This is very tender with pressure, but no paronychia or signs of infection are noted.  Onychocryptosis lateral left hallux in setting of dystrophic/mycotic changes.  Tender with pressure but no paronychia or signs of infection  MSK:    ROM, strength wnl without limitation, no pain on palpation noted.  Calf:    Neg for redness, swelling or tenderness    Assessment:  66 year old male with onychocryptosis medial right hallux; onychocryptosis lateral left hallux in setting of dystrophic/mycotic nail      Plan:  Discussed etiologies, anatomy and options  1.  Onychocryptosis medial right hallux  -I personally reviewed and interpreted the patient's lower extremity history pertinent to today's visit, including imaging/labs, in preparation for initiating a treatment program.  -Regarding the nail, treatment options were discussed.  They elected to proceed with a procedure, Partial permanent avulsion.  See procedure note for details.   Risks that were discussed include but are not limited to infection, wound healing complications, nerve irritation, recurrence of the ingrown nail and the need for further procedures.  Antibiotic:  None needed  -Rx for Silvadene was sent to patient's pharmacy for twice-daily dressing changes  -patient was advised to follow up in 4 weeks for a 30 minute appointment if the wound has failed to progress/heal, and we'll proceed with an I&D; otherwise, follow up prn    After discussing the procedure, as well as risks, complications and post-procedure instructions, informed consent was obtained.    Anesthesia:  4 cc's of  1% lidocaine plain    Procedure:  After adequate prep, and with anesthesia achieved, a tourni-cot was applied to the involved toe(and removed after bandage application) and  attention was directed to the medial border of the R hallux where the nail plate was freed from surrounding soft tissue.  The offending border was  using an English Anvil and then removed in total.  The base of the wound was explored and showed no necrotic tissue, purulence or debris.  Phenol was then applied to the base of the wound for 30s x 3, and sufficient isopropyl alcohol was used to irrigate the wound.  The base of the wound/ nail matrix was curetted after each acid application.   A clean dressing was applied loosely to prevent vascular insult.  The patient tolerated the procedure well without complications.    Post-procedural instructions were dispensed and discussed with the patient.  All questions were answered.    2.  Onychocryptosis lateral left hallux in setting of dystrophic/mycotic nail  -Symptoms are less pronounced at this toe.  As such, discussed the option for a slant back.  The patient agreed.  This was performed in the leading edge of the lateral left hallux nail was removed with good initial pain relief.  -No wound care or antibiotics indicated    Follow up:  prn or sooner with acute  issues      Patient's medical history was reviewed today      Zachary Serna DPM [facility]  Podiatric Foot & Ankle Surgeon  [facility]  [phone]    Disclaimer: This note consists of symbols derived from keyboarding, dictation and/or voice recognition software. As a result, there may be errors in the script that have gone undetected. Please consider this when interpreting information found in this chart.            Again, thank you for allowing me to participate in the care of your patient.        Sincerely,        Zachary Serna DPM, PHILLIP

## 2022-05-02 NOTE — PROGRESS NOTES
"Foot & Ankle Surgery  May 2, 2022    CC: \"Pain around/under toenail, big toes, primarily right foot\"    I was asked to see Salbador Begum regarding the chief complaint by:  Dr. NAYANA Allen    HPI:  Pt is a 66 year old male who presents with above complaint.  2 to 3-month history of painful hallux nails bilateral.  Describes pain as \"sharp\", 8 out of 10 \"when pressure is applied to top of nail\".  Worse with \"pressure\".  \"Trim toenail\" for treatment.    ROS:   Pos for CC.  The patient denies current nausea, vomiting, chills, fevers, belly pain, calf pain, chest pain or SOB.  Complete remainder of ROS is otherwise neg.    VITALS:  There were no vitals filed for this visit.    PMH:    Past Medical History:   Diagnosis Date     Allergic rhinitis, cause unspecified      Backache, unspecified      Depressive disorder      Diverticulitis      Junctional ectopic contractions (H)      Mild intermittent asthma 9/28/2005     Narcolepsy      Osteoarthritis      Palpitations      Sleep apnea        SXHX:    Past Surgical History:   Procedure Laterality Date     APPENDECTOMY       ORTHOPEDIC SURGERY      left hip replacement, right ankle,     SEPTORHINOPLASTY  4/7/2011    Procedure:SEPTORHINOPLASTY; Septoplasty withNasal Reconstruction with  Grafts Harvested from [employer]; Surgeon:ALFREDO UMANA; Location: OR     [facility] NONSPECIFIC PROCEDURE      s/p Appendectomy        MEDS:    Current Outpatient Medications   Medication     acetaminophen (TYLENOL) 650 MG CR tablet     amLODIPine (NORVASC) 5 MG tablet     buPROPion (WELLBUTRIN XL) 300 MG 24 hr tablet     calcium-vitamin D 500-125 MG-UNIT TABS     Multiple Vitamin (MULTI-DAY VITAMINS) TABS     naproxen sodium 220 MG capsule     sildenafil (REVATIO) 20 MG tablet     No current facility-administered medications for this visit.       ALL:     Allergies   Allergen Reactions     Metoclopramide Rash       FMH:    Family History   Problem Relation Age of Onset     " Arthritis Mother         osteo     Dementia Mother      Hypertension Mother      Cerebrovascular Disease Mother         Not confirmed     Seizure Disorder Mother      Rheumatoid Arthritis Father      Depression Father      Coronary Artery Disease Father      Depression Sister      Substance Abuse Sister      Myocardial Infarction Sister        SocHx:    Social History     Socioeconomic History     Marital status:      Spouse name: Not on file     Number of children: Not on file     Years of education: Not on file     Highest education level: Not on file   Occupational History     Not on file   Tobacco Use     Smoking status: Never Smoker     Smokeless tobacco: Never Used   Vaping Use     Vaping Use: Never used   Substance and Sexual Activity     Alcohol use: Yes     Comment: 14 glass of wine weeks      Drug use: No     Sexual activity: Yes     Partners: Female     Birth control/protection: Post-menopausal     Comment: Other   Other Topics Concern     Parent/sibling w/ CABG, MI or angioplasty before 65F 55M? No   Social History Narrative    Social Documentation:        Balanced Diet: YES    Calcium intake: milk and food  per day, occas mtv    Caffeine: 1 cup per day    Exercise:  type of activity varies;  3-5 times per week    Sunscreen: No -     Seatbelts:  Yes    Self Breast Exam:  No - na    Self Testicular Exam: No -     Physical/Emotional/Sexual Abuse: No -     Do you feel safe in your environment? Yes        Cholesterol screen up to date: Yes    Eye Exam up to date: Yes    Dental Exam up to date: Yes    Pap smear up to date: Does Not Apply    Mammogram up to date: Does Not Apply    Dexa Scan up to date: Does Not Apply    Colonoscopy up to date: Due    Immunizations up to date: Yes    Glucose screen if over 40:  Yes                 Social Determinants of Health     Financial Resource Strain: Not on file   Food Insecurity: Not on file   Transportation Needs: Not on file   Physical Activity: Not on file    Stress: Not on file   Social Connections: Not on file   Intimate Partner Violence: Not on file   Housing Stability: Not on file           EXAMINATION:  Gen:   No apparent distress  Neuro:   A&Ox3, no deficits  Psych:    Answering questions appropriately for age and situation with normal affect  Head:    NCAT  Eye:    Visual scanning without deficit  Ear:    Response to auditory stimuli wnl  Lung:    Non-labored breathing on RA noted  Abd:    NTND per patient report  Lymph:    Neg for pitting/non-pitting edema BLE  Vasc:    Pulses palpable, CFT minimally delayed  Neuro:    Light touch sensation intact to all sensory nerve distributions without paresthesias  Derm:    Pronounced onychocryptosis medial right hallux.  This is very tender with pressure, but no paronychia or signs of infection are noted.  Onychocryptosis lateral left hallux in setting of dystrophic/mycotic changes.  Tender with pressure but no paronychia or signs of infection  MSK:    ROM, strength wnl without limitation, no pain on palpation noted.  Calf:    Neg for redness, swelling or tenderness    Assessment:  66 year old male with onychocryptosis medial right hallux; onychocryptosis lateral left hallux in setting of dystrophic/mycotic nail      Plan:  Discussed etiologies, anatomy and options  1.  Onychocryptosis medial right hallux  -I personally reviewed and interpreted the patient's lower extremity history pertinent to today's visit, including imaging/labs, in preparation for initiating a treatment program.  -Regarding the nail, treatment options were discussed.  They elected to proceed with a procedure, Partial permanent avulsion.  See procedure note for details.  Risks that were discussed include but are not limited to infection, wound healing complications, nerve irritation, recurrence of the ingrown nail and the need for further procedures.  Antibiotic:  None needed  -Rx for Silvadene was sent to patient's pharmacy for twice-daily dressing  changes  -patient was advised to follow up in 4 weeks for a 30 minute appointment if the wound has failed to progress/heal, and we'll proceed with an I&D; otherwise, follow up prn    After discussing the procedure, as well as risks, complications and post-procedure instructions, informed consent was obtained.    Anesthesia:  4 cc's of  1% lidocaine plain    Procedure:  After adequate prep, and with anesthesia achieved, a tourni-cot was applied to the involved toe(and removed after bandage application) and  attention was directed to the medial border of the R hallux where the nail plate was freed from surrounding soft tissue.  The offending border was  using an English Anvil and then removed in total.  The base of the wound was explored and showed no necrotic tissue, purulence or debris.  Phenol was then applied to the base of the wound for 30s x 3, and sufficient isopropyl alcohol was used to irrigate the wound.  The base of the wound/ nail matrix was curetted after each acid application.   A clean dressing was applied loosely to prevent vascular insult.  The patient tolerated the procedure well without complications.    Post-procedural instructions were dispensed and discussed with the patient.  All questions were answered.    2.  Onychocryptosis lateral left hallux in setting of dystrophic/mycotic nail  -Symptoms are less pronounced at this toe.  As such, discussed the option for a slant back.  The patient agreed.  This was performed in the leading edge of the lateral left hallux nail was removed with good initial pain relief.  -No wound care or antibiotics indicated    Follow up:  prn or sooner with acute issues      Patient's medical history was reviewed today      Zachary Serna DPM [facility] FACFAOM  Podiatric Foot & Ankle Surgeon  [facility]  [phone]    Disclaimer: This note consists of symbols derived from keyboarding, dictation and/or voice recognition software. As a result,  there may be errors in the script that have gone undetected. Please consider this when interpreting information found in this chart.

## 2022-05-03 ENCOUNTER — TRANSFERRED RECORDS (OUTPATIENT)
Dept: HEALTH INFORMATION MANAGEMENT | Facility: CLINIC | Age: 67
End: 2022-05-03
Payer: MEDICARE

## 2022-06-20 ENCOUNTER — TRANSFERRED RECORDS (OUTPATIENT)
Dept: HEALTH INFORMATION MANAGEMENT | Facility: CLINIC | Age: 67
End: 2022-06-20

## 2022-08-23 ASSESSMENT — ANXIETY QUESTIONNAIRES
2. NOT BEING ABLE TO STOP OR CONTROL WORRYING: NOT AT ALL
GAD7 TOTAL SCORE: 3
IF YOU CHECKED OFF ANY PROBLEMS ON THIS QUESTIONNAIRE, HOW DIFFICULT HAVE THESE PROBLEMS MADE IT FOR YOU TO DO YOUR WORK, TAKE CARE OF THINGS AT HOME, OR GET ALONG WITH OTHER PEOPLE: NOT DIFFICULT AT ALL
7. FEELING AFRAID AS IF SOMETHING AWFUL MIGHT HAPPEN: NOT AT ALL
GAD7 TOTAL SCORE: 3
3. WORRYING TOO MUCH ABOUT DIFFERENT THINGS: SEVERAL DAYS
1. FEELING NERVOUS, ANXIOUS, OR ON EDGE: SEVERAL DAYS
6. BECOMING EASILY ANNOYED OR IRRITABLE: NOT AT ALL
8. IF YOU CHECKED OFF ANY PROBLEMS, HOW DIFFICULT HAVE THESE MADE IT FOR YOU TO DO YOUR WORK, TAKE CARE OF THINGS AT HOME, OR GET ALONG WITH OTHER PEOPLE?: NOT DIFFICULT AT ALL
7. FEELING AFRAID AS IF SOMETHING AWFUL MIGHT HAPPEN: NOT AT ALL
4. TROUBLE RELAXING: SEVERAL DAYS
5. BEING SO RESTLESS THAT IT IS HARD TO SIT STILL: NOT AT ALL

## 2022-08-24 ENCOUNTER — VIRTUAL VISIT (OUTPATIENT)
Dept: FAMILY MEDICINE | Facility: CLINIC | Age: 67
End: 2022-08-24
Payer: MEDICARE

## 2022-08-24 DIAGNOSIS — I49.5 SINUS NODE DYSFUNCTION (H): ICD-10-CM

## 2022-08-24 DIAGNOSIS — F33.0 MAJOR DEPRESSIVE DISORDER, RECURRENT, MILD (H): ICD-10-CM

## 2022-08-24 DIAGNOSIS — G47.11 IDIOPATHIC HYPERSOMNOLENCE: ICD-10-CM

## 2022-08-24 PROCEDURE — 99214 OFFICE O/P EST MOD 30 MIN: CPT | Mod: 95 | Performed by: FAMILY MEDICINE

## 2022-08-24 RX ORDER — DEXTROAMPHETAMINE SACCHARATE, AMPHETAMINE ASPARTATE MONOHYDRATE, DEXTROAMPHETAMINE SULFATE AND AMPHETAMINE SULFATE 2.5; 2.5; 2.5; 2.5 MG/1; MG/1; MG/1; MG/1
10 CAPSULE, EXTENDED RELEASE ORAL DAILY
Qty: 30 CAPSULE | Refills: 0 | Status: SHIPPED | OUTPATIENT
Start: 2022-09-24 | End: 2022-10-24

## 2022-08-24 RX ORDER — DEXTROAMPHETAMINE SACCHARATE, AMPHETAMINE ASPARTATE MONOHYDRATE, DEXTROAMPHETAMINE SULFATE AND AMPHETAMINE SULFATE 2.5; 2.5; 2.5; 2.5 MG/1; MG/1; MG/1; MG/1
10 CAPSULE, EXTENDED RELEASE ORAL DAILY
Qty: 30 CAPSULE | Refills: 0 | Status: SHIPPED | OUTPATIENT
Start: 2022-08-24 | End: 2022-09-23

## 2022-08-24 ASSESSMENT — ANXIETY QUESTIONNAIRES: GAD7 TOTAL SCORE: 3

## 2022-08-24 ASSESSMENT — PATIENT HEALTH QUESTIONNAIRE - PHQ9: SUM OF ALL RESPONSES TO PHQ QUESTIONS 1-9: 6

## 2022-08-24 NOTE — PROGRESS NOTES
Salbador is a 66 year old who is being evaluated via a billable video visit.      How would you like to obtain your AVS? MyChart  If the video visit is dropped, the invitation should be resent by: Send to e-mail at: [email or web address]  Will anyone else be joining your video visit? No      Assessment & Plan     Idiopathic hypersomnolence  Reviewed old records. Been to sleep doctor. Was given mandibular repositioning device and trial of modafinil and other other lifestyle changes. They were not successful.   He was given adderall but concerns of heart issues (arrhythmia) and sleep doctor recommended against it.   Saw cardiologist and he did not express any concerns with his heart issues and he has been getting rx since then.   Since he retired he stopped using rx but now noticing improvement in attention trial of leftover rx and had outside sleep study done which showed sleep apnea and he will have consultation with sleep doctor. Waiting for his cpap machine.   We discussed as long as sleep doctor is recommending this along with cpap I am fine continuing this rx and I need to get recommendations from sleep doctor. He understood. We discussed controlled substance, clinic policy. He is scheduled to see them in 2 months and I agreed to fill that for next 2 months.      amphetamine-dextroamphetamine (ADDERALL XR) 10 MG 24 hr capsule; Take 1 capsule (10 mg) by mouth daily for 30 days  - amphetamine-dextroamphetamine (ADDERALL XR) 10 MG 24 hr capsule; Take 1 capsule (10 mg) by mouth daily for 30 days    Major depressive disorder, recurrent, mild (H)  Mood is fine.     Sinus node dysfunction (H)  Seeing cardiologist. Doing well overall.                    No follow-ups on file.    Kieran Zuluaga MD, MD  [facility]    Papa Siu is a 66 year old, presenting for the following health issues:  No chief complaint on file.      History of Present Illness       Reason for visit:  " I want to resume taking Adderall.    He eats 2-3 servings of fruits and vegetables daily.He consumes 0 sweetened beverage(s) daily.He exercises with enough effort to increase his heart rate 30 to 60 minutes per day.  He exercises with enough effort to increase his heart rate 5 days per week.   He is taking medications regularly.  Today's KENNEDY-7 Score: 3     Sleep apnea, feels more sleepy and hard to focus, was taking adderall and would like to resume medication. Medication helped focus and with sleepiness. Is feeling tired everyday and hard to wake up in mornings.     Would like to go back on adderall.   Started taking when diagnosed with sleep apnea and hypersomnolence.   Took it so that stays awake at work.   Helped with mood and focus also.  Stopped taking when retired in 2018.   Recently feeling tired, cant make decision in the morning. So many things has to complete it.   Tried pill from old rx and it was helpful.   Has to stop using apnea device primarily as it was not fitting - now dental device dentist is accepting medicare.   [facility] - most recent sleep study in Dec 2020 and they gave cpap machine and following up with them in 2 months.     Chronic back issue. Been to [place]. Back pain but no tingling or numbness. Back pain is not as bad as it was before but now numbness in leg. Noticing in right shin to foot. Seeing PMR at [facility].     Review of Systems         Objective    Vitals - Patient Reported  Weight (Patient Reported): 63.5 kg (140 lb)  Height (Patient Reported): 172.7 cm (5' 8\")  BMI (Based on Pt Reported Ht/Wt): 21.29    Physical Exam   GENERAL: Healthy, alert and no distress  EYES: Eyes grossly normal to inspection.  No discharge or erythema, or obvious scleral/conjunctival abnormalities.  RESP: No audible wheeze, cough, or visible cyanosis.  No visible retractions or increased work of breathing.    SKIN: Visible skin clear. No significant rash, abnormal pigmentation or " lesions.  NEURO: Cranial nerves grossly intact.  Mentation and speech appropriate for age.  PSYCH: Mentation appears normal, affect normal/bright, judgement and insight intact, normal speech and appearance well-groomed.    Video-Visit Details    Video Start Time: 4:56 PM    Type of service:  Video Visit    Video End Time:5:18 PM    Originating Location (pt. Location): Home    Distant Location (provider location):  [facility]     Platform used for Video Visit: [employer]spotter  [place].

## 2022-08-26 ENCOUNTER — TELEPHONE (OUTPATIENT)
Dept: FAMILY MEDICINE | Facility: CLINIC | Age: 67
End: 2022-08-26

## 2022-08-26 NOTE — TELEPHONE ENCOUNTER
Prior Authorization Retail Medication Request    Medication/Dose: amphetamine-dextroamphetamine (ADDERALL XR) 10 MG 24 hr capsule  ICD code (if different than what is on RX):  Previously Tried and Failed:  Rationale:    Insurance Name:    Insurance ID:      Pharmacy Information (if different than what is on RX)  Name:  Phone:    Please include previous medications tried and failed.  Please ask insurance for medications on formulary.

## 2022-08-26 NOTE — TELEPHONE ENCOUNTER
Central Prior Authorization Team   Phone: [phone]      PA Initiation    Medication: amphetamine-dextroamphetamine (ADDERALL XR) 10 MG 24 hr capsule, INITIATED  Insurance Company: [employer] Part D - Phone [phone] Fax [phone]  Pharmacy Filling the Rx: [id number] [place], [place] - [address]  Filling Pharmacy Phone: [phone]  Filling Pharmacy Fax:    Start Date: 8/26/2022

## 2022-08-30 NOTE — TELEPHONE ENCOUNTER
Central Prior Authorization Team   Phone: [phone]      Prior Authorization Approval    Authorization Effective Date: 5/29/2022  Authorization Expiration Date: 8/27/2023  Medication: amphetamine-dextroamphetamine (ADDERALL XR) 10 MG 24 hr capsule, APPROVED  Approved Dose/Quantity:    Reference #:     Insurance Company: [employer] Part D - Phone [phone] Fax [phone]  Expected CoPay:       CoPay Card Available:      Foundation Assistance Needed:    Which Pharmacy is filling the prescription (Not needed for infusion/clinic administered): [id number] [place], [place] - [address]  Pharmacy Notified: Yes  Patient Notified: Yes PHARMACY WILL CONTACT WHEN FILLED

## 2022-09-28 ENCOUNTER — IMMUNIZATION (OUTPATIENT)
Dept: NURSING | Facility: CLINIC | Age: 67
End: 2022-09-28
Payer: MEDICARE

## 2022-09-28 PROCEDURE — 91312 COVID-19,PF,PFIZER BOOSTER BIVALENT: CPT

## 2022-09-28 PROCEDURE — G0008 ADMIN INFLUENZA VIRUS VAC: HCPCS

## 2022-09-28 PROCEDURE — 90662 IIV NO PRSV INCREASED AG IM: CPT

## 2022-09-28 PROCEDURE — 0124A COVID-19,PF,PFIZER BOOSTER BIVALENT: CPT

## 2022-11-04 ENCOUNTER — TRANSFERRED RECORDS (OUTPATIENT)
Dept: HEALTH INFORMATION MANAGEMENT | Facility: CLINIC | Age: 67
End: 2022-11-04

## 2022-11-07 ENCOUNTER — VIRTUAL VISIT (OUTPATIENT)
Dept: FAMILY MEDICINE | Facility: CLINIC | Age: 67
End: 2022-11-07
Payer: MEDICARE

## 2022-11-07 DIAGNOSIS — U07.1 INFECTION DUE TO 2019 NOVEL CORONAVIRUS: Primary | ICD-10-CM

## 2022-11-07 PROCEDURE — 99213 OFFICE O/P EST LOW 20 MIN: CPT | Mod: CS | Performed by: PHYSICIAN ASSISTANT

## 2022-11-07 ASSESSMENT — PATIENT HEALTH QUESTIONNAIRE - PHQ9
SUM OF ALL RESPONSES TO PHQ QUESTIONS 1-9: 0
SUM OF ALL RESPONSES TO PHQ QUESTIONS 1-9: 0

## 2022-11-07 NOTE — PATIENT INSTRUCTIONS
Instructions for Patients      What are the symptoms of COVID-19?  Symptoms can include fever, cough, shortness of breath, chills, headache, muscle pain sore throat, fatigue, runny or stuffy nose, and loss of taste and smell. Other less common symptoms include nausea, vomiting, or diarrhea (watery stools).    Know when to call 911. Emergency warning signs include:    Trouble breathing or shortness of breath    Pain or pressure in the chest that doesn't go away    Feeling confused like you haven't felt before, or not being able to wake up    Bluish-colored lips or face    How can I take care of myself?  1. Get lots of rest. Drink extra fluids (unless a doctor has told you not to).  2. Take Tylenol (acetaminophen) for fever or pain. If you have liver or kidney problems, ask your family doctor if it's okay to take Tylenol   Adults:   650 mg (two 325 mg pills or tablets) every 4 to 6 hours, or...   1,000 mg (two 500 mg pills or tablets) every 8 hours as needed.  Note: Don't take more than 3,000 mg in one day. Acetaminophen is found in many medicines (both prescribed and over the counter). Read all labels to be sure you don't take too much.  For children, check the Tylenol bottle for the right dose. The dose is based on the child's age or weight.  3. Take over the counter medicines for your symptoms as needed. Talk to your pharmacist.  4. If you have other health problems (like cancer, heart failure, an organ transplant, or severe kidney disease): Call your specialty clinic if you don't feel better in the next 2 days.    Where can I get more information?     [employer] COVID-19 Resource Hub: [email or web address]     CDC Quarantine & Isolation: https://www.cdc.gov/coronavirus/2019-ncov/your-health/quarantine-isolation.html     CDC - What to Do If You're Sick: https://www.cdc.gov/coronavirus/2019-ncov/if-you-are-sick/index.html    [facility] clinical trials (COVID-19 research studies):  [email or web address]/[email or web address]-clinical-trials    [place] Department of Health COVID-19 Public Hotline: 1-801.162.5862

## 2022-11-07 NOTE — PROGRESS NOTES
"Salbador is a 67 year old who is being evaluated via a billable video visit.      How would you like to obtain your AVS? MyChart  If the video visit is dropped, the invitation should be resent by: Text to cell phone: [phone]  Will anyone else be joining your video visit? No        Assessment & Plan     Salbador was seen today for covid concern.    Diagnoses and all orders for this visit:    Infection due to 2019 novel coronavirus  -     nirmatrelvir and ritonavir (PAXLOVID) therapy pack; Take 3 tablets by mouth 2 times daily for 5 days (Take 2 Nirmatrelvir tablets and 1 Ritonavir tablet twice daily for 5 days)      COVID-19 positive patient.  Encounter for consideration of medication intervention. Patient does qualify for a prescription. Full discussion with patient including medication options, risks and benefits. Potential drug interactions reviewed with patient. Not sure he will take it as doing ok, but would like sent in just in case he elects to use it. Encouraged consideration to home O2 monitoring, reviewed red flags that warrant ER follow-up or in-person assessment if worsening and CDC guidance on quarantine. Patient in agreement with plan.       Treatment Planned Paxlovid, Rx sent to [facility] [phone]  [address] [place], [place] [address]. [place] [address]    Hours:  Monday - Friday: 8am - 6pm  Saturday - Sunday: 8am - 4pm    [facility]: Patient to call [phone] to set up  in the cul de sac of the professional building  Temporary change to home medications:   May decrease efficacy of wellbutrin so monitor for worsening depressive symptoms   Decrease norvasc by 50% = 5mg daily down to 2.5mg daily  Stop sildenafil during treatment    Estimated body mass index is 21.77 kg/m  as calculated from the following:    Height as of 4/28/22: 1.702 m (5' 7\").    Weight as of 4/28/22: 63 kg (139 lb).  GFR Estimate   Date Value Ref Range Status   12/20/2021 69 >60 mL/min/1.73m2 " Final     Comment:     As of July 11, 2021, eGFR is calculated by the CKD-EPI creatinine equation, without race adjustment. eGFR can be influenced by muscle mass, exercise, and diet. The reported eGFR is an estimation only and is only applicable if the renal function is stable.   09/18/2020 77 >60 mL/min/[1.73_m2] Final     Comment:     Non  GFR Calc  Starting 12/18/2018, serum creatinine based estimated GFR (eGFR) will be   calculated using the Chronic Kidney Disease Epidemiology Collaboration   (CKD-EPI) equation.       Lab Results   Component Value Date    [id number] Negative 01/28/2022       Return today (on 11/7/2022) for treatment .    Danielle Marshall PA-C  [facility] PRIOR GEORGI Siu is a 67 year old, presenting for the following health issues:  Covid Concern      HPI   COVID-19 Symptom Review  How many days ago did these symptoms start? 4-5 days, 11/3-11/4 then tested positive 11/5.  Feels really sx started on 11/4 as really didn't feel sick until this day.  Mentions 1.5 weeks ago also had a positive home antigen test, but only taken for screening prior to seeing relatives derrick asymptomatic at that time. Repeat antigen test was negative and had subsequent PCR that was negative so may have been a false positive test.  Overall sx mild and wonders if he really needs treatment.   Fully immunized and boosted  PMHx:HTN and depression   No hx of kidney or liver disease.    Are any of the following symptoms significant for you?    New or worsening difficulty breathing? No    Worsening cough? Yes, I am coughing up mucus.    Fever or chills? No    Headache: YES - only at onset, but since resolved.    Sore throat: No    Chest pain: No    Diarrhea: No    Body aches? No    What treatments has patient tried? none   Does patient live in a nursing home, group home, or shelter? No  Does patient have a way to get food/medications during quarantined? Yes, I have a friend or  family member who can help me.            Review of Systems   Constitutional, HEENT, cardiovascular, pulmonary, gi and gu systems are negative, except as otherwise noted.      Objective           Vitals:  No vitals were obtained today due to virtual visit.    Physical Exam   GENERAL: Healthy, alert and no distress  EYES: Eyes grossly normal to inspection.  No discharge or erythema, or obvious scleral/conjunctival abnormalities.  RESP: No audible wheeze, cough, or visible cyanosis.  No visible retractions or increased work of breathing.    SKIN: Visible skin clear. No significant rash, abnormal pigmentation or lesions.  NEURO: Cranial nerves grossly intact.  Mentation and speech appropriate for age.  PSYCH: Mentation appears normal, affect normal/bright, judgement and insight intact, normal speech and appearance well-groomed.                Video-Visit Details    Video Start Time: 9:19 AM    Type of service:  Video Visit    Video End Time:9:41 AM    Originating Location (pt. Location): Home    Distant Location (provider location):  Off-site    Platform used for Video Visit: [employer]      Answers for HPI/ROS submitted by the patient on 11/7/2022  PHQ9 TOTAL SCORE: 0

## 2022-11-08 NOTE — PROGRESS NOTES
"Salbador is a 67 year old who is being evaluated via a billable video visit.      How would you like to obtain your AVS? MyChart  If the video visit is dropped, the invitation should be resent by: Send to e-mail at: [email or web address]  Will anyone else be joining your video visit? Mattie Taylor           ELECTROPHYSIOLOGY VIRTUAL CLINIC VISIT  Mr. Salbador Begum is a 67 year old male who is being evaluated via a billable video visit.      The patient has been notified of following:     \"This video visit will be conducted via a call between you and your physician/provider. We have found that certain health care needs can be provided without the need for an in-person physical exam.  This service lets us provide the care you need with a video conversation.  If a prescription is necessary we can send it directly to your pharmacy.  If lab work is needed we can place an order for that and you can then stop by our lab to have the test done at a later time.    If during the course of the call the physician/provider feels a video visit is not appropriate, you will not be charged for this service.\"     Physician/provider has received verbal consent for a Video Visit from the patient? Yes    Assessment/Recommendations   Assessment/Plan:    Mr. Begum is a 67 year old male who has a past medical history significant for SND, MR, diverticulitis, narcolepsy, AIMEE (uses CPAP), OA, and depression.     He has had known SND mainly manifesting as chronotropic incompetence. He has previously underwent a thorough cardiac workup. He continues to have stable symptoms with intermittent exertional fatigue and SOB. We discussed given stability of his symptoms, we can defer pacing at this time. He understands that a pacemaker may be in his future, and will let us know if he has any worsening frequency/severity of symptoms that become lifestyle limiting.      Follow up in 2 years or sooner if need arises.        History of " Present Illness/Subjective    Mr. Salbador Begum is a 67 year old male who comes in today for EP follow-up of SND.    Mr. Begum is a 67 year old male who has a past medical history significant for SND, MR, diverticulitis, narcolepsy, AIMEE (uses CPAP), OA, and depression.       He has had symptoms of dyspnea on exertion associated with blunted HR response to exercise. He was also having a sensation of irregular HRs with exercise. He had a treadmill study that showed excellent exercise capacity. The report mentions PACs and PVCs during exercise and rest. The note I sent him mentions atrial fibrillation during exercise. I will have the stress test ECGs pulled to review again.     Overall, he reports he has been feeling well. He was on vacation, walking up hills and climbing stairs without difficulty. He will occasion notice some shortness of breath with exercise or stairs but this is much less frequent than before. He initially noted this symptom last winter while cross country skiing. He is wondering if it will return this winter.     [id number]: Jogging again, stairs don't bother him like they use to. It had been a couple years since he was in to see me and felt he should touch base.      18Nov2016:  Mr. Begum feels well since his last visit in 09/2015. He continues to bicycle, jog, ski, and run frequently. His symptoms of dizziness/lightheadedness have not precluded him from his personal hobbies or his professional work as an  for the [place] [employer] [employer]. He did not a period of dyspnea on exertion over the last few summers that actually improved when winter came about. Mr. Begum feels that his exercise tolerance is now at baseline. Mr. Begum still has some dizziness/lightheadedness upon waking, but he prevents this by standing slowly. He had a single episode of pre-syncope several months ago. No syncope, chest pain, exertional dyspnea at present.     2/10/17: Since last visit, he was admitted under ED  observation for chest pain rule out on 11/20/16. Serial troponin x3 negative, stress echocardiogram with normal baseline echo and appropriate rise in LV systolic function at peak exercise. He did not have any symptoms during the exercise portion of the study. Since discharge, he has felt well, and denies any limitations to exercise capacity such as chest pain/pressure or dyspnea. He is not experiencing palpitations. He is not on a B-blocker as his resting HR is very low at baseline. He exercises regularly, including Nord[employer]k and running. He feels well and denies any complaints currently. Unfortunately, his wife was recently diagnosed with frontotemporal dementia, so he plans to retire soon and travel back to [facility] more frequently.      [id number]: He reports no increase in his palpitations.  He has noticed that his exercise abilities are beginning to decline, more than he would anticipate related to simple aging.  He notices that on the days where he is feeling a worse with exercise his heart rate tends to be in the 110s to may be 120 using his Fitbit.  Other days he is able to get his heart rate to the 140s 150s and feels much better with exercise.  He has had no syncope or near syncope.     11/2021: we had planned on a permanent pacemaker for chronotropic incompetence but he elected to hold on that. Today he reports he has been doing well. His wife is not in memory care due to early onset dementia. He has began running again. It takes some time to get his heart rate up but eventually reaches the 140s. He has no complaints about his exercise abilities at this time.    He presents today for follow up. He reports feeling at baseline. He continues to jog and be active. He does have some symptoms of chronotropic incompetence at times, namely fatigue/PHELPS, but thus far has not been too limiting to him. He denies chest discomfort, palpitations, abdominal fullness/bloating or peripheral edema, shortness of breath,  paroxysmal nocturnal dyspnea, orthopnea, lightheadedness, dizziness, pre-syncope, or syncope. Current cardiac medications include: Norvasc.         I have reviewed and updated the patient's Past Medical History, Social History, Family History and Medication List.     Cardiographics (Personally Reviewed) :   11/2021 Echo:   Interpretation Summary  Global and regional left ventricular function is normal with an EF of 55-60%.  The right ventricle is normal size. Global right ventricular function is  normal.  Mild to moderate mitral insufficiency is present.  IVC diameter and respiratory changes fall into an intermediate range  suggesting an RA pressure of 8 mmHg.  No pericardial effusion is present.       Physical Examination   There were no vitals taken for this visit.  Wt Readings from Last 3 Encounters:   04/28/22 63 kg (139 lb)   12/20/21 64.4 kg (142 lb)   11/01/21 62.6 kg (137 lb 14.4 oz)     The rest of a comprehensive physical examination is deferred due to public [facility] emergency video visit restrictions.  CONSITUTIONAL: no acute distress  HEENT: no icterus, no redness or discharge, neck supple  CV: no visible edema of visualized extremities. No JVD.   RESPIRATORY: respirations nonlabored, no cough  NEURO: AA&Ox3, speech fluent/appropriate, motor grossly nonfocal  PSYCH: cooperative, affect appropriate  DERM: no rashes on visualized face/neck/upper extremities       Medications  Allergies   Current Outpatient Medications   Medication Sig Dispense Refill     acetaminophen (TYLENOL) 650 MG CR tablet Take 1,300 mg by mouth 2 times daily       amLODIPine (NORVASC) 5 MG tablet Take 1 tablet (5 mg) by mouth daily 90 tablet 2     buPROPion (WELLBUTRIN XL) 300 MG 24 hr tablet TAKE 1 TABLET BY MOUTH EVERY DAY IN THE MORNING 90 tablet 3     calcium-vitamin D 500-125 MG-UNIT TABS Take 1 tablet by mouth daily       Multiple Vitamin (MULTI-DAY VITAMINS) TABS Take  by mouth.       naproxen sodium 220 MG capsule Take 220 mg  by mouth 2 times daily (with meals)       nirmatrelvir and ritonavir (PAXLOVID) therapy pack Take 3 tablets by mouth 2 times daily for 5 days (Take 2 Nirmatrelvir tablets and 1 Ritonavir tablet twice daily for 5 days) 30 each 0     sildenafil (REVATIO) 20 MG tablet TAKE 3-5 TABLETS 30 MINUTES BEFORE SEX AS NEEDED. 90 tablet 3    Allergies   Allergen Reactions     Metoclopramide Rash         Lab Results (Personally Reviewed)    Chemistry/lipid CBC Cardiac Enzymes/BNP/TSH/INR   Lab Results   Component Value Date    BUN 17 12/20/2021     12/20/2021    CO2 26 12/20/2021     Creatinine   Date Value Ref Range Status   12/20/2021 1.12 0.66 - 1.25 mg/dL Final   09/18/2020 1.02 0.66 - 1.25 mg/dL Final       Lab Results   Component Value Date    CHOL 197 12/20/2021    HDL 90 12/20/2021    LDL 89 12/20/2021      Lab Results   Component Value Date    WBC 3.5 (L) 09/18/2020    HGB 14.1 09/18/2020    HCT 40.3 09/18/2020    MCV 91 09/18/2020     (L) 09/18/2020    Lab Results   Component Value Date    TSH 1.35 06/29/2018    INR 1.00 04/13/2010          Video-Visit Details    Type of service:  Video Visit    Video Start Time: 1123    Video End Time:1136    Originating Location (pt. Location): Home    Distant Location (provider location):  [facility]     Platform used for Video Visit: Elsie    I have reviewed the note as documented above.  This accurately captures the substance of my virtual visit with the patient. The patient states understanding and is agreeable with the plan.   AYDEN Thompson CNP  Electrophysiology Consult Service  Pager: [phone]

## 2022-11-09 ENCOUNTER — VIRTUAL VISIT (OUTPATIENT)
Dept: CARDIOLOGY | Facility: CLINIC | Age: 67
End: 2022-11-09
Attending: NURSE PRACTITIONER
Payer: MEDICARE

## 2022-11-09 DIAGNOSIS — I49.5 SINUS NODE DYSFUNCTION (H): ICD-10-CM

## 2022-11-09 DIAGNOSIS — I49.8 SINUS ARRHYTHMIA: Primary | ICD-10-CM

## 2022-11-09 PROCEDURE — G0463 HOSPITAL OUTPT CLINIC VISIT: HCPCS | Mod: PN,RTG | Performed by: NURSE PRACTITIONER

## 2022-11-09 PROCEDURE — 99213 OFFICE O/P EST LOW 20 MIN: CPT | Mod: 95 | Performed by: NURSE PRACTITIONER

## 2022-11-09 NOTE — LETTER
"11/9/2022      RE: Salbador Begum  [address] [place] [address]       Dear Colleague,    Thank you for the opportunity to participate in the care of your patient, Salbador Begum, at the [facility]. Please see a copy of my visit note below.    Salbador is a 67 year old who is being evaluated via a billable video visit.      How would you like to obtain your AVS? MyChart  If the video visit is dropped, the invitation should be resent by: Send to e-mail at: [email or web address]  Will anyone else be joining your video visit? No      Marge Taylor           ELECTROPHYSIOLOGY VIRTUAL CLINIC VISIT  Mr. Salbador Begum is a 67 year old male who is being evaluated via a billable video visit.      The patient has been notified of following:     \"This video visit will be conducted via a call between you and your physician/provider. We have found that certain health care needs can be provided without the need for an in-person physical exam.  This service lets us provide the care you need with a video conversation.  If a prescription is necessary we can send it directly to your pharmacy.  If lab work is needed we can place an order for that and you can then stop by our lab to have the test done at a later time.    If during the course of the call the physician/provider feels a video visit is not appropriate, you will not be charged for this service.\"     Physician/provider has received verbal consent for a Video Visit from the patient? Yes    Assessment/Recommendations   Assessment/Plan:    Mr. Begum is a 67 year old male who has a past medical history significant for SND, MR, diverticulitis, narcolepsy, AIMEE (uses CPAP), OA, and depression.     He has had known SND mainly manifesting as chronotropic incompetence. He has previously underwent a thorough cardiac workup. He continues to have stable " symptoms with intermittent exertional fatigue and SOB. We discussed given stability of his symptoms, we can defer pacing at this time. He understands that a pacemaker may be in his future, and will let us know if he has any worsening frequency/severity of symptoms that become lifestyle limiting.      Follow up in 2 years or sooner if need arises.        History of Present Illness/Subjective    Mr. Salbador Begum is a 67 year old male who comes in today for EP follow-up of SND.    Mr. Begum is a 67 year old male who has a past medical history significant for SND, MR, diverticulitis, narcolepsy, AIMEE (uses CPAP), OA, and depression.       He has had symptoms of dyspnea on exertion associated with blunted HR response to exercise. He was also having a sensation of irregular HRs with exercise. He had a treadmill study that showed excellent exercise capacity. The report mentions PACs and PVCs during exercise and rest. The note I sent him mentions atrial fibrillation during exercise. I will have the stress test ECGs pulled to review again.     Overall, he reports he has been feeling well. He was on vacation, walking up hills and climbing stairs without difficulty. He will occasion notice some shortness of breath with exercise or stairs but this is much less frequent than before. He initially noted this symptom last winter while cross country skiing. He is wondering if it will return this winter.     [id number]: Jogging again, stairs don't bother him like they use to. It had been a couple years since he was in to see me and felt he should touch base.      18Nov2016:  Mr. Begum feels well since his last visit in 09/2015. He continues to bicycle, jog, ski, and run frequently. His symptoms of dizziness/lightheadedness have not precluded him from his personal hobbies or his professional work as an  for the [place] [employer]. He did not a period of dyspnea on exertion over the last few summers that actually  improved when winter came about. Mr. Begum feels that his exercise tolerance is now at baseline. Mr. Begum still has some dizziness/lightheadedness upon waking, but he prevents this by standing slowly. He had a single episode of pre-syncope several months ago. No syncope, chest pain, exertional dyspnea at present.     2/10/17: Since last visit, he was admitted under ED observation for chest pain rule out on 11/20/16. Serial troponin x3 negative, stress echocardiogram with normal baseline echo and appropriate rise in LV systolic function at peak exercise. He did not have any symptoms during the exercise portion of the study. Since discharge, he has felt well, and denies any limitations to exercise capacity such as chest pain/pressure or dyspnea. He is not experiencing palpitations. He is not on a B-blocker as his resting HR is very low at baseline. He exercises regularly, including Nord[employer] and running. He feels well and denies any complaints currently. Unfortunately, his wife was recently diagnosed with frontotemporal dementia, so he plans to retire soon and travel back to [facility] more frequently.      [id number]: He reports no increase in his palpitations.  He has noticed that his exercise abilities are beginning to decline, more than he would anticipate related to simple aging.  He notices that on the days where he is feeling a worse with exercise his heart rate tends to be in the 110s to may be 120 using his Fitbit.  Other days he is able to get his heart rate to the 140s 150s and feels much better with exercise.  He has had no syncope or near syncope.     11/2021: we had planned on a permanent pacemaker for chronotropic incompetence but he elected to hold on that. Today he reports he has been doing well. His wife is not in memory care due to early onset dementia. He has began running again. It takes some time to get his heart rate up but eventually reaches the 140s. He has no complaints about his exercise abilities  at this time.    He presents today for follow up. He reports feeling at baseline. He continues to jog and be active. He does have some symptoms of chronotropic incompetence at times, namely fatigue/PHELPS, but thus far has not been too limiting to him. He denies chest discomfort, palpitations, abdominal fullness/bloating or peripheral edema, shortness of breath, paroxysmal nocturnal dyspnea, orthopnea, lightheadedness, dizziness, pre-syncope, or syncope. Current cardiac medications include: Norvasc.         I have reviewed and updated the patient's Past Medical History, Social History, Family History and Medication List.     Cardiographics (Personally Reviewed) :   11/2021 Echo:   Interpretation Summary  Global and regional left ventricular function is normal with an EF of 55-60%.  The right ventricle is normal size. Global right ventricular function is  normal.  Mild to moderate mitral insufficiency is present.  IVC diameter and respiratory changes fall into an intermediate range  suggesting an RA pressure of 8 mmHg.  No pericardial effusion is present.       Physical Examination   There were no vitals taken for this visit.  Wt Readings from Last 3 Encounters:   04/28/22 63 kg (139 lb)   12/20/21 64.4 kg (142 lb)   11/01/21 62.6 kg (137 lb 14.4 oz)     The rest of a comprehensive physical examination is deferred due to public health emergency video visit restrictions.  CONSITUTIONAL: no acute distress  HEENT: no icterus, no redness or discharge, neck supple  CV: no visible edema of visualized extremities. No JVD.   RESPIRATORY: respirations nonlabored, no cough  NEURO: AA&Ox3, speech fluent/appropriate, motor grossly nonfocal  PSYCH: cooperative, affect appropriate  DERM: no rashes on visualized face/neck/upper extremities       Medications  Allergies   Current Outpatient Medications   Medication Sig Dispense Refill     acetaminophen (TYLENOL) 650 MG CR tablet Take 1,300 mg by mouth 2 times daily       amLODIPine  (NORVASC) 5 MG tablet Take 1 tablet (5 mg) by mouth daily 90 tablet 2     buPROPion (WELLBUTRIN XL) 300 MG 24 hr tablet TAKE 1 TABLET BY MOUTH EVERY DAY IN THE MORNING 90 tablet 3     calcium-vitamin D 500-125 MG-UNIT TABS Take 1 tablet by mouth daily       Multiple Vitamin (MULTI-DAY VITAMINS) TABS Take  by mouth.       naproxen sodium 220 MG capsule Take 220 mg by mouth 2 times daily (with meals)       nirmatrelvir and ritonavir (PAXLOVID) therapy pack Take 3 tablets by mouth 2 times daily for 5 days (Take 2 Nirmatrelvir tablets and 1 Ritonavir tablet twice daily for 5 days) 30 each 0     sildenafil (REVATIO) 20 MG tablet TAKE 3-5 TABLETS 30 MINUTES BEFORE SEX AS NEEDED. 90 tablet 3    Allergies   Allergen Reactions     Metoclopramide Rash         Lab Results (Personally Reviewed)    Chemistry/lipid CBC Cardiac Enzymes/BNP/TSH/INR   Lab Results   Component Value Date    BUN 17 12/20/2021     12/20/2021    CO2 26 12/20/2021     Creatinine   Date Value Ref Range Status   12/20/2021 1.12 0.66 - 1.25 mg/dL Final   09/18/2020 1.02 0.66 - 1.25 mg/dL Final       Lab Results   Component Value Date    CHOL 197 12/20/2021    HDL 90 12/20/2021    LDL 89 12/20/2021    Lab Results   Component Value Date    WBC 3.5 (L) 09/18/2020    HGB 14.1 09/18/2020    HCT 40.3 09/18/2020    MCV 91 09/18/2020     (L) 09/18/2020    Lab Results   Component Value Date    TSH 1.35 06/29/2018    INR 1.00 04/13/2010        I have reviewed the note as documented above.  This accurately captures the substance of my virtual visit with the patient. The patient states understanding and is agreeable with the plan.       Please do not hesitate to contact me if you have any questions/concerns.     Sincerely,     AYDEN Sanchez CNP

## 2022-11-09 NOTE — NURSING NOTE
Chief Complaint   Patient presents with     Follow Up     Pt reported taking adderral (prescribed for excessive daytime sleepiness per pt)    Marge DALY

## 2022-11-18 ENCOUNTER — NURSE TRIAGE (OUTPATIENT)
Dept: FAMILY MEDICINE | Facility: CLINIC | Age: 67
End: 2022-11-18

## 2022-11-18 NOTE — TELEPHONE ENCOUNTER
Additional Information    Negative: SEVERE difficulty breathing (e.g., struggling for each breath, speaks in single words)    Negative: Difficult to awaken or acting confused (e.g., disoriented, slurred speech)    Negative: Bluish (or gray) lips or face now    Negative: Shock suspected (e.g., cold/pale/clammy skin, too weak to stand, low BP, rapid pulse)    Negative: Sounds like a life-threatening emergency to the triager    Negative: [1] Diagnosed or suspected COVID-19 AND [2] symptoms lasting 3 or more weeks    Negative: [1] COVID-19 exposure AND [2] no symptoms    Negative: COVID-19 vaccine reaction suspected (e.g., fever, headache, muscle aches) occurring 1 to 3 days after getting vaccine    Negative: COVID-19 vaccine, questions about    Negative: [1] Lives with someone known to have influenza (flu test positive) AND [2] flu-like symptoms (e.g., cough, runny nose, sore throat, SOB; with or without fever)    Negative: [1] Adult with possible COVID-19 symptoms AND [2] triager concerned about severity of symptoms or other causes    Negative: COVID-19 and breastfeeding, questions about    Negative: SEVERE or constant chest pain or pressure  (Exception: Mild central chest pain, present only when coughing.)    Negative: MODERATE difficulty breathing (e.g., speaks in phrases, SOB even at rest, pulse 100-120)    Negative: Headache and stiff neck (can't touch chin to chest)    Negative: Oxygen level (e.g., pulse oximetry) 90 percent or lower    Negative: Chest pain or pressure    Negative: Patient sounds very sick or weak to the triager    Negative: MILD difficulty breathing (e.g., minimal/no SOB at rest, SOB with walking, pulse <100)    Negative: Fever > 103 F (39.4 C)    Negative: [1] Fever > 101 F (38.3 C) AND [2] over 60 years of age    Negative: [1] Fever > 100.0 F (37.8 C) AND [2] bedridden (e.g., nursing home patient, CVA, chronic illness, recovering from surgery)    Negative: HIGH RISK for severe COVID  complications (e.g., weak immune system, age > 64 years, obesity with BMI > 25, pregnant, chronic lung disease or other chronic medical condition) (Exception: Already seen by PCP and no new or worsening symptoms.)    Negative: [1] HIGH RISK patient AND [2] influenza is widespread in the community AND [3] ONE OR MORE respiratory symptoms: cough, sore throat, runny or stuffy nose    Negative: [1] HIGH RISK patient AND [2] influenza exposure within the last 7 days AND [3] ONE OR MORE respiratory symptoms: cough, sore throat, runny or stuffy nose    Negative: Oxygen level (e.g., pulse oximetry) 91 to 94 percent    Negative: [1] COVID-19 infection suspected by caller or triager AND [2] mild symptoms (cough, fever, or others) AND [3] negative COVID-19 rapid test    Negative: Fever present > 3 days (72 hours)    Negative: [1] Fever returns after gone for over 24 hours AND [2] symptoms worse or not improved    Negative: [1] Continuous (nonstop) coughing interferes with work or school AND [2] no improvement using cough treatment per Care Advice    Negative: Cough present > 3 weeks    Negative: [1] COVID-19 diagnosed by positive lab test (e.g., PCR, rapid self-test kit) AND [2] NO symptoms (e.g., cough, fever, others)    Negative: [1] COVID-19 diagnosed by positive lab test (e.g., PCR, rapid self-test kit) AND [2] mild symptoms (e.g., cough, fever, others) AND [3] no complications or SOB    Negative: [1] COVID-19 diagnosed by doctor (or NP/PA) AND [2] mild symptoms (e.g., cough, fever, others) AND [3] no complications or SOB    Negative: [1] COVID-19 diagnosed AND [2] has mild nausea, vomiting or diarrhea    Negative: [1] COVID-19 infection suspected by caller or triager AND [2] mild symptoms (cough, fever, or others) AND [3] has not gotten tested yet    Negative: COVID-19 Home Isolation, questions about    Negative: COVID-19 Testing, questions about    Negative: COVID-19 Prevention and Healthy Living, questions about     "Negative: COVID-19 Disease, questions about    Answer Assessment - Initial Assessment Questions  1. COVID-19 DIAGNOSIS: \"Who made your COVID-19 diagnosis?\" \"Was it confirmed by a positive lab test or self-test?\" If not diagnosed by a doctor (or NP/PA), ask \"Are there lots of cases (community spread) where you live?\" Note: See public health department website, if unsure.      Posted test about 2 weeks ago, had a negative test 2 days ago and today positive again    2. COVID-19 EXPOSURE: \"Was there any known exposure to COVID before the symptoms began?\" CDC Definition of close contact: within 6 feet (2 meters) for a total of 15 minutes or more over a 24-hour period.    NA  3. ONSET: \"When did the COVID-19 symptoms start?\"      Woke up sick again today,   4. WORST SYMPTOM: \"What is your worst symptom?\" (e.g., cough, fever, shortness of breath, muscle aches)     Same as always, scratchy throat , cough, nasal congestion  5. COUGH: \"Do you have a cough?\" If Yes, ask: \"How bad is the cough?\"       Cough is \"not bad\"   6. FEVER: \"Do you have a fever?\" If Yes, ask: \"What is your temperature, how was it measured, and when did it start?\"     Day 3 of infection before has fever, no fever yet  7. RESPIRATORY STATUS: \"Describe your breathing?\" (e.g., shortness of breath, wheezing, unable to speak)       No breathing problems  8. BETTER-SAME-WORSE: \"Are you getting better, staying the same or getting worse compared to yesterday?\"  If getting worse, ask, \"In what way?\"      Feels just like the first time around 2 weeks ago  9. HIGH RISK DISEASE: \"Do you have any chronic medical problems?\" (e.g., asthma, heart or lung disease, weak immune system, obesity, etc.)      Some cardiac issues  10. VACCINE: \"Have you had the COVID-19 vaccine?\" If Yes, ask: \"Which one, how many shots, when did you get it?\"       All 5  11. BOOSTER: \"Have you received your COVID-19 booster?\" If Yes, ask: \"Which one and when did you get it?\"       Did get " "bivalent booster     13. OTHER SYMPTOMS: \"Do you have any other symptoms?\"  (e.g., chills, fatigue, headache, loss of smell or taste, muscle pain, sore throat)      No muscle pain, no headache now  14. O2 SATURATION MONITOR:  \"Do you use an oxygen saturation monitor (pulse oximeter) at home?\" If Yes, ask \"What is your reading (oxygen level) today?\" \"What is your usual oxygen saturation reading?\" (e.g., 95%)       Doesn't have    Protocols used: CORONAVIRUS (COVID-19) DIAGNOSED OR [id number] 1.18.2022      "

## 2022-11-18 NOTE — TELEPHONE ENCOUNTER
Patient calling in about this    I relayed message below    LEVI VarmaN RN  [facility]

## 2022-11-18 NOTE — TELEPHONE ENCOUNTER
Yes, agree that he should isolate again for another 5 days then mask for 5 days after that  No need to repeat testing if symptoms improving  Sharon Pearce PA-C

## 2022-11-18 NOTE — TELEPHONE ENCOUNTER
To covering provider.    Pt had covid 2 weeks ago. Put on paxlovid. Sx mild then    Testing was negative last couple days and feeling better . Today minor sx started again - just like the first time, cough, runny nose, scratchy throat, no fever, no difficulty breathing or muscle aches. He tested and home test positive    Pt will be advised to isolate for 5 days again and then wear mask when around others for an additional 5 days. Correct?  Do you agree with this advice?     Is there a need to test again before going out in public as long as sx nikki?    Radha Stauffer, RN, BSN  [facility]

## 2022-12-07 ENCOUNTER — TRANSCRIBE ORDERS (OUTPATIENT)
Dept: OTHER | Age: 67
End: 2022-12-07

## 2022-12-07 ENCOUNTER — THERAPY VISIT (OUTPATIENT)
Dept: PHYSICAL THERAPY | Facility: CLINIC | Age: 67
End: 2022-12-07
Payer: MEDICARE

## 2022-12-07 DIAGNOSIS — M77.8 TRICEPS TENDONITIS: ICD-10-CM

## 2022-12-07 DIAGNOSIS — M75.80 ROTATOR CUFF TENDONITIS: ICD-10-CM

## 2022-12-07 DIAGNOSIS — M75.80 ROTATOR CUFF TENDONITIS: Primary | ICD-10-CM

## 2022-12-07 PROCEDURE — 97110 THERAPEUTIC EXERCISES: CPT | Mod: GP | Performed by: PHYSICAL THERAPIST

## 2022-12-07 PROCEDURE — 97161 PT EVAL LOW COMPLEX 20 MIN: CPT | Mod: GP | Performed by: PHYSICAL THERAPIST

## 2022-12-07 NOTE — PROGRESS NOTES
Physical Therapy Initial Evaluation  Subjective:    Patient Health History  Salbador Begum being seen for R shoulder.     Date of Onset: summer 2022.   HPI: Documentation: unknown onset; denies injury.   Pain is reported as 4/10 on pain scale.  General health as reported by patient is excellent.  Pertinent medical history includes: high blood pressure, depression, heart problems and osteoarthritis.   Red flags:  None as reported by patient.  Medical allergies: none.   Surgeries include:  Orthopedic surgery (R ankle; L hip; R hand).    Current medications:  Anti-depressants, high blood pressure medication and pain medication.    Occupation: retired                      Therapist Generated HPI Evaluation  Problem details: Pt presents to PT with a chief complaint of R shoulder pain with reported insidious onset in the summer (~08/2022). Pt denies any acute precipitating event but rather noticed increased pain with lifting. Pain reported to be worse with reaching overhead, reaching behind back, and after activity. Pain improved with activity modification and tylenol. .         Type of problem:  Right shoulder.    This is a new condition.  Condition occurred with:  Unknown cause.  Where condition occurred: for unknown reasons.  Patient reports pain:  Anterior and lateral.  Pain is described as aching   Pain radiates to:  Upper arm. Pain is worse in the P.M. and worse during the night.  Since onset symptoms are gradually worsening and unchanged.  Associated symptoms:  Loss of motion/stiffness, loss of strength and painful arc. Symptoms are exacerbated by using arm behind back, lifting, using arm overhead, using arm at shoulder level and lying on extremity  and relieved by NSAID's.      Barriers include:  None as reported by patient.                        Objective:  Standing Alignment:      Shoulder/UE:  Scapular downward rotation R                                       Shoulder Evaluation:  ROM:  AROM:    Flexion:   Left:  165    Right:  160*    Abduction:  Left: 160   Right:  150*    Internal Rotation:  Left:  WNL    Right:  Min loss  External Rotation:  Left:  75    Right:  70                  Pain: Pain + with end range R shoulder elevation and IR     Strength:    Flexion: Left:5/5   Pain:    Right: 5/5     Pain:     Abduction:  Left: 5-/5  Pain:    Right: 4+/5      Pain:+    Internal Rotation:  Left:5/5     Pain:    Right: 5/5     Pain:  External Rotation:   Left:5-/5     Pain:   Right:4+/5      Pain:+              Special Tests:      Right shoulder positive for the following special tests:Impingement  Right shoulder negative for the following special tests:Rotator cuff tear    Mobility Tests:      Glenohumeral posterior right:  Hypomobile                                             General     ROS    Assessment/Plan:    Patient is a 67 year old male with right side shoulder complaints.    Patient has the following significant findings with corresponding treatment plan.                Diagnosis 1:  R shoulder pain, RTC tendinitis  Pain -  manual therapy, splint/taping/bracing/orthotics, self management, education and home program  Decreased ROM/flexibility - manual therapy and therapeutic exercise  Decreased joint mobility - manual therapy and therapeutic exercise  Decreased strength - therapeutic exercise and therapeutic activities  Impaired muscle performance - neuro re-education  Impaired posture - neuro re-education    Therapy Evaluation Codes:     Cumulative Therapy Evaluation is: Low complexity.    Previous and current functional limitations:  (See Goal Flow Sheet for this information)    Short term and Long term goals: (See Goal Flow Sheet for this information)     Communication ability:  Patient appears to be able to clearly communicate and understand verbal and written communication and follow directions correctly.  Treatment Explanation - The following has been discussed with the patient:   RX ordered/plan of  care  Anticipated outcomes  Possible risks and side effects  This patient would benefit from PT intervention to resume normal activities.   Rehab potential is good.    Frequency:  1 X week, once daily  Duration:  for 4 weeks then 2x month for 2 months  Discharge Plan:  Achieve all LTG.  Independent in home treatment program.  Reach maximal therapeutic benefit.    Please refer to the daily flowsheet for treatment today, total treatment time and time spent performing 1:1 timed codes.

## 2022-12-07 NOTE — PROGRESS NOTES
[facility]    OUTPATIENT Physical Therapy ORTHOPEDIC EVALUATION  PLAN OF TREATMENT FOR OUTPATIENT REHABILITATION  (COMPLETE FOR INITIAL CLAIMS ONLY)  Patient's Last Name, First Name, M.I.  YOB: 1955  Salbador Begum    Provider s Name:  [facility]   Medical Record No.  [id number]   Start of Care Date:  12/07/22   Onset Date:       Treatment Diagnosis:  R shoulder RTC impingement Medical Diagnosis:     Rotator cuff tendonitis  Triceps tendonitis       Goals:     12/07/22 0500   Body Part   Goals listed below are for R shoulder   Goal #1   Goal #1 reaching   Previous Functional Level No restrictions   Current Functional Level Can reach ;behind back;overhead   Performance level pain 4/10   STG Target Performance Reach ;overhead   Performance level painfree   Rationale for independent personal hygiene;for dressing;for bathing   Due date 01/04/23   LTG Target Performance Reach;behind back;overhead   Performance Level painfree   Rationale for independent personal hygiene;for bathing;for dressing   Due date 02/01/23       Therapy Frequency:  1x week  Predicted Duration of Therapy Intervention:  4 weeks; then 2x month for 2 months    Adam Lewis, PT                 I CERTIFY THE NEED FOR THESE SERVICES FURNISHED UNDER        THIS PLAN OF TREATMENT AND WHILE UNDER MY CARE .             Physician Signature               Date    X_____________________________________________________                         Certification Date From:  12/07/22   Certification Date To:  03/01/23    Referring Provider:  Terence Bullock    Initial Assessment        See Epic Evaluation SOC Date: 12/07/22

## 2022-12-30 ENCOUNTER — THERAPY VISIT (OUTPATIENT)
Dept: PHYSICAL THERAPY | Facility: CLINIC | Age: 67
End: 2022-12-30
Payer: MEDICARE

## 2022-12-30 DIAGNOSIS — M75.80 ROTATOR CUFF TENDONITIS: Primary | ICD-10-CM

## 2022-12-30 PROCEDURE — 97112 NEUROMUSCULAR REEDUCATION: CPT | Mod: GP | Performed by: PHYSICAL THERAPIST

## 2022-12-30 PROCEDURE — 97110 THERAPEUTIC EXERCISES: CPT | Mod: GP | Performed by: PHYSICAL THERAPIST

## 2023-01-05 ENCOUNTER — THERAPY VISIT (OUTPATIENT)
Dept: PHYSICAL THERAPY | Facility: CLINIC | Age: 68
End: 2023-01-05
Payer: MEDICARE

## 2023-01-05 DIAGNOSIS — M75.81 TENDINITIS OF RIGHT ROTATOR CUFF: Primary | ICD-10-CM

## 2023-01-05 PROCEDURE — 97140 MANUAL THERAPY 1/> REGIONS: CPT | Mod: GP | Performed by: PHYSICAL THERAPIST

## 2023-01-05 PROCEDURE — 97112 NEUROMUSCULAR REEDUCATION: CPT | Mod: GP | Performed by: PHYSICAL THERAPIST

## 2023-01-05 PROCEDURE — 97110 THERAPEUTIC EXERCISES: CPT | Mod: GP | Performed by: PHYSICAL THERAPIST

## 2023-01-17 ENCOUNTER — TELEPHONE (OUTPATIENT)
Dept: FAMILY MEDICINE | Facility: CLINIC | Age: 68
End: 2023-01-17
Payer: MEDICARE

## 2023-01-17 NOTE — TELEPHONE ENCOUNTER
New Medication Request        What medication are you requesting?: Oxycodone 5mg    Reason for medication request: Acute back pain    Have you taken this medication before?: Yes: Patient states they took this back in 2014 but found one yesterday and took it to help with the pain.     Controlled Substance Agreement on file:   CSA -- Patient Level:    CSA: None found at the patient level.         Patient offered an appointment? No    Preferred Pharmacy:     [id number] - [place], [place] - [address]  [address] [place] [address]  Phone: [phone] Fax: [phone]    OR [facility]        Could we send this information to you in [facility] or would you prefer to receive a phone call?:   Patient would prefer a phone call   Okay to leave a detailed message?: Yes at Cell number on file:    Telephone Information:   Mobile [phone]     Patient states they see [facility] in a week but wants something to help him get through the pain until that appointment.

## 2023-01-17 NOTE — TELEPHONE ENCOUNTER
This would require appt.    Sent pt [employer] message to schedule    LEVI VarmaN RN  [facility]

## 2023-01-19 ENCOUNTER — THERAPY VISIT (OUTPATIENT)
Dept: PHYSICAL THERAPY | Facility: CLINIC | Age: 68
End: 2023-01-19
Payer: MEDICARE

## 2023-01-19 DIAGNOSIS — M75.81 TENDINITIS OF RIGHT ROTATOR CUFF: Primary | ICD-10-CM

## 2023-01-19 PROCEDURE — 97140 MANUAL THERAPY 1/> REGIONS: CPT | Mod: GP | Performed by: PHYSICAL THERAPIST

## 2023-01-19 PROCEDURE — 97112 NEUROMUSCULAR REEDUCATION: CPT | Mod: GP | Performed by: PHYSICAL THERAPIST

## 2023-01-19 PROCEDURE — 97110 THERAPEUTIC EXERCISES: CPT | Mod: GP | Performed by: PHYSICAL THERAPIST

## 2023-01-20 ENCOUNTER — OFFICE VISIT (OUTPATIENT)
Dept: FAMILY MEDICINE | Facility: CLINIC | Age: 68
End: 2023-01-20
Payer: MEDICARE

## 2023-01-20 VITALS
HEIGHT: 68 IN | WEIGHT: 144 LBS | TEMPERATURE: 97.3 F | HEART RATE: 41 BPM | SYSTOLIC BLOOD PRESSURE: 126 MMHG | BODY MASS INDEX: 21.82 KG/M2 | DIASTOLIC BLOOD PRESSURE: 80 MMHG | RESPIRATION RATE: 15 BRPM | OXYGEN SATURATION: 100 %

## 2023-01-20 DIAGNOSIS — M54.50 CHRONIC LOW BACK PAIN, UNSPECIFIED BACK PAIN LATERALITY, UNSPECIFIED WHETHER SCIATICA PRESENT: Primary | ICD-10-CM

## 2023-01-20 DIAGNOSIS — I49.5 SINUS NODE DYSFUNCTION (H): ICD-10-CM

## 2023-01-20 DIAGNOSIS — F33.0 MAJOR DEPRESSIVE DISORDER, RECURRENT, MILD (H): ICD-10-CM

## 2023-01-20 DIAGNOSIS — G89.29 CHRONIC LOW BACK PAIN, UNSPECIFIED BACK PAIN LATERALITY, UNSPECIFIED WHETHER SCIATICA PRESENT: Primary | ICD-10-CM

## 2023-01-20 DIAGNOSIS — M48.062 SPINAL STENOSIS OF LUMBAR REGION WITH NEUROGENIC CLAUDICATION: ICD-10-CM

## 2023-01-20 PROCEDURE — 90677 PCV20 VACCINE IM: CPT | Performed by: FAMILY MEDICINE

## 2023-01-20 PROCEDURE — G0009 ADMIN PNEUMOCOCCAL VACCINE: HCPCS | Performed by: FAMILY MEDICINE

## 2023-01-20 PROCEDURE — 99214 OFFICE O/P EST MOD 30 MIN: CPT | Mod: 25 | Performed by: FAMILY MEDICINE

## 2023-01-20 RX ORDER — HYDROCODONE BITARTRATE AND ACETAMINOPHEN 5; 325 MG/1; MG/1
1 TABLET ORAL EVERY 6 HOURS PRN
Qty: 10 TABLET | Refills: 0 | Status: SHIPPED | OUTPATIENT
Start: 2023-01-20 | End: 2023-01-23

## 2023-01-20 RX ORDER — NABUMETONE 500 MG/1
500 TABLET, FILM COATED ORAL 2 TIMES DAILY PRN
Qty: 30 TABLET | Refills: 0 | Status: SHIPPED | OUTPATIENT
Start: 2023-01-20 | End: 2023-02-03

## 2023-01-20 ASSESSMENT — PAIN SCALES - GENERAL: PAINLEVEL: EXTREME PAIN (8)

## 2023-01-20 NOTE — PROGRESS NOTES
"  Assessment & Plan     Chronic low back pain, unspecified back pain laterality, unspecified whether sciatica present  and   Spinal stenosis of lumbar region with neurogenic claudication    Managed by [facility] - Dr. Noriega as well as consult at [place]    Has appointment later this week for evaluation and likely facet injection    Would like pain medication for \"just in case\" when pain severe.    We reviewed record and looks like Dr. Tam prescribed nabumetone several years ago - refilled this although discussed that can't take if takes naproxen.     Also sent hydrocodone-acetaminophen for prn use for severe acute pain - #10, no refills, per guidelines for safe opioid prescribing    Review of [place] Prescription Monitoring Program (): Today I have also reviewed the patient's history of controlled substance use, as provided by [place] licensed pharmacies and prescriber dispensers.  No unusual or outside prescriptions.    Discussed with patient, all questions answered, in agreement with this plan, will return or seek further care if not improving or worsening. Also discussed red flag back symptoms and when to go to emergency room (new saddle anesthesia/numbness, new incontinence, fever, loss of muscle function of legs)  - nabumetone (RELAFEN) 500 MG tablet; Take 1 tablet (500 mg) by mouth 2 times daily as needed for moderate pain (4-6)  - HYDROcodone-acetaminophen (NORCO) 5-325 MG tablet; Take 1 tablet by mouth every 6 hours as needed for severe pain (7-10)    Major depressive disorder, recurrent, mild (H)    In full remission, stable on bupropion.    Reviewed and last PHQ9 in November was 0  PHQ 10/25/2021 8/24/2022 11/7/2022   PHQ-9 Total Score 0 6 0   Q9: Thoughts of better off dead/self-harm past 2 weeks Not at all Not at all Not at all         Sinus node dysfunction (H)    Follows with cardiology, stable    Reviewed last cardiology note 11/2022:    He has had known SND mainly manifesting as chronotropic " incompetence. He has previously underwent a thorough cardiac workup. He continues to have stable symptoms with intermittent exertional fatigue and SOB. We discussed given stability of his symptoms, we can defer pacing at this time. He understands that a pacemaker may be in his future, and will let us know if he has any worsening frequency/severity of symptoms that become lifestyle limiting.       Follow up in 2 years or sooner if need arises.         Return for already scheduled appt, with your PCP.    Shyla Davis MD  [facility]    Papa Siu is a 67 year old, presenting for the following health issues:  Pain (Pain Management )      Pain    History of Present Illness       Back Pain:  He presents for follow up of back pain. Patient's back pain is a chronic problem.  Location of back pain:  Right lower back, left lower back, right buttock, left buttock, right side of waist, left side of waist and other  Description of back pain: burning, cramping and dull ache  Back pain spreads: right buttocks, left buttocks, right thigh, right knee and right foot    Since patient first noticed back pain, pain is: always present, but gets better and worse  Does back pain interfere with his job:  Not applicable      He eats 2-3 servings of fruits and vegetables daily.He consumes 0 sweetened beverage(s) daily.He exercises with enough effort to increase his heart rate 30 to 60 minutes per day.  He exercises with enough effort to increase his heart rate 5 days per week.   He is taking medications regularly.     67 year old with back pain, following at [facility].  Was at [place] this summer getting 2nd/3rd opinion  Most recent MRI 3-4 years ago  Showed low back arthritis and severe spinal stenosis L5-S2.  Issue comes and goes.  Has had for 35 years  When went down there had just finished acute phase.  Pain in mid low back, not radiating.  No numbness, but had severe cramping in buttocks.  Hard to  "stand up, hard to take a few steps in AM.    After [place] - went to see Dr. Noriega at [facility].  They discussed facet injection at that time, but was feeling better.    Now it's back and with a vengenance.  That morning couldn't walk.    Doesn't want to take pain medication, but wants to have just in case.  Hasn't typically managed pain with medicine in past      At [place] - they said not candidate for surgery at that time as no numbness.      Review of Systems   Constitutional, HEENT, cardiovascular, pulmonary, gi and gu systems are negative, except as otherwise noted.      Objective    BP (!) 144/79 (BP Location: Left arm, Patient Position: Sitting, Cuff Size: Adult Regular)   Pulse (!) 41   Temp 97.3  F (36.3  C) (Temporal)   Resp 15   Ht 1.727 m (5' 8\")   Wt 65.3 kg (144 lb)   SpO2 100%   BMI 21.90 kg/m    Body mass index is 21.9 kg/m .  Physical Exam   GENERAL: healthy, alert and no distress                "

## 2023-01-25 DIAGNOSIS — N52.9 ERECTILE DYSFUNCTION, UNSPECIFIED ERECTILE DYSFUNCTION TYPE: ICD-10-CM

## 2023-01-26 ENCOUNTER — TRANSFERRED RECORDS (OUTPATIENT)
Dept: HEALTH INFORMATION MANAGEMENT | Facility: CLINIC | Age: 68
End: 2023-01-26

## 2023-01-26 RX ORDER — SILDENAFIL CITRATE 20 MG/1
TABLET ORAL
Qty: 90 TABLET | Refills: 3 | Status: SHIPPED | OUTPATIENT
Start: 2023-01-26 | End: 2024-02-01

## 2023-01-26 NOTE — TELEPHONE ENCOUNTER
Prescription approved per [facility] Refill Protocol.    Macarena Thomas, BSN RN  [facility]

## 2023-01-28 DIAGNOSIS — I10 BENIGN ESSENTIAL HYPERTENSION: ICD-10-CM

## 2023-02-01 ENCOUNTER — THERAPY VISIT (OUTPATIENT)
Dept: PHYSICAL THERAPY | Facility: CLINIC | Age: 68
End: 2023-02-01
Payer: MEDICARE

## 2023-02-01 ENCOUNTER — TRANSCRIBE ORDERS (OUTPATIENT)
Dept: OTHER | Age: 68
End: 2023-02-01

## 2023-02-01 DIAGNOSIS — G89.29 CHRONIC BILATERAL LOW BACK PAIN WITH RIGHT-SIDED SCIATICA: ICD-10-CM

## 2023-02-01 DIAGNOSIS — M54.41 CHRONIC BILATERAL LOW BACK PAIN WITH RIGHT-SIDED SCIATICA: ICD-10-CM

## 2023-02-01 DIAGNOSIS — I10 BENIGN ESSENTIAL HYPERTENSION: ICD-10-CM

## 2023-02-01 PROCEDURE — 97161 PT EVAL LOW COMPLEX 20 MIN: CPT | Mod: GP | Performed by: PHYSICAL THERAPIST

## 2023-02-01 PROCEDURE — 97110 THERAPEUTIC EXERCISES: CPT | Mod: GP | Performed by: PHYSICAL THERAPIST

## 2023-02-01 PROCEDURE — 97112 NEUROMUSCULAR REEDUCATION: CPT | Mod: GP | Performed by: PHYSICAL THERAPIST

## 2023-02-01 RX ORDER — AMLODIPINE BESYLATE 5 MG/1
5 TABLET ORAL DAILY
Qty: 90 TABLET | Refills: 0 | Status: SHIPPED | OUTPATIENT
Start: 2023-02-01 | End: 2023-03-01

## 2023-02-01 NOTE — PROGRESS NOTES
[facility]    OUTPATIENT Physical Therapy ORTHOPEDIC EVALUATION  PLAN OF TREATMENT FOR OUTPATIENT REHABILITATION  (COMPLETE FOR INITIAL CLAIMS ONLY)  Patient's Last Name, First Name, M.I.  YOB: 1955  Salbador Begum    Provider s Name:  [facility]   Medical Record No.  [id number]   Start of Care Date:  02/01/23   Onset Date:   01/20/23   Treatment Diagnosis:  LBP; Lumbar stenosis Medical Diagnosis:  Chronic bilateral low back pain with right-sided sciatica       Goals:     02/01/23 0500   Goal #2   Goal #2 standing   Previous Functional Level No restrictions   Current Functional Level Minutes patient can stand   Performance level 10' in A.M pain 8/10   STG Target Performance Minutes patient will be able to stand   Performance level >30' in A.M pain 4/10 or less   Rationale for housekeeping tasks such as vacuuming, bed making, mowing, gardening;for personal hygiene;for meal preparation   Due date 03/01/23   LTG Target Performance Minutes patient will be able to stand   Performance Level >60' in a.m painfree   Rationale for housekeeping tasks such as vacuuming, bed making, mowing;for personal hygiene;for meal preparation   Due date 03/29/23         Therapy Frequency:  1x week  Predicted Duration of Therapy Intervention:  6 weeks then 2 x month for 1 month    Adam Lewis, PT                 I CERTIFY THE NEED FOR THESE SERVICES FURNISHED UNDER        THIS PLAN OF TREATMENT AND WHILE UNDER MY CARE .             Physician Signature               Date    X_____________________________________________________                         Certification Date From:  02/01/23   Certification Date To:  04/22/23    Referring Provider:  Terence Bullock    Initial Assessment        See Epic Evaluation SOC Date: 02/01/23                                                        LOV 6-25-19 TL  Per OV notes:  Recommend:  Discussed adjustments with the diabetes medications:              Lower evening dose of basal insulin, new dosing as Levemir 54U sq QAM and 60U sq QPM              Continue Humalog as H40- H20 (-35)- H20 (-25) schedule                          Humalog sscale 5U per 50>150               Continue current glimeparide 4 mg 2-tabs po QPM      Consider change from Levemir (and Victoza) to Xultophy              Discussed Xultophy option, gave them paper Rx and discount card              Let me know outcome of this pharmacy check              Discussed a transition plan using lower dose Xultophy with Levemir every day              Contact me with feedback on Xultophy- pharmacy check  Check labs today  Needs close monitoring of BP, blood Cr and BUN, urine microalbumin ratio              Consider nephrology consultation  Keep focus on diet, exercise, weight management  Advise having fasting lipid panel testing and dilated eye examination, at least annually  Keep regular followup ophthalmology evaluations also     Addressed patient questions today     Labs ordered today:       Orders Placed This Encounter   Procedures     GLUCOSE MONITOR, 72 HOUR, PHYS INTERP     Hemoglobin A1c     Basic metabolic panel     Lipid panel reflex to direct LDL Fasting      Radiology/Consults ordered today: None     More than 50% of the time spent with Mr. De Leon on counseling / coordinating his care.  Total appointment time was 30 minutes.      Follow-up:  3 mo.        Glimepiride dose was not updated in chart/new Rx was not sent to pharmacy.  Pharmacy now seeking refill/new Rx.    Pended for 8mg daily, #60 (one month) R0 as patient is overdue for office visit.  Please review.    Again will send patient [employer] message (one was sent in late October that patient read but did not schedule appt).    Pending Prescriptions:                       Disp   Refills    glimepiride (AMARYL) 4 MG tablet          60  tab*0            Sig: Take 2 tablets (8 mg) by mouth every morning           (before breakfast) - Overdue for office visit           with Dr Bustos          Last Written Prescription Date:  -  Last Fill Quantity: -,   # refills: -  Last Office Visit: 6-25-19 TL  Future Office visit:       Routing refill request to provider for review/approval because:  Dose change not updated in chart    Vanessa Carrasco RT (R)

## 2023-02-01 NOTE — PROGRESS NOTES
Physical Therapy Initial Evaluation  Subjective:    Therapist Generated HPI Evaluation  Problem details: Pt presents to PT with a chief complaint of chronic LBP with recent exacerbation ~2 months ago. Primary pain location identified at central lower lumbar spine with radiation into bilateral lumbar spine/gluteals. Pain worse early in the a.m which worsens with prolonged standing/walking with occasional severe muscle spasms reported. Pain improved with sitting and activity/movement. Pt has started to notice intermittent R LE/foot numbness/tingling over the past month which is a new symptom for him. Previous Lumbar MRI reveals severe lower lumbar stenosis and MD recommended a refresher of his PT program with potential for future MRI and/or CSI. .         Type of problem:  Lumbar.    This is a chronic condition.  Condition occurred with:  Insidious onset.  Where condition occurred: for unknown reasons.  Patient reports pain:  Lower lumbar spine, lumbar spine left and lumbar spine right.  Pain is described as aching and sharp and is constant.  Pain radiates to:  Gluteals left and gluteals right (occasional tingling into R foot/lower leg). Pain is worse in the A.M..  Since onset symptoms are gradually worsening.  Associated symptoms:  Loss of motion/stiffness, numbness and tingling. Symptoms are exacerbated by twisting, lifting, standing and walking  and relieved by NSAID's and activity/movement.  Special tests included:  MRI.  Previous treatment includes physical therapy. There was mild improvement following previous treatment.  Barriers include:  None as reported by patient.                        Objective:    Gait:  Loss of lumbar/hip ext during gait                   Lumbar/SI Evaluation  ROM:    AROM Lumbar:   Flexion:            WNL  Ext:                    Major loss, pain ++ into lumbar and R LE   Side Bend:        Left:  WNL    Right:  WNL  Rotation:           Left:  Min loss    Right:  Min loss  Side Glide:         Left:     Right:           Lumbar Myotomes:  normal                Lumbar Dermtomes:              L5 Left:  Normal-light touch     L5 Right:  Hypo-light touch    Neural Tension/Mobility:  Lumbar:  Normal              Spinal Segmental Conclusions:     Level: Hypo noted at L3, L5 and L4                                        Hip Evaluation    Hip Strength:      Extension:  Left: 4/5  Pain:Right: 4/5    Pain:    Abduction:  Left: 4/5     Pain:Right: 4/5    Pain:                                 General     ROS    Assessment/Plan:    Patient is a 67 year old male with lumbar complaints.    Patient has the following significant findings with corresponding treatment plan.                Diagnosis 1:  LBP  Pain -  manual therapy, self management, education, directional preference exercise and home program  Decreased ROM/flexibility - manual therapy and therapeutic exercise  Decreased joint mobility - manual therapy and therapeutic exercise  Decreased strength - therapeutic exercise and therapeutic activities  Impaired muscle performance - neuro re-education  Decreased function - therapeutic activities  Impaired posture - neuro re-education    Therapy Evaluation Codes:     Cumulative Therapy Evaluation is: Low complexity.    Previous and current functional limitations:  (See Goal Flow Sheet for this information)    Short term and Long term goals: (See Goal Flow Sheet for this information)     Communication ability:  Patient appears to be able to clearly communicate and understand verbal and written communication and follow directions correctly.  Treatment Explanation - The following has been discussed with the patient:   RX ordered/plan of care  Anticipated outcomes  Possible risks and side effects  This patient would benefit from PT intervention to resume normal activities.   Rehab potential is good.    Frequency:  1 X week, once daily  Duration:  for 6 weeks tapering to 2 X a month over 4 weeks  Discharge Plan:   Achieve all LTG.  Independent in home treatment program.  Reach maximal therapeutic benefit.    Please refer to the daily flowsheet for treatment today, total treatment time and time spent performing 1:1 timed codes.

## 2023-02-01 NOTE — TELEPHONE ENCOUNTER
Medication is being filled for 1 time refill only due to:  Patient needs labs creatinine.   Future appt 3/1/23    NAYANA Cueto RN  [facility]

## 2023-02-03 ENCOUNTER — MYC REFILL (OUTPATIENT)
Dept: FAMILY MEDICINE | Facility: CLINIC | Age: 68
End: 2023-02-03
Payer: MEDICARE

## 2023-02-03 DIAGNOSIS — M48.062 SPINAL STENOSIS OF LUMBAR REGION WITH NEUROGENIC CLAUDICATION: ICD-10-CM

## 2023-02-03 RX ORDER — AMLODIPINE BESYLATE 5 MG/1
5 TABLET ORAL DAILY
Qty: 90 TABLET | Refills: 0 | OUTPATIENT
Start: 2023-02-03

## 2023-02-06 RX ORDER — NABUMETONE 500 MG/1
500 TABLET, FILM COATED ORAL 2 TIMES DAILY PRN
Qty: 30 TABLET | Refills: 0 | Status: SHIPPED | OUTPATIENT
Start: 2023-02-06 | End: 2023-02-21

## 2023-02-06 NOTE — TELEPHONE ENCOUNTER
Routing refill request to provider for review/approval because:  -- Labs not current:  ALT and AST: 6/29/2018  -- Labs not current: CBC: 9/18/2020  -- Labs not current: Creatinine: 12/20/2021  -- Age      NSAID Medications Failed    Patient is age 6-64 years      Normal CBC on file in past 12 months      Recent Labs   Lab Test 09/18/20  1032   WBC 3.5*   RBC 4.43   HGB 14.1   HCT 40.3   *     Last Written Prescription Date:  1/20/2023  Last Fill Quantity: 30,  # refills: 0   Last office visit: 1/20/2023 with prescribing provider:  Margareth Carrera Office Visit:  none    Macarena Thomas, LEVIN RN  [facility]

## 2023-02-08 ENCOUNTER — THERAPY VISIT (OUTPATIENT)
Dept: PHYSICAL THERAPY | Facility: CLINIC | Age: 68
End: 2023-02-08
Payer: MEDICARE

## 2023-02-08 DIAGNOSIS — M54.41 CHRONIC BILATERAL LOW BACK PAIN WITH RIGHT-SIDED SCIATICA: Primary | ICD-10-CM

## 2023-02-08 DIAGNOSIS — G89.29 CHRONIC BILATERAL LOW BACK PAIN WITH RIGHT-SIDED SCIATICA: Primary | ICD-10-CM

## 2023-02-08 PROCEDURE — 97112 NEUROMUSCULAR REEDUCATION: CPT | Mod: GP | Performed by: PHYSICAL THERAPIST

## 2023-02-08 PROCEDURE — 97140 MANUAL THERAPY 1/> REGIONS: CPT | Mod: GP | Performed by: PHYSICAL THERAPIST

## 2023-02-08 PROCEDURE — 97110 THERAPEUTIC EXERCISES: CPT | Mod: GP | Performed by: PHYSICAL THERAPIST

## 2023-02-15 ENCOUNTER — THERAPY VISIT (OUTPATIENT)
Dept: PHYSICAL THERAPY | Facility: CLINIC | Age: 68
End: 2023-02-15
Payer: MEDICARE

## 2023-02-15 DIAGNOSIS — M54.41 CHRONIC BILATERAL LOW BACK PAIN WITH RIGHT-SIDED SCIATICA: Primary | ICD-10-CM

## 2023-02-15 DIAGNOSIS — G89.29 CHRONIC BILATERAL LOW BACK PAIN WITH RIGHT-SIDED SCIATICA: Primary | ICD-10-CM

## 2023-02-15 PROCEDURE — 97140 MANUAL THERAPY 1/> REGIONS: CPT | Mod: GP | Performed by: PHYSICAL THERAPIST

## 2023-02-15 PROCEDURE — 97112 NEUROMUSCULAR REEDUCATION: CPT | Mod: GP | Performed by: PHYSICAL THERAPIST

## 2023-02-15 PROCEDURE — 97110 THERAPEUTIC EXERCISES: CPT | Mod: GP | Performed by: PHYSICAL THERAPIST

## 2023-02-20 ENCOUNTER — MYC REFILL (OUTPATIENT)
Dept: FAMILY MEDICINE | Facility: CLINIC | Age: 68
End: 2023-02-20
Payer: MEDICARE

## 2023-02-20 DIAGNOSIS — M48.062 SPINAL STENOSIS OF LUMBAR REGION WITH NEUROGENIC CLAUDICATION: ICD-10-CM

## 2023-02-20 NOTE — TELEPHONE ENCOUNTER
Pt has come to  again    Wants refill on relafen    Has appt on 3/1/23 with [place]    Creatinine   Date Value Ref Range Status   12/20/2021 1.12 0.66 - 1.25 mg/dL Final   09/18/2020 1.02 0.66 - 1.25 mg/dL Final     RN cannot refill    Radha Stauffer, RN, BSN  [facility]

## 2023-02-20 NOTE — TELEPHONE ENCOUNTER
Pt has come to  for refill    See mychart of 2/20/2023    Radha Stauffer RN, BSN  [facility]

## 2023-02-21 RX ORDER — NABUMETONE 500 MG/1
500 TABLET, FILM COATED ORAL 2 TIMES DAILY PRN
Qty: 180 TABLET | Refills: 0 | Status: SHIPPED | OUTPATIENT
Start: 2023-02-21 | End: 2023-05-12

## 2023-02-21 RX ORDER — NABUMETONE 500 MG/1
500 TABLET, FILM COATED ORAL 2 TIMES DAILY PRN
Qty: 30 TABLET | Refills: 0 | OUTPATIENT
Start: 2023-02-21

## 2023-02-21 NOTE — TELEPHONE ENCOUNTER
HP Providers-Please review, sign if agree and may close encounter.  Patient is requesting a 90 day supply #180 pended.    Thank you!  LEVI SiegelN, RN-[facility]    Routing refill request to provider for review/approval because:  Drug not on the [facility] refill protocol     Last Written Prescription Date:  2/6/23  Last Fill Quantity: 30,  # refills: 0   Last office visit: 1/20/2023 with provider:  Dr. Davis   Future Office Visit:

## 2023-02-21 NOTE — TELEPHONE ENCOUNTER
Looks like this was just sent on 2/6?  Duplicate?  Dr. Shyla Davis MD / [facility]

## 2023-02-28 ASSESSMENT — ENCOUNTER SYMPTOMS
HEMATURIA: 0
DIZZINESS: 0
MYALGIAS: 0
JOINT SWELLING: 1
DIARRHEA: 0
WEAKNESS: 0
PALPITATIONS: 0
SORE THROAT: 0
HEMATOCHEZIA: 0
ARTHRALGIAS: 1
HEARTBURN: 1
SHORTNESS OF BREATH: 0
EYE PAIN: 0
FREQUENCY: 0
CONSTIPATION: 0
NERVOUS/ANXIOUS: 0
PARESTHESIAS: 0
NAUSEA: 0
COUGH: 0
DYSURIA: 0
FEVER: 0
HEADACHES: 0
ABDOMINAL PAIN: 0
CHILLS: 0

## 2023-02-28 ASSESSMENT — ACTIVITIES OF DAILY LIVING (ADL): CURRENT_FUNCTION: NO ASSISTANCE NEEDED

## 2023-03-01 ENCOUNTER — OFFICE VISIT (OUTPATIENT)
Dept: FAMILY MEDICINE | Facility: CLINIC | Age: 68
End: 2023-03-01
Payer: MEDICARE

## 2023-03-01 VITALS
OXYGEN SATURATION: 99 % | HEART RATE: 56 BPM | BODY MASS INDEX: 21.52 KG/M2 | RESPIRATION RATE: 16 BRPM | HEIGHT: 68 IN | WEIGHT: 142 LBS | TEMPERATURE: 97.3 F | DIASTOLIC BLOOD PRESSURE: 69 MMHG | SYSTOLIC BLOOD PRESSURE: 129 MMHG

## 2023-03-01 DIAGNOSIS — I10 BENIGN ESSENTIAL HYPERTENSION: ICD-10-CM

## 2023-03-01 DIAGNOSIS — F33.0 MAJOR DEPRESSIVE DISORDER, RECURRENT, MILD (H): ICD-10-CM

## 2023-03-01 DIAGNOSIS — Z13.220 SCREENING FOR HYPERLIPIDEMIA: ICD-10-CM

## 2023-03-01 DIAGNOSIS — Z00.00 ENCOUNTER FOR MEDICARE ANNUAL WELLNESS EXAM: Primary | ICD-10-CM

## 2023-03-01 DIAGNOSIS — M48.062 SPINAL STENOSIS OF LUMBAR REGION WITH NEUROGENIC CLAUDICATION: ICD-10-CM

## 2023-03-01 DIAGNOSIS — R41.840 INATTENTION: ICD-10-CM

## 2023-03-01 DIAGNOSIS — Z12.5 SCREENING PSA (PROSTATE SPECIFIC ANTIGEN): ICD-10-CM

## 2023-03-01 LAB
ANION GAP SERPL CALCULATED.3IONS-SCNC: 13 MMOL/L (ref 7–15)
BUN SERPL-MCNC: 16.1 MG/DL (ref 8–23)
CALCIUM SERPL-MCNC: 9.8 MG/DL (ref 8.8–10.2)
CHLORIDE SERPL-SCNC: 97 MMOL/L (ref 98–107)
CHOLEST SERPL-MCNC: 183 MG/DL
CREAT SERPL-MCNC: 1.12 MG/DL (ref 0.67–1.17)
DEPRECATED HCO3 PLAS-SCNC: 27 MMOL/L (ref 22–29)
GFR SERPL CREATININE-BSD FRML MDRD: 72 ML/MIN/1.73M2
GLUCOSE SERPL-MCNC: 95 MG/DL (ref 70–99)
HDLC SERPL-MCNC: 78 MG/DL
LDLC SERPL CALC-MCNC: 90 MG/DL
NONHDLC SERPL-MCNC: 105 MG/DL
POTASSIUM SERPL-SCNC: 4.4 MMOL/L (ref 3.4–5.3)
PSA SERPL-MCNC: 0.37 NG/ML (ref 0–4.5)
SODIUM SERPL-SCNC: 137 MMOL/L (ref 136–145)
TRIGL SERPL-MCNC: 73 MG/DL

## 2023-03-01 PROCEDURE — 80048 BASIC METABOLIC PNL TOTAL CA: CPT | Performed by: FAMILY MEDICINE

## 2023-03-01 PROCEDURE — G0103 PSA SCREENING: HCPCS | Performed by: FAMILY MEDICINE

## 2023-03-01 PROCEDURE — 36415 COLL VENOUS BLD VENIPUNCTURE: CPT | Performed by: FAMILY MEDICINE

## 2023-03-01 PROCEDURE — 99214 OFFICE O/P EST MOD 30 MIN: CPT | Mod: 25 | Performed by: FAMILY MEDICINE

## 2023-03-01 PROCEDURE — G0438 PPPS, INITIAL VISIT: HCPCS | Performed by: FAMILY MEDICINE

## 2023-03-01 PROCEDURE — 80061 LIPID PANEL: CPT | Performed by: FAMILY MEDICINE

## 2023-03-01 RX ORDER — BUPROPION HYDROCHLORIDE 300 MG/1
TABLET ORAL
Qty: 90 TABLET | Refills: 1 | Status: SHIPPED | OUTPATIENT
Start: 2023-06-01 | End: 2023-08-07

## 2023-03-01 RX ORDER — AMLODIPINE BESYLATE 5 MG/1
5 TABLET ORAL DAILY
Qty: 90 TABLET | Refills: 2 | Status: SHIPPED | OUTPATIENT
Start: 2023-05-01 | End: 2023-12-05

## 2023-03-01 RX ORDER — GABAPENTIN 100 MG/1
CAPSULE ORAL
Qty: 225 CAPSULE | Refills: 0 | Status: SHIPPED | OUTPATIENT
Start: 2023-03-01 | End: 2023-03-01

## 2023-03-01 RX ORDER — GABAPENTIN 100 MG/1
CAPSULE ORAL
Qty: 225 CAPSULE | Refills: 0 | Status: SHIPPED | OUTPATIENT
Start: 2023-03-01 | End: 2023-04-10

## 2023-03-01 ASSESSMENT — ENCOUNTER SYMPTOMS
DIARRHEA: 0
DIZZINESS: 0
HEMATOCHEZIA: 0
WEAKNESS: 0
FREQUENCY: 0
JOINT SWELLING: 1
CHILLS: 0
SORE THROAT: 0
SHORTNESS OF BREATH: 0
PARESTHESIAS: 0
COUGH: 0
NERVOUS/ANXIOUS: 0
HEMATURIA: 0
DYSURIA: 0
HEARTBURN: 1
ABDOMINAL PAIN: 0
EYE PAIN: 0
MYALGIAS: 0
PALPITATIONS: 0
NAUSEA: 0
CONSTIPATION: 0
FEVER: 0
HEADACHES: 0
ARTHRALGIAS: 1

## 2023-03-01 ASSESSMENT — ACTIVITIES OF DAILY LIVING (ADL): CURRENT_FUNCTION: NO ASSISTANCE NEEDED

## 2023-03-01 NOTE — PATIENT INSTRUCTIONS
Patient Education   Personalized Prevention Plan  You are due for the preventive services outlined below.  Your care team is available to assist you in scheduling these services.  If you have already completed any of these items, please share that information with your care team to update in your medical record.  Health Maintenance Due   Topic Date Due     AORTIC ANEURYSM SCREENING (SYSTEM ASSIGNED)  10/06/2021     Annual Wellness Visit  12/20/2022

## 2023-03-02 ENCOUNTER — THERAPY VISIT (OUTPATIENT)
Dept: PHYSICAL THERAPY | Facility: CLINIC | Age: 68
End: 2023-03-02
Payer: MEDICARE

## 2023-03-02 DIAGNOSIS — M54.41 CHRONIC BILATERAL LOW BACK PAIN WITH RIGHT-SIDED SCIATICA: Primary | ICD-10-CM

## 2023-03-02 DIAGNOSIS — G89.29 CHRONIC BILATERAL LOW BACK PAIN WITH RIGHT-SIDED SCIATICA: Primary | ICD-10-CM

## 2023-03-02 PROCEDURE — 97112 NEUROMUSCULAR REEDUCATION: CPT | Mod: GP | Performed by: PHYSICAL THERAPIST

## 2023-03-02 PROCEDURE — 97110 THERAPEUTIC EXERCISES: CPT | Mod: GP | Performed by: PHYSICAL THERAPIST

## 2023-03-02 PROCEDURE — 97140 MANUAL THERAPY 1/> REGIONS: CPT | Mod: GP | Performed by: PHYSICAL THERAPIST

## 2023-03-22 ENCOUNTER — TRANSFERRED RECORDS (OUTPATIENT)
Dept: HEALTH INFORMATION MANAGEMENT | Facility: CLINIC | Age: 68
End: 2023-03-22
Payer: MEDICARE

## 2023-03-26 ENCOUNTER — MYC MEDICAL ADVICE (OUTPATIENT)
Dept: FAMILY MEDICINE | Facility: CLINIC | Age: 68
End: 2023-03-26
Payer: MEDICARE

## 2023-03-29 NOTE — TELEPHONE ENCOUNTER
"Dr. Zuluaga: please see pt messge and excerpt from Dr. Davis's visit below.      When I saw your colleague Shyla Davis on January 20, she prescribed hydrocodone for my back pain. The prescription was for ten tablets. After using two this past Friday and Saturday, to enable me to go hiking and snowshoeing with friends, I have five remaining. I need to plan how to use the remaining five tablets. If I cannot get a refill, I plan to cut back on walking and other physical activity so I can have these available should some kind of emergency arise.   Thus, my question to you: will you refill the prescription if I continue to use the tablets so I can stay at least somewhat active?    Chronic low back pain, unspecified back pain laterality, unspecified whether sciatica present  and   Spinal stenosis of lumbar region with neurogenic claudication    Managed by [facility] - Dr. Noriega as well as consult at [facility] appointment later this week for evaluation and likely facet injection    Would like pain medication for \"just in case\" when pain severe.    We reviewed record and looks like Dr. Tam prescribed nabumetone several years ago - refilled this although discussed that can't take if takes naproxen.     Also sent hydrocodone-acetaminophen for prn use for severe acute pain - #10, no refills, per guidelines for safe opioid prescribing  ? Review of [place] Prescription Monitoring Program (): Today I have also reviewed the patient's history of controlled substance use, as provided by [place] licensed pharmacies and prescriber dispensers.  No unusual or outside prescriptions.    Discussed with patient, all questions answered, in agreement with this plan, will return or seek further care if not improving or worsening. Also discussed red flag back symptoms and when to go to emergency room (new saddle anesthesia/numbness, new incontinence, fever, loss of muscle function of legs)  - nabumetone (RELAFEN) 500 MG tablet; Take 1 " tablet (500 mg) by mouth 2 times daily as needed for moderate pain (4-6)  - HYDROcodone-acetaminophen (NORCO) 5-325 MG tablet; Take 1 tablet by mouth every 6 hours as needed for severe pain (7-10)    FRANK Espinosa [facility]

## 2023-03-29 NOTE — TELEPHONE ENCOUNTER
I am fine with gabapentin and adjusting rx also but I do not feel comfortable with refilling narcotics.   If pain is getting worse, I would recommend follow up appointment with ortho or with us.

## 2023-04-04 ENCOUNTER — THERAPY VISIT (OUTPATIENT)
Dept: PHYSICAL THERAPY | Facility: CLINIC | Age: 68
End: 2023-04-04
Payer: MEDICARE

## 2023-04-04 DIAGNOSIS — M54.41 CHRONIC BILATERAL LOW BACK PAIN WITH RIGHT-SIDED SCIATICA: Primary | ICD-10-CM

## 2023-04-04 DIAGNOSIS — G89.29 CHRONIC BILATERAL LOW BACK PAIN WITH RIGHT-SIDED SCIATICA: Primary | ICD-10-CM

## 2023-04-04 PROCEDURE — 97110 THERAPEUTIC EXERCISES: CPT | Mod: GP | Performed by: PHYSICAL THERAPIST

## 2023-04-04 PROCEDURE — 97112 NEUROMUSCULAR REEDUCATION: CPT | Mod: GP | Performed by: PHYSICAL THERAPIST

## 2023-04-08 DIAGNOSIS — M48.062 SPINAL STENOSIS OF LUMBAR REGION WITH NEUROGENIC CLAUDICATION: ICD-10-CM

## 2023-04-10 RX ORDER — NABUMETONE 500 MG/1
500 TABLET, FILM COATED ORAL 2 TIMES DAILY PRN
Qty: 180 TABLET | Refills: 0 | OUTPATIENT
Start: 2023-04-10

## 2023-04-10 RX ORDER — GABAPENTIN 100 MG/1
300 CAPSULE ORAL 3 TIMES DAILY
Qty: 300 CAPSULE | Refills: 0 | Status: SHIPPED | OUTPATIENT
Start: 2023-04-10 | End: 2023-05-09

## 2023-04-10 NOTE — TELEPHONE ENCOUNTER
Called and spoke w/ pt regarding med refill. Advice pt to schedule for a F/U appt before further refill. Pt asked why he need an appt, replied than it was recommended by pt's provider. Pt stated that he will call and schedule.

## 2023-04-10 NOTE — TELEPHONE ENCOUNTER
I have signed prescription for 30 days. Please notify and help patient to schedule a follow up appointment before further refills.   Virtual visit is fine.

## 2023-04-13 ENCOUNTER — OFFICE VISIT (OUTPATIENT)
Dept: FAMILY MEDICINE | Facility: CLINIC | Age: 68
End: 2023-04-13
Payer: MEDICARE

## 2023-04-13 VITALS
SYSTOLIC BLOOD PRESSURE: 153 MMHG | HEIGHT: 67 IN | RESPIRATION RATE: 12 BRPM | OXYGEN SATURATION: 100 % | HEART RATE: 47 BPM | BODY MASS INDEX: 22.13 KG/M2 | DIASTOLIC BLOOD PRESSURE: 82 MMHG | WEIGHT: 141 LBS | TEMPERATURE: 97.7 F

## 2023-04-13 DIAGNOSIS — N34.2 URETHRITIS: Primary | ICD-10-CM

## 2023-04-13 DIAGNOSIS — T28.3XXA BURN OF URETHRA: Primary | ICD-10-CM

## 2023-04-13 LAB
ALBUMIN UR-MCNC: NEGATIVE MG/DL
APPEARANCE UR: CLEAR
BACTERIA #/AREA URNS HPF: ABNORMAL /HPF
BILIRUB UR QL STRIP: NEGATIVE
C TRACH DNA SPEC QL NAA+PROBE: NEGATIVE
COLOR UR AUTO: YELLOW
GLUCOSE UR STRIP-MCNC: NEGATIVE MG/DL
HGB UR QL STRIP: NEGATIVE
KETONES UR STRIP-MCNC: NEGATIVE MG/DL
LEUKOCYTE ESTERASE UR QL STRIP: NEGATIVE
N GONORRHOEA DNA SPEC QL NAA+PROBE: NEGATIVE
NITRATE UR QL: NEGATIVE
PH UR STRIP: 6 [PH] (ref 5–7)
RBC #/AREA URNS AUTO: ABNORMAL /HPF
SP GR UR STRIP: 1.01 (ref 1–1.03)
SQUAMOUS #/AREA URNS AUTO: ABNORMAL /LPF
UROBILINOGEN UR STRIP-ACNC: 0.2 E.U./DL
WBC #/AREA URNS AUTO: ABNORMAL /HPF

## 2023-04-13 PROCEDURE — 87591 N.GONORRHOEAE DNA AMP PROB: CPT

## 2023-04-13 PROCEDURE — 87491 CHLMYD TRACH DNA AMP PROBE: CPT

## 2023-04-13 PROCEDURE — 99213 OFFICE O/P EST LOW 20 MIN: CPT

## 2023-04-13 PROCEDURE — 81001 URINALYSIS AUTO W/SCOPE: CPT

## 2023-04-13 RX ORDER — DOXYCYCLINE HYCLATE 100 MG
100 TABLET ORAL 2 TIMES DAILY
Qty: 14 TABLET | Refills: 0 | Status: SHIPPED | OUTPATIENT
Start: 2023-04-13 | End: 2023-04-20

## 2023-04-13 ASSESSMENT — PAIN SCALES - GENERAL: PAINLEVEL: EXTREME PAIN (9)

## 2023-04-13 ASSESSMENT — PATIENT HEALTH QUESTIONNAIRE - PHQ9: SUM OF ALL RESPONSES TO PHQ QUESTIONS 1-9: 3

## 2023-04-13 NOTE — PROGRESS NOTES
Assessment & Plan     Burn of urethra  - UA with Microscopic reflex to Culture - lab collect; Future  - NEISSERIA GONORRHOEA PCR; Future  - CHLAMYDIA TRACHOMATIS PCR; Future    Differentials for patient's symptoms include urinary tract infection, gonorrhea or chlamydia, urethritis.  We will test patient for UTI and gonorrhea or chlamydia today.  May consider treatment with doxycycline for urethritis if lab work-up is negative.  Low suspicion for pyelonephritis today given that patient does not had a worsening of his chronic back pain.  He also was without CVA tenderness on exam.    Maged Airas NP  [facility] RO Siu is a 67 year old, presenting for the following health issues:  UTI (Burning and stinging when urinating )  Mild stinging and burning when urinating and ejaculation. This has been occurring for the past 4 weeks. Stinging on the inside. No urinary frequency. No changes to the urine character. No odor.  Chronic back pain (DDD), but nothing new. Epidural 2 weeks ago. He does have back pain today. No nausea or vomiting.  No penile discharge.   No history of UTI  No fever.    1 partner, monogamous agreement.  Partner has not had any symptoms.      4/13/2023     9:13 AM   Additional Questions   Roomed by Cori Galeas   Accompanied by N/A     UTI    History of Present Illness       Reason for visit:  Stinging/burning when urinating. Possible UTI?  Symptom onset:  3-4 weeks ago  Symptom intensity:  Mild  Symptom progression:  Staying the same  Had these symptoms before:  No  What makes it worse:  Urinating; ejaculating  What makes it better:  Time (?)    He eats 0-1 servings of fruits and vegetables daily.He consumes 0 sweetened beverage(s) daily.He exercises with enough effort to increase his heart rate 60 or more minutes per day.  He exercises with enough effort to increase his heart rate 5 days per week.   He is taking medications regularly.     Review of Systems  "  Constitutional, HEENT, cardiovascular, pulmonary, gi and gu systems are negative, except as otherwise noted.      Objective    BP (!) 153/82 (BP Location: Right arm, Patient Position: Sitting, Cuff Size: Adult Regular)   Pulse (!) 47   Temp 97.7  F (36.5  C) (Temporal)   Resp (!) 7   Ht 1.71 m (5' 7.32\")   Wt 64 kg (141 lb)   SpO2 100%   BMI 21.87 kg/m    Body mass index is 21.87 kg/m .  Physical Exam   GENERAL: healthy, alert and no distress  ABDOMEN: soft, nontender, no hepatosplenomegaly, no masses  MS: No CVA tenderness. No gross musculoskeletal defects noted, no edema  SKIN: no suspicious lesions or rashes  PSYCH: mentation appears normal, affect normal/bright            "

## 2023-05-01 ENCOUNTER — HOSPITAL ENCOUNTER (EMERGENCY)
Facility: CLINIC | Age: 68
Discharge: HOME | End: 2023-05-01
Attending: EMERGENCY MEDICINE | Admitting: EMERGENCY MEDICINE
Payer: MEDICARE

## 2023-05-01 ENCOUNTER — APPOINTMENT (OUTPATIENT)
Dept: ULTRASOUND IMAGING | Facility: CLINIC | Age: 68
End: 2023-05-01
Attending: EMERGENCY MEDICINE
Payer: MEDICARE

## 2023-05-01 ENCOUNTER — TELEPHONE (OUTPATIENT)
Dept: FAMILY MEDICINE | Facility: CLINIC | Age: 68
End: 2023-05-01
Payer: MEDICARE

## 2023-05-01 VITALS
BODY MASS INDEX: 21.37 KG/M2 | RESPIRATION RATE: 18 BRPM | HEART RATE: 60 BPM | WEIGHT: 141 LBS | HEIGHT: 68 IN | TEMPERATURE: 98.1 F | OXYGEN SATURATION: 99 % | SYSTOLIC BLOOD PRESSURE: 115 MMHG | DIASTOLIC BLOOD PRESSURE: 70 MMHG

## 2023-05-01 DIAGNOSIS — R20.2 PARESTHESIA: ICD-10-CM

## 2023-05-01 DIAGNOSIS — M79.661 RIGHT CALF PAIN: ICD-10-CM

## 2023-05-01 LAB
ALBUMIN SERPL BCG-MCNC: 4.4 G/DL (ref 3.5–5.2)
ALP SERPL-CCNC: 72 U/L (ref 40–129)
ALT SERPL W P-5'-P-CCNC: 25 U/L (ref 10–50)
ANION GAP SERPL CALCULATED.3IONS-SCNC: 15 MMOL/L (ref 7–15)
AST SERPL W P-5'-P-CCNC: 38 U/L (ref 10–50)
BASOPHILS # BLD AUTO: 0 10E3/UL (ref 0–0.2)
BASOPHILS NFR BLD AUTO: 0 %
BILIRUB SERPL-MCNC: 0.4 MG/DL
BUN SERPL-MCNC: 13.6 MG/DL (ref 8–23)
CALCIUM SERPL-MCNC: 9.7 MG/DL (ref 8.8–10.2)
CHLORIDE SERPL-SCNC: 105 MMOL/L (ref 98–107)
CREAT SERPL-MCNC: 1.12 MG/DL (ref 0.67–1.17)
DEPRECATED HCO3 PLAS-SCNC: 21 MMOL/L (ref 22–29)
EOSINOPHIL # BLD AUTO: 0.1 10E3/UL (ref 0–0.7)
EOSINOPHIL NFR BLD AUTO: 2 %
ERYTHROCYTE [DISTWIDTH] IN BLOOD BY AUTOMATED COUNT: 12.3 % (ref 10–15)
GFR SERPL CREATININE-BSD FRML MDRD: 72 ML/MIN/1.73M2
GLUCOSE SERPL-MCNC: 132 MG/DL (ref 70–99)
HCT VFR BLD AUTO: 40.7 % (ref 40–53)
HGB BLD-MCNC: 14.1 G/DL (ref 13.3–17.7)
IMM GRANULOCYTES # BLD: 0 10E3/UL
IMM GRANULOCYTES NFR BLD: 0 %
LYMPHOCYTES # BLD AUTO: 1.5 10E3/UL (ref 0.8–5.3)
LYMPHOCYTES NFR BLD AUTO: 47 %
MAGNESIUM SERPL-MCNC: 2.1 MG/DL (ref 1.7–2.3)
MCH RBC QN AUTO: 32 PG (ref 26.5–33)
MCHC RBC AUTO-ENTMCNC: 34.6 G/DL (ref 31.5–36.5)
MCV RBC AUTO: 93 FL (ref 78–100)
MONOCYTES # BLD AUTO: 0.3 10E3/UL (ref 0–1.3)
MONOCYTES NFR BLD AUTO: 9 %
NEUTROPHILS # BLD AUTO: 1.4 10E3/UL (ref 1.6–8.3)
NEUTROPHILS NFR BLD AUTO: 42 %
NRBC # BLD AUTO: 0 10E3/UL
NRBC BLD AUTO-RTO: 0 /100
PLATELET # BLD AUTO: 149 10E3/UL (ref 150–450)
POTASSIUM SERPL-SCNC: 3.8 MMOL/L (ref 3.4–5.3)
PROT SERPL-MCNC: 7 G/DL (ref 6.4–8.3)
RBC # BLD AUTO: 4.4 10E6/UL (ref 4.4–5.9)
SODIUM SERPL-SCNC: 141 MMOL/L (ref 136–145)
WBC # BLD AUTO: 3.3 10E3/UL (ref 4–11)

## 2023-05-01 PROCEDURE — 93971 EXTREMITY STUDY: CPT | Mod: 26 | Performed by: RADIOLOGY

## 2023-05-01 PROCEDURE — 93971 EXTREMITY STUDY: CPT | Mod: RT

## 2023-05-01 PROCEDURE — 85025 COMPLETE CBC W/AUTO DIFF WBC: CPT | Performed by: EMERGENCY MEDICINE

## 2023-05-01 PROCEDURE — 80053 COMPREHEN METABOLIC PANEL: CPT | Performed by: EMERGENCY MEDICINE

## 2023-05-01 PROCEDURE — 99284 EMERGENCY DEPT VISIT MOD MDM: CPT | Performed by: EMERGENCY MEDICINE

## 2023-05-01 PROCEDURE — 99284 EMERGENCY DEPT VISIT MOD MDM: CPT | Mod: 25 | Performed by: EMERGENCY MEDICINE

## 2023-05-01 PROCEDURE — 36415 COLL VENOUS BLD VENIPUNCTURE: CPT | Performed by: EMERGENCY MEDICINE

## 2023-05-01 PROCEDURE — 83735 ASSAY OF MAGNESIUM: CPT | Performed by: EMERGENCY MEDICINE

## 2023-05-01 RX ORDER — HYDROCODONE BITARTRATE AND ACETAMINOPHEN 5; 325 MG/1; MG/1
1 TABLET ORAL EVERY 6 HOURS PRN
Qty: 10 TABLET | Refills: 0 | Status: SHIPPED | OUTPATIENT
Start: 2023-05-01 | End: 2023-05-04

## 2023-05-01 ASSESSMENT — ACTIVITIES OF DAILY LIVING (ADL)
ADLS_ACUITY_SCORE: 35
ADLS_ACUITY_SCORE: 35
ADLS_ACUITY_SCORE: 33

## 2023-05-01 NOTE — ED TRIAGE NOTES
Pt present with a 1 week history of intermittent calf pain that had been improving.  Pt states that he began feeling a tingling sensation about 5 days ago and this morning he noticed a tingling sensation in his arms and hands bilateral.       Triage Assessment     Row Name 05/01/23 1056       Triage Assessment (Adult)    Airway WDL WDL       Respiratory WDL    Respiratory WDL WDL       Skin Circulation/Temperature WDL    Skin Circulation/Temperature WDL WDL       Cardiac WDL    Cardiac WDL WDL       Peripheral/Neurovascular WDL    Peripheral Neurovascular WDL WDL       Cognitive/Neuro/Behavioral WDL    Cognitive/Neuro/Behavioral WDL WDL

## 2023-05-01 NOTE — DISCHARGE INSTRUCTIONS
TODAY'S VISIT:  You were seen today for leg pain   -   - If you had any labs or imaging/radiology tests performed today, you should also discuss these tests with your usual provider.     FOLLOW-UP:  Please make an appointment to follow up with:  - Your Primary Care Provider. If you do not have a PCP, please call the Primary Care Center (phone: [phone] for an appointment  - orthopedic surgery regarding your back pain    - Have your provider review the results from today's visit with you again to make sure no further follow-up or additional testing is needed based on those results.     RETURN TO THE EMERGENCY DEPARTMENT  Return to the Emergency Department at any time for any new or worsening symptoms or any concerns.

## 2023-05-01 NOTE — ED PROVIDER NOTES
History     Chief Complaint   Patient presents with     Leg Pain     HPI  Salbador Begum is a 67 year old male with a past medical history of lumbar back pain, asthma, osteoarthritis, AIMEE who presents to the emergency department with a chief complaint of leg pain.  Located in the right lower extremity, primarily in the calf area.  Associated with numbness and tingling in all 4 extremities, primarily to the right lower extremity.  He states he has chronic numbness of the bilateral lower extremities due to lumbar back problems, however, he does not have a history of any C or T-spine problems, and the upper extremity tingling is new.  The upper extremity tingling is equal bilaterally.  The patient denies any recent injuries, repetitive motion, recent travel.  However, he states that he did a lot of sitting still for many hours during a samaniego for his recently  wife.  The patient states he talked to the nurse triage and was advised to come here to rule out DVT and other neurologic process due to upper extremity tingling.  He states his initial plan had been to present to orthopedic urgent care as he has been managing his lumbar back problems with orthopedic surgery.  They did a an MRI a couple of months ago, which was not significantly changed from his last MRI a couple of years ago.  They did recently do an epidural injection.  The patient states he is prescribed 300 mg of gabapentin 3 times daily (however, he only takes 200 mg of this twice daily, unsure if this is going to be continued as he is unsure if it has been helping him), 1300 mg of Tylenol twice daily, nabumetone 500 mg twice daily.  He states he also occasionally takes Norco, he last received 10 tablets of this in January per [place] PDMP report, he states he has 1 or 2 of these.    I have reviewed the Medications, Allergies, Past Medical and Surgical History, and Social History in the [employer] system.    Past Medical History:   Diagnosis Date      Allergic rhinitis, cause unspecified      Backache, unspecified      Depressive disorder      Diverticulitis      Junctional ectopic contractions (H)      Mild intermittent asthma 9/28/2005     Narcolepsy      Osteoarthritis      Palpitations      Sleep apnea      Past Surgical History:   Procedure Laterality Date     APPENDECTOMY       ORTHOPEDIC SURGERY      left hip replacement, right ankle,     SEPTORHINOPLASTY  4/7/2011    Procedure:SEPTORHINOPLASTY; Septoplasty withNasal Reconstruction with  Grafts Harvested from [employer]; Surgeon:ALFREDO UMANA; Location: OR     [facility] NONSPECIFIC PROCEDURE      s/p Appendectomy     No current facility-administered medications for this encounter.     Current Outpatient Medications   Medication     acetaminophen (TYLENOL) 650 MG CR tablet     amLODIPine (NORVASC) 5 MG tablet     Ascorbic Acid (VITAMIN C PO)     [START ON 6/1/2023] buPROPion (WELLBUTRIN XL) 300 MG 24 hr tablet     calcium-vitamin D 500-125 MG-UNIT TABS     gabapentin (NEURONTIN) 100 MG capsule     Multiple Vitamin (MULTI-DAY VITAMINS) TABS     nabumetone (RELAFEN) 500 MG tablet     sildenafil (REVATIO) 20 MG tablet     Allergies   Allergen Reactions     Metoclopramide Rash     Past medical history, past surgical history, medications, and allergies were reviewed with the patient. Additional pertinent items: None    Social History     Socioeconomic History     Marital status:      Spouse name: Not on file     Number of children: Not on file     Years of education: Not on file     Highest education level: Not on file   Occupational History     Not on file   Tobacco Use     Smoking status: Never     Smokeless tobacco: Never   Vaping Use     Vaping status: Never Used   Substance and Sexual Activity     Alcohol use: Yes     Comment: 14 glass of wine weeks      Drug use: No     Sexual activity: Yes     Partners: Female     Birth control/protection: Post-menopausal     Comment: Other   Other  "Topics Concern     Parent/sibling w/ CABG, MI or angioplasty before 65F 55M? No   Social History Narrative    Social Documentation:        Balanced Diet: YES    Calcium intake: milk and food  per day, occas mtv    Caffeine: 1 cup per day    Exercise:  type of activity varies;  3-5 times per week    Sunscreen: No -     Seatbelts:  Yes    Self Breast Exam:  No - na    Self Testicular Exam: No -     Physical/Emotional/Sexual Abuse: No -     Do you feel safe in your environment? Yes        Cholesterol screen up to date: Yes    Eye Exam up to date: Yes    Dental Exam up to date: Yes    Pap smear up to date: Does Not Apply    Mammogram up to date: Does Not Apply    Dexa Scan up to date: Does Not Apply    Colonoscopy up to date: Due    Immunizations up to date: Yes    Glucose screen if over 40:  Yes                 Social Determinants of Health     Financial Resource Strain: Not on file   Food Insecurity: Not on file   Transportation Needs: Not on file   Physical Activity: Not on file   Stress: Not on file   Social Connections: Not on file   Intimate Partner Violence: Not on file   Housing Stability: Not on file     Social history was reviewed with the patient. Additional pertinent items: None    Review of Systems  A medically appropriate review of systems was performed with pertinent positives and negatives noted in the HPI, and all other systems negative.    Physical Exam   BP: 114/69  Pulse: 54  Temp: 98.1  F (36.7  C)  Resp: 18  Height: 172.7 cm (5' 8\")  Weight: 64 kg (141 lb)  SpO2: 99 %      General: Well nourished, well developed, NAD  HEENT: EOMI, anicteric. NCAT, MMM  Neck: no jugular venous distension, supple, nl ROM  Cardiac: Regular rate and rhythm.  Intact peripheral pulses  Pulm: NLB, normal RR  Back: No midline tenderness or step-off, normal range of motion    Skin: Warm and dry to the touch.  No rash  Extremities: No LE edema, no cyanosis, w/w/p, right calf tenderness  Neuro: A&Ox3, no gross focal deficits, " steady gait    ED Course        Procedures                           Labs Ordered and Resulted from Time of ED Arrival to Time of ED Departure   COMPREHENSIVE METABOLIC PANEL - Abnormal       Result Value    Sodium 141      Potassium 3.8      Chloride 105      Carbon Dioxide (CO2) 21 (*)     Anion Gap 15      Urea Nitrogen 13.6      Creatinine 1.12      Calcium 9.7      Glucose 132 (*)     Alkaline Phosphatase 72      AST 38      ALT 25      Protein Total 7.0      Albumin 4.4      Bilirubin Total 0.4      GFR Estimate 72     CBC WITH PLATELETS AND DIFFERENTIAL - Abnormal    WBC Count 3.3 (*)     RBC Count 4.40      Hemoglobin 14.1      Hematocrit 40.7      MCV 93      MCH 32.0      MCHC 34.6      RDW 12.3      Platelet Count 149 (*)     % Neutrophils 42      % Lymphocytes 47      % Monocytes 9      % Eosinophils 2      % Basophils 0      % Immature Granulocytes 0      NRBCs per 100 WBC 0      Absolute Neutrophils 1.4 (*)     Absolute Lymphocytes 1.5      Absolute Monocytes 0.3      Absolute Eosinophils 0.1      Absolute Basophils 0.0      Absolute Immature Granulocytes 0.0      Absolute NRBCs 0.0     MAGNESIUM - Normal    Magnesium 2.1              Results for orders placed or performed during the hospital encounter of 05/01/23 (from the past 24 hour(s))   CBC with platelets differential    Narrative    The following orders were created for panel order CBC with platelets differential.  Procedure                               Abnormality         Status                     ---------                               -----------         ------                     CBC with platelets and d...[[id number]]  Abnormal            Final result                 Please view results for these tests on the individual orders.   Comprehensive metabolic panel   Result Value Ref Range    Sodium 141 136 - 145 mmol/L    Potassium 3.8 3.4 - 5.3 mmol/L    Chloride 105 98 - 107 mmol/L    Carbon Dioxide (CO2) 21 (L) 22 - 29 mmol/L    Anion Gap 15  7 - 15 mmol/L    Urea Nitrogen 13.6 8.0 - 23.0 mg/dL    Creatinine 1.12 0.67 - 1.17 mg/dL    Calcium 9.7 8.8 - 10.2 mg/dL    Glucose 132 (H) 70 - 99 mg/dL    Alkaline Phosphatase 72 40 - 129 U/L    AST 38 10 - 50 U/L    ALT 25 10 - 50 U/L    Protein Total 7.0 6.4 - 8.3 g/dL    Albumin 4.4 3.5 - 5.2 g/dL    Bilirubin Total 0.4 <=1.2 mg/dL    GFR Estimate 72 >60 mL/min/1.73m2   Magnesium   Result Value Ref Range    Magnesium 2.1 1.7 - 2.3 mg/dL   CBC with platelets and differential   Result Value Ref Range    WBC Count 3.3 (L) 4.0 - 11.0 10e3/uL    RBC Count 4.40 4.40 - 5.90 10e6/uL    Hemoglobin 14.1 13.3 - 17.7 g/dL    Hematocrit 40.7 40.0 - 53.0 %    MCV 93 78 - 100 fL    MCH 32.0 26.5 - 33.0 pg    MCHC 34.6 31.5 - 36.5 g/dL    RDW 12.3 10.0 - 15.0 %    Platelet Count 149 (L) 150 - 450 10e3/uL    % Neutrophils 42 %    % Lymphocytes 47 %    % Monocytes 9 %    % Eosinophils 2 %    % Basophils 0 %    % Immature Granulocytes 0 %    NRBCs per 100 WBC 0 <1 /100    Absolute Neutrophils 1.4 (L) 1.6 - 8.3 10e3/uL    Absolute Lymphocytes 1.5 0.8 - 5.3 10e3/uL    Absolute Monocytes 0.3 0.0 - 1.3 10e3/uL    Absolute Eosinophils 0.1 0.0 - 0.7 10e3/uL    Absolute Basophils 0.0 0.0 - 0.2 10e3/uL    Absolute Immature Granulocytes 0.0 <=0.4 10e3/uL    Absolute NRBCs 0.0 10e3/uL   US Lower Extremity Venous Duplex Right    Narrative    EXAMINATION: DOPPLER VENOUS ULTRASOUND OF THE RIGHT LOWER EXTREMITY,  5/1/2023 1:29 PM     COMPARISON: None.    HISTORY: Edema    TECHNIQUE:  Gray-scale evaluation with compression, spectral flow and  color Doppler assessment of the deep venous system of the right leg  from groin to knee, and then at the ankles.    FINDINGS:  Right: the common femoral, femoral, popliteal and posterior tibial  veins demonstrate normal compressibility and blood flow.    Left: the common femoral vein demonstrates normal compressibility and  blood flow.    In the focal area of pain in the lateral mid right calf there is  no  focal fluid collection or identifiable lesion.      Impression    IMPRESSION:  1.  No evidence of deep venous thrombosis in the right lower  extremity.   2.  No soft tissue abnormality in the area of focal calf pain.    I have personally reviewed the examination and initial interpretation  and I agree with the findings.    ADALID JACK MD         SYSTEM ID:  [id number]       Labs, vital signs, and imaging studies were reviewed by me.    Medications - No data to display    Assessments & Plan (with Medical Decision Making)   Salbador Begum is a 67 year old male who presents to the emergency department with right calf pain and paresthesias of all 4 extremities.  Differential diagnosis for patient's calf pain includes DVT, Baker's cyst, muscle strain.  Ultrasound ordered to further evaluate the patient.  Patient's paresthesias in the lower extremities may be secondary to his known lumbar back issues.  In addition, if patient does have other process in his right lower extremity, this could exacerbate his right lower extremity paresthesias.  However, the new onset of upper extremity tingling elevates concern for a systemic process such as electrolyte abnormality, dehydration, anemia, hyperventilation, anxiety, viral syndrome.  Given patient's history of arthritis and lumbar back problems, cervical or thoracic DJD would also be on the differential for the symptoms.  Presence of symptoms in all 4 limbs is reassuring against CVA/TIA.     Labs are largely unremarkable.  White blood cell count low at 3.3.  Electrolytes within normal limits.    Ultrasound was reviewed personally by me and shows no DVT    Critical care was not performed.     Medical Decision Making  The patient's presentation was of moderate complexity (an undiagnosed new problem with uncertain diagnosis).    The patient's evaluation involved:  ordering and/or review of 3+ test(s) in this encounter (see separate area of note for details)  review of 1  test result(s) ordered prior to this encounter (prior MRI)  independent interpretation of testing performed by another health professional ([place])    The patient's management necessitated moderate risk (prescription drug management including medications given in the ED).      I have reviewed the nursing notes.    I have reviewed the findings, diagnosis, plan and need for follow up with the patient.    Patient to be discharged home. Advised to follow up with PCP within 1 week. To return to ER immediately with any new/worsening symptoms. Plan of care discussed with patient who expresses understanding and agrees with plan of care.    Discharge Medication List as of 5/1/2023  3:23 PM      START taking these medications    Details   HYDROcodone-acetaminophen (NORCO) 5-325 MG tablet Take 1 tablet by mouth every 6 hours as needed for pain, Disp-10 tablet, R-0, E-Prescribe             Final diagnoses:   Right calf pain   Paresthesia       Carly Nolen MD  5/1/2023   [facility] EMERGENCY DEPARTMENT     Carly Nolen MD  05/01/23 [id number]

## 2023-05-01 NOTE — TELEPHONE ENCOUNTER
Patient called regarding right calf pain that started this weekend.     Patient was advised to go to the ER for possible blood clot.     Patient stated understanding and that others have said the same thing to him. He is going to the ER.     Patient has chronic numbness and tingling in legs due to lumbar injury.     LEVI JohnsonN RN  [facility]

## 2023-05-02 ENCOUNTER — PATIENT OUTREACH (OUTPATIENT)
Dept: CARE COORDINATION | Facility: CLINIC | Age: 68
End: 2023-05-02
Payer: MEDICARE

## 2023-05-02 NOTE — PROGRESS NOTES
Clinic Care Coordination Contact  Community Health Worker Initial Outreach    CHW Initial Information Gathering:  Referral Source: ED Follow-Up  Preferred Hospital: [facility]  [phone]  Pref[facility]: [facility], [phone]  No PCP office visit in Past Year: No       Patient accepts CC: No, due to the patient stating that at this time there are no needs for CCC at this time. Patient will be sent Care Coordination introduction letter for future reference.     VICENTA Dyson  [phone]  Connected Care Resource [facility]

## 2023-05-02 NOTE — LETTER
[facility] CARE COORDINATION  [address]  [place] [address]    May 2, 2023    Salbador Garyy  [address]  [place] [address]      Dear Salbador,        I am a  clinic community health worker who works with Kieran Zuluaga MD, MD with the [facility]. I wanted to thank you for spending the time to talk with me.  Below is a description of clinic care coordination and how I can further assist you.       The clinic care coordination team is made up of a registered nurse, , financial resource worker and community health worker who understand the health care system. The goal of clinic care coordination is to help you manage your health and improve access to the health care system. Our team works alongside your provider to assist you in determining your health and social needs. We can help you obtain health care and community resources, providing you with necessary information and education. We can work with you through any barriers and develop a care plan that helps coordinate and strengthen the communication between you and your care team.  Our services are voluntary and are offered without charge to you personally.    If you wish to connect with the Clinic Care Coordination Team, please let your [facility] Primary Care Provider or Clinic Care Team know and they can place a referral. The Clinic Care Coordination team will then reach out by phone to further support you.    We are focused on providing you with the highest-quality healthcare experience possible.    Sincerely,   Your care team with [facility]

## 2023-05-08 ENCOUNTER — TRANSFERRED RECORDS (OUTPATIENT)
Dept: HEALTH INFORMATION MANAGEMENT | Facility: CLINIC | Age: 68
End: 2023-05-08
Payer: MEDICARE

## 2023-05-08 DIAGNOSIS — M48.062 SPINAL STENOSIS OF LUMBAR REGION WITH NEUROGENIC CLAUDICATION: ICD-10-CM

## 2023-05-09 ENCOUNTER — OFFICE VISIT (OUTPATIENT)
Dept: FAMILY MEDICINE | Facility: CLINIC | Age: 68
End: 2023-05-09
Payer: MEDICARE

## 2023-05-09 VITALS
SYSTOLIC BLOOD PRESSURE: 164 MMHG | HEIGHT: 68 IN | OXYGEN SATURATION: 100 % | RESPIRATION RATE: 16 BRPM | TEMPERATURE: 97.8 F | DIASTOLIC BLOOD PRESSURE: 85 MMHG | WEIGHT: 146 LBS | BODY MASS INDEX: 22.13 KG/M2 | HEART RATE: 47 BPM

## 2023-05-09 DIAGNOSIS — M48.062 SPINAL STENOSIS OF LUMBAR REGION WITH NEUROGENIC CLAUDICATION: ICD-10-CM

## 2023-05-09 DIAGNOSIS — R20.0 RIGHT LEG NUMBNESS: Primary | ICD-10-CM

## 2023-05-09 DIAGNOSIS — I10 BENIGN ESSENTIAL HYPERTENSION: ICD-10-CM

## 2023-05-09 PROCEDURE — 99214 OFFICE O/P EST MOD 30 MIN: CPT | Performed by: FAMILY MEDICINE

## 2023-05-09 RX ORDER — GABAPENTIN 300 MG/1
300 CAPSULE ORAL 3 TIMES DAILY
Qty: 90 CAPSULE | Refills: 3 | Status: SHIPPED | OUTPATIENT
Start: 2023-05-09 | End: 2023-08-07

## 2023-05-09 ASSESSMENT — PAIN SCALES - GENERAL: PAINLEVEL: MODERATE PAIN (5)

## 2023-05-09 NOTE — PROGRESS NOTES
Assessment & Plan     Spinal stenosis of lumbar region with neurogenic claudication  He has some right lower limb radiculopathy and both feet numbness.  Most likely from his spinal stenosis but other etiology cannot be ruled out.  See below.  He is seeing PMNR through [facility].  He has been to [place].  We discussed seeking another opinion from a spine specialist as his symptoms are worsening if needed.  He understood.  He will follow-up with his PMR provider and see.  -His current PMNR provider does not provide any medication and I am okay with filling his gabapentin.  Currently using 200 mg twice daily and we agreed to go up to 300 as he is not seeing major improvement.   - He uses Norco very infrequently and he still has some from the old prescription of 10 pills he got it in January.  I again discussed long term complications with chronic norco but as needed with severe symptoms it would be okay to take it.  He understands without actual clinic appointment he may not get a prescription of norco and I cannot guarantee if he sees other providers they would also prescribe him.  He understood.  - gabapentin (NEURONTIN) 300 MG capsule; Take 1 capsule (300 mg) by mouth 3 times daily Please discontinue 100mg dose.    Right leg numbness  See above.  Most likely from above.  May be reasonable to do a one-time neurology consult to rule out any other etiology.  - Adult Neurology  Referral; Future    Benign essential hypertension  Blood pressure above goal.  Blood pressure was fine recently in the ER.  We will schedule MA only for blood pressure recheck and if it remains high we will go up on the dose of amlodipine.             MED REC REQUIRED  Post Medication Reconciliation Status:  Discharge medications reconciled, continue medications without change         Kieran Zuluaga MD, MD  [facility]    Papa Siu is a 67 year old, presenting for the following [facility]  "issues:  Hospital F/U        5/9/2023    11:05 AM   Additional Questions   Roomed by Erin Willoughby   Accompanied by Self     HPI     ED/UC Followup:    Facility:  [facility] Emergency Department   Date of visit: 05/01/2023  Reason for visit: Leg Pain  Current Status: Was getting better, then it have gotten worst again. PT stated that his leg is feeling numb now and the pain is slowly going down his calf. PT also stated that there's tightness on his calf towards the ankle.     Still having pain with walking.   Pain moved from calf to higher achillis area.   Some numbness.   not painful. Not sure if tightness or weakness.   Numbness in calf area does not go away but more significant.  Had MRI of spine few months ago.   Got epidural injection recently too. It helped for few days. Seeing PMR at [facility].   No injury.   4 from old norco has. Got 10 more from ER. havent use it.      Thinking about going back to ortho vs neurologist.     Gabapentin - cant say it has changed everything. If it is working - it may be really slow.   200mg twice per day daily right now.     bp at home - cant do it due to low heart rate.     Review of Systems         Objective    BP (!) 164/85 (BP Location: Right arm, Patient Position: Sitting, Cuff Size: Adult Regular)   Pulse (!) 47   Temp 97.8  F (36.6  C) (Tympanic)   Resp 16   Ht 1.727 m (5' 8\")   Wt 66.2 kg (146 lb)   SpO2 100%   BMI 22.20 kg/m    Body mass index is 22.2 kg/m .  Physical Exam                       "

## 2023-05-10 NOTE — TELEPHONE ENCOUNTER
Records Requested     May 10, 2023 4:25 PM   [id number]   Facility  [facility]   Outcome 4:25pm Sent URGENT request for imaging to be pushed to PACS. -FAVIOLA John on 5/11/2023 at 10:09 AM Imaging resolved into PACS. -FAVIOLA     RECORDS RECEIVED FROM: Care Everywhere   REASON FOR VISIT: Right leg numbness    Date of Appt: 5/11/23 1:00pm   NOTES (FOR ALL VISITS) STATUS DETAILS   OFFICE NOTE from referring provider Internal 5/9/23, 3/1/23 Kieran Zuluaga MD @ [facility]     OFFICE NOTE from other specialist Care Everywhere 1/20/23 Shyla Davis MD @[facility]    4/21/22 Carly Gordon M.D.  @ [facility]    4/21/22 Ana Lawler APRN, C.N.P., M.S.N @ [facility]     DISCHARGE REPORT from the ER Internal 5/1/23 Carly Nolen MD @ [facility] ED     MEDICATION LIST Internal    IMAGING  (FOR ALL VISITS)     ULTRASOUND (CAROTID BILAT) *VASCULAR* Internal [facility]  5/1/23 US Lower Extremity Venous Duplex Right   MRI (HEAD, NECK, SPINE) PACS [facility]  9/16/17 MR Lumbar Spine

## 2023-05-11 ENCOUNTER — OFFICE VISIT (OUTPATIENT)
Dept: NEUROLOGY | Facility: CLINIC | Age: 68
End: 2023-05-11
Attending: FAMILY MEDICINE
Payer: MEDICARE

## 2023-05-11 ENCOUNTER — PRE VISIT (OUTPATIENT)
Dept: NEUROLOGY | Facility: CLINIC | Age: 68
End: 2023-05-11

## 2023-05-11 VITALS
DIASTOLIC BLOOD PRESSURE: 86 MMHG | HEART RATE: 50 BPM | OXYGEN SATURATION: 98 % | SYSTOLIC BLOOD PRESSURE: 139 MMHG | RESPIRATION RATE: 16 BRPM

## 2023-05-11 DIAGNOSIS — R79.9 ABNORMAL FINDING OF BLOOD CHEMISTRY, UNSPECIFIED: ICD-10-CM

## 2023-05-11 DIAGNOSIS — R20.0 RIGHT LEG NUMBNESS: ICD-10-CM

## 2023-05-11 DIAGNOSIS — R26.89 IMBALANCE: ICD-10-CM

## 2023-05-11 DIAGNOSIS — F40.240 CLAUSTROPHOBIA: ICD-10-CM

## 2023-05-11 DIAGNOSIS — M48.062 SPINAL STENOSIS OF LUMBAR REGION WITH NEUROGENIC CLAUDICATION: Primary | ICD-10-CM

## 2023-05-11 DIAGNOSIS — R29.2 HYPERREFLEXIA: ICD-10-CM

## 2023-05-11 DIAGNOSIS — G62.9 PERIPHERAL POLYNEUROPATHY: ICD-10-CM

## 2023-05-11 DIAGNOSIS — R73.03 PREDIABETES: ICD-10-CM

## 2023-05-11 PROCEDURE — 99204 OFFICE O/P NEW MOD 45 MIN: CPT | Mod: GC | Performed by: PSYCHIATRY & NEUROLOGY

## 2023-05-11 ASSESSMENT — PAIN SCALES - GENERAL: PAINLEVEL: MODERATE PAIN (4)

## 2023-05-11 NOTE — TELEPHONE ENCOUNTER
Routing refill request to provider for review/approval because:  -- Labs out of range:  CBC  -- Age  -- Blood pressure elevated    NSAID Medications Failed      Patient is age 6-64 years      Normal CBC on file in past 12 months        Recent Labs   Lab Test 05/01/23  1200   WBC 3.3*   RBC 4.40   HGB 14.1   HCT 40.7   *           Blood pressure under 140/90 in past 12 months        BP Readings from Last 3 Encounters:   05/09/23 (!) 164/85   05/01/23 115/70   04/13/23 (!) 153/82     Last Written Prescription Date:  2/21/2023  Last Fill Quantity: 180,  # refills: 0   Last office visit: 5/9/2023 ; last virtual visit: 8/24/2022 with prescribing provider:  Dr. Zuluaga   Future Office Visit:   Next 5 appointments (look out 90 days)    Issac 15, 2023 10:00 AM  MA Visit with [facility] MA/LPN  [facility] ([facility] ) [address] [place] [id number] [phone]        LEVI JohnsonN RN  [facility]

## 2023-05-11 NOTE — LETTER
2023       RE: Salbador Begum  [address]  [place] [address]     Dear Colleague,    Thank you for referring your patient, Salbador Begum, to the [facility] [place] at [facility]. Please see a copy of my visit note below.      DEPARTMENT OF NEUROLOGY  Referral for: Right Leg Numbness  Patient Name:  Salbador Begum  MRN:  [id number]    :  1955  Date of Clinic Visit:  May 11, 2023  Primary Care Provider:  Kieran Zuluaga  Referring Provider: Dr. Zuluaga    CHIEF COMPLAINT: Back pain, paraesthesias     ASSESSMENT AND PLAN:  Salbador Begum is a 67 year old man with history of low back, right rotator cuff tendonitis, narcolepsy, idiopathic hypersomnolence, AIMEE on CPAP, OA, depression, sinus nose dysfunction, mitral valve prolapse, and diverticulitis who presents as referral from PCP for evaluation of numbness x 4 extremities in setting of lumbar spinal stenosis with neurogenic claudication. Given his report of paraesthesias in the upper extremities, would investigate this further with cervical spine MRI and EMG (query radiculopathy versus bilateral CTS). Exam is non-localizing so no need for brain MRI. I also am detecting a subclinical large fiber neuropathy in his feet, so will do workup for reversible causes of peripheral neuropathy. Will defer to [facility] re: management of low back pain - would not be surprised if this will eventually require surgical intervention, so I have requested EMG/NCS of lower extremities in addition to one arm.     Plan:  - Reversible neuropathy labs   - EMG/NCS 2 legs and 1 arm (right).   - Cervical spine MRI without contrast + PRN medication for claustrophobia.     RTC in 6 months with any provider. I will be in touch with patient about results of above tests.     Patient was seen and discussed with Dr. Watt.     Oliver  "MD Bear  PGY-4 Neurology Resident  [facility]    ----------------------    HISTORY OF PRESENT ILLNESS:  Salbador Begum is a 67 year old man with history of low back, right rotator cuff tendonitis, narcolepsy, idiopathic hypersomnolence, AIMEE on CPAP, OA, depression, sinus nose dysfunction, mitral valve prolapse, and diverticulitis who presents as referral from PCP for evaluation of numbness x 4 extremities in setting of lumbar spinal stenosis with neurogenic claudication.     Low back pain started 30 years ago. Managed entirely by OTC medications and exercise for the vast majority of this time. He would have \"activity-related flare-ups\" that would resolve with narcotic pain medications. Since about 2017, the pain became more severe. Had first epidural injection about 20 years ago. Starting around 2020, started experiencing cramping sensation in lower buttocks. Has had sciatica on and off for years, but not in the last few years. He saw Dr. Spnecer at [facility] in March 2021 and had L4-L5 interlaminar RAFAEL with 100% relief of symptoms that lasted 4-6 weeks.     In March 2022, had MRI imaging \"Marked multilevel degenerative changes throughout the lumbar spine with grade 1 anterolisthesis of L3 on L4. Moderate to severe spinal canal narrowing at L4-L4. This has progressed since prior MR....mild to moderate spinal canal narrowing at L2-L3 and L4-L5. Multiple levels of neural foraminal narrowing throughout the lumbar spine\".     He had lumbar x-rays in April 2022 at [facility] that reportedly showed stenosis from L3-S1 with multilevel neural foraminal stenosis. He saw neurosurgeon Dr. Carly Gordon at that time, who recommended conservative management and facet joint injections.     Salbador underwent L4-L5 interlaminar RAFAEL at [facility] on 3/29/23. Had complete relief of symptoms same day, some discomfort the next day. Has not had recurrence of cramping in buttocks " "since this injection. Had PT on 4/4/23, but felt so good that he wondered if he no longer needed in-person PT, just the exercises at home. Pain started back up within the last few weeks. In beginning of April, he felt like he started \"walking with a limp\". On the week of 4/17/23, he developed right calf pain and was seen in our ED for this. Around this time, he developed tingling in the hands and up the forearms, both at the same time. It involved mostly digits 1, 2, and 3 on both sides. The tingling was confined to the elbows distally. It comes and goes. No dropping of objects or weakness in the hands. The calf pain has disappeared. After calf pain started, he developed numbness that started in the dorsum of the foot and seems to radiate upwards into the thigh, affecting both the front and back of the leg. This is in both feet, but is much worse in the right foot. He feels the area around his Achilles tendon is completely numb. This started in the past few months.      Reports rare neck pain. Never had shooting pain from the neck down into the hands. Within the past year, he notices that standing up straight causes more pain/discomfort. If he walks hunched over, it is much more tolerable. He is on gabapentin 300 mg BID, recently increased from 200 mg BID by his PCP. He also takes nabumetone 500 mg twice/day and Tylenol 1300 mg BID. Rarely has to take Vicodin.     No headaches, blurry vision, double vision. No imbalance. No dizziness/lightheadedness. Sometimes feels like ankles could buckle underneath him, has actually happened a couple times including today. No falls. Describes memory as \"fading', has trouble with dates, has to use a calendar, has to keep phone and keys in the same spot or he will lose it. Having a full neuropsychology evaluation in June. Was previously on Adderall for hypersomnolence/to be able to work, but is wondering if he has ADHD, which prompted this neuropsychology evaluation. This is going " "to [facility] at [facility] in [place] on .     His wife  last month from frontotemporal dementia. Worked as an , retired from this in 2018. Was previously an avid runner but no longer runs anymore in the past year because of back pain. Can still do prolonged 3 hour bike rides, even to this day.    PHYSICAL EXAMINATION:  Vitals: /86   Pulse 50   Resp 16   SpO2 98%   General: Appears stated age. Arthritic appearance of hands.   Psych: Appropriate mood and affect  Extremities: No pitting edema  Neurologic:    Mental status: Patient is oriented to person, place and time and able to provide a detailed account of history of illness. Attention and fund of knowledge are normal.    Language: Speech is fluent; comprehension is normal.     Cranial nerves: Pupils are round and react normally to light and accommodation. The optic discs are sharp and flat and without pallor; retina appears normal without hemorrhages or exudates. Visual fields are full and extraocular movements are normal. Facial strength and sensation are normal. Hearing is normal to finger rub bilaterally. The palate rises symmetrically and there is no dysarthria. Tongue protrusion is normal.    Power: Strength is normal bilaterally (5/5) in the following muscles/groups: sternocleidomastoids, trapezius, deltoids, biceps, triceps, wrist extensors, finger extensors, finger interossei, abudctor pollicis brevis, hip flexors, quadriceps, hamstrings, gastrocnemius/soleus, and anterior tibialis. Inversion and eversion of feet are 5/5 bilaterally.     Reflexes: 2+ patellar tendon reflexes. 3+ patellar tendon reflexes. Babinski sign absent. Payton's is present bilaterally. Triceps 2+ on the right and 2+ on the left. Biceps and brachioradialis tendon reflexes are 2+ bilaterally.     Motor/cerebellar: Normal tone in arms and legs. Mild kinetic and postural tremor in the arms, left greater than right. Possible very mild tremor in the left " leg. Toe tapping and foot tapping is slightly clumsy on left compared to the right. Muscle bulk and tone are normal. Finger-to-nose without dysmetria. HSH is mildly dysmetric. Rapid alternating movements are symmetric in the extremities. There is no pronator drift in the arms.    Sensation: Decreased pinprick/ligh  touch in S2 dermatome on the right. Decreased pinprick over fingertips and in left median distribution.      Gait: Gait is narrow based and steady and the patient is able to walk on heels and in tandem. Romberg is negative. Struggles with toe walking. ?Trendelenberg on the left.       REVIEW OF SYSTEMS: Comprehensive RoS negative except as per HPI.    ALLERGIES:  Allergies   Allergen Reactions    Metoclopramide Rash     MEDICATIONS:  Current Outpatient Medications   Medication Sig Dispense Refill    acetaminophen (TYLENOL) 650 MG CR tablet Take 1,300 mg by mouth 2 times daily      amLODIPine (NORVASC) 5 MG tablet Take 1 tablet (5 mg) by mouth daily 90 tablet 2    Ascorbic Acid (VITAMIN C PO)       [START ON 6/1/2023] buPROPion (WELLBUTRIN XL) 300 MG 24 hr tablet TAKE 1 TABLET BY MOUTH EVERY DAY IN THE MORNING 90 tablet 1    calcium-vitamin D 500-125 MG-UNIT TABS Take 1 tablet by mouth daily      gabapentin (NEURONTIN) 300 MG capsule Take 1 capsule (300 mg) by mouth 3 times daily Please discontinue 100mg dose. 90 capsule 3    Multiple Vitamin (MULTI-DAY VITAMINS) TABS Take  by mouth.      nabumetone (RELAFEN) 500 MG tablet Take 1 tablet (500 mg) by mouth 2 times daily as needed for moderate pain (4-6) 180 tablet 0    sildenafil (REVATIO) 20 MG tablet TAKE 3 TO 5 TABLETS BY MOUTH 30 MINUTES BEFORE SEXUAL ACTIVITY AS NEEDED 90 tablet 3     PAST MEDICAL HISTORY:  Past Medical History:   Diagnosis Date    Allergic rhinitis, cause unspecified     Backache, unspecified     Depressive disorder     Diverticulitis     Junctional ectopic contractions (H)     Mild intermittent asthma 9/28/2005    Narcolepsy      Osteoarthritis     Palpitations     Sleep apnea      PAST SURGICAL HISTORY:  Past Surgical History:   Procedure Laterality Date    APPENDECTOMY      ORTHOPEDIC SURGERY      left hip replacement, right ankle,    SEPTORHINOPLASTY  4/7/2011    Procedure:SEPTORHINOPLASTY; Septoplasty withNasal Reconstruction with  Grafts Harvested from [employer]; Surgeon:ALFREDO UMANA; Location: OR    [facility] NONSPECIFIC PROCEDURE      s/p Appendectomy     SOCIAL HISTORY:  Social History     Socioeconomic History    Marital status:      Spouse name: Not on file    Number of children: Not on file    Years of education: Not on file    Highest education level: Not on file   Occupational History    Not on file   Tobacco Use    Smoking status: Never    Smokeless tobacco: Never   Vaping Use    Vaping status: Never Used   Substance and Sexual Activity    Alcohol use: Yes     Comment: 14 glass of wine weeks     Drug use: No    Sexual activity: Yes     Partners: Female     Birth control/protection: Post-menopausal     Comment: Other   Other Topics Concern    Parent/sibling w/ CABG, MI or angioplasty before 65F 55M? No   Social History Narrative    Social Documentation:        Balanced Diet: YES    Calcium intake: milk and food  per day, occas mtv    Caffeine: 1 cup per day    Exercise:  type of activity varies;  3-5 times per week    Sunscreen: No -     Seatbelts:  Yes    Self Breast Exam:  No - na    Self Testicular Exam: No -     Physical/Emotional/Sexual Abuse: No -     Do you feel safe in your environment? Yes        Cholesterol screen up to date: Yes    Eye Exam up to date: Yes    Dental Exam up to date: Yes    Pap smear up to date: Does Not Apply    Mammogram up to date: Does Not Apply    Dexa Scan up to date: Does Not Apply    Colonoscopy up to date: Due    Immunizations up to date: Yes    Glucose screen if over 40:  Yes                 Social Determinants of Health     Financial Resource Strain: Not on file   Food  Insecurity: Not on file   Transportation Needs: Not on file   Physical Activity: Not on file   Stress: Not on file   Social Connections: Not on file   Intimate Partner Violence: Not on file   Housing Stability: Not on file     FAMILY HISTORY:  No family history of essential tremor.   Mother had seizures and trouble communicating at the end of her life.     Attestation signed by Filippo Watt DO at 5/17/2023  3:20 PM:  I saw and evaluated the patient on 5/11/2023 and agree with the findings and the plan of care as documented in the resident's note.      Total time 45 minutes over half of which was with patient and family counseling or coordinating care      Again, thank you for allowing me to participate in the care of your patient.      Sincerely,    Oliver Sifuentes MD

## 2023-05-11 NOTE — PROGRESS NOTES
DEPARTMENT OF NEUROLOGY  Referral for: Right Leg Numbness  Patient Name:  Salbador Begum  MRN:  [id number]    :  1955  Date of Clinic Visit:  May 11, 2023  Primary Care Provider:  Kieran Zuluaga  Referring Provider: Dr. Zuluaga    CHIEF COMPLAINT: Back pain, paraesthesias     ASSESSMENT AND PLAN:  Salbador Begum is a 67 year old man with history of low back, right rotator cuff tendonitis, narcolepsy, idiopathic hypersomnolence, AIMEE on CPAP, OA, depression, sinus nose dysfunction, mitral valve prolapse, and diverticulitis who presents as referral from PCP for evaluation of numbness x 4 extremities in setting of lumbar spinal stenosis with neurogenic claudication. Given his report of paraesthesias in the upper extremities, would investigate this further with cervical spine MRI and EMG (query radiculopathy versus bilateral CTS). Exam is non-localizing so no need for brain MRI. I also am detecting a subclinical large fiber neuropathy in his feet, so will do workup for reversible causes of peripheral neuropathy. Will defer to [facility] re: management of low back pain - would not be surprised if this will eventually require surgical intervention, so I have requested EMG/NCS of lower extremities in addition to one arm.     Plan:  - Reversible neuropathy labs   - EMG/NCS 2 legs and 1 arm (right).   - Cervical spine MRI without contrast + PRN medication for claustrophobia.     RTC in 6 months with any provider. I will be in touch with patient about results of above tests.     Patient was seen and discussed with Dr. Watt.     Oliver Sifuentes MD  PGY-4 Neurology Resident  [facility]    ----------------------    HISTORY OF PRESENT ILLNESS:  Salbador Begum is a 67 year old man with history of low back, right rotator cuff tendonitis, narcolepsy, idiopathic hypersomnolence, AIMEE on CPAP, OA, depression, sinus nose dysfunction, mitral valve prolapse, and  "diverticulitis who presents as referral from PCP for evaluation of numbness x 4 extremities in setting of lumbar spinal stenosis with neurogenic claudication.     Low back pain started 30 years ago. Managed entirely by OTC medications and exercise for the vast majority of this time. He would have \"activity-related flare-ups\" that would resolve with narcotic pain medications. Since about 2017, the pain became more severe. Had first epidural injection about 20 years ago. Starting around 2020, started experiencing cramping sensation in lower buttocks. Has had sciatica on and off for years, but not in the last few years. He saw Dr. Spencer at [facility] in March 2021 and had L4-L5 interlaminar RAFAEL with 100% relief of symptoms that lasted 4-6 weeks.     In March 2022, had MRI imaging \"Marked multilevel degenerative changes throughout the lumbar spine with grade 1 anterolisthesis of L3 on L4. Moderate to severe spinal canal narrowing at L4-L4. This has progressed since prior MR....mild to moderate spinal canal narrowing at L2-L3 and L4-L5. Multiple levels of neural foraminal narrowing throughout the lumbar spine\".     He had lumbar x-rays in April 2022 at [facility] that reportedly showed stenosis from L3-S1 with multilevel neural foraminal stenosis. He saw neurosurgeon Dr. Carly Gordon at that time, who recommended conservative management and facet joint injections.     Salbador underwent L4-L5 interlaminar RAFAEL at [facility] on 3/29/23. Had complete relief of symptoms same day, some discomfort the next day. Has not had recurrence of cramping in buttocks since this injection. Had PT on 4/4/23, but felt so good that he wondered if he no longer needed in-person PT, just the exercises at home. Pain started back up within the last few weeks. In beginning of April, he felt like he started \"walking with a limp\". On the week of 4/17/23, he developed right calf pain and was seen in our ED for this. " "Around this time, he developed tingling in the hands and up the forearms, both at the same time. It involved mostly digits 1, 2, and 3 on both sides. The tingling was confined to the elbows distally. It comes and goes. No dropping of objects or weakness in the hands. The calf pain has disappeared. After calf pain started, he developed numbness that started in the dorsum of the foot and seems to radiate upwards into the thigh, affecting both the front and back of the leg. This is in both feet, but is much worse in the right foot. He feels the area around his Achilles tendon is completely numb. This started in the past few months.      Reports rare neck pain. Never had shooting pain from the neck down into the hands. Within the past year, he notices that standing up straight causes more pain/discomfort. If he walks hunched over, it is much more tolerable. He is on gabapentin 300 mg BID, recently increased from 200 mg BID by his PCP. He also takes nabumetone 500 mg twice/day and Tylenol 1300 mg BID. Rarely has to take Vicodin.     No headaches, blurry vision, double vision. No imbalance. No dizziness/lightheadedness. Sometimes feels like ankles could buckle underneath him, has actually happened a couple times including today. No falls. Describes memory as \"fading', has trouble with dates, has to use a calendar, has to keep phone and keys in the same spot or he will lose it. Having a full neuropsychology evaluation in . Was previously on Adderall for hypersomnolence/to be able to work, but is wondering if he has ADHD, which prompted this neuropsychology evaluation. This [facility] going to be at [facility] in [place] on .     His wife  last month from frontotemporal dementia. Worked as an , retired from this in 2018. Was previously an avid runner but no longer runs anymore in the past year because of back pain. Can still do prolonged 3 hour bike rides, even to this day.    PHYSICAL " EXAMINATION:  Vitals: /86   Pulse 50   Resp 16   SpO2 98%   General: Appears stated age. Arthritic appearance of hands.   Psych: Appropriate mood and affect  Extremities: No pitting edema  Neurologic:    Mental status: Patient is oriented to person, place and time and able to provide a detailed account of history of illness. Attention and fund of knowledge are normal.    Language: Speech is fluent; comprehension is normal.     Cranial nerves: Pupils are round and react normally to light and accommodation. The optic discs are sharp and flat and without pallor; retina appears normal without hemorrhages or exudates. Visual fields are full and extraocular movements are normal. Facial strength and sensation are normal. Hearing is normal to finger rub bilaterally. The palate rises symmetrically and there is no dysarthria. Tongue protrusion is normal.    Power: Strength is normal bilaterally (5/5) in the following muscles/groups: sternocleidomastoids, trapezius, deltoids, biceps, triceps, wrist extensors, finger extensors, finger interossei, abudctor pollicis brevis, hip flexors, quadriceps, hamstrings, gastrocnemius/soleus, and anterior tibialis. Inversion and eversion of feet are 5/5 bilaterally.     Reflexes: 2+ patellar tendon reflexes. 3+ patellar tendon reflexes. Babinski sign absent. Payton's is present bilaterally. Triceps 2+ on the right and 2+ on the left. Biceps and brachioradialis tendon reflexes are 2+ bilaterally.     Motor/cerebellar: Normal tone in arms and legs. Mild kinetic and postural tremor in the arms, left greater than right. Possible very mild tremor in the left leg. Toe tapping and foot tapping is slightly clumsy on left compared to the right. Muscle bulk and tone are normal. Finger-to-nose without dysmetria. HSH is mildly dysmetric. Rapid alternating movements are symmetric in the extremities. There is no pronator drift in the arms.    Sensation: Decreased pinprick/ligh  touch in S2  dermatome on the right. Decreased pinprick over fingertips and in left median distribution.      Gait: Gait is narrow based and steady and the patient is able to walk on heels and in tandem. Romberg is negative. Struggles with toe walking. ?Trendelenberg on the left.       REVIEW OF SYSTEMS: Comprehensive RoS negative except as per HPI.    ALLERGIES:  Allergies   Allergen Reactions     Metoclopramide Rash     MEDICATIONS:  Current Outpatient Medications   Medication Sig Dispense Refill     acetaminophen (TYLENOL) 650 MG CR tablet Take 1,300 mg by mouth 2 times daily       amLODIPine (NORVASC) 5 MG tablet Take 1 tablet (5 mg) by mouth daily 90 tablet 2     Ascorbic Acid (VITAMIN C PO)        [START ON 6/1/2023] buPROPion (WELLBUTRIN XL) 300 MG 24 hr tablet TAKE 1 TABLET BY MOUTH EVERY DAY IN THE MORNING 90 tablet 1     calcium-vitamin D 500-125 MG-UNIT TABS Take 1 tablet by mouth daily       gabapentin (NEURONTIN) 300 MG capsule Take 1 capsule (300 mg) by mouth 3 times daily Please discontinue 100mg dose. 90 capsule 3     Multiple Vitamin (MULTI-DAY VITAMINS) TABS Take  by mouth.       nabumetone (RELAFEN) 500 MG tablet Take 1 tablet (500 mg) by mouth 2 times daily as needed for moderate pain (4-6) 180 tablet 0     sildenafil (REVATIO) 20 MG tablet TAKE 3 TO 5 TABLETS BY MOUTH 30 MINUTES BEFORE SEXUAL ACTIVITY AS NEEDED 90 tablet 3     PAST MEDICAL HISTORY:  Past Medical History:   Diagnosis Date     Allergic rhinitis, cause unspecified      Backache, unspecified      Depressive disorder      Diverticulitis      Junctional ectopic contractions (H)      Mild intermittent asthma 9/28/2005     Narcolepsy      Osteoarthritis      Palpitations      Sleep apnea      PAST SURGICAL HISTORY:  Past Surgical History:   Procedure Laterality Date     APPENDECTOMY       ORTHOPEDIC SURGERY      left hip replacement, right ankle,     SEPTORHINOPLASTY  4/7/2011    Procedure:SEPTORHINOPLASTY; Septoplasty withNasal Reconstruction with   Grafts Harvested from [employer]; Surgeon:ALFREDO UMANA; Location: OR     [facility] [place] NONSPECIFIC PROCEDURE      s/p Appendectomy     SOCIAL HISTORY:  Social History     Socioeconomic History     Marital status:      Spouse name: Not on file     Number of children: Not on file     Years of education: Not on file     Highest education level: Not on file   Occupational History     Not on file   Tobacco Use     Smoking status: Never     Smokeless tobacco: Never   Vaping Use     Vaping status: Never Used   Substance and Sexual Activity     Alcohol use: Yes     Comment: 14 glass of wine weeks      Drug use: No     Sexual activity: Yes     Partners: Female     Birth control/protection: Post-menopausal     Comment: Other   Other Topics Concern     Parent/sibling w/ CABG, MI or angioplasty before 65F 55M? No   Social History Narrative    Social Documentation:        Balanced Diet: YES    Calcium intake: milk and food  per day, occas mtv    Caffeine: 1 cup per day    Exercise:  type of activity varies;  3-5 times per week    Sunscreen: No -     Seatbelts:  Yes    Self Breast Exam:  No - na    Self Testicular Exam: No -     Physical/Emotional/Sexual Abuse: No -     Do you feel safe in your environment? Yes        Cholesterol screen up to date: Yes    Eye Exam up to date: Yes    Dental Exam up to date: Yes    Pap smear up to date: Does Not Apply    Mammogram up to date: Does Not Apply    Dexa Scan up to date: Does Not Apply    Colonoscopy up to date: Due    Immunizations up to date: Yes    Glucose screen if over 40:  Yes                 Social Determinants of Health     Financial Resource Strain: Not on file   Food Insecurity: Not on file   Transportation Needs: Not on file   Physical Activity: Not on file   Stress: Not on file   Social Connections: Not on file   Intimate Partner Violence: Not on file   Housing Stability: Not on file     FAMILY HISTORY:  No family history of essential tremor.   Mother had seizures  and trouble communicating at the end of her life.

## 2023-05-11 NOTE — PATIENT INSTRUCTIONS
Blood work  EMG/NCS  MR cervical spine      Dr. Sifuentes will communicate with you on [facility] about the results of the tests. Blood work will take a few days to result. EMG test and MRI is scheduling a few weeks out. Dr. Sifuentes leaves end of June.

## 2023-05-12 DIAGNOSIS — M48.062 SPINAL STENOSIS OF LUMBAR REGION WITH NEUROGENIC CLAUDICATION: Primary | ICD-10-CM

## 2023-05-12 RX ORDER — DIAZEPAM 2 MG
2 TABLET ORAL PRN
Qty: 2 TABLET | Refills: 0 | Status: CANCELLED | OUTPATIENT
Start: 2023-05-12

## 2023-05-12 RX ORDER — NABUMETONE 500 MG/1
500 TABLET, FILM COATED ORAL 2 TIMES DAILY PRN
Qty: 180 TABLET | Refills: 0 | Status: SHIPPED | OUTPATIENT
Start: 2023-05-12 | End: 2023-08-10

## 2023-05-12 RX ORDER — DIAZEPAM 2 MG
4 TABLET ORAL
Qty: 2 TABLET | Refills: 0 | Status: SHIPPED | OUTPATIENT
Start: 2023-05-12 | End: 2023-06-22

## 2023-05-15 ENCOUNTER — TELEPHONE (OUTPATIENT)
Dept: NEUROLOGY | Facility: CLINIC | Age: 68
End: 2023-05-15

## 2023-05-15 ENCOUNTER — LAB (OUTPATIENT)
Dept: LAB | Facility: CLINIC | Age: 68
End: 2023-05-15
Payer: MEDICARE

## 2023-05-15 DIAGNOSIS — R79.9 ABNORMAL FINDING OF BLOOD CHEMISTRY, UNSPECIFIED: ICD-10-CM

## 2023-05-15 DIAGNOSIS — R73.03 PREDIABETES: ICD-10-CM

## 2023-05-15 DIAGNOSIS — G62.9 PERIPHERAL POLYNEUROPATHY: ICD-10-CM

## 2023-05-15 LAB — TSH SERPL DL<=0.005 MIU/L-ACNC: 0.87 UIU/ML (ref 0.3–4.2)

## 2023-05-15 PROCEDURE — 84165 PROTEIN E-PHORESIS SERUM: CPT | Mod: TC | Performed by: PATHOLOGY

## 2023-05-15 PROCEDURE — 36415 COLL VENOUS BLD VENIPUNCTURE: CPT | Performed by: PATHOLOGY

## 2023-05-15 PROCEDURE — 84165 PROTEIN E-PHORESIS SERUM: CPT | Mod: 26

## 2023-05-15 PROCEDURE — 82607 VITAMIN B-12: CPT | Performed by: PSYCHIATRY & NEUROLOGY

## 2023-05-15 PROCEDURE — 84155 ASSAY OF PROTEIN SERUM: CPT | Performed by: PSYCHIATRY & NEUROLOGY

## 2023-05-15 PROCEDURE — 83036 HEMOGLOBIN GLYCOSYLATED A1C: CPT | Performed by: PSYCHIATRY & NEUROLOGY

## 2023-05-15 PROCEDURE — 84443 ASSAY THYROID STIM HORMONE: CPT | Performed by: PATHOLOGY

## 2023-05-15 NOTE — TELEPHONE ENCOUNTER
----- Message from Oliver Sifuentes MD sent at 5/12/2023  7:00 AM CDT -----  Regarding: FW: Patient requesting sedative for MRI  Dr. Watt,     I ordered 2 doses of diazepam for claustrophobia. Can you co-sign the order? No gonsales.     Oliver    ----- Message -----  From: Mariluz Rojas RN  Sent: 5/11/2023   3:03 PM CDT  To: Oliver Sifuentes MD  Subject: FW: Patient requesting sedative for MRI          Hi Dr. Sifuentes please review and advise. Thank you. Mariluz MRAIE   ----- Message -----  From: Santos Patino  Sent: 5/11/2023   2:59 PM CDT  To: General Neurology Nurses -   Subject: Patient requesting sedative for MRI              Hello,     While I was checking out this patient today he mentioned he would need an oral sedative for his MRI. He told me he usually takes 2 diazepam & asked me to pass along this message. Patient's MRI is currently scheduled on 6/2. Would Juliane Sifuentes or Alysa be able to prescribe this medication for him?    Thank you,   Santos

## 2023-05-16 LAB
HBA1C MFR BLD: 5.7 %
TOTAL PROTEIN SERUM FOR ELP: 6.8 G/DL (ref 6.4–8.3)
VIT B12 SERPL-MCNC: 570 PG/ML (ref 232–1245)

## 2023-05-17 LAB
ALBUMIN SERPL ELPH-MCNC: 4.4 G/DL (ref 3.7–5.1)
ALPHA1 GLOB SERPL ELPH-MCNC: 0.3 G/DL (ref 0.2–0.4)
ALPHA2 GLOB SERPL ELPH-MCNC: 0.5 G/DL (ref 0.5–0.9)
B-GLOBULIN SERPL ELPH-MCNC: 0.7 G/DL (ref 0.6–1)
GAMMA GLOB SERPL ELPH-MCNC: 0.9 G/DL (ref 0.7–1.6)
M PROTEIN SERPL ELPH-MCNC: 0 G/DL
PROT PATTERN SERPL ELPH-IMP: NORMAL

## 2023-05-18 ENCOUNTER — OFFICE VISIT (OUTPATIENT)
Dept: NEUROLOGY | Facility: CLINIC | Age: 68
End: 2023-05-18
Attending: PSYCHIATRY & NEUROLOGY
Payer: MEDICARE

## 2023-05-18 ENCOUNTER — MYC MEDICAL ADVICE (OUTPATIENT)
Dept: FAMILY MEDICINE | Facility: CLINIC | Age: 68
End: 2023-05-18

## 2023-05-18 DIAGNOSIS — M54.17 LUMBOSACRAL RADICULOPATHY AT S1: Primary | ICD-10-CM

## 2023-05-18 DIAGNOSIS — G62.9 PERIPHERAL POLYNEUROPATHY: ICD-10-CM

## 2023-05-18 DIAGNOSIS — R20.0 RIGHT LEG NUMBNESS: ICD-10-CM

## 2023-05-18 PROCEDURE — 95912 NRV CNDJ TEST 11-12 STUDIES: CPT | Performed by: STUDENT IN AN ORGANIZED HEALTH CARE EDUCATION/TRAINING PROGRAM

## 2023-05-18 PROCEDURE — 95885 MUSC TST DONE W/NERV TST LIM: CPT | Performed by: STUDENT IN AN ORGANIZED HEALTH CARE EDUCATION/TRAINING PROGRAM

## 2023-05-18 NOTE — TELEPHONE ENCOUNTER
See RN response to patient's mychart message re:covid vaccine    LEVI AguirreN RN  [facility]

## 2023-05-18 NOTE — LETTER
2023       RE: Salbador Begum  [address]  [place] [address]       Dear Colleague,    Thank you for referring your patient, Salbador Begum, to the [facility] EMG CLINIC [place] at [facility]. Please see a copy of my visit note below.                        [facility]  Electrodiagnostic Laboratory                 Department of Neurology                                                                                                         Test Date:  2023    Patient: Salbador Begum : 1955 Physician: Celso Serrano MD   Sex: Male AGE: 67 year Ref Phys: Dr. Moser   ID#: [id number]   Technician: Abida Sofia     History and Examination:  Mr. Begum is a 67 y.o. man with a 30 year history of back pain and leg numbness with lumbar central canal stenosis, L4 on L5 anterolisthesis s/p steroid injections with temporary relief. Conservative management has been recommended by neurosurgery at [place] most recently. EMG of bilateral legs and right arm to assess for polyneuropathy vs radiculopathy.       NCS Results:  1.  Right ulnar-FDI and ulnar-ADM with slowing over the elbow and otherwise normal distal latency, amplitude, and conduction velocities  2.  Bilateral sural and superficial fibular sensory responses with normal amplitudes and conduction velocities  3.  Right median-D2 and ulnar- D5 sensory responses with normal amplitudes and conduction velocities.  4.  Right radial sensory response with normal amplitude and conduction velocity  5.  Bilateral fibular-EDB and tibial-AH motor responses with normal distal latency, amplitude, conduction velocities  6.  Right median-APB motor response with normal distal latency, amplitude and conduction velocity  7.  Bilateral tibial minimum F latencies within normal limits      EMG Results:  1.  Increased insertional activity with fibrillation potentials in the  right gluteus kezia and bilateral gastrocnemius muscles with large amplitude and/or long-duration motor unit potentials.  2.  Normal insertional activity with large amplitude and/or long-duration motor unit potentials in the bilateral vastus lateralis, tibialis anterior muscles  3.  Left gluteus kezia and left tibialis anterior with normal insertional activity and motor unit action potentials.  4.  Right deltoid, triceps, first dorsal interosseous, and EDC with normal insertional activity and motor unit action potentials      Interpretation:  This is an abnormal study.  There is electrophysiologic evidence of:  1. Acute, active bilateral S1 radiculopathies with chronic reinnervation changes, right >left.   2. Chronic bilateral L4-L5 radiculopathies without evidence of acute denervation.   3. 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     There is no evidence of a polyneuropathy affecting the lower extremities or the right upper extremity, nor is there evidence of a cervical radiculopathy affecting the right upper extremity on the basis of the study.    Clinical correlation is recommended.       ___________________________  Celso Serrano MD        Nerve Conduction Studies  Motor Sites      Latency Amplitude Neg. Amp Diff Segment Distance Velocity Neg. Dur Neg Area Diff Temperature Comment   Site (ms) Norm (mV) Norm %  cm m/s Norm ms %  C    Left Fibular (EDB) Motor   Ankle 3.8  < 6.0 6.9  > 2.0  Ankle-EDB 8   5.6  32.3    Bel Fib Head 11.3 - 5.9 - -14.5 Bel Fib Head-Ankle 31.5 42  > 38 6.4 -3.2 32.6    Pop Fossa 13.0 - 5.5 - -6.8 Pop Fossa-Bel Fib Head 9.5 56  > 38 6.5 3.8 32.1    Right Fibular (EDB) Motor   Ankle 4.7  < 6.0 6.2  > 2.0  Ankle-EDB 8   5.0  31.2    Bel Fib Head 12.3 - 5.2 - -16.1 Bel Fib Head-Ankle 32 42  > 38 5.7 -1.74 31.2    Pop Fossa 13.9 - 4.8 - -7.7 Pop Fossa-Bel Fib Head 8 50  > 38 5.9 -3.6 31.2    Right Median (APB) Motor   Wrist 3.3  < 4.4 6.8  > 5.0  Wrist-APB 8   5.1  33.5     Elbow 7.7 - 6.4  > 5.0 -5.9 Elbow-Wrist 24.5 56  > 48 4.9 -8.8 33.5    Left Tibial (AHB) Motor   Ankle 6.2  < 6.5 6.9  > 5.0  Ankle-AHB 8   4.0  31.9    Knee 16.0 - 4.7 - -31.9 Knee-Ankle 39 40  > 38 4.7 -19.7 31.9    Right Tibial (AHB) Motor   Ankle 6.3  < 6.5 8.0  > 5.0  Ankle-AHB 8   3.4  31    Knee 16.1 - 4.5 - -43.8 Knee-Ankle 40 41  > 38 4.1 -35.0 31    Right Ulnar (ADM) Motor   Wrist 2.8  < 3.5 9.7  > 5.0  Wrist-ADM 8   6.5  33.4    Bel Elbow 6.5 - 9.2 - -5.2 Bel Elbow-Wrist 22 59  > 48 6.5 -0.65 33.5    Abv Elbow 9.3 - 8.8 - -4.3 Abv Elbow-Bel Elbow 10 36  > 48 6.0 -3.9 32.1    Up Arm 10.4 - 8.1 - -8.0 Up Arm-Abv Elbow 7.5 68 - 6.1 -3.7 32.4    Right Ulnar (FDI) Motor   Wrist 4.0 - 6.3 -      4.3  32.5    Bel Elbow 7.8 - 7.7 - 22.2 Bel Elbow-Wrist 22 58  > 48 4.0 9.6 32.7    Abv Elbow 10.1 - 6.5 - -15.6 Abv Elbow-Bel Elbow 10 43  > 48 4.4 -15.6 32.6    Up Arm 11.4 - 7.5 - 15.4 Up Arm-Abv Elbow 7.5 58 - 4.3 15.6 32      F-Wave Sites      Min F-Lat Max-Min F-Lat Mean F-Lat   Site (ms) ms ms   Right Ulnar F-Wave   Wrist 28.9 0.70 29.3     Sensory Sites      Onset Lat Peak Lat Amp (O-P) Amp (P-P) Segment Distance Velocity Temperature Comment   Site ms ms  V Norm  V  cm m/s Norm  C    Right Median Sensory   Wrist-Dig II 2.7 3.4 25  > 10 40 Wrist-Dig II 14 52  > 48 32.7    Right Median (Ortho) Sensory   Palm-Wrist 1.60 2.1 44 - 59 Palm-Wrist 8 50 - 33.5    Right Median-Ulnar Palmar Sensory        Median   Palm-Wrist 1.60 2.1 44 - 59 Palm-Wrist 8 50 - 33.5         Ulnar   Palm-Wrist 1.35 1.83 13 - 39 Palm-Wrist 8 59 - 33.4    Right Radial Sensory   Forearm-Wrist 1.53 2.2 19  > 15 24 Forearm-Wrist 10 65 - 33.6    Left Superficial Fibular Sensory   14 cm-Ankle 3.2 4.1 7  > 3 3 14 cm-Ankle 12.5 39  > 38 32.2    Right Superficial Fibular Sensory   14 cm-Ankle 3.1 4.2 5  > 3 6 14 cm-Ankle 12.5 40  > 38 31.1    Left Sural Sensory   Calf-Lat Mall 3.4 4.6 8  > 5 11 Calf-Lat Mall 14 41  > 38 31.5    Right Sural Sensory    Calf-Lat Mall 3.6 4.4 5  > 5 6 Calf-Lat Mall 14 39  > 38 30.6    Right Ulnar Sensory   Wrist-Dig V 2.5 3.2 17  > 8 25 Wrist-Dig V 12.5 50  > 48 32.6    Right Ulnar (Ortho) Sensory   Palm-Wrist 1.35 1.83 13 - 39 Palm-Wrist 8 59 - 33.4      Inter-Nerve Comparisons     Nerve 1 Value 1 Nerve 2 Value 2 Parameter Result Normal   Sensory Sites   R Median Palm-Wrist 2.1 ms R Ulnar Palm-Wrist 1.8 ms Peak Lat Diff 0.27 ms <0.40     F Wave Studies     Min-F Max-F Dispersion Persistence Mean-F F-Norm L-R Mean-F L-R Mean-F Norm F/M Ratio F-M Lat (ms)   Left Tibial (Abd Hallucis)  32.8  C   52.50 69.53 17.03 100.00 56.94 <61 1.39 <5.7 1.70 50.78   Right Tibial (Abd Hallucis)  30.8  C   57.19 59.22 2.03 100.00 58.33 <61 1.39 <5.7 1.58 50.78       Electromyography     Side Muscle Ins Act Fibs/PSW Fasc HF Amp Dur Poly Recrt Int Pat   Right Vastus Lat Nml None Nml 0 2+ Nml 1+ Nate Nml   Right Tib Anterior Nml None Nml 0 1+ Nml 1+ Nate Nml   Right Gastroc Incr 3+ Nml 0 2+ 1+ 2+ ModRed Nml   Left Gastroc Incr 2+ Nml 0 2+ 1+ 1+ ModRed Nml   Left Vastus Lat Nml None Nml 0 1+ Nml 1+ Nate Nml   Left Tib Anterior Nml None Nml 0 Nml Nml 0 Nml Nml   Right Gluteus Max Incr 2+ Nml 0 1+ Nml 1+ Nate Nml   Left Gluteus Max Nml None Nml 0 Nml Nml 0 Nml Nml   Right Lumbo Parasp (Lower) Nml None Nml 0        Right Deltoid Nml None Nml 0 Nml Nml 0 Nml Nml   Right Triceps Nml None Nml 0 Nml Nml 0 Nml Nml   Right EDC Nml None Nml 0 Nml Nml 0 Nml Nml   Right FDI Nml None Nml 0 Nml Nml 0 Nml Nml         NCS Waveforms:    Motor                         F-Wave       Sensory                                                     Again, thank you for allowing me to participate in the care of your patient.      Sincerely,    Celso Serrano MD

## 2023-05-18 NOTE — PROGRESS NOTES
[facility]  Electrodiagnostic Laboratory                 Department of Neurology                                                                                                         Test Date:  2023    Patient: Salbador Begum : 1955 Physician: Celso Serrano MD   Sex: Male AGE: 67 year Ref Phys: Dr. Moser   ID#: [id number]   Technician: Abida Sofia     History and Examination:  Mr. Begum is a 67 y.o. man with a 30 year history of back pain and leg numbness with lumbar central canal stenosis, L4 on L5 anterolisthesis s/p steroid injections with temporary relief. Conservative management has been recommended by neurosurgery at [place] most recently. EMG of bilateral legs and right arm to assess for polyneuropathy vs radiculopathy.       NCS Results:  1.  Right ulnar-FDI and ulnar-ADM with slowing over the elbow and otherwise normal distal latency, amplitude, and conduction velocities  2.  Bilateral sural and superficial fibular sensory responses with normal amplitudes and conduction velocities  3.  Right median-D2 and ulnar- D5 sensory responses with normal amplitudes and conduction velocities.  4.  Right radial sensory response with normal amplitude and conduction velocity  5.  Bilateral fibular-EDB and tibial-AH motor responses with normal distal latency, amplitude, conduction velocities  6.  Right median-APB motor response with normal distal latency, amplitude and conduction velocity  7.  Bilateral tibial minimum F latencies within normal limits      EMG Results:  1.  Increased insertional activity with fibrillation potentials in the right gluteus kezia and bilateral gastrocnemius muscles with large amplitude and/or long-duration motor unit potentials.  2.  Normal insertional activity with large amplitude and/or long-duration motor unit potentials in the bilateral vastus lateralis, tibialis anterior muscles  3.  Left gluteus kezia and left tibialis anterior  with normal insertional activity and motor unit action potentials.  4.  Right deltoid, triceps, first dorsal interosseous, and EDC with normal insertional activity and motor unit action potentials      Interpretation:  This is an abnormal study.  There is electrophysiologic evidence of:  1. Acute, active bilateral S1 radiculopathies with chronic reinnervation changes, right >left.   2. Chronic bilateral L4-L5 radiculopathies without evidence of acute denervation.   3. 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     There is no evidence of a polyneuropathy affecting the lower extremities or the right upper extremity, nor is there evidence of a cervical radiculopathy affecting the right upper extremity on the basis of the study.    Clinical correlation is recommended.       ___________________________  Celso Serrano MD        Nerve Conduction Studies  Motor Sites      Latency Amplitude Neg. Amp Diff Segment Distance Velocity Neg. Dur Neg Area Diff Temperature Comment   Site (ms) Norm (mV) Norm %  cm m/s Norm ms %  C    Left Fibular (EDB) Motor   Ankle 3.8  < 6.0 6.9  > 2.0  Ankle-EDB 8   5.6  32.3    Bel Fib Head 11.3 - 5.9 - -14.5 Bel Fib Head-Ankle 31.5 42  > 38 6.4 -3.2 32.6    Pop Fossa 13.0 - 5.5 - -6.8 Pop Fossa-Bel Fib Head 9.5 56  > 38 6.5 3.8 32.1    Right Fibular (EDB) Motor   Ankle 4.7  < 6.0 6.2  > 2.0  Ankle-EDB 8   5.0  31.2    Bel Fib Head 12.3 - 5.2 - -16.1 Bel Fib Head-Ankle 32 42  > 38 5.7 -1.74 31.2    Pop Fossa 13.9 - 4.8 - -7.7 Pop Fossa-Bel Fib Head 8 50  > 38 5.9 -3.6 31.2    Right Median (APB) Motor   Wrist 3.3  < 4.4 6.8  > 5.0  Wrist-APB 8   5.1  33.5    Elbow 7.7 - 6.4  > 5.0 -5.9 Elbow-Wrist 24.5 56  > 48 4.9 -8.8 33.5    Left Tibial (AHB) Motor   Ankle 6.2  < 6.5 6.9  > 5.0  Ankle-AHB 8   4.0  31.9    Knee 16.0 - 4.7 - -31.9 Knee-Ankle 39 40  > 38 4.7 -19.7 31.9    Right Tibial (AHB) Motor   Ankle 6.3  < 6.5 8.0  > 5.0  Ankle-AHB 8   3.4  31    Knee 16.1 - 4.5 - -43.8  Knee-Ankle 40 41  > 38 4.1 -35.0 31    Right Ulnar (ADM) Motor   Wrist 2.8  < 3.5 9.7  > 5.0  Wrist-ADM 8   6.5  33.4    Bel Elbow 6.5 - 9.2 - -5.2 Bel Elbow-Wrist 22 59  > 48 6.5 -0.65 33.5    Abv Elbow 9.3 - 8.8 - -4.3 Abv Elbow-Bel Elbow 10 36  > 48 6.0 -3.9 32.1    Up Arm 10.4 - 8.1 - -8.0 Up Arm-Abv Elbow 7.5 68 - 6.1 -3.7 32.4    Right Ulnar (FDI) Motor   Wrist 4.0 - 6.3 -      4.3  32.5    Bel Elbow 7.8 - 7.7 - 22.2 Bel Elbow-Wrist 22 58  > 48 4.0 9.6 32.7    Abv Elbow 10.1 - 6.5 - -15.6 Abv Elbow-Bel Elbow 10 43  > 48 4.4 -15.6 32.6    Up Arm 11.4 - 7.5 - 15.4 Up Arm-Abv Elbow 7.5 58 - 4.3 15.6 32      F-Wave Sites      Min F-Lat Max-Min F-Lat Mean F-Lat   Site (ms) ms ms   Right Ulnar F-Wave   Wrist 28.9 0.70 29.3     Sensory Sites      Onset Lat Peak Lat Amp (O-P) Amp (P-P) Segment Distance Velocity Temperature Comment   Site ms ms  V Norm  V  cm m/s Norm  C    Right Median Sensory   Wrist-Dig II 2.7 3.4 25  > 10 40 Wrist-Dig II 14 52  > 48 32.7    Right Median (Ortho) Sensory   Palm-Wrist 1.60 2.1 44 - 59 Palm-Wrist 8 50 - 33.5    Right Median-Ulnar Palmar Sensory        Median   Palm-Wrist 1.60 2.1 44 - 59 Palm-Wrist 8 50 - 33.5         Ulnar   Palm-Wrist 1.35 1.83 13 - 39 Palm-Wrist 8 59 - 33.4    Right Radial Sensory   Forearm-Wrist 1.53 2.2 19  > 15 24 Forearm-Wrist 10 65 - 33.6    Left Superficial Fibular Sensory   14 cm-Ankle 3.2 4.1 7  > 3 3 14 cm-Ankle 12.5 39  > 38 32.2    Right Superficial Fibular Sensory   14 cm-Ankle 3.1 4.2 5  > 3 6 14 cm-Ankle 12.5 40  > 38 31.1    Left Sural Sensory   Calf-Lat Mall 3.4 4.6 8  > 5 11 Calf-Lat Mall 14 41  > 38 31.5    Right Sural Sensory   Calf-Lat Mall 3.6 4.4 5  > 5 6 Calf-Lat Mall 14 39  > 38 30.6    Right Ulnar Sensory   Wrist-Dig V 2.5 3.2 17  > 8 25 Wrist-Dig V 12.5 50  > 48 32.6    Right Ulnar (Ortho) Sensory   Palm-Wrist 1.35 1.83 13 - 39 Palm-Wrist 8 59 - 33.4      Inter-Nerve Comparisons     Nerve 1 Value 1 Nerve 2 Value 2 Parameter Result Normal    Sensory Sites   R Median Palm-Wrist 2.1 ms R Ulnar Palm-Wrist 1.8 ms Peak Lat Diff 0.27 ms <0.40     F Wave Studies     Min-F Max-F Dispersion Persistence Mean-F F-Norm L-R Mean-F L-R Mean-F Norm F/M Ratio F-M Lat (ms)   Left Tibial (Abd Hallucis)  32.8  C   52.50 69.53 17.03 100.00 56.94 <61 1.39 <5.7 1.70 50.78   Right Tibial (Abd Hallucis)  30.8  C   57.19 59.22 2.03 100.00 58.33 <61 1.39 <5.7 1.58 50.78       Electromyography     Side Muscle Ins Act Fibs/PSW Fasc HF Amp Dur Poly Recrt Int Pat   Right Vastus Lat Nml None Nml 0 2+ Nml 1+ Nate Nml   Right Tib Anterior Nml None Nml 0 1+ Nml 1+ Nate Nml   Right Gastroc Incr 3+ Nml 0 2+ 1+ 2+ ModRed Nml   Left Gastroc Incr 2+ Nml 0 2+ 1+ 1+ ModRed Nml   Left Vastus Lat Nml None Nml 0 1+ Nml 1+ Nate Nml   Left Tib Anterior Nml None Nml 0 Nml Nml 0 Nml Nml   Right Gluteus Max Incr 2+ Nml 0 1+ Nml 1+ Nate Nml   Left Gluteus Max Nml None Nml 0 Nml Nml 0 Nml Nml   Right Lumbo Parasp (Lower) Nml None Nml 0        Right Deltoid Nml None Nml 0 Nml Nml 0 Nml Nml   Right Triceps Nml None Nml 0 Nml Nml 0 Nml Nml   Right EDC Nml None Nml 0 Nml Nml 0 Nml Nml   Right FDI Nml None Nml 0 Nml Nml 0 Nml Nml         NCS Waveforms:    Motor                         F-Wave       Sensory

## 2023-06-02 ENCOUNTER — ANCILLARY PROCEDURE (OUTPATIENT)
Dept: MRI IMAGING | Facility: CLINIC | Age: 68
End: 2023-06-02
Attending: PSYCHIATRY & NEUROLOGY
Payer: MEDICARE

## 2023-06-02 DIAGNOSIS — R29.2 HYPERREFLEXIA: ICD-10-CM

## 2023-06-02 DIAGNOSIS — R26.89 IMBALANCE: ICD-10-CM

## 2023-06-02 DIAGNOSIS — G62.9 PERIPHERAL POLYNEUROPATHY: ICD-10-CM

## 2023-06-02 PROCEDURE — 72141 MRI NECK SPINE W/O DYE: CPT | Mod: MG | Performed by: RADIOLOGY

## 2023-06-02 PROCEDURE — G1010 CDSM STANSON: HCPCS | Performed by: RADIOLOGY

## 2023-06-15 ENCOUNTER — ALLIED HEALTH/NURSE VISIT (OUTPATIENT)
Dept: FAMILY MEDICINE | Facility: CLINIC | Age: 68
End: 2023-06-15
Payer: MEDICARE

## 2023-06-15 ENCOUNTER — TELEPHONE (OUTPATIENT)
Dept: NEUROLOGY | Facility: CLINIC | Age: 68
End: 2023-06-15

## 2023-06-15 VITALS — SYSTOLIC BLOOD PRESSURE: 132 MMHG | DIASTOLIC BLOOD PRESSURE: 70 MMHG | HEART RATE: 34 BPM

## 2023-06-15 DIAGNOSIS — I10 BENIGN ESSENTIAL HYPERTENSION: Primary | ICD-10-CM

## 2023-06-15 DIAGNOSIS — M54.17 LUMBOSACRAL RADICULOPATHY AT S1: Primary | ICD-10-CM

## 2023-06-15 PROCEDURE — 99207 PR NO CHARGE NURSE ONLY: CPT

## 2023-06-15 NOTE — TELEPHONE ENCOUNTER
Brief Neurology Note     Called and spoke to Salbador about results of MRI, EMG, and blood work. I suspect the right hand tingling/numbness is ulnar neuropathy and advised him to keep his elbow straight at night when sleeping (can do this by wrapping a towel around the elbow). Also discussed how cervical spine MRI was notable for arthritis but no evidence of spinal cord compression. EMG shows active damage in bilateral S1 spinal nerves, likely from compression. He will talk to his sports medicine physician at [facility] and forward a copy of EMG results to them (query injections) + ask for orthopedic surgeon referral (query surgical management). This could explain some of his calf pain (gastrocnemius is supplied partially by S1) and difficulty walking on tip toes, as well as some of his low back/gluteal pain. He is also interested in PT for radiculopathy/low back pain, so I will place this referral. He has asked for Adam Lewis by name. Salbador is scheduled for follow-up in neurology clinic in November with Dr. Watt.     Oliver Sifuentes MD  PGY-4 Neurology Resident   P: [phone]

## 2023-06-15 NOTE — PROGRESS NOTES
Salbador Begum is a 67 year old year old patient who comes in today for a Blood Pressure check because of ongoing blood pressure monitoring.    Vital Signs as taken by RN:  /70  HR 34    Patient is taking medication as prescribed  Patient is tolerating medications well.    Patient is not monitoring Blood Pressure at home.     Current complaints: none    Disposition: Huddled with provider Melissa Finley CNP for heart rate. Provider ok'd patient to go due to history of bradycardia and being asymptomatic.     Morenita Morales RN  [facility]

## 2023-06-20 ENCOUNTER — OFFICE VISIT (OUTPATIENT)
Dept: DERMATOLOGY | Facility: CLINIC | Age: 68
End: 2023-06-20
Payer: MEDICARE

## 2023-06-20 DIAGNOSIS — L30.9 DERMATITIS: Primary | ICD-10-CM

## 2023-06-20 DIAGNOSIS — L82.1 SEBORRHEIC KERATOSIS: ICD-10-CM

## 2023-06-20 DIAGNOSIS — L81.4 LENTIGINES: ICD-10-CM

## 2023-06-20 DIAGNOSIS — D18.01 CHERRY ANGIOMA: ICD-10-CM

## 2023-06-20 DIAGNOSIS — D49.2 NEOPLASM OF SKIN: ICD-10-CM

## 2023-06-20 DIAGNOSIS — D22.9 MULTIPLE BENIGN NEVI: ICD-10-CM

## 2023-06-20 PROCEDURE — 99203 OFFICE O/P NEW LOW 30 MIN: CPT | Mod: 25 | Performed by: DERMATOLOGY

## 2023-06-20 PROCEDURE — 11102 TANGNTL BX SKIN SINGLE LES: CPT | Performed by: DERMATOLOGY

## 2023-06-20 PROCEDURE — 88305 TISSUE EXAM BY PATHOLOGIST: CPT | Mod: 26 | Performed by: PATHOLOGY

## 2023-06-20 PROCEDURE — 88305 TISSUE EXAM BY PATHOLOGIST: CPT | Mod: TC | Performed by: DERMATOLOGY

## 2023-06-20 ASSESSMENT — PAIN SCALES - GENERAL: PAINLEVEL: NO PAIN (0)

## 2023-06-20 NOTE — NURSING NOTE
Lidocaine-epinephrine 1-1:517426 % injection   0.3mL once for one use, starting 6/20/2023 ending 6/20/2023,  2mL disp, R-0, injection  Injected by Alena Smith RN

## 2023-06-20 NOTE — LETTER
6/20/2023       RE: Salbador Begum  [address]  [place] [address]     Dear Colleague,    Thank you for referring your patient, Salbador Begum, to the [facility] [place] at [facility]. Please see a copy of my visit note below.    [facility] Dermatology Note  Encounter Date: Jun 20, 2023  Office Visit     Dermatology Problem List:  # Neoplasm of uncertain behavior, L forearm, s/p shave bx 6/20/23.    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  - ddx AK v SCC v BCC v ISK  - see shave bx note    # Dermatitis, left ankle, occurred in winter, now resolved.  - was one of initial reasons for appt, but now resolved  - suspect xerotic v nummular v stasis derm  - Advised daily moisturization with bland emollient.   - If has flare up of itching/rash on left ankle, will send me a message and ok to send triamcinolone 0.1% ointment    # Benign lesions - SKs, cherry angiomas, lentigenes.  - No treatment required    # Multiple benign nevi.   - Monitor for ABCDEs of melanoma   - Continue sun protection - recommend SPF 30 or higher with frequent application   - Return sooner if noticing changing or symptomatic lesions    Procedures Performed:   - Shave biopsy procedure note, location(s): see above. After discussion of benefits and risks including but not limited to bleeding, infection, scar, incomplete removal, recurrence, and non-diagnostic biopsy, written consent and photographs were obtained. The area was cleaned with isopropyl alcohol. 0.5mL of 1% lidocaine with epinephrine was injected to obtain adequate anesthesia of lesion(s). Shave biopsy at site(s) performed. Hemostasis was achieved with aluminium chloride. Petrolatum ointment and a sterile dressing were applied. The patient tolerated the procedure and  no complications were noted. The patient was provided with verbal and written post care instructions.       Follow-up: 1 year, sooner if concerns or pending path    Staff:     Bozena Atkins MD    Department of Dermatology  [facility]: Phone: [phone], Fax: [phone]  6/22/2023    ____________________________________________    CC: Skin Check (Salbador is here for a skin check and has spots of concern on his face and arm.)    HPI:  Mr. Salbador Begum is a(n) 67 year old male who presents today as a new patient for above.    Originally spots on face, couldn't shave, couldn't shave, maybe mask related? Then went away  Then had spot on left ankle that itched like the dickens, wasn't scaly, wasn't that red, not much to look at, cortisone cream helped but now it's gone away    Spot on left arm, scab that won't heal  Spots on left temple    No other painful, bleeding, non-healing, or otherwise symptomatic lesions.   No personal or family history of skin cancer.     Dad had something biopsied, not sure on outcome    Patient is otherwise feeling well, without additional skin concerns.     Labs Reviewed:  N/A    Physical Exam:  Vitals: There were no vitals taken for this visit.  SKIN: Total skin excluding the undergarment areas was performed. The exam included the head/face, neck, both arms, chest, back, abdomen, both legs, digits and/or nails.   - pink scaly macule left forearm.  - mild xerosis left ankle  - There are dome shaped bright red papules on the trunk and extremities .   - Multiple regular brown pigmented macules and papules are identified on the trunk and extremities. .   - Scattered brown macules on sun exposed areas.  - Waxy stuck on papules and plaques on trunk and extremities.   - No other lesions of concern on areas examined.     Medications:  Current Outpatient Medications   Medication     acetaminophen (TYLENOL) 650 MG CR tablet    amLODIPine (NORVASC) 5 MG tablet    Ascorbic Acid (VITAMIN C PO)    buPROPion (WELLBUTRIN XL) 300 MG 24 hr tablet    calcium-vitamin D 500-125 MG-UNIT TABS    gabapentin (NEURONTIN) 300 MG capsule    Multiple Vitamin (MULTI-DAY VITAMINS) TABS    nabumetone (RELAFEN) 500 MG tablet    sildenafil (REVATIO) 20 MG tablet    diazepam (VALIUM) 2 MG tablet     No current facility-administered medications for this visit.      Past Medical History:   Patient Active Problem List   Diagnosis    Backache    Allergic rhinitis    Major depressive disorder, recurrent, mild (H)    Sinus arrhythmia    Sleep apnea    Diverticulitis of colon    Advanced directives, counseling/discussion    Essential and other specified forms of tremor    Idiopathic hypersomnolence    Chronotropic incompetence with sinus node dysfunction (H)    Benign essential hypertension    Nonrheumatic mitral valve regurgitation    Spinal stenosis of lumbar region with neurogenic claudication    Bilateral low back pain with right-sided sciatica     Past Medical History:   Diagnosis Date    Allergic rhinitis, cause unspecified     Backache, unspecified     Depressive disorder     Diverticulitis     Junctional ectopic contractions (H)     Mild intermittent asthma 9/28/2005    Narcolepsy     Osteoarthritis     Palpitations     Sleep apnea        CC Referred Self, MD  No address on file on close of this encounter.

## 2023-06-20 NOTE — PROGRESS NOTES
[facility] Dermatology Note  Encounter Date: Jun 20, 2023  Office Visit     Dermatology Problem List:  # Neoplasm of uncertain behavior, L forearm, s/p shave bx 6/20/23.    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  - ddx AK v SCC v BCC v ISK  - see shave bx note    # Dermatitis, left ankle, occurred in winter, now resolved.  - was one of initial reasons for appt, but now resolved  - suspect xerotic v nummular v stasis derm  - Advised daily moisturization with bland emollient.   - If has flare up of itching/rash on left ankle, will send me a message and ok to send triamcinolone 0.1% ointment    # Benign lesions - SKs, cherry angiomas, lentigenes.  - No treatment required    # Multiple benign nevi.   - Monitor for ABCDEs of melanoma   - Continue sun protection - recommend SPF 30 or higher with frequent application   - Return sooner if noticing changing or symptomatic lesions    Procedures Performed:   - Shave biopsy procedure note, location(s): see above. After discussion of benefits and risks including but not limited to bleeding, infection, scar, incomplete removal, recurrence, and non-diagnostic biopsy, written consent and photographs were obtained. The area was cleaned with isopropyl alcohol. 0.5mL of 1% lidocaine with epinephrine was injected to obtain adequate anesthesia of lesion(s). Shave biopsy at site(s) performed. Hemostasis was achieved with aluminium chloride. Petrolatum ointment and a sterile dressing were applied. The patient tolerated the procedure and no complications were noted. The patient was provided with verbal and written post care instructions.       Follow-up: 1 year, sooner if concerns or pending path    Staff:     Bozena Atkins MD    Department of Dermatology  [facility]: Phone:  [phone], Fax: [phone]  6/22/2023    ____________________________________________    CC: Skin Check (Salbador is here for a skin check and has spots of concern on his face and arm.)    HPI:  Mr. Salbador Begum is a(n) 67 year old male who presents today as a new patient for above.    Originally spots on face, couldn't shave, couldn't shave, maybe mask related? Then went away  Then had spot on left ankle that itched like the dickens, wasn't scaly, wasn't that red, not much to look at, cortisone cream helped but now it's gone away    Spot on left arm, scab that won't heal  Spots on left temple    No other painful, bleeding, non-healing, or otherwise symptomatic lesions.   No personal or family history of skin cancer.     Dad had something biopsied, not sure on outcome    Patient is otherwise feeling well, without additional skin concerns.     Labs Reviewed:  N/A    Physical Exam:  Vitals: There were no vitals taken for this visit.  SKIN: Total skin excluding the undergarment areas was performed. The exam included the head/face, neck, both arms, chest, back, abdomen, both legs, digits and/or nails.   - pink scaly macule left forearm.  - mild xerosis left ankle  - There are dome shaped bright red papules on the trunk and extremities .   - Multiple regular brown pigmented macules and papules are identified on the trunk and extremities. .   - Scattered brown macules on sun exposed areas.  - Waxy stuck on papules and plaques on trunk and extremities.   - No other lesions of concern on areas examined.     Medications:  Current Outpatient Medications   Medication     acetaminophen (TYLENOL) 650 MG CR tablet     amLODIPine (NORVASC) 5 MG tablet     Ascorbic Acid (VITAMIN C PO)     buPROPion (WELLBUTRIN XL) 300 MG 24 hr tablet     calcium-vitamin D 500-125 MG-UNIT TABS     gabapentin (NEURONTIN) 300 MG capsule     Multiple Vitamin (MULTI-DAY VITAMINS) TABS     nabumetone (RELAFEN) 500 MG tablet     sildenafil  (REVATIO) 20 MG tablet     diazepam (VALIUM) 2 MG tablet     No current facility-administered medications for this visit.      Past Medical History:   Patient Active Problem List   Diagnosis     Backache     Allergic rhinitis     Major depressive disorder, recurrent, mild (H)     Sinus arrhythmia     Sleep apnea     Diverticulitis of colon     Advanced directives, counseling/discussion     Essential and other specified forms of tremor     Idiopathic hypersomnolence     Chronotropic incompetence with sinus node dysfunction (H)     Benign essential hypertension     Nonrheumatic mitral valve regurgitation     Spinal stenosis of lumbar region with neurogenic claudication     Bilateral low back pain with right-sided sciatica     Past Medical History:   Diagnosis Date     Allergic rhinitis, cause unspecified      Backache, unspecified      Depressive disorder      Diverticulitis      Junctional ectopic contractions (H)      Mild intermittent asthma 9/28/2005     Narcolepsy      Osteoarthritis      Palpitations      Sleep apnea        CC Referred Self, MD  No address on file on close of this encounter.

## 2023-06-20 NOTE — NURSING NOTE
Dermatology Rooming Note    Salbador Melo Shy's goals for this visit include:   Chief Complaint   Patient presents with     Skin Check     Salbador is here for a skin check and has spots of concern on his face and arm.     Jaguar Yates, EMT

## 2023-06-21 LAB
PATH REPORT.COMMENTS IMP SPEC: NORMAL
PATH REPORT.FINAL DX SPEC: NORMAL
PATH REPORT.GROSS SPEC: NORMAL
PATH REPORT.MICROSCOPIC SPEC OTHER STN: NORMAL
PATH REPORT.RELEVANT HX SPEC: NORMAL

## 2023-06-22 ENCOUNTER — TELEPHONE (OUTPATIENT)
Dept: FAMILY MEDICINE | Facility: CLINIC | Age: 68
End: 2023-06-22
Payer: MEDICARE

## 2023-06-22 NOTE — TELEPHONE ENCOUNTER
Agree, should not take ibuprofen with relafen.  Recommend tylenol 1000mg every 8 hours as needed.  Can try oragel as well.  Can go to urgent care if having worsening pain.      Dr. Hidalgo

## 2023-06-22 NOTE — TELEPHONE ENCOUNTER
Tooth pain and can't see dentist until Monday    He is asking can he add ibuprofen to his pain regimen or would he have to take out the relafen?    He uses these  for back pain:    acetaminophen (TYLENOL) 650 MG CR tablet     --   Sig - Route: Take 1,300 mg by mouth 2 times daily - Oral       nabumetone (RELAFEN) 500 MG tablet 180 tablet 0 5/12/2023  No   Sig - Route: TAKE 1 TABLET (500 MG) BY MOUTH 2 TIMES DAILY AS NEEDED FOR MODERATE PAIN (4-6) - Oral   Sent to pharmacy as: Nabumetone 500 MG Oral Tablet (RELAFEN)         He bite down on a broken tooth and the pain is going to get worse      I did tell pt that he should not add the ibuprofen as the relafen is a NSAID as well and the relafen is probably stronger. Is that correct?    (I also told him if he cannot control pain he would need appt rather than further phone advice)    .

## 2023-06-22 NOTE — PATIENT INSTRUCTIONS
Wound Care After a Biopsy    What is a skin biopsy?  A skin biopsy allows the doctor to examine a very small piece of tissue under the microscope to determine the diagnosis and the best treatment for the skin condition. A local anesthetic (numbing medicine) is injected with a very small needle into the skin area to be tested. A small piece of skin is taken from the area. Sometimes a suture (stitch) is used.     What are the risks of a skin biopsy?  I will experience scar, bleeding, swelling, pain, crusting and redness. I may experience incomplete removal or recurrence. Risks of this procedure are excessive bleeding, bruising, infection, nerve damage, numbness, thick (hypertrophic or keloidal) scar and non-diagnostic biopsy.    How should I care for my wound for the first 24 hours?  Keep the wound dry and covered for 24 hours  If it bleeds, hold direct pressure on the area for 15 minutes. If bleeding does not stop, call us or go to the emergency room  Avoid strenuous exercise the first 1-2 days or as your doctor instructs you    How should I care for the wound after 24 hours?  After 24 hours, remove the bandage  You may bathe or shower as normal  If you had a scalp biopsy, you can shampoo as usual and can use shower water to clean the biopsy site daily  Clean the wound once a day with gentle soap and water  Do not scrub, be gentle  Apply white petroleum/Vaseline after cleaning the wound with a cotton swab or a clean finger, and keep the site covered with a Bandaid /bandage. Bandages are not necessary with a scalp biopsy  If you are unable to cover the site with a Bandaid /bandage, re-apply ointment 2-3 times a day to keep the site moist. Moisture will help with healing  Avoid strenuous activity for first 1-2 days  Avoid lakes, rivers, pools, and oceans until the stitches are removed or the site is healed    How do I clean my wound?  Wash hands thoroughly with soap or use hand  before all wound care  Clean  the wound with gentle soap and water  Apply white petroleum/Vaseline  to wound after it is clean  Replace the Bandaid /bandage to keep the wound covered for the first few days or as instructed by your doctor  If you had a scalp biopsy, warm shower water to the area on a daily basis should suffice    What should I use to clean my wound?   Cotton-tipped applicators (Qtips )  White petroleum jelly (Vaseline ). Use a clean new container and use Q-tips to apply.  Bandaids  as needed  Gentle soap     How should I care for my wound long term?  Do not get your wound dirty  Keep up with wound care for one week or until the area is healed.  If you have stitches, stitches need to be removed in 14 days. You may return to our clinic for this or you may have it done locally at your doctor s office.  A small scab will form and fall off by itself when the area is completely healed. The area will be red and will become pink in color as it heals. Sun protection is very important for how your scar will turn out. Sunscreen with an SPF 30 or greater is recommended once the area is healed.  You should have some soreness but it should be mild and slowly go away over several days. Talk to your doctor about using tylenol for pain,    When should I call my doctor?  If you have increased:   Pain or swelling  Pus or drainage (clear or slightly yellow drainage is ok)  Temperature over 100F  Spreading redness or warmth around wound    When will I hear about my results?  The biopsy results can take 2 weeks to come back.  Your results will automatically release to One[employer] before your provider has even reviewed them.  The clinic will call you with the results, send you a OneF[employer] message, or have you schedule a follow-up clinic or phone time to discuss the results.  Contact our clinics if you do not hear from us in 2 weeks.    Who should I call with questions?  [facility]: [phone]  [facility]  [facility]: [phone]  For urgent needs outside of business hours call the [facility] at [phone] and ask for the dermatology resident on call

## 2023-06-23 NOTE — TELEPHONE ENCOUNTER
I called pt.  Was about to review this provider message.  Pt says he had the tooth extracted today.  FRANK Engel

## 2023-06-26 ENCOUNTER — TRANSFERRED RECORDS (OUTPATIENT)
Dept: HEALTH INFORMATION MANAGEMENT | Facility: CLINIC | Age: 68
End: 2023-06-26

## 2023-07-05 ENCOUNTER — MYC MEDICAL ADVICE (OUTPATIENT)
Dept: FAMILY MEDICINE | Facility: CLINIC | Age: 68
End: 2023-07-05
Payer: MEDICARE

## 2023-07-06 ENCOUNTER — TRANSFERRED RECORDS (OUTPATIENT)
Dept: HEALTH INFORMATION MANAGEMENT | Facility: CLINIC | Age: 68
End: 2023-07-06
Payer: MEDICARE

## 2023-07-06 DIAGNOSIS — M48.062 SPINAL STENOSIS OF LUMBAR REGION WITH NEUROGENIC CLAUDICATION: ICD-10-CM

## 2023-07-07 NOTE — TELEPHONE ENCOUNTER
"Routing refill request to provider for review/approval because:  Labs out of range:  Cbc, age    Last Written Prescription Date:  5/12/23  Last Fill Quantity: 180,  # refills: 0   Last office visit provider:  5/9/23     Requested Prescriptions   Pending Prescriptions Disp Refills     nabumetone (RELAFEN) 500 MG tablet [Pharmacy Med Name: NABUMETONE 500 MG TABLET] 180 tablet 0     Sig: TAKE 1 TABLET (500 MG) BY MOUTH 2 TIMES DAILY AS NEEDED FOR MODERATE PAIN (4-6)       NSAID Medications Failed - 7/6/2023 10:05 AM        Failed - Patient is age 6-64 years        Failed - Normal CBC on file in past 12 months     Recent Labs   Lab Test 05/01/23  1200   WBC 3.3*   RBC 4.40   HGB 14.1   HCT 40.7   *                 Passed - Blood pressure under 140/90 in past 12 months     BP Readings from Last 3 Encounters:   06/15/23 132/70   05/11/23 139/86   05/09/23 (!) 164/85                 Passed - Normal ALT on file in past 12 months     Recent Labs   Lab Test 05/01/23  1200   ALT 25             Passed - Normal AST on file in past 12 months     Recent Labs   Lab Test 05/01/23  1200   AST 38             Passed - Recent (12 mo) or future (30 days) visit within the authorizing provider's specialty     Patient has had an office visit with the authorizing provider or a provider within the authorizing providers department within the previous 12 mos or has a future within next 30 days. See \"Patient Info\" tab in inbasket, or \"Choose Columns\" in Meds & Orders section of the refill encounter.              Passed - Medication is active on med list        Passed - Normal serum creatinine on file in past 12 months     Recent Labs   Lab Test 05/01/23  1200 11/11/16  0920 02/21/16 2050   CR 1.12   < >  --    CREAT  --   --  1.0    < > = values in this interval not displayed.       Ok to refill medication if creatinine is low               Macarena Knapp, RN 07/07/23 11:59 AM  "

## 2023-07-09 RX ORDER — NABUMETONE 500 MG/1
500 TABLET, FILM COATED ORAL 2 TIMES DAILY PRN
Qty: 180 TABLET | Refills: 0 | OUTPATIENT
Start: 2023-07-09

## 2023-07-09 NOTE — TELEPHONE ENCOUNTER
Got 180 pills - take up to 2 pills prn on 5/9/23 and should last until 8/9/23.  Please review before routing and if patient is not taking as prescribed and running before refill due - please reach out to patient for clarification

## 2023-07-10 ENCOUNTER — OFFICE VISIT (OUTPATIENT)
Dept: OTOLARYNGOLOGY | Facility: CLINIC | Age: 68
End: 2023-07-10
Payer: MEDICARE

## 2023-07-10 VITALS
WEIGHT: 141 LBS | OXYGEN SATURATION: 97 % | DIASTOLIC BLOOD PRESSURE: 87 MMHG | BODY MASS INDEX: 21.44 KG/M2 | TEMPERATURE: 98.1 F | HEART RATE: 54 BPM | SYSTOLIC BLOOD PRESSURE: 143 MMHG

## 2023-07-10 DIAGNOSIS — H61.23 BILATERAL IMPACTED CERUMEN: Primary | ICD-10-CM

## 2023-07-10 PROCEDURE — 69210 REMOVE IMPACTED EAR WAX UNI: CPT | Performed by: REGISTERED NURSE

## 2023-07-10 ASSESSMENT — PAIN SCALES - GENERAL: PAINLEVEL: MODERATE PAIN (4)

## 2023-07-10 NOTE — LETTER
7/10/2023       RE: Salbador Begum  [address]   [place] [address]     Dear Colleague,    Thank you for referring your patient, Salbador Begum, to the [facility] [place] at [facility]. Please see a copy of my visit note below.      Otolaryngology Clinic  July 10, 2023    HPI:  Salbador Begum is here for cerumen impaction removal. Patient has a history of excessive cerumen. Episode of right ear plugging recently that has improved but right ear continues to feel full.     Patient denies any otalgia, otorrhea, dizziness, history of frequent ear infections, or ear surgeries.      Otologic microscope exam:    Biocular Microscopy exam is needed due to deep impaction of cerumen of bilateral ears, requiring direct visualization for use of cleaning instruments.    Right ear was examined under the microscope.  Complete cerumen impaction noted. It was cleaned with right angle hook and alligator forceps. Once cleaned, TM visualized under microscope. Normal appearing TM, nicely aerated middle ear space.     Left ear was also examined under the microscope.  Cerumen impaction noted. It was cleaned with right angle hook and alligator forceps. Once cleaned, TM visualized under microscope. Normal appearing TM, nicely aerated middle ear space.     The patient noted improvement of symptoms.    Assessment and Plan:  1. Bilateral impacted cerumen  Patient presents with cerumen impaction of bilateral ears.  The patient's ears are cleaned today.  Ear exam is healthy after cleaning.    Patient will follow up in 1 year for repeat ear cleaning.    Patient can return to clinic for a separate clinic appointment to further discuss results of audiogram if there are any abnormal findings including asymmetric hearing loss or word recognition score, conductive hearing loss, or abnormal tympanograms.     If audiogram shows  bilateral sensorineural hearing loss and patient is a candidate for hearing aids, they are medically cleared for hearing aids.     Keila Griggs DNP, APRN, CNP  Otolaryngology  Head & Neck Surgery  [phone]    15 minutes spent on the date of the encounter doing chart review, history and exam, documentation and further activities per the note excluding time performing ear cleaning under microscopy.        Again, thank you for allowing me to participate in the care of your patient.      Sincerely,    Indira Griggs, NP

## 2023-07-10 NOTE — PROGRESS NOTES
Otolaryngology Clinic  July 10, 2023    HPI:  Salbador Begum is here for cerumen impaction removal. Patient has a history of excessive cerumen. Episode of right ear plugging recently that has improved but right ear continues to feel full.     Patient denies any otalgia, otorrhea, dizziness, history of frequent ear infections, or ear surgeries.      Otologic microscope exam:    Biocular Microscopy exam is needed due to deep impaction of cerumen of bilateral ears, requiring direct visualization for use of cleaning instruments.    Right ear was examined under the microscope.  Complete cerumen impaction noted. It was cleaned with right angle hook and alligator forceps. Once cleaned, TM visualized under microscope. Normal appearing TM, nicely aerated middle ear space.     Left ear was also examined under the microscope.  Cerumen impaction noted. It was cleaned with right angle hook and alligator forceps. Once cleaned, TM visualized under microscope. Normal appearing TM, nicely aerated middle ear space.     The patient noted improvement of symptoms.    Assessment and Plan:  1. Bilateral impacted cerumen  Patient presents with cerumen impaction of bilateral ears.  The patient's ears are cleaned today.  Ear exam is healthy after cleaning.    Patient will follow up in 1 year for repeat ear cleaning.    Patient can return to clinic for a separate clinic appointment to further discuss results of audiogram if there are any abnormal findings including asymmetric hearing loss or word recognition score, conductive hearing loss, or abnormal tympanograms.     If audiogram shows bilateral sensorineural hearing loss and patient is a candidate for hearing aids, they are medically cleared for hearing aids.     Keila Griggs DNP, APRN, CNP  Otolaryngology  Head & Neck Surgery  [phone]    15 minutes spent on the date of the encounter doing chart review, history and exam, documentation and further activities per the note  excluding time performing ear cleaning under microscopy.

## 2023-07-14 ENCOUNTER — THERAPY VISIT (OUTPATIENT)
Dept: PHYSICAL THERAPY | Facility: CLINIC | Age: 68
End: 2023-07-14
Payer: MEDICARE

## 2023-07-14 DIAGNOSIS — M54.41 CHRONIC BILATERAL LOW BACK PAIN WITH RIGHT-SIDED SCIATICA: Primary | ICD-10-CM

## 2023-07-14 DIAGNOSIS — G89.29 CHRONIC BILATERAL LOW BACK PAIN WITH RIGHT-SIDED SCIATICA: Primary | ICD-10-CM

## 2023-07-14 DIAGNOSIS — M54.17 LUMBOSACRAL RADICULOPATHY AT S1: ICD-10-CM

## 2023-07-14 PROCEDURE — 97161 PT EVAL LOW COMPLEX 20 MIN: CPT | Mod: GP | Performed by: PHYSICAL THERAPIST

## 2023-07-14 PROCEDURE — 97110 THERAPEUTIC EXERCISES: CPT | Mod: GP | Performed by: PHYSICAL THERAPIST

## 2023-07-14 NOTE — PROGRESS NOTES
PHYSICAL THERAPY EVALUATION  Type of Visit: Evaluation    See electronic medical record for Abuse and Falls Screening details.    Subjective      Presenting condition or subjective complaint: Pt presents to PT with a chief complaint of R>L sided S1 radiculopathy with an exacerbation in 04/2023 after prolonged sitting. Pt reports initial intense calf pain which has now transitioned to more n/t with weakness in his calf. EMG confirmed S1 Radiculopathy and patient has plans for neurosurgery consult next week.  Date of onset: 05/01/23    Relevant medical history: Arthritis; Depression; High blood pressure; Osteoarthritis; Vision problems   Dates & types of surgery: see EMR    Prior diagnostic imaging/testing results: MRI; EMG     Prior therapy history for the same diagnosis, illness or injury: Yes previous LBP PT earlier this year but now an onset of radiculopathy    Prior Level of Function   Transfers:   Ambulation:   ADL:   IADL:     Living Environment  Social support: Alone   Type of home: 1 level   Stairs to enter the home: Yes       Ramp: No   Stairs inside the home: Yes       Help at home: None  Equipment owned:       Employment:   Retired  Hobbies/Interests:      Patient goals for therapy: Walking up hills, climbing stairs    Pain assessment: See objective evaluation for additional pain details     Objective   LUMBAR SPINE EVALUATION  PAIN: Pain Level at Rest: 2/10  Pain Level with Use: 10/10  Pain Location: R calf  Pain Quality: Aching and Shooting  Pain Frequency: intermittent  Pain is Worst: daytime  Pain is Exacerbated By: walking, prolonged sitting   Pain is Relieved By: none  Pain Progression: Improved  INTEGUMENTARY (edema, incisions):   POSTURE:   GAIT:   Weightbearing Status:   Assistive Device(s):   Gait Deviations:   BALANCE/PROPRIOCEPTION:   WEIGHTBEARING ALIGNMENT:   NON-WEIGHTBEARING ALIGNMENT:    ROM:   (Degrees) Left AROM Left PROM  Right AROM Right PROM   Hip Flexion       Hip Extension       Hip  Abduction       Hip Adduction       Hip Internal Rotation       Hip External Rotation       Knee Flexion       Knee Extension       Lumbar Side glide Min loss MIn loss   Lumbar Flexion WNL   Lumbar Extension Mod loss   Pain:   End feel:     PELVIC/SI SCREEN:   STRENGTH:     MYOTOMES:    Left Right   T12-L3 (Hip Flexion) 5 5   L2-4 (Quads)  5 5   L4 (Ankle DF) 5 5   L5 (Great Toe Ext) 5 5   S1 (Toe Raise) 5 3-     DTR S:    Left Right   C5 (Biceps)     C6 (Brachioradialis)     C7 (Triceps)     L4 (Quad) 2 2   S1 (Achilles) 2 0     CORD SIGNS:   DERMATOMES:    Left Right   T12      L1     L2     L3 Normal (light touch) Normal (light touch)   L4 Normal (light touch) Normal (light touch)   L5 Normal (light touch) Hypo (light touch)   S1 Normal (light touch) Hypo (light touch)     NEURAL TENSION: positive R Slump/SLR  FLEXIBILITY:   LUMBAR/HIP Special Tests:    PELVIS/SI SPECIAL TESTS:   FUNCTIONAL TESTS:   PALPATION: TTP at R gastroc  SPINAL SEGMENTAL CONCLUSIONS:       Assessment & Plan   CLINICAL IMPRESSIONS   Medical Diagnosis: LBP w/ S1 Radiculopathy    Treatment Diagnosis: LBP w/ R sided LE radiation   Impression/Assessment: Patient is a 67 year old male with R LE radiculopathy complaints.  The following significant findings have been identified: Pain, Decreased ROM/flexibility, Decreased strength, Impaired sensation and Impaired muscle performance. These impairments interfere with their ability to perform self care tasks, work tasks, recreational activities, household chores, household mobility and community mobility as compared to previous level of function.     Clinical Decision Making (Complexity):   Clinical Presentation: Evolving/Changing  Clinical Presentation Rationale: based on medical and personal factors listed in PT evaluation  Clinical Decision Making (Complexity): Low complexity    PLAN OF CARE  Treatment Interventions:  Interventions: Manual Therapy, Neuromuscular Re-education, Therapeutic Activity,  Therapeutic Exercise, Self-Care/Home Management    Long Term Goals            Frequency of Treatment: 1x week  Duration of Treatment: 4 weeks then 2x month for 2 month    Recommended Referrals to Other Professionals:   Education Assessment:        Risks and benefits of evaluation/treatment have been explained.   Patient/Family/caregiver agrees with Plan of Care.     Evaluation Time:            Signing Clinician: Adam Lewis PT      [facility]                                                                                   OUTPATIENT PHYSICAL THERAPY      PLAN OF TREATMENT FOR OUTPATIENT REHABILITATION   Patient's Last Name, First Name, Salbador Sullivan YOB: 1955   Provider's Name   [facility]   Medical Record No.  [id number]     Onset Date: 05/01/23  Start of Care Date: 07/14/23     Medical Diagnosis:  LBP w/ S1 Radiculopathy      PT Treatment Diagnosis:  LBP w/ R sided LE radiation Plan of Treatment  Frequency/Duration: 1x week/ 4 weeks then 2x month for 2 month    Certification date from 07/14/23 to 10/06/23         See note for plan of treatment details and functional goals     Adam Lewis, PT                         I CERTIFY THE NEED FOR THESE SERVICES FURNISHED UNDER        THIS PLAN OF TREATMENT AND WHILE UNDER MY CARE .             Physician Signature               Date    X_____________________________________________________                        Referring Provider:  Marty Rey      Initial Assessment  See Epic Evaluation- Start of Care Date: 07/14/23

## 2023-07-18 ENCOUNTER — TRANSFERRED RECORDS (OUTPATIENT)
Dept: HEALTH INFORMATION MANAGEMENT | Facility: CLINIC | Age: 68
End: 2023-07-18
Payer: MEDICARE

## 2023-07-21 ENCOUNTER — THERAPY VISIT (OUTPATIENT)
Dept: PHYSICAL THERAPY | Facility: CLINIC | Age: 68
End: 2023-07-21
Payer: MEDICARE

## 2023-07-21 DIAGNOSIS — G89.29 CHRONIC BILATERAL LOW BACK PAIN WITH RIGHT-SIDED SCIATICA: Primary | ICD-10-CM

## 2023-07-21 DIAGNOSIS — M54.41 CHRONIC BILATERAL LOW BACK PAIN WITH RIGHT-SIDED SCIATICA: Primary | ICD-10-CM

## 2023-07-21 PROCEDURE — 97112 NEUROMUSCULAR REEDUCATION: CPT | Mod: GP | Performed by: PHYSICAL THERAPIST

## 2023-07-21 PROCEDURE — 97110 THERAPEUTIC EXERCISES: CPT | Mod: GP | Performed by: PHYSICAL THERAPIST

## 2023-07-27 ENCOUNTER — MYC MEDICAL ADVICE (OUTPATIENT)
Dept: FAMILY MEDICINE | Facility: CLINIC | Age: 68
End: 2023-07-27
Payer: MEDICARE

## 2023-07-28 ENCOUNTER — MYC MEDICAL ADVICE (OUTPATIENT)
Dept: FAMILY MEDICINE | Facility: CLINIC | Age: 68
End: 2023-07-28

## 2023-07-28 NOTE — TELEPHONE ENCOUNTER
Please review and advise if you would like direct copy, or if patient should schedule appt to discuss further w/ you-thanks!

## 2023-07-28 NOTE — TELEPHONE ENCOUNTER
Perfectly fine with uploading report via [employer] and schedule visit with me for adhd treatment options.

## 2023-08-01 ENCOUNTER — VIRTUAL VISIT (OUTPATIENT)
Dept: FAMILY MEDICINE | Facility: CLINIC | Age: 68
End: 2023-08-01
Payer: MEDICARE

## 2023-08-01 DIAGNOSIS — F90.0 ADHD (ATTENTION DEFICIT HYPERACTIVITY DISORDER), INATTENTIVE TYPE: Primary | ICD-10-CM

## 2023-08-01 PROCEDURE — 99214 OFFICE O/P EST MOD 30 MIN: CPT | Mod: VID | Performed by: FAMILY MEDICINE

## 2023-08-01 RX ORDER — DEXTROAMPHETAMINE SACCHARATE, AMPHETAMINE ASPARTATE MONOHYDRATE, DEXTROAMPHETAMINE SULFATE AND AMPHETAMINE SULFATE 2.5; 2.5; 2.5; 2.5 MG/1; MG/1; MG/1; MG/1
10 CAPSULE, EXTENDED RELEASE ORAL DAILY
Qty: 30 CAPSULE | Refills: 0 | Status: SHIPPED | OUTPATIENT
Start: 2023-10-02 | End: 2023-11-01

## 2023-08-01 RX ORDER — DEXTROAMPHETAMINE SACCHARATE, AMPHETAMINE ASPARTATE MONOHYDRATE, DEXTROAMPHETAMINE SULFATE AND AMPHETAMINE SULFATE 2.5; 2.5; 2.5; 2.5 MG/1; MG/1; MG/1; MG/1
10 CAPSULE, EXTENDED RELEASE ORAL DAILY
Qty: 30 CAPSULE | Refills: 0 | Status: SHIPPED | OUTPATIENT
Start: 2023-08-01 | End: 2023-08-31

## 2023-08-01 RX ORDER — DEXTROAMPHETAMINE SACCHARATE, AMPHETAMINE ASPARTATE MONOHYDRATE, DEXTROAMPHETAMINE SULFATE AND AMPHETAMINE SULFATE 2.5; 2.5; 2.5; 2.5 MG/1; MG/1; MG/1; MG/1
10 CAPSULE, EXTENDED RELEASE ORAL DAILY
Qty: 30 CAPSULE | Refills: 0 | Status: SHIPPED | OUTPATIENT
Start: 2023-09-01 | End: 2023-10-01

## 2023-08-01 NOTE — PROGRESS NOTES
Salbador is a 67 year old who is being evaluated via a billable video visit.      How would you like to obtain your AVS? MyChart  If the video visit is dropped, the invitation should be resent by: Text to cell phone: [phone]  Will anyone else be joining your video visit? No      Assessment & Plan     ADHD (attention deficit hyperactivity disorder), inattentive type  Used to be on Adderall XR 10 mg due to sleep issue (hypersomnolence).  Sleep medicine did not recommend further stimulant use.  He also had underlying ADHD symptoms and he has completed evaluation done which showed mild inattention type of ADHD.  We agreed to resume Adderall.  Side effects discussed.  3 months of Rx given.  He does not take it on a regular basis and 3 months of Rx is going to last him for multiple months.  He understood.  Side effects, complications, clinical policy discussed.  I reviewed his neuropsych evaluation and I will scan that.  - amphetamine-dextroamphetamine (ADDERALL XR) 10 MG 24 hr capsule; Take 1 capsule (10 mg) by mouth daily for 30 days  - amphetamine-dextroamphetamine (ADDERALL XR) 10 MG 24 hr capsule; Take 1 capsule (10 mg) by mouth daily for 30 days  - amphetamine-dextroamphetamine (ADDERALL XR) 10 MG 24 hr capsule; Take 1 capsule (10 mg) by mouth daily for 30 days    I would recommend follow-up in 3 to 4 months in person for vitals evaluation.                 Kieran Zuluaga MD, MD  [facility]    Papa Siu is a 67 year old, presenting for the following health issues:  No chief complaint on file.    History of Present Illness       Reason for visit:  Follow up ADHD diagnosis for medication  Symptom onset:  More than a month  Symptom progression:  Staying the same    He eats 0-1 servings of fruits and vegetables daily.He consumes 1 sweetened beverage(s) daily.He exercises with enough effort to increase his heart rate 30 to 60 minutes per day.  He exercises with enough  effort to increase his heart rate 4 days per week.   He is taking medications regularly.     Recent neuropsych evaluation for inattention.     Used to take adhd medication for sleep issue. Stopped taking it when retired and used only as needed from old rx previously.     Review of Systems         Objective           Vitals:  No vitals were obtained today due to virtual visit.    Physical Exam   GENERAL: Healthy, alert and no distress  EYES: Eyes grossly normal to inspection.  No discharge or erythema, or obvious scleral/conjunctival abnormalities.  RESP: No audible wheeze, cough, or visible cyanosis.  No visible retractions or increased work of breathing.    SKIN: Visible skin clear. No significant rash, abnormal pigmentation or lesions.  NEURO: Cranial nerves grossly intact.  Mentation and speech appropriate for age.  PSYCH: Mentation appears normal, affect normal/bright, judgement and insight intact, normal speech and appearance well-groomed.                Video-Visit Details    Type of service:  Video Visit   Video Start Time: 4:40 PM  Video End Time:4:56 PM    Originating Location (pt. Location): Home    Distant Location (provider location):  On-site  Platform used for Video Visit: SkuRun

## 2023-08-02 DIAGNOSIS — M48.062 SPINAL STENOSIS OF LUMBAR REGION WITH NEUROGENIC CLAUDICATION: Primary | ICD-10-CM

## 2023-08-02 NOTE — TELEPHONE ENCOUNTER
Dr. Zuluaga --    Routing refill request to provider for review/approval because:  -- Passed protocol  -- Patient requesting 90 day supply    Last Written Prescription Date:  7/10/2023  Last Fill Quantity: --,  # refills: -- (30 day supply)   Last office visit: 5/9/2023 ; last virtual visit: 8/1/2023 with prescribing provider:  Dr. Zuluaga   Future Office Visit:  none    LEVI JohnsonN FRANK  [facility]

## 2023-08-02 NOTE — TELEPHONE ENCOUNTER
Medication Question or Refill    Contacts         Type Contact Phone/Fax    08/02/2023 12:36 PM CDT Phone (Incoming) Salbador Begum (Self) [phone] (M)            What medication are you calling about (include dose and sig)?: acetaminophen (TYLENOL) 650 MG CR tablet     Preferred Pharmacy:       [place] [address] [place] [facility] - [place], [place] - [address]  [address]  [place] [address]  Phone: [phone] Fax: [phone]      Controlled Substance Agreement on file:   CSA -- Patient Level:    CSA: None found at the patient level.       Who prescribed the medication?: Dr. Zuluaga    Do you need a refill? Yes, No    When did you use the medication last? NA    Patient offered an appointment? No    Do you have any questions or concerns?  Yes: patient went to pharmacy thinking that he would be picking up a 90 day prescription of his medication. Patient was only given a 30 day supply and was wondering why he wasn't given a 90 day supply. Patient also stated that pharmacy does not have any more refills on his medication.      Could we send this information to you in [employer] or would you prefer to receive a phone call?:   Patient would prefer a phone call   Okay to leave a detailed message?: Yes at Cell number on file:    Telephone Information:   Mobile [phone]

## 2023-08-03 NOTE — TELEPHONE ENCOUNTER
Patient had visit with Dr. Zuluaga regarding MyChart message.    Lilli Sullivan RN  [facility]

## 2023-08-04 RX ORDER — SENNOSIDES 8.6 MG
1300 CAPSULE ORAL 2 TIMES DAILY
Qty: 180 TABLET | Refills: 1 | Status: SHIPPED | OUTPATIENT
Start: 2023-08-04

## 2023-08-07 DIAGNOSIS — M48.062 SPINAL STENOSIS OF LUMBAR REGION WITH NEUROGENIC CLAUDICATION: ICD-10-CM

## 2023-08-07 DIAGNOSIS — F33.0 MAJOR DEPRESSIVE DISORDER, RECURRENT, MILD (H): ICD-10-CM

## 2023-08-07 RX ORDER — BUPROPION HYDROCHLORIDE 300 MG/1
TABLET ORAL
Qty: 90 TABLET | Refills: 1 | Status: SHIPPED | OUTPATIENT
Start: 2023-08-07 | End: 2024-01-30

## 2023-08-07 RX ORDER — GABAPENTIN 300 MG/1
300 CAPSULE ORAL 3 TIMES DAILY
Qty: 90 CAPSULE | Refills: 3 | Status: SHIPPED | OUTPATIENT
Start: 2023-08-07 | End: 2023-11-03

## 2023-08-10 RX ORDER — NABUMETONE 500 MG/1
500 TABLET, FILM COATED ORAL 2 TIMES DAILY PRN
Qty: 180 TABLET | Refills: 3 | Status: SHIPPED | OUTPATIENT
Start: 2023-08-10 | End: 2024-07-22

## 2023-08-10 NOTE — TELEPHONE ENCOUNTER
Routing refill request to provider for review/approval because:  --Does not meet age or blood pressure criteria for RN to authorize.    --Last visit:  8/1/23 virtual with Margareth.    --Future Visit: none with family practice.          --Last Written Prescription:          BP Readings from Last 6 Encounters:   07/10/23 (!) 143/87   06/15/23 132/70   05/11/23 139/86   05/09/23 (!) 164/85   05/01/23 115/70   04/13/23 (!) 153/82

## 2023-10-31 ENCOUNTER — MYC REFILL (OUTPATIENT)
Dept: FAMILY MEDICINE | Facility: CLINIC | Age: 68
End: 2023-10-31
Payer: MEDICARE

## 2023-10-31 DIAGNOSIS — F90.0 ADHD (ATTENTION DEFICIT HYPERACTIVITY DISORDER), INATTENTIVE TYPE: ICD-10-CM

## 2023-11-01 ENCOUNTER — MYC MEDICAL ADVICE (OUTPATIENT)
Dept: FAMILY MEDICINE | Facility: CLINIC | Age: 68
End: 2023-11-01

## 2023-11-01 ENCOUNTER — IMMUNIZATION (OUTPATIENT)
Dept: NURSING | Facility: CLINIC | Age: 68
End: 2023-11-01
Payer: MEDICARE

## 2023-11-01 ENCOUNTER — VIRTUAL VISIT (OUTPATIENT)
Dept: FAMILY MEDICINE | Facility: CLINIC | Age: 68
End: 2023-11-01
Payer: MEDICARE

## 2023-11-01 DIAGNOSIS — F90.0 ADHD (ATTENTION DEFICIT HYPERACTIVITY DISORDER), INATTENTIVE TYPE: ICD-10-CM

## 2023-11-01 DIAGNOSIS — M48.062 SPINAL STENOSIS OF LUMBAR REGION WITH NEUROGENIC CLAUDICATION: Primary | ICD-10-CM

## 2023-11-01 PROCEDURE — 91320 SARSCV2 VAC 30MCG TRS-SUC IM: CPT

## 2023-11-01 PROCEDURE — 90480 ADMN SARSCOV2 VAC 1/ONLY CMP: CPT

## 2023-11-01 PROCEDURE — 99214 OFFICE O/P EST MOD 30 MIN: CPT | Mod: VID | Performed by: FAMILY MEDICINE

## 2023-11-01 RX ORDER — DEXTROAMPHETAMINE SACCHARATE, AMPHETAMINE ASPARTATE MONOHYDRATE, DEXTROAMPHETAMINE SULFATE AND AMPHETAMINE SULFATE 2.5; 2.5; 2.5; 2.5 MG/1; MG/1; MG/1; MG/1
10 CAPSULE, EXTENDED RELEASE ORAL DAILY
Qty: 30 CAPSULE | Refills: 0 | OUTPATIENT
Start: 2023-11-01

## 2023-11-01 RX ORDER — DEXTROAMPHETAMINE SACCHARATE, AMPHETAMINE ASPARTATE MONOHYDRATE, DEXTROAMPHETAMINE SULFATE AND AMPHETAMINE SULFATE 2.5; 2.5; 2.5; 2.5 MG/1; MG/1; MG/1; MG/1
10 CAPSULE, EXTENDED RELEASE ORAL DAILY
Qty: 30 CAPSULE | Refills: 0 | Status: SHIPPED | OUTPATIENT
Start: 2023-11-01 | End: 2023-12-01

## 2023-11-01 RX ORDER — DEXTROAMPHETAMINE SACCHARATE, AMPHETAMINE ASPARTATE MONOHYDRATE, DEXTROAMPHETAMINE SULFATE AND AMPHETAMINE SULFATE 2.5; 2.5; 2.5; 2.5 MG/1; MG/1; MG/1; MG/1
10 CAPSULE, EXTENDED RELEASE ORAL DAILY
Qty: 30 CAPSULE | Refills: 0 | Status: SHIPPED | OUTPATIENT
Start: 2024-01-02 | End: 2024-02-01

## 2023-11-01 RX ORDER — DEXTROAMPHETAMINE SACCHARATE, AMPHETAMINE ASPARTATE MONOHYDRATE, DEXTROAMPHETAMINE SULFATE AND AMPHETAMINE SULFATE 2.5; 2.5; 2.5; 2.5 MG/1; MG/1; MG/1; MG/1
10 CAPSULE, EXTENDED RELEASE ORAL DAILY
Qty: 30 CAPSULE | Refills: 0 | Status: CANCELLED | OUTPATIENT
Start: 2023-11-01

## 2023-11-01 RX ORDER — DEXTROAMPHETAMINE SACCHARATE, AMPHETAMINE ASPARTATE MONOHYDRATE, DEXTROAMPHETAMINE SULFATE AND AMPHETAMINE SULFATE 2.5; 2.5; 2.5; 2.5 MG/1; MG/1; MG/1; MG/1
10 CAPSULE, EXTENDED RELEASE ORAL DAILY
Qty: 30 CAPSULE | Refills: 0 | Status: SHIPPED | OUTPATIENT
Start: 2023-12-02 | End: 2024-01-01

## 2023-11-01 ASSESSMENT — PATIENT HEALTH QUESTIONNAIRE - PHQ9
SUM OF ALL RESPONSES TO PHQ QUESTIONS 1-9: 1
10. IF YOU CHECKED OFF ANY PROBLEMS, HOW DIFFICULT HAVE THESE PROBLEMS MADE IT FOR YOU TO DO YOUR WORK, TAKE CARE OF THINGS AT HOME, OR GET ALONG WITH OTHER PEOPLE: NOT DIFFICULT AT ALL
SUM OF ALL RESPONSES TO PHQ QUESTIONS 1-9: 1

## 2023-11-01 NOTE — PROGRESS NOTES
Salbador is a 68 year old who is being evaluated via a billable video visit.      How would you like to obtain your AVS? MyChart  If the video visit is dropped, the invitation should be resent by: Text to cell phone: [phone]  Will anyone else be joining your video visit? No        Assessment & Plan     ADHD (attention deficit hyperactivity disorder), inattentive type  Using it daily with good benefit. Stick to same rx. Follow up in person in 3 months for vitals monitoring. PDMP checked.   - amphetamine-dextroamphetamine (ADDERALL XR) 10 MG 24 hr capsule; Take 1 capsule (10 mg) by mouth daily for 30 days  - amphetamine-dextroamphetamine (ADDERALL XR) 10 MG 24 hr capsule; Take 1 capsule (10 mg) by mouth daily for 30 days  - amphetamine-dextroamphetamine (ADDERALL XR) 10 MG 24 hr capsule; Take 1 capsule (10 mg) by mouth daily for 30 days    Spinal stenosis of lumbar region with neurogenic claudication  Seeing ortho and neuro. Need to reschedule neuro appt and can't get in for months. Will ask for referral to Dm if needed. He is waiting for their input. Using gabapentin.                  Kieran Zuluaga MD, MD  [facility]    Papa Siu is a 68 year old, presenting for the following health issues:  Recheck Medication      11/1/2023     1:25 PM   Additional Questions   Roomed by Jillian AVILES       History of Present Illness       Reason for visit:  Need Rx refill: Adderall    He eats 2-3 servings of fruits and vegetables daily.He consumes 0 sweetened beverage(s) daily.He exercises with enough effort to increase his heart rate 30 to 60 minutes per day.  He exercises with enough effort to increase his heart rate 4 days per week.   He is taking medications regularly.     Steady progression.     Ortho and neuro follow up.     No surgery yet. Right foot numbness and weakness improved but now on left side. Has to reschedule an appointment but it is now too far out. Called  "[facility].   Taking gabapentin twice per day. Just weakness and numbness.   Had cortisone shot for his back.     Adderall -using it daily.      Review of Systems         Objective    Vitals - Patient Reported  Weight (Patient Reported): 65.8 kg (145 lb)  Height (Patient Reported): 172.7 cm (5' 8\")  BMI (Based on Pt Reported Ht/Wt): 22.05  Pain Score: Moderate Pain (5)  Pain Loc: Other - see comment (Back, right hand)    Physical Exam   GENERAL: Healthy, alert and no distress  EYES: Eyes grossly normal to inspection.  No discharge or erythema, or obvious scleral/conjunctival abnormalities.  RESP: No audible wheeze, cough, or visible cyanosis.  No visible retractions or increased work of breathing.    SKIN: Visible skin clear. No significant rash, abnormal pigmentation or lesions.  NEURO: Cranial nerves grossly intact.  Mentation and speech appropriate for age.  PSYCH: Mentation appears normal, affect normal/bright, judgement and insight intact, normal speech and appearance well-groomed.      Video-Visit Details    Type of service:  Video Visit   Video Start Time: 1:44 PM  Video End Time:2:00 PM    Originating Location (pt. Location): Home    Distant Location (provider location):  On-site  Platform used for Video Visit: Elsie"

## 2023-11-01 NOTE — TELEPHONE ENCOUNTER
Denied. Controlled substance refill requires some sort of visit - office or virtual. Please inform patient and help patient to schedule an appointment. Virtual visit is fine. Thank you.

## 2023-11-02 ENCOUNTER — TELEPHONE (OUTPATIENT)
Dept: NEUROLOGY | Facility: CLINIC | Age: 68
End: 2023-11-02
Payer: MEDICARE

## 2023-11-02 ENCOUNTER — MYC MEDICAL ADVICE (OUTPATIENT)
Dept: FAMILY MEDICINE | Facility: CLINIC | Age: 68
End: 2023-11-02
Payer: MEDICARE

## 2023-11-02 NOTE — TELEPHONE ENCOUNTER
I saw a message regarding that the pt saw Dr. Watt, but also saw  Dr. Sifuentes and wanted to schedule a follow up with Dr. Watt.     The  that was on original call scheduled the pt as a new with Dr. Watt, I sent a message to the nurses regarding the appointment.    I received a message from Macarena Willis RN stating that the pt is not a NEW pt with Dr. Watt and could be scheduled as a return and have a virtual appt if pt chose to do so.  I spoke with the pt, rescheduled the appointment as a return, offered virtual which they accepted, informed pt that I was going to cancel the appt marked as NEW for March of 2024.     Pt's appt rescheduled, pt aware.    11/2/23 BD

## 2023-11-03 DIAGNOSIS — M48.062 SPINAL STENOSIS OF LUMBAR REGION WITH NEUROGENIC CLAUDICATION: ICD-10-CM

## 2023-11-03 RX ORDER — GABAPENTIN 300 MG/1
300 CAPSULE ORAL 3 TIMES DAILY
Qty: 90 CAPSULE | Refills: 2 | Status: SHIPPED | OUTPATIENT
Start: 2023-11-03 | End: 2024-02-28

## 2023-11-03 NOTE — TELEPHONE ENCOUNTER
"Dr. Zuluaga- No immunization history of Hepatitis B vaccination found, patient inquiring if you recommend he complete the series. Patient does have medicare so would need to complete at pharmacy.    \"I was just checking the [employer] patti to see if it had been updated yet with my recent immunizations. There's an entry that shows \"Hep B\" is \"Overdue,\" due on 1955. I'm sure I've talked to you about this in the past, but I don't remember. So I figured I should check to see if this is something I need to take care of.\"    Lilli Sullivan RN  [facility]    "

## 2023-11-03 NOTE — TELEPHONE ENCOUNTER
Agree with RN response. Reviewed JOSÉ CAMEJO to complete series at pharmacy for hep B. It is not required but recommended vaccine

## 2023-11-07 ENCOUNTER — VIRTUAL VISIT (OUTPATIENT)
Dept: NEUROLOGY | Facility: CLINIC | Age: 68
End: 2023-11-07
Payer: MEDICARE

## 2023-11-07 VITALS — BODY MASS INDEX: 21.44 KG/M2 | WEIGHT: 141 LBS

## 2023-11-07 DIAGNOSIS — R20.0 RIGHT LEG NUMBNESS: ICD-10-CM

## 2023-11-07 DIAGNOSIS — M54.17 LUMBOSACRAL RADICULOPATHY AT S1: Primary | ICD-10-CM

## 2023-11-07 DIAGNOSIS — M48.062 SPINAL STENOSIS OF LUMBAR REGION WITH NEUROGENIC CLAUDICATION: ICD-10-CM

## 2023-11-07 PROCEDURE — 99214 OFFICE O/P EST MOD 30 MIN: CPT | Mod: 95 | Performed by: PSYCHIATRY & NEUROLOGY

## 2023-11-07 ASSESSMENT — PAIN SCALES - GENERAL: PAINLEVEL: MODERATE PAIN (5)

## 2023-11-07 NOTE — PROGRESS NOTES
Patient requesting 2nd opinion regarding his lumbosacral radiculopathy(s) and possible need for intervention beyond PT and RAFAEL.    JOE Watt D.O.  [facility] Neurology

## 2023-11-07 NOTE — LETTER
11/7/2023       RE: Salbador Begum  [address]  [place] [address]     Dear Colleague,    Thank you for referring your patient, Salbador Begum, to the [facility] [place] at [facility]. Please see a copy of my visit note below.    [facility] Neurology Follow Up Visit    Salbador Begum MRN# [id number]   Age: 68 year old YOB: 1955     Brief history of symptoms: The patient was initially seen in neurologic consultation on 5/11/2023 for evaluation of back pain with paresthesias within the resident clinic while I was attending. Please see the comprehensive neurologic consultation notes from those dates in the Epic records for details.     Overall impression was for that of the patient likely having neuropathy as well as radiculopathy of the lumbosacral region b/l and a possible cervical radiculopathy versus ulnar neuropathy.  There was noted Lumbar central canal stenosis at L3-4 (Severe) of imaging in 3/2023.  EMG and cervical spine MRI were to be done.    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  - MRI cervical spine showed no signs of myelopathy but was notable for arthritis of multiple facet joints.  - He was interested in PT for calf pain related to radiculopathy, was to wear a elbow brace, and continue to follow with [facility] for treatment of his back pain which at the time could be radicular in nature.   - PT therapies are documented 7/14/2023   - RAFAEL was done after 7/18/2023 visit, by patient report    Today, the patient still has numbness in his right leg.  He wasn't considering surgery (fusion) with his [facility] provider (7/18/2023).  He feels his right leg weakness is improved (can stand on it now).  However, he feels his left leg has weakness now (when walking) and has  some tingling.  There is no ongoing urinary incontinence, or fecal incontinence.      Physical Exam:   Vitals: Wt 64 kg (141 lb)   BMI 21.44 kg/m     General: Seated comfortably in no acute distress.  Neurologic:     Mental Status: Fully alert, attentive and oriented. Speech clear and fluent, no paraphasic errors.     Cranial Nerves: EOMI with normal smooth pursuit. Facial movements symmetric. Hearing not formally tested but intact to conversation.  No dysarthria.     Motor: No tremors or other abnormal movements observed.          Assessment and Plan:   Assessment:  Suspect active b/l S1 radiculopathy (now with L>R clinical symptoms)  Chronic L4 and L5 b/l radiculopathy    The patient's worsened sensory changes of the left foot along with some notable weakness (Reported) in the same leg do go along with  his prior EMG and imaging, consistent with radiculopathy that is active.  I asked him to consider repeat PT or RAFAEL if he wanted, but ultimately given the progression of symptoms, and his prior EMG and imaging, I think  his symptoms could continue to progress due to anatomical insult (neuro-foraminal narrowing/stenosis).  He will speak with his [facility] providers about conservative treatment versus surgery, but at this time I do not see much reason to repeat his EMG as it will either show a worsening of his old acute radiculopathy or that it is now chronic, both of which would lead to similar recommendations of PT/RAFAEL or surgery.   I did offer a second opinion through our orthopedics provides if the patient wanted, and he will consider this moving forward.    Plan:  - Consideration of Left S1 RAFAEL or repeat  - [facility] providers could consider repeat imaging, but it is unclear how much new EMG would be helpful    Follow up in Neurology clinic as needed should new concerns arise.      Total time today (32 min) in this patient encounter was spent on pre-charting, counseling and/or coordination of care.         Again, thank you  for allowing me to participate in the care of your patient.      Sincerely,    Filippo Watt, DO

## 2023-11-07 NOTE — PROGRESS NOTES
Virtual Visit Details    Type of service:  Video Visit   Video Start Time:  0830  Video End Time:9:00 AM    Originating Location (pt. Location): Home    Distant Location (provider location):  On-site  Platform used for Video Visit: [employer]

## 2023-11-07 NOTE — PROGRESS NOTES
[facility] Neurology Follow Up Visit    Salbador Begum MRN# [id number]   Age: 68 year old YOB: 1955     Brief history of symptoms: The patient was initially seen in neurologic consultation on 5/11/2023 for evaluation of back pain with paresthesias within the resident clinic while I was attending. Please see the comprehensive neurologic consultation notes from those dates in the Epic records for details.     Overall impression was for that of the patient likely having neuropathy as well as radiculopathy of the lumbosacral region b/l and a possible cervical radiculopathy versus ulnar neuropathy.  There was noted Lumbar central canal stenosis at L3-4 (Severe) of imaging in 3/2023.  EMG and cervical spine MRI were to be done.    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  - MRI cervical spine showed no signs of myelopathy but was notable for arthritis of multiple facet joints.  - He was interested in PT for calf pain related to radiculopathy, was to wear a elbow brace, and continue to follow with [facility] for treatment of his back pain which at the time could be radicular in nature.   - PT therapies are documented 7/14/2023   - RAFAEL was done after 7/18/2023 visit, by patient report    Today, the patient still has numbness in his right leg.  He wasn't considering surgery (fusion) with his [facility] provider (7/18/2023).  He feels his right leg weakness is improved (can stand on it now).  However, he feels his left leg has weakness now (when walking) and has some tingling.  There is no ongoing urinary incontinence, or fecal incontinence.      Physical Exam:   Vitals: Wt 64 kg (141 lb)   BMI 21.44 kg/m     General: Seated comfortably in no acute distress.  Neurologic:     Mental Status: Fully alert, attentive and oriented. Speech clear and fluent, no paraphasic errors.     Cranial Nerves:  EOMI with normal smooth pursuit. Facial movements symmetric. Hearing not formally tested but intact to conversation.  No dysarthria.     Motor: No tremors or other abnormal movements observed.          Assessment and Plan:   Assessment:  Suspect active b/l S1 radiculopathy (now with L>R clinical symptoms)  Chronic L4 and L5 b/l radiculopathy    The patient's worsened sensory changes of the left foot along with some notable weakness (Reported) in the same leg do go along with  his prior EMG and imaging, consistent with radiculopathy that is active.  I asked him to consider repeat PT or RAFAEL if he wanted, but ultimately given the progression of symptoms, and his prior EMG and imaging, I think  his symptoms could continue to progress due to anatomical insult (neuro-foraminal narrowing/stenosis).  He will speak with his [facility] providers about conservative treatment versus surgery, but at this time I do not see much reason to repeat his EMG as it will either show a worsening of his old acute radiculopathy or that it is now chronic, both of which would lead to similar recommendations of PT/RAFAEL or surgery.   I did offer a second opinion through our orthopedics provides if the patient wanted, and he will consider this moving forward.    Plan:  - Consideration of Left S1 RAFAEL or repeat  - [facility] providers could consider repeat imaging, but it is unclear how much new EMG would be helpful    Follow up in Neurology clinic as needed should new concerns arise.    JOE Watt D.O.   of Neurology    Total time today (32 min) in this patient encounter was spent on pre-charting, counseling and/or coordination of care.

## 2023-11-08 ENCOUNTER — TRANSFERRED RECORDS (OUTPATIENT)
Dept: HEALTH INFORMATION MANAGEMENT | Facility: CLINIC | Age: 68
End: 2023-11-08
Payer: MEDICARE

## 2023-11-29 ENCOUNTER — MYC REFILL (OUTPATIENT)
Dept: FAMILY MEDICINE | Facility: CLINIC | Age: 68
End: 2023-11-29
Payer: MEDICARE

## 2023-11-29 ENCOUNTER — TRANSFERRED RECORDS (OUTPATIENT)
Dept: HEALTH INFORMATION MANAGEMENT | Facility: CLINIC | Age: 68
End: 2023-11-29
Payer: MEDICARE

## 2023-11-29 DIAGNOSIS — I10 BENIGN ESSENTIAL HYPERTENSION: ICD-10-CM

## 2023-11-29 RX ORDER — AMLODIPINE BESYLATE 5 MG/1
5 TABLET ORAL DAILY
Qty: 90 TABLET | Refills: 2 | OUTPATIENT
Start: 2023-11-29

## 2023-12-04 DIAGNOSIS — I10 BENIGN ESSENTIAL HYPERTENSION: ICD-10-CM

## 2023-12-05 RX ORDER — AMLODIPINE BESYLATE 5 MG/1
5 TABLET ORAL DAILY
Qty: 90 TABLET | Refills: 0 | Status: SHIPPED | OUTPATIENT
Start: 2023-12-05 | End: 2024-02-28

## 2023-12-29 ENCOUNTER — TELEPHONE (OUTPATIENT)
Dept: ANESTHESIOLOGY | Facility: CLINIC | Age: 68
End: 2023-12-29
Payer: MEDICARE

## 2023-12-29 ASSESSMENT — PAIN SCALES - PAIN ENJOYMENT GENERAL ACTIVITY SCALE (PEG)
AVG_PAIN_PASTWEEK: 8
INTERFERED_GENERAL_ACTIVITY: 6
AVG_PAIN_PASTWEEK: 8
PEG_TOTALSCORE: 7
INTERFERED_GENERAL_ACTIVITY: 6
INTERFERED_ENJOYMENT_LIFE: 7
INTERFERED_ENJOYMENT_LIFE: 7
PEG_TOTALSCORE: 7

## 2023-12-29 ASSESSMENT — ANXIETY QUESTIONNAIRES
5. BEING SO RESTLESS THAT IT IS HARD TO SIT STILL: NOT AT ALL
7. FEELING AFRAID AS IF SOMETHING AWFUL MIGHT HAPPEN: NOT AT ALL
2. NOT BEING ABLE TO STOP OR CONTROL WORRYING: NOT AT ALL
GAD7 TOTAL SCORE: 0
6. BECOMING EASILY ANNOYED OR IRRITABLE: NOT AT ALL
7. FEELING AFRAID AS IF SOMETHING AWFUL MIGHT HAPPEN: NOT AT ALL
GAD7 TOTAL SCORE: 0
1. FEELING NERVOUS, ANXIOUS, OR ON EDGE: NOT AT ALL
4. TROUBLE RELAXING: NOT AT ALL
GAD7 TOTAL SCORE: 0
3. WORRYING TOO MUCH ABOUT DIFFERENT THINGS: NOT AT ALL

## 2023-12-29 NOTE — TELEPHONE ENCOUNTER
I called and left a message to remind him that he has an appointment with Dr Oakley 1/2 at 10:30 arrive 15-20 minutes prior    Complete the questionnaire in [facility] prior to his visit

## 2024-01-01 NOTE — PROGRESS NOTES
"[facility] Med Spine Evaluation    Date of visit: 1/2/2024    Reason for consultation:    Salbador Begum is a 68 year old male who is seen in consultation today for evaluation of an interventional strategy for pain management.    PCP is Kieran Zuluaga.    Please see the [facility] health questionnaire which the patient completed and reviewed with me in detail.    Chief Complaint:    Chief Complaint   Patient presents with    Consult     Low back pain     Patient Supplied Answers To the  Pain Questionnaire      12/29/2023     3:45 PM    Pain -  Patient Entered Questionnaire/Answers   What number best describes your pain right now:  0 = No pain  to  10 = Worst pain imaginable 7   How would you describe the pain burning    cramping    sharp    numbness    dull, aching   Which of the following worsen your pain standing    sitting    coughing / sneezing   Which of the following improve or reduce your pain lying down    sitting    walking    medication    relaxation   What number best describes your average pain for the past week:  0 = No pain  to  10 = Worst pain imaginable 8   What number best describes your LOWEST pain in past 24 hours:  0 = No pain  to  10 = Worst pain imaginable 3   What number best describes your WORST pain in past 24 hours:  0 = No pain  to  10 = Worst pain imaginable 9   When is your pain worst AM    PM    Night   What non-medicine treatments have you already had for your pain physical therapy    spine injections (shots)    exercise       Pain history:  Salbador Begum is a 68 year old male presenting with a chief pain complaint of lumbosacral radiculopathy     Onset: months ago without inciting event  Location and Radiation: lumbar spine below belt line with radiation into mid post thighs  Provoking: bending, lifting, sitting  Palliating: movement  Quality: \"pins and needles\"  Severity: 3/10  Timing: " "morning and improves  Numbness: R medial foot up shin to lateral thigh. Inside left foot  Weakness: chronic R foot weakness that has \"been resolved but is not 100%\". L foot \"floopy foot\" feeling has improved    Patient denies red flag symptoms of corresponding bowel/bladder symptoms, fever/chills, saddle anesthesia, profound motor loss, weight loss, or sudden unremitting increase in pain.    Current pain medications include:  Nabumetone   Gabapentin  Tylenol    Previous medication treatments included:  Norco  Advil  Percocet  Venlafaxine  Flexeril  Tizanidine    Other treatments have included:  Salbador Begum has been seen at a pain clinic in the past.    PT: yes - helpful   Injections: has had 5 injections, last was 3/29/2023 (L4-5 ILESI) - last for months  Surgery: denies    Past Medical History:  Past Medical History:   Diagnosis Date    Allergic rhinitis, cause unspecified     Backache, unspecified     Depressive disorder     Diverticulitis     Junctional ectopic contractions (H)     Mild intermittent asthma 9/28/2005    Narcolepsy     Osteoarthritis     Palpitations     Sleep apnea      Patient Active Problem List    Diagnosis Date Noted    ADHD (attention deficit hyperactivity disorder), inattentive type 08/01/2023     Priority: Medium    Bilateral low back pain with right-sided sciatica 02/01/2023     Priority: Medium    Spinal stenosis of lumbar region with neurogenic claudication 01/20/2023     Priority: Medium    Nonrheumatic mitral valve regurgitation 11/01/2021     Priority: Medium    Benign essential hypertension 09/23/2020     Priority: Medium    Chronotropic incompetence with sinus node dysfunction (H) 05/04/2020     Priority: Medium    Idiopathic hypersomnolence 07/16/2015     Priority: Medium     Patient is followed by ASHLEE ESPINOZA for ongoing prescription of stimulants.  All refills should be approved by this provider, or covering partner.    Medication(s): adderall.   Maximum quantity " per month: 30  Clinic visit frequency required: q 3months for refills. Ok to do e-visits.     Controlled substance agreement on file: No  Sleep specialist evaluation for ADD completed:  Yes, completed years ago. , not on file    Last [facility] website verification:  none   [email or web address]           Essential and other specified forms of tremor 02/25/2014     Priority: Medium    Advanced directives, counseling/discussion 07/02/2012     Priority: Medium     Discussed advance care planning with patient; information given to patient to review. 7/2/2012 Marty Banuelos LPN    Honoring choices letter mailed.  8-  Therese Hurst RT (R)        Diverticulitis of colon 01/02/2012     Priority: Medium    Sinus arrhythmia 10/23/2009     Priority: Medium     GETS SOB IF HEART RATE GETS PAST 140  Junctional escape rhythm. Always bradycardic.       Sleep apnea 10/23/2009     Priority: Medium    Major depressive disorder, recurrent, mild (H24) 06/17/2008     Priority: Medium    Allergic rhinitis 05/28/2004     Priority: Medium     Allergic rhinitis  Problem list name updated by automated process. Provider to review      Backache 04/26/2004     Priority: Medium     Problem list name updated by automated process. Provider to review         Past Surgical History:  Past Surgical History:   Procedure Laterality Date    APPENDECTOMY      ORTHOPEDIC SURGERY      left hip replacement, right ankle,    SEPTORHINOPLASTY  4/7/2011    Procedure:SEPTORHINOPLASTY; Septoplasty withNasal Reconstruction with  Grafts Harvested from [employer]; Surgeon:ALFREDO UMANA; Location: OR    [facility] NONSPECIFIC PROCEDURE      s/p Appendectomy     Medications:  Current Outpatient Medications   Medication Sig Dispense Refill    acetaminophen (TYLENOL) 650 MG CR tablet Take 2 tablets (1,300 mg) by mouth 2 times daily 180 tablet 1    amLODIPine (NORVASC) 5 MG tablet TAKE 1 TABLET BY MOUTH EVERY DAY 90 tablet 0     amphetamine-dextroamphetamine (ADDERALL XR) 10 MG 24 hr capsule Take 1 capsule (10 mg) by mouth daily for 30 days 30 capsule 0    Ascorbic Acid (VITAMIN C PO)       buPROPion (WELLBUTRIN XL) 300 MG 24 hr tablet TAKE 1 TABLET BY MOUTH EVERY DAY IN THE MORNING 90 tablet 1    calcium-vitamin D 500-125 MG-UNIT TABS Take 1 tablet by mouth daily      gabapentin (NEURONTIN) 300 MG capsule TAKE 1 CAPSULE (300 MG) BY MOUTH 3 TIMES DAILY PLEASE DISCONTINUE 100MG DOSE. 90 capsule 2    Multiple Vitamin (MULTI-DAY VITAMINS) TABS Take  by mouth.      nabumetone (RELAFEN) 500 MG tablet TAKE 1 TABLET (500 MG) BY MOUTH 2 TIMES DAILY AS NEEDED FOR MODERATE PAIN (4-6) 180 tablet 3    sildenafil (REVATIO) 20 MG tablet TAKE 3 TO 5 TABLETS BY MOUTH 30 MINUTES BEFORE SEXUAL ACTIVITY AS NEEDED 90 tablet 3     Allergies:     Allergies   Allergen Reactions    Metoclopramide Rash         Family history:  Family History   Problem Relation Age of Onset    Arthritis Mother         osteo    Dementia Mother     Hypertension Mother     Cerebrovascular Disease Mother         Not confirmed    Seizure Disorder Mother     Rheumatoid Arthritis Father     Depression Father     Coronary Artery Disease Father     Depression Sister     Substance Abuse Sister     Myocardial Infarction Sister        Physical Exam:  Vitals:    01/02/24 1029   BP: (!) 148/82   BP Location: Right arm   Patient Position: Chair   Cuff Size: Adult Regular   Pulse: 59   SpO2: 100%     Exam:  Constitutional: Well developed, NAD  Head: normocephalic. Atraumatic.   Eyes: no redness or jaundice noted   CV: warm, well perfused extremities   Skin: no suspicious lesions or rashes   Psychiatric: mentation appears normal and affect     Neuromuscular Examination:  Normal ROM in lumbar flexion, extension  Nontender to palpation of the midline lumbar spine or lumbar paraspinals bilaterally  Negative SI joint tenderness bilaterally  Normal 5/5 strength in BLE   Normal sensation to light touch in  the L3-S1 distributions bilaterally   No ankle clonus bilaterally    Straight leg raise: negative bilaterally   Seated slump negative bilaterally    Reflexes   Patellar: L 2/4, R 2/4   Ankle:    L 2/4, R 2/4      Diagnostic tests:  MRI lumbar Spine - [facility] 3/2023    Impression: Multilevel spondylosis, no fractures or destructive lesions and significant findings are as follows:  - severe central stenosis at L3-4 with bulge and hypertrophic facet arthropathy and a grade 1 spondylolisthesis without interval change since 3/11/2021  - mild central and moderate to severe subarticular stenosis/L5 impingement at L4-5 is also unchanged.  - disc bulge at L1-2 and L2-3, facet degeneration and no interval change  - no change in severe right and mild-to-moderate left L5-S1 foraminal stenosis     EMG on 5/18/2023 showed active b/l S1 radiculopathy (R>L), chronic b/l L4-5 radiculopathy, and right ulnar neuropathy that was mild.     Personally reviewed imaging: report only    Other testing (labs, diagnostics) reviewed:  Labs Hgb A1c 5.7 (5/15/23)    Outside records reviewed: yes    Assessment:  Lumbar radiculopathy  Lumbar spondylosis    Salbador Begum is a 68 year old male who presents with chronic low back pain with numbness in an S1 distribution on his RLE. He is seen today primarily for a second opinion regarding numbness in his lower extremities and concerns for the potential for nerve damage. He was seen by a surgeon at [facility] who felt his low back pain and lower extremity symptoms did not require surgery and were best managed conservatively. Today's physical exam was unremarkable. He receives RAFAEL's at [facility] (last was 3/2023). We discussed that an RAFAEL would not fix nerve damage, nor would it alleviate numbness, and that it is primarily meant to help manage symptoms of back and lower extremity pain. Patient has a good understanding of this. All of his questions and concerns were addressed at this time    Plan:  Diagnosis  reviewed, treatment option addressed, and risk/benefits discussed.     Procedures: Can consider L5-S1 ILESI in the future  Physical Therapy: none  Diagnostic Studies: none  Medication Management: none  Follow up: Can follow up as needed    Mckay Bradley MD  Interventional Pain Fellow  [facility]         Answers submitted by the patient for this visit:  KENNEDY-7 (Submitted on 12/29/2023)  KENNEDY 7 TOTAL SCORE: 0      ATTENDING ATTESTATION  I saw the patient along with the pain medicine fellow Dr. Mckay Bradley. Please see his note above for full details. I was involved in gathering history, physical examination and development of the plan of care.

## 2024-01-02 ENCOUNTER — OFFICE VISIT (OUTPATIENT)
Dept: ANESTHESIOLOGY | Facility: CLINIC | Age: 69
End: 2024-01-02
Attending: PSYCHIATRY & NEUROLOGY
Payer: MEDICARE

## 2024-01-02 VITALS — DIASTOLIC BLOOD PRESSURE: 82 MMHG | SYSTOLIC BLOOD PRESSURE: 148 MMHG | HEART RATE: 59 BPM | OXYGEN SATURATION: 100 %

## 2024-01-02 DIAGNOSIS — M48.062 SPINAL STENOSIS OF LUMBAR REGION WITH NEUROGENIC CLAUDICATION: ICD-10-CM

## 2024-01-02 DIAGNOSIS — M54.17 LUMBOSACRAL RADICULOPATHY AT S1: ICD-10-CM

## 2024-01-02 DIAGNOSIS — R20.0 RIGHT LEG NUMBNESS: ICD-10-CM

## 2024-01-02 PROCEDURE — 99203 OFFICE O/P NEW LOW 30 MIN: CPT | Mod: GC | Performed by: ANESTHESIOLOGY

## 2024-01-02 ASSESSMENT — PAIN SCALES - GENERAL: PAINLEVEL: NO PAIN (0)

## 2024-01-02 NOTE — LETTER
1/2/2024       RE: Salbador Begum  [address] [place] [address]       Dear Colleague,    Thank you for referring your patient, Salbador Begum, to the [facility] [place] at [facility]. Please see a copy of my visit note below.                          [facility] Med Spine Evaluation    Date of visit: 1/2/2024    Reason for consultation:    Salbador Begum is a 68 year old male who is seen in consultation today for evaluation of an interventional strategy for pain management.    PCP is Kieran Zuluaga.    Please see the [facility] [place] health questionnaire which the patient completed and reviewed with me in detail.    Chief Complaint:    Chief Complaint   Patient presents with    Consult     Low back pain     Patient Supplied Answers To the  Pain Questionnaire      12/29/2023     3:45 PM    Pain -  Patient Entered Questionnaire/Answers   What number best describes your pain right now:  0 = No pain  to  10 = Worst pain imaginable 7   How would you describe the pain burning    cramping    sharp    numbness    dull, aching   Which of the following worsen your pain standing    sitting    coughing / sneezing   Which of the following improve or reduce your pain lying down    sitting    walking    medication    relaxation   What number best describes your average pain for the past week:  0 = No pain  to  10 = Worst pain imaginable 8   What number best describes your LOWEST pain in past 24 hours:  0 = No pain  to  10 = Worst pain imaginable 3   What number best describes your WORST pain in past 24 hours:  0 = No pain  to  10 = Worst pain imaginable 9   When is your pain worst AM    PM    Night   What non-medicine treatments have you already had for your pain physical therapy    spine injections (shots)    exercise       Pain  "history:  Salbador Begum is a 68 year old male presenting with a chief pain complaint of lumbosacral radiculopathy     Onset: months ago without inciting event  Location and Radiation: lumbar spine below belt line with radiation into mid post thighs  Provoking: bending, lifting, sitting  Palliating: movement  Quality: \"pins and needles\"  Severity: 3/10  Timing: morning and improves  Numbness: R medial foot up shin to lateral thigh. Inside left foot  Weakness: chronic R foot weakness that has \"been resolved but is not 100%\". L foot \"floopy foot\" feeling has improved    Patient denies red flag symptoms of corresponding bowel/bladder symptoms, fever/chills, saddle anesthesia, profound motor loss, weight loss, or sudden unremitting increase in pain.    Current pain medications include:  Nabumetone   Gabapentin  Tylenol    Previous medication treatments included:  Norco  Advil  Percocet  Venlafaxine  Flexeril  Tizanidine    Other treatments have included:  Salbador Begum has been seen at a pain clinic in the past.    PT: yes - helpful   Injections: has had 5 injections, last was 3/29/2023 (L4-5 ILESI) - last for months  Surgery: denies    Past Medical History:  Past Medical History:   Diagnosis Date    Allergic rhinitis, cause unspecified     Backache, unspecified     Depressive disorder     Diverticulitis     Junctional ectopic contractions (H)     Mild intermittent asthma 9/28/2005    Narcolepsy     Osteoarthritis     Palpitations     Sleep apnea      Patient Active Problem List    Diagnosis Date Noted    ADHD (attention deficit hyperactivity disorder), inattentive type 08/01/2023     Priority: Medium    Bilateral low back pain with right-sided sciatica 02/01/2023     Priority: Medium    Spinal stenosis of lumbar region with neurogenic claudication 01/20/2023     Priority: Medium    Nonrheumatic mitral valve regurgitation 11/01/2021     Priority: Medium    Benign essential hypertension 09/23/2020     " Priority: Medium    Chronotropic incompetence with sinus node dysfunction (H) 05/04/2020     Priority: Medium    Idiopathic hypersomnolence 07/16/2015     Priority: Medium     Patient is followed by ASHLEE ESPINOZA for ongoing prescription of stimulants.  All refills should be approved by this provider, or covering partner.    Medication(s): adderall.   Maximum quantity per month: 30  Clinic visit frequency required: q 3months for refills. Ok to do e-visits.     Controlled substance agreement on file: No  Sleep specialist evaluation for ADD completed:  Yes, completed years ago. , not on file    Last [facility] website verification:  none   [email or web address]           Essential and other specified forms of tremor 02/25/2014     Priority: Medium    Advanced directives, counseling/discussion 07/02/2012     Priority: Medium     Discussed advance care planning with patient; information given to patient to review. 7/2/2012 Marty Banuelos LPN    Honoring choices letter mailed.  8-  Therese Hurst RT (R)        Diverticulitis of colon 01/02/2012     Priority: Medium    Sinus arrhythmia 10/23/2009     Priority: Medium     GETS SOB IF HEART RATE GETS PAST 140  Junctional escape rhythm. Always bradycardic.       Sleep apnea 10/23/2009     Priority: Medium    Major depressive disorder, recurrent, mild (H24) 06/17/2008     Priority: Medium    Allergic rhinitis 05/28/2004     Priority: Medium     Allergic rhinitis  Problem list name updated by automated process. Provider to review      Backache 04/26/2004     Priority: Medium     Problem list name updated by automated process. Provider to review         Past Surgical History:  Past Surgical History:   Procedure Laterality Date    APPENDECTOMY      ORTHOPEDIC SURGERY      left hip replacement, right ankle,    SEPTORHINOPLASTY  4/7/2011    Procedure:SEPTORHINOPLASTY; Septoplasty withNasal Reconstruction with  Grafts Harvested from [employer]; Surgeon:CUNNINGHAM,  ALFREDO; Location: OR    [facility] NONSPECIFIC PROCEDURE      s/p Appendectomy     Medications:  Current Outpatient Medications   Medication Sig Dispense Refill    acetaminophen (TYLENOL) 650 MG CR tablet Take 2 tablets (1,300 mg) by mouth 2 times daily 180 tablet 1    amLODIPine (NORVASC) 5 MG tablet TAKE 1 TABLET BY MOUTH EVERY DAY 90 tablet 0    amphetamine-dextroamphetamine (ADDERALL XR) 10 MG 24 hr capsule Take 1 capsule (10 mg) by mouth daily for 30 days 30 capsule 0    Ascorbic Acid (VITAMIN C PO)       buPROPion (WELLBUTRIN XL) 300 MG 24 hr tablet TAKE 1 TABLET BY MOUTH EVERY DAY IN THE MORNING 90 tablet 1    calcium-vitamin D 500-125 MG-UNIT TABS Take 1 tablet by mouth daily      gabapentin (NEURONTIN) 300 MG capsule TAKE 1 CAPSULE (300 MG) BY MOUTH 3 TIMES DAILY PLEASE DISCONTINUE 100MG DOSE. 90 capsule 2    Multiple Vitamin (MULTI-DAY VITAMINS) TABS Take  by mouth.      nabumetone (RELAFEN) 500 MG tablet TAKE 1 TABLET (500 MG) BY MOUTH 2 TIMES DAILY AS NEEDED FOR MODERATE PAIN (4-6) 180 tablet 3    sildenafil (REVATIO) 20 MG tablet TAKE 3 TO 5 TABLETS BY MOUTH 30 MINUTES BEFORE SEXUAL ACTIVITY AS NEEDED 90 tablet 3     Allergies:     Allergies   Allergen Reactions    Metoclopramide Rash         Family history:  Family History   Problem Relation Age of Onset    Arthritis Mother         osteo    Dementia Mother     Hypertension Mother     Cerebrovascular Disease Mother         Not confirmed    Seizure Disorder Mother     Rheumatoid Arthritis Father     Depression Father     Coronary Artery Disease Father     Depression Sister     Substance Abuse Sister     Myocardial Infarction Sister        Physical Exam:  Vitals:    01/02/24 1029   BP: (!) 148/82   BP Location: Right arm   Patient Position: Chair   Cuff Size: Adult Regular   Pulse: 59   SpO2: 100%     Exam:  Constitutional: Well developed, NAD  Head: normocephalic. Atraumatic.   Eyes: no redness or jaundice noted   CV: warm, well perfused extremities   Skin: no  suspicious lesions or rashes   Psychiatric: mentation appears normal and affect     Neuromuscular Examination:  Normal ROM in lumbar flexion, extension  Nontender to palpation of the midline lumbar spine or lumbar paraspinals bilaterally  Negative SI joint tenderness bilaterally  Normal 5/5 strength in BLE   Normal sensation to light touch in the L3-S1 distributions bilaterally   No ankle clonus bilaterally    Straight leg raise: negative bilaterally   Seated slump negative bilaterally    Reflexes   Patellar: L 2/4, R 2/4   Ankle:    L 2/4, R 2/4      Diagnostic tests:  MRI lumbar Spine - [facility] 3/2023    Impression: Multilevel spondylosis, no fractures or destructive lesions and significant findings are as follows:  - severe central stenosis at L3-4 with bulge and hypertrophic facet arthropathy and a grade 1 spondylolisthesis without interval change since 3/11/2021  - mild central and moderate to severe subarticular stenosis/L5 impingement at L4-5 is also unchanged.  - disc bulge at L1-2 and L2-3, facet degeneration and no interval change  - no change in severe right and mild-to-moderate left L5-S1 foraminal stenosis     EMG on 5/18/2023 showed active b/l S1 radiculopathy (R>L), chronic b/l L4-5 radiculopathy, and right ulnar neuropathy that was mild.     Personally reviewed imaging: report only    Other testing (labs, diagnostics) reviewed:  Labs Hgb A1c 5.7 (5/15/23)    Outside records reviewed: yes    Assessment:  Lumbar radiculopathy  Lumbar spondylosis    Salbador Begum is a 68 year old male who presents with chronic low back pain with numbness in an S1 distribution on his RLE. He is seen today primarily for a second opinion regarding numbness in his lower extremities and concerns for the potential for nerve damage. He was seen by a surgeon at [facility] who felt his low back pain and lower extremity symptoms did not require surgery and were best managed conservatively. Today's physical exam was unremarkable. He  receives RAFAEL's at [facility] (last was 3/2023). We discussed that an RAFEAL would not fix nerve damage, nor would it alleviate numbness, and that it is primarily meant to help manage symptoms of back and lower extremity pain. Patient has a good understanding of this. All of his questions and concerns were addressed at this time    Plan:  Diagnosis reviewed, treatment option addressed, and risk/benefits discussed.     Procedures: Can consider L5-S1 ILESI in the future  Physical Therapy: none  Diagnostic Studies: none  Medication Management: none  Follow up: Can follow up as needed    Mckay Bradley MD  Interventional Pain Fellow  [facility]     Answers submitted by the patient for this visit:  KENNEDY-7 (Submitted on 12/29/2023)  KENNEDY 7 TOTAL SCORE: 0      ATTENDING ATTESTATION  I saw the patient along with the pain medicine fellow Dr. Mckay Bradley. Please see his note above for full details. I was involved in gathering history, physical examination and development of the plan of care.         Again, thank you for allowing me to participate in the care of your patient.      Sincerely,    Julisa Oakley MD

## 2024-01-02 NOTE — PATIENT INSTRUCTIONS
Medications:  Recommendations will be written in the providers note for your Primary Care provider (OR other providers in your care team) to review and make changes to your therapies based on their discretion.       Recommended Follow up:      As Needed       To speak with a nurse, schedule/reschedule/cancel a clinic appointment, or request a medication refill call: [phone].    You can also reach us by [employer]: [email or web address]

## 2024-01-02 NOTE — NURSING NOTE
Patient presents with:  Consult: Low back pain      Data Unavailable     Pain Medications       Analgesics Other Refills Start End     acetaminophen (TYLENOL) 650 MG CR tablet 1 8/4/2023 --    Sig - Route: Take 2 tablets (1,300 mg) by mouth 2 times daily - Oral    Class: E-Prescribe            What medications are you using for pain? Tylenol, gabapentin     (New patients only) Have you been seen by another pain clinic/ provider? no    (Return Patients only) What refills are you needing today? no    Expectations: kaitlin Hayes, EMT

## 2024-01-02 NOTE — PROGRESS NOTES
Prior Authorization Specialty Medication Request    Medication/Dose: Low back pain  Diagnosis and ICD code (if different than what is on RX):  ***  New/renewal/insurance change PA/secondary ins. PA:  Previously Tried and Failed:  ***    Important Lab Values: ***  Rationale: ***    Insurance   Primary: ***  Insurance ID:  ***    Secondary (if applicable):***  Insurance ID:  ***    Pharmacy Information (if different than what is on RX)  Name:  ***  Phone:  ***  Fax:***

## 2024-01-03 ENCOUNTER — NURSE TRIAGE (OUTPATIENT)
Dept: FAMILY MEDICINE | Facility: CLINIC | Age: 69
End: 2024-01-03
Payer: MEDICARE

## 2024-01-03 NOTE — TELEPHONE ENCOUNTER
Care Advice:  Patient would like to be seen.    Disposition:  Will follow recommendation.  Date, time and location of appointment provided.  RN advised to seek urgent/ER care if symptoms worsening before appointment.  Patient verbalized understood.    NAHEED Arrieta, RN  [facility]      Reason for Disposition   Patient wants to be seen    Additional Information   Negative: Bluish (or gray) lips or face   Negative: SEVERE difficulty breathing (e.g., struggling for each breath, speaks in single words)   Negative: Rapid onset of cough and has hives   Negative: Coughing started suddenly after medicine, an allergic food or bee sting   Negative: Difficulty breathing after exposure to flames, smoke, or fumes   Negative: Sounds like a life-threatening emergency to the triager   Negative: Previous asthma attacks and this feels like asthma attack   Negative: Dry cough (non-productive; no sputum or minimal clear sputum) and within 14 days of COVID-19 Exposure   Negative: MODERATE difficulty breathing (e.g., speaks in phrases, SOB even at rest, pulse 100-120) and still present when not coughing   Negative: Chest pain present when not coughing   Negative: Passed out (i.e., fainted, collapsed and was not responding)   Negative: Patient sounds very sick or weak to the triager   Negative: MILD difficulty breathing (e.g., minimal/no SOB at rest, SOB with walking, pulse <100) and still present when not coughing   Negative: Coughed up > 1 tablespoon (15 ml) blood (Exception: Blood-tinged sputum.)   Negative: Fever > 103 F (39.4 C)   Negative: Fever > 101 F (38.3 C) and over 60 years of age   Negative: Fever > 100.0 F (37.8 C) and has diabetes mellitus or a weak immune system (e.g., HIV positive, cancer chemotherapy, organ transplant, splenectomy, chronic steroids)   Negative: Fever > 100.0 F (37.8 C) and bedridden (e.g., CVA, chronic illness, recovering from surgery)   Negative: Increasing ankle swelling    "Negative: Wheezing is present   Negative: SEVERE coughing spells (e.g., whooping sound after coughing, vomiting after coughing)   Negative: Coughing up teresa-colored (reddish-brown) or blood-tinged sputum   Negative: Fever present > 3 days (72 hours)   Negative: Fever returns after gone for over 24 hours and symptoms worse or not improved   Negative: Using nasal washes and pain medicine > 24 hours and sinus pain persists   Negative: Known COPD or other severe lung disease (i.e., bronchiectasis, cystic fibrosis, lung surgery) and worsening symptoms (i.e., increased sputum purulence or amount, increased breathing difficulty)   Negative: Continuous (nonstop) coughing interferes with work or school and no improvement using cough treatment per Care Advice    Answer Assessment - Initial Assessment Questions  1. ONSET: \"When did the cough begin?\"       Cough ongoing for the past two months.  Non-productive.  Every now and then might get some sputum.  Not congested.    2. SEVERITY: \"How bad is the cough today?\"       More frequent.  Intermittently.  When does cough, the coughing will be ongoing for up to 30 seconds.   3. SPUTUM: \"Describe the color of your sputum\" (none, dry cough; clear, white, yellow, green)      Dry cough  4. HEMOPTYSIS: \"Are you coughing up any blood?\" If so ask: \"How much?\" (flecks, streaks, tablespoons, etc.)      denied  5. DIFFICULTY BREATHING: \"Are you having difficulty breathing?\" If Yes, ask: \"How bad is it?\" (e.g., mild, moderate, severe)     - MILD: No SOB at rest, mild SOB with walking, speaks normally in sentences, can lie down, no retractions, pulse < 100.     - MODERATE: SOB at rest, SOB with minimal exertion and prefers to sit, cannot lie down flat, speaks in phrases, mild retractions, audible wheezing, pulse 100-120.     - SEVERE: Very SOB at rest, speaks in single words, struggling to breathe, sitting hunched forward, retractions, pulse > 120       Denied SOB.   6. FEVER: \"Do you have a " "fever?\" If Yes, ask: \"What is your temperature, how was it measured, and when did it start?\"      Yes, fever over the weekend of 12/30/23 up to 101.5 F.  Home tested for COVID but negative.   7. CARDIAC HISTORY: \"Do you have any history of heart disease?\" (e.g., heart attack, congestive heart failure)       Marcio cardia  8. LUNG HISTORY: \"Do you have any history of lung disease?\"  (e.g., pulmonary embolus, asthma, emphysema)      denied  9. PE RISK FACTORS: \"Do you have a history of blood clots?\" (or: recent major surgery, recent prolonged travel, bedridden)      denied  10. OTHER SYMPTOMS: \"Do you have any other symptoms?\" (e.g., runny nose, wheezing, chest pain)        Wheezing  11. PREGNANCY: \"Is there any chance you are pregnant?\" \"When was your last menstrual period?\"        N/a  12. TRAVEL: \"Have you traveled out of the country in the last month?\" (e.g., travel history, exposures)        Exposure to people with similar symptoms/COVID; 12/21-27/2023.    Protocols used: Cough-A-[place]    "

## 2024-01-04 ENCOUNTER — OFFICE VISIT (OUTPATIENT)
Dept: FAMILY MEDICINE | Facility: CLINIC | Age: 69
End: 2024-01-04
Payer: MEDICARE

## 2024-01-04 VITALS
SYSTOLIC BLOOD PRESSURE: 133 MMHG | RESPIRATION RATE: 18 BRPM | TEMPERATURE: 98 F | WEIGHT: 142.3 LBS | OXYGEN SATURATION: 99 % | BODY MASS INDEX: 22.34 KG/M2 | DIASTOLIC BLOOD PRESSURE: 81 MMHG | HEART RATE: 64 BPM | HEIGHT: 67 IN

## 2024-01-04 DIAGNOSIS — J20.9 ACUTE BRONCHITIS, UNSPECIFIED ORGANISM: Primary | ICD-10-CM

## 2024-01-04 DIAGNOSIS — R05.1 ACUTE COUGH: ICD-10-CM

## 2024-01-04 LAB
FLUAV RNA SPEC QL NAA+PROBE: NEGATIVE
FLUBV RNA RESP QL NAA+PROBE: NEGATIVE
RSV RNA SPEC NAA+PROBE: NEGATIVE
SARS-COV-2 RNA RESP QL NAA+PROBE: NEGATIVE

## 2024-01-04 PROCEDURE — 99213 OFFICE O/P EST LOW 20 MIN: CPT | Performed by: NURSE PRACTITIONER

## 2024-01-04 PROCEDURE — 87637 SARSCOV2&INF A&B&RSV AMP PRB: CPT | Performed by: NURSE PRACTITIONER

## 2024-01-04 RX ORDER — ALBUTEROL SULFATE 90 UG/1
2 AEROSOL, METERED RESPIRATORY (INHALATION) EVERY 6 HOURS PRN
Qty: 8 G | Refills: 0 | Status: SHIPPED | OUTPATIENT
Start: 2024-01-04 | End: 2024-04-09

## 2024-01-04 RX ORDER — PREDNISONE 20 MG/1
TABLET ORAL
Qty: 10 TABLET | Refills: 0 | Status: SHIPPED | OUTPATIENT
Start: 2024-01-04 | End: 2024-01-08

## 2024-01-04 ASSESSMENT — PAIN SCALES - GENERAL: PAINLEVEL: MODERATE PAIN (5)

## 2024-01-04 ASSESSMENT — ENCOUNTER SYMPTOMS: COUGH: 1

## 2024-01-04 NOTE — PROGRESS NOTES
Assessment & Plan     Acute bronchitis, unspecified organism  Symptoms sound consistent with bronchitis likely secondary to recent viral URI.  Will put on course of oral steroids and inhaler PRN.  Discussed use of Mucinex and Flonase for congestion, also consider dextromethorphan for coughing.  Avoid environmental triggers such as dust and smoke.  Follow up if symptoms worsen or persist.    - REVIEW OF HEALTH MAINTENANCE PROTOCOL ORDERS  - Asymptomatic Influenza A/B, RSV, & SARS-CoV2 PCR (COVID-19) Nose; Future  - predniSONE (DELTASONE) 20 MG tablet; Take 2 tablets on days one and two, and 1 tablet the remainder of the prescription.  - albuterol (PROAIR HFA/PROVENTIL HFA/VENTOLIN HFA) 108 (90 Base) MCG/ACT inhaler; Inhale 2 puffs into the lungs every 6 hours as needed for shortness of breath, wheezing or cough  - Asymptomatic Influenza A/B, RSV, & SARS-CoV2 PCR (COVID-19) Nose    Acute cough  - Asymptomatic Influenza A/B, RSV, & SARS-CoV2 PCR (COVID-19) Nose; Future  - Asymptomatic Influenza A/B, RSV, & SARS-CoV2 PCR (COVID-19) Nose        AYDEN Brock CNP  [facility]    Papa Siu is a 68 year old, presenting for the following health issues:  Cough (Sore throat/Needing to get a pain med re prescribed previously given by Margareth, oxycodone or hydrocodone. )      1/4/2024    11:01 AM   Additional Questions   Roomed by Jillian DANIEL       Cough    History of Present Illness       Reason for visit:  Persistent cough  Symptom onset:  3-4 weeks ago  Symptoms include:  Dry, unproductive cough. Worse in late afternoon, evening.Continues into the night,and can wake me with sustained coughing fit.This morning I woke with a mild sore throat which is still present.  Symptom intensity:  Severe  Symptom progression:  Staying the same  What makes it worse:  No  What makes it better:  Codeine    He eats 2-3 servings of fruits and vegetables daily.He consumes 0 sweetened beverage(s)  "daily.He exercises with enough effort to increase his heart rate 30 to 60 minutes per day.  He exercises with enough effort to increase his heart rate 4 days per week.   He is taking medications regularly.       Persistent, unproductive cough going on 2 months.  Over [place] was exposed to COVID.    Over the weekend felt poor, had a fever that was gone next day.  Tmax 100.1              Review of Systems   Respiratory:  Positive for cough.       Constitutional, HEENT, cardiovascular, pulmonary, gi and gu systems are negative, except as otherwise noted.      Objective    /81 (BP Location: Right arm, Patient Position: Sitting, Cuff Size: Adult Regular)   Pulse 64   Temp 98  F (36.7  C) (Temporal)   Resp 18   Ht 1.708 m (5' 7.24\")   Wt 64.5 kg (142 lb 4.8 oz)   SpO2 99%   BMI 22.13 kg/m    Body mass index is 22.13 kg/m .  Physical Exam   GENERAL: healthy, alert and no distress  RESP: expiratory wheezes R upper anterior and throughout  MS: no gross musculoskeletal defects noted, no edema                      "

## 2024-01-08 ENCOUNTER — TELEPHONE (OUTPATIENT)
Dept: FAMILY MEDICINE | Facility: CLINIC | Age: 69
End: 2024-01-08
Payer: MEDICARE

## 2024-01-08 DIAGNOSIS — J20.9 ACUTE BRONCHITIS, UNSPECIFIED ORGANISM: ICD-10-CM

## 2024-01-08 RX ORDER — CODEINE PHOSPHATE AND GUAIFENESIN 10; 100 MG/5ML; MG/5ML
1-2 SOLUTION ORAL EVERY 6 HOURS PRN
Qty: 473 ML | Refills: 0 | Status: CANCELLED | OUTPATIENT
Start: 2024-01-08

## 2024-01-08 RX ORDER — PREDNISONE 20 MG/1
20 TABLET ORAL DAILY
Qty: 5 TABLET | Refills: 0 | Status: SHIPPED | OUTPATIENT
Start: 2024-01-10 | End: 2024-01-15

## 2024-01-08 NOTE — TELEPHONE ENCOUNTER
Sent in new RX for prednisone, can do additional 5 days.  Increase albuterol to q4 hours.  Recommend OTC cough suppressants.

## 2024-01-08 NOTE — TELEPHONE ENCOUNTER
Robitussin DM or anything with dextromethorphan.  A combination syrup with mucincex might also be helpful.

## 2024-01-08 NOTE — TELEPHONE ENCOUNTER
Melissa-Please review and advise/ sign if agree.    Call received from patient:  Temperature over the weekend was 100.9^ F, which is less than what it was the week before  Currently afebrile   2. 1/4/24 evaluation diagnosed with bronchitis and prescribed Prednisone and Albuterol inhaler   A. Did not receive the full 10 tablets of Prednisone as prescribed.  Is short 2 tablets   B. Took Prednisone 20 mg: Take 2 tablets on day one and day two and 1 tablet per day for remainder of prescription  3. Not sleeping well due to cough frequency and would like a cough medication prescribed  A. Using pain medication for his back pain to help him sleep at night  4. Using Albuterol inhaler as directed and wonders if he can use it more frequently? If not, patient stated he is fine with using every 6 hours (Would every 4 hours PRN be possible?)  5. Cough is causing throat irritation    Informed patient message/request will be sent to ROBBY Finley CNP    Writer recommended the following home care measures:  Drink warm liquids-warm tea and/or lemon water  Honey: 1-1.5 teaspoons of plain honey to help cough  3. Throat lozenges   4. Sleep more upright/with head resting on multiple pillows  5. Use humidifier    Patient verbalized understanding and in agreement with plan.    Thank you!  LEVI SiegelN, RN-[facility]

## 2024-01-08 NOTE — TELEPHONE ENCOUNTER
Melissa-Thank you.  Is there a cough medicine you recommend for patient?    Thank you!  LEVI SeigelN, RN-[facility]

## 2024-01-23 ENCOUNTER — OFFICE VISIT (OUTPATIENT)
Dept: FAMILY MEDICINE | Facility: CLINIC | Age: 69
End: 2024-01-23
Attending: FAMILY MEDICINE
Payer: MEDICARE

## 2024-01-23 VITALS
SYSTOLIC BLOOD PRESSURE: 126 MMHG | HEART RATE: 55 BPM | RESPIRATION RATE: 13 BRPM | WEIGHT: 143.3 LBS | HEIGHT: 67 IN | OXYGEN SATURATION: 100 % | DIASTOLIC BLOOD PRESSURE: 74 MMHG | BODY MASS INDEX: 22.49 KG/M2 | TEMPERATURE: 98.1 F

## 2024-01-23 DIAGNOSIS — F90.0 ADHD (ATTENTION DEFICIT HYPERACTIVITY DISORDER), INATTENTIVE TYPE: ICD-10-CM

## 2024-01-23 DIAGNOSIS — M48.062 SPINAL STENOSIS OF LUMBAR REGION WITH NEUROGENIC CLAUDICATION: ICD-10-CM

## 2024-01-23 DIAGNOSIS — M47.812 CERVICAL SPONDYLOSIS WITHOUT MYELOPATHY: Primary | ICD-10-CM

## 2024-01-23 PROCEDURE — 99214 OFFICE O/P EST MOD 30 MIN: CPT | Performed by: FAMILY MEDICINE

## 2024-01-23 RX ORDER — DEXTROAMPHETAMINE SACCHARATE, AMPHETAMINE ASPARTATE MONOHYDRATE, DEXTROAMPHETAMINE SULFATE AND AMPHETAMINE SULFATE 2.5; 2.5; 2.5; 2.5 MG/1; MG/1; MG/1; MG/1
10 CAPSULE, EXTENDED RELEASE ORAL DAILY
Qty: 30 CAPSULE | Refills: 0 | Status: SHIPPED | OUTPATIENT
Start: 2024-03-01 | End: 2024-03-31

## 2024-01-23 RX ORDER — HYDROCODONE BITARTRATE AND ACETAMINOPHEN 5; 325 MG/1; MG/1
1 TABLET ORAL 2 TIMES DAILY PRN
Qty: 10 TABLET | Refills: 0 | Status: SHIPPED | OUTPATIENT
Start: 2024-01-23 | End: 2024-08-27

## 2024-01-23 RX ORDER — DEXTROAMPHETAMINE SACCHARATE, AMPHETAMINE ASPARTATE MONOHYDRATE, DEXTROAMPHETAMINE SULFATE AND AMPHETAMINE SULFATE 2.5; 2.5; 2.5; 2.5 MG/1; MG/1; MG/1; MG/1
10 CAPSULE, EXTENDED RELEASE ORAL DAILY
Qty: 30 CAPSULE | Refills: 0 | Status: SHIPPED | OUTPATIENT
Start: 2024-04-01 | End: 2024-05-01

## 2024-01-23 RX ORDER — DEXTROAMPHETAMINE SACCHARATE, AMPHETAMINE ASPARTATE MONOHYDRATE, DEXTROAMPHETAMINE SULFATE AND AMPHETAMINE SULFATE 2.5; 2.5; 2.5; 2.5 MG/1; MG/1; MG/1; MG/1
10 CAPSULE, EXTENDED RELEASE ORAL DAILY
Qty: 30 CAPSULE | Refills: 0 | Status: SHIPPED | OUTPATIENT
Start: 2024-02-01 | End: 2024-03-02

## 2024-01-23 ASSESSMENT — PAIN SCALES - GENERAL: PAINLEVEL: SEVERE PAIN (6)

## 2024-01-23 NOTE — PROGRESS NOTES
Assessment & Plan     Cervical spondylosis without myelopathy  Reviewed MRI. No radicular symptoms. Wishes to start with PT. Not too excited about surgical options but will seek epidural injection if needed.   - Physical Therapy Referral; Future  - HYDROcodone-acetaminophen (NORCO) 5-325 MG tablet; Take 1 tablet by mouth 2 times daily as needed for pain    ADHD (attention deficit hyperactivity disorder), inattentive type  Patient is tolerating current medication without any major side effects of concerns and current dose seems reasonable too.  Current medication regime is effective. Continue current treatment without any changes.   Pdmp checked.   - amphetamine-dextroamphetamine (ADDERALL XR) 10 MG 24 hr capsule; Take 1 capsule (10 mg) by mouth daily for 30 days  - amphetamine-dextroamphetamine (ADDERALL XR) 10 MG 24 hr capsule; Take 1 capsule (10 mg) by mouth daily for 30 days  - amphetamine-dextroamphetamine (ADDERALL XR) 10 MG 24 hr capsule; Take 1 capsule (10 mg) by mouth daily for 30 days    Spinal stenosis of lumbar region with neurogenic claudication  Infrequent use. Seeing spine, neuro, pain specialist. Ok to keep some handy. Understands controlled substance and requires office visit for the refills.   - HYDROcodone-acetaminophen (NORCO) 5-325 MG tablet; Take 1 tablet by mouth 2 times daily as needed for pain                Papa Siu is a 68 year old, presenting for the following health issues:  Recheck Medication      1/23/2024     1:23 PM   Additional Questions   Roomed by Chandni JANE     History of Present Illness       Back Pain:  He presents for follow up of back pain. Patient's back pain is a chronic problem.  Location of back pain:  Right lower back, left lower back, right side of neck and left side of neck  Description of back pain: dull ache and sharp  Back pain spreads: right buttocks, left buttocks, right side of neck and left side of neck    Since patient first noticed back pain, pain  "is: unchanged  Does back pain interfere with his job:  Not applicable       Reason for visit:  Follow-up for Adderal and pain med prescriptions.I also want to discuss referral to physical therapy for severe neck pain.    He eats 2-3 servings of fruits and vegetables daily.He consumes 0 sweetened beverage(s) daily.He exercises with enough effort to increase his heart rate 30 to 60 minutes per day.  He exercises with enough effort to increase his heart rate 4 days per week.   He is taking medications regularly.     Back - improving. Weakness in feet - several appt with neurology and [place] [facility]. Improving.   Able to walk normally. Back pain still there when stands up.   Pain - been to pain clinic also. Sometime neck pain. Not going away. Snap crackles and pop in neck with movement.   Cervical MRI showed spinal stenosis and wondering about PT to start with.   Running out of pain meds. During bronchitis cough made back pain worse.   Adderall using it daily. No side effects.   Has enough of gabapentin.         Objective    /74 (BP Location: Right arm, Patient Position: Sitting, Cuff Size: Adult Regular)   Pulse 55   Temp 98.1  F (36.7  C) (Temporal)   Resp 13   Ht 1.708 m (5' 7.25\")   Wt 65 kg (143 lb 4.8 oz)   SpO2 100%   BMI 22.28 kg/m    Body mass index is 22.28 kg/m .  Physical Exam               Signed Electronically by: Kieran Zuluaga MD, MD    "

## 2024-01-30 DIAGNOSIS — F33.0 MAJOR DEPRESSIVE DISORDER, RECURRENT, MILD (H): ICD-10-CM

## 2024-01-30 RX ORDER — BUPROPION HYDROCHLORIDE 300 MG/1
TABLET ORAL
Qty: 90 TABLET | Refills: 1 | Status: SHIPPED | OUTPATIENT
Start: 2024-01-30 | End: 2024-04-09

## 2024-01-31 ENCOUNTER — PATIENT OUTREACH (OUTPATIENT)
Dept: CARE COORDINATION | Facility: CLINIC | Age: 69
End: 2024-01-31
Payer: MEDICARE

## 2024-01-31 DIAGNOSIS — N52.9 ERECTILE DYSFUNCTION, UNSPECIFIED ERECTILE DYSFUNCTION TYPE: ICD-10-CM

## 2024-02-01 RX ORDER — SILDENAFIL CITRATE 20 MG/1
TABLET ORAL
Qty: 90 TABLET | Refills: 1 | Status: SHIPPED | OUTPATIENT
Start: 2024-02-01 | End: 2024-07-03

## 2024-02-14 ENCOUNTER — PATIENT OUTREACH (OUTPATIENT)
Dept: CARE COORDINATION | Facility: CLINIC | Age: 69
End: 2024-02-14

## 2024-02-14 ENCOUNTER — THERAPY VISIT (OUTPATIENT)
Dept: PHYSICAL THERAPY | Facility: CLINIC | Age: 69
End: 2024-02-14
Payer: MEDICARE

## 2024-02-14 DIAGNOSIS — M47.812 CERVICAL SPONDYLOSIS WITHOUT MYELOPATHY: ICD-10-CM

## 2024-02-14 PROCEDURE — 97140 MANUAL THERAPY 1/> REGIONS: CPT | Mod: GP | Performed by: PHYSICAL THERAPIST

## 2024-02-14 PROCEDURE — 97161 PT EVAL LOW COMPLEX 20 MIN: CPT | Mod: GP | Performed by: PHYSICAL THERAPIST

## 2024-02-14 PROCEDURE — 97110 THERAPEUTIC EXERCISES: CPT | Mod: GP | Performed by: PHYSICAL THERAPIST

## 2024-02-15 PROBLEM — M54.41 BILATERAL LOW BACK PAIN WITH RIGHT-SIDED SCIATICA: Status: RESOLVED | Noted: 2023-02-01 | Resolved: 2024-02-15

## 2024-02-15 NOTE — PROGRESS NOTES
PHYSICAL THERAPY EVALUATION  Type of Visit: Evaluation    See electronic medical record for Abuse and Falls Screening details.    Subjective       Presenting condition or subjective complaint: Pt presents to PT with a chief complaint of worsening cervical pain and stiffness over the past year (09/2023) with MRI revealing multilevel spondylosis and mild central stenosis at C6-7. Pt reports worsening neck pain and stiffness in the morning and with sleeping at night making it difficult to change positions. Pain and ROM improves later in the day  Date of onset: 09/01/23    Relevant medical history:     Dates & types of surgery:      Prior diagnostic imaging/testing results: MRI     Prior therapy history for the same diagnosis, illness or injury: No      Prior Level of Function  Transfers:   Ambulation:   ADL:   IADL:     Living Environment  Social support:     Type of home:     Stairs to enter the home:         Ramp:     Stairs inside the home:         Help at home:    Equipment owned:       Employment:      Hobbies/Interests:      Patient goals for therapy: Turn my head without pain..    Pain assessment: See objective evaluation for additional pain details     Objective   CERVICAL SPINE EVALUATION  PAIN: Pain Level at Rest: 2/10  Pain Level with Use: 8/10  Pain Location: B upper trap, central lower cervical spine, ocassionally R upper cervical spine  Pain Quality: Aching and Sharp  Pain Frequency: intermittent  Pain is Worst: nighttime  Pain is Exacerbated By: cervical ROM  Pain is Relieved By: heat and stretch  Pain Progression: Worsened  INTEGUMENTARY (edema, incisions):   POSTURE: Sitting Posture: Rounded shoulders  GAIT:   Weightbearing Status:   Assistive Device(s):   Gait Deviations:   BALANCE/PROPRIOCEPTION:   WEIGHTBEARING ALIGNMENT:   ROM:   (Degrees) Left AROM Right AROM    Cervical Flexion Min loss    Cervical Extension Mod loss, pain ++    Cervical Side bend      Cervical Rotation 60 deg, pain ++ 70 deg,  pain +    Cervical Protrusion     Cervical Retraction     Thoracic Flexion WNL    Thoracic Extension Mod loss    Thoracic Rotation Min loss Min loss     Left AROM Left PROM Right AROM Right PROM   Shoulder Flexion WNL  WNL    Shoulder Extension       Shoulder Abduction WNL  WNL    Shoulder Adduction       Shoulder IR       Shoulder ER WNL  WNL    Shoulder Horiz Abduction       Shoulder Horiz Adduction       Pain:   End Feel:     MYOTOMES: WNL  DTR S:   CORD SIGNS:   DERMATOMES: WNL  NEURAL TENSION:   FLEXIBILITY:    SPECIAL TESTS:   PALPATION:  TTP at rhomboids, upper trapezius  SPINAL SEGMENTAL CONCLUSIONS:  hypomobile lower cervical and upper thoracic spine C6-T2      Assessment & Plan   CLINICAL IMPRESSIONS  Medical Diagnosis: Cervical spondylosis    Treatment Diagnosis: Cervical pain and stiffness   Impression/Assessment: Patient is a 68 year old male with Cervical complaints.  The following significant findings have been identified: Pain, Decreased ROM/flexibility, Decreased joint mobility, Decreased strength, and Impaired posture. These impairments interfere with their ability to perform self care tasks, recreational activities, household chores, and driving  as compared to previous level of function.     Clinical Decision Making (Complexity):  Clinical Presentation: Stable/Uncomplicated  Clinical Presentation Rationale: based on medical and personal factors listed in PT evaluation  Clinical Decision Making (Complexity): Low complexity    PLAN OF CARE  Treatment Interventions:  Interventions: Manual Therapy, Neuromuscular Re-education, Therapeutic Activity, Therapeutic Exercise    Long Term Goals     PT Goal 1  Goal Identifier: Driving  Goal Description: Pt will be able to rotate his neck >70 deg w/o increased neck pain for safe driving and checking blindspot  Rationale: to maximize safety and independence within the community;to maximize safety and independence with transportation  Goal Progress: pain 6/10 at  times w/ rotation  Target Date: 05/09/24      Frequency of Treatment: 2x week  Duration of Treatment: 4 weeks then 1 x month for 2 months    Recommended Referrals to Other Professionals:   Education Assessment:   Learner/Method: Patient;No Barriers to Learning    Risks and benefits of evaluation/treatment have been explained.   Patient/Family/caregiver agrees with Plan of Care.     Evaluation Time:     PT Eval, Low Complexity Minutes (80041): 15       Signing Clinician: ANSLEY Zelaya [facility]                                                                                   OUTPATIENT PHYSICAL THERAPY      PLAN OF TREATMENT FOR OUTPATIENT REHABILITATION   Patient's Last Name, First Name, M.ICullen  Salbador Begum YOB: 1955   Provider's Name   [facility]   Medical Record No.  [id number]     Onset Date: 09/01/23  Start of Care Date: 02/15/24     Medical Diagnosis:  Cervical spondylosis      PT Treatment Diagnosis:  Cervical pain and stiffness Plan of Treatment  Frequency/Duration: 2x week/ 4 weeks then 1 x month for 2 months    Certification date from 02/15/24 to 05/09/24         See note for plan of treatment details and functional goals     Adam Lewis, PT                         I CERTIFY THE NEED FOR THESE SERVICES FURNISHED UNDER        THIS PLAN OF TREATMENT AND WHILE UNDER MY CARE     (Physician attestation of this document indicates review and certification of the therapy plan).              Referring Provider:  Kieran Zuluaga    Initial Assessment  See Epic Evaluation- Start of Care Date: 02/15/24

## 2024-02-21 ENCOUNTER — OFFICE VISIT (OUTPATIENT)
Dept: DERMATOLOGY | Facility: CLINIC | Age: 69
End: 2024-02-21
Payer: MEDICARE

## 2024-02-21 ENCOUNTER — THERAPY VISIT (OUTPATIENT)
Dept: PHYSICAL THERAPY | Facility: CLINIC | Age: 69
End: 2024-02-21
Payer: MEDICARE

## 2024-02-21 DIAGNOSIS — L57.0 ACTINIC KERATOSIS: ICD-10-CM

## 2024-02-21 DIAGNOSIS — L30.9 DERMATITIS: Primary | ICD-10-CM

## 2024-02-21 DIAGNOSIS — M47.812 CERVICAL SPONDYLOSIS WITHOUT MYELOPATHY: Primary | ICD-10-CM

## 2024-02-21 DIAGNOSIS — L82.1 SEBORRHEIC KERATOSIS: ICD-10-CM

## 2024-02-21 PROCEDURE — 17000 DESTRUCT PREMALG LESION: CPT | Mod: GC | Performed by: DERMATOLOGY

## 2024-02-21 PROCEDURE — 99213 OFFICE O/P EST LOW 20 MIN: CPT | Mod: 25 | Performed by: DERMATOLOGY

## 2024-02-21 PROCEDURE — 97140 MANUAL THERAPY 1/> REGIONS: CPT | Mod: GP | Performed by: PHYSICAL THERAPIST

## 2024-02-21 PROCEDURE — 97110 THERAPEUTIC EXERCISES: CPT | Mod: GP | Performed by: PHYSICAL THERAPIST

## 2024-02-21 RX ORDER — FLUOCINONIDE 0.5 MG/G
OINTMENT TOPICAL 2 TIMES DAILY
Qty: 60 G | Refills: 3 | Status: SHIPPED | OUTPATIENT
Start: 2024-02-21

## 2024-02-21 ASSESSMENT — PAIN SCALES - GENERAL: PAINLEVEL: NO PAIN (0)

## 2024-02-21 NOTE — PROGRESS NOTES
[facility] Dermatology Note  Encounter Date: Feb 21, 2024  Office Visit     Dermatology Problem List:  1. Dermatitis, left ankle  - lidex ointment  2. HAK, L forearm, s/p bx 06/20/23, s/p cryo 2/21/24    ____________________________________________    Assessment & Plan:    # Hypertrophic actinic keratosis, left forearm  - Shave biopsy 6/20/23 showing hypertrophic AK.  Some residual lesion  - cryo today    # SK, left cheek.  - Benign, reassurance; no treatment required     # Dermatitis, left ankle  - suspect xerotic v nummular v stasis derm  - Advised daily moisturization with bland emollient.   - start lidex ointment BID prn     Procedures Performed:   - Cryotherapy procedure note, location(s): L dorsal forearm. After verbal consent and discussion of risks and benefits including, but not limited to, dyspigmentation/scar, blister, and pain, 1 lesion(s) was(were) treated with 1-2 mm freeze border for 1-2 cycles with liquid nitrogen. Post cryotherapy instructions were provided.    Follow-up: 3 months for next FBSE as planned, sooner if concerns.     Staff and Resident:     Frederick Hinkle MD PGY2    Staff Physician Comments:   I saw and evaluated the patient with the resident and I agree with the assessment and plan.  I was present for the entire minor procedure and examination.    Bozena Atkins MD    Department of Dermatology  [facility]: Phone: [phone], Fax: [phone]  2/27/2024     ____________________________________________    CC: Derm Problem (Recheck spot on arm, spot on left ankle to check. )    HPI:  Mr. Salbador Begum is a(n) 68 year old male who presents today as a return patient for follow up of skin biopsy on L forearm    Follow up biopsy  - Had shave biopsy done 6/20/23 on L forearm lesion c/f ddx AK v SCC v BCC v ISK   - Biopsy revealed hypertrophic AK  - Has recurred,  still present but not raised or painful    Dermatitis, L ankle  - L ankle has had dry/itchy rashover the last several naik  - Improves w/ topical cortisone typically, was worse of the winter this year. Not currently flared.    Spot check  - Small red/tan spot on L cheek, not pxful, itchy, ulcerated    Patient is otherwise feeling well, without additional skin concerns.    Labs Reviewed:  N/A    Physical Exam:  Vitals: There were no vitals taken for this visit.  SKIN: exam of face, forearms, hands, ankles  - There is xerosis, primarily of the hands and bilateral LEs/ankles.   - There is a waxy stuck on tan to brown papule on the L cheek.   - scaly papule left forearm at site of prior bx.  - No other lesions of concern on areas examined.     Medications:  Current Outpatient Medications   Medication    acetaminophen (TYLENOL) 650 MG CR tablet    albuterol (PROAIR HFA/PROVENTIL HFA/VENTOLIN HFA) 108 (90 Base) MCG/ACT inhaler    amLODIPine (NORVASC) 5 MG tablet    amphetamine-dextroamphetamine (ADDERALL XR) 10 MG 24 hr capsule    [START ON 3/1/2024] amphetamine-dextroamphetamine (ADDERALL XR) 10 MG 24 hr capsule    [START ON 4/1/2024] amphetamine-dextroamphetamine (ADDERALL XR) 10 MG 24 hr capsule    Ascorbic Acid (VITAMIN C PO)    buPROPion (WELLBUTRIN XL) 300 MG 24 hr tablet    calcium-vitamin D 500-125 MG-UNIT TABS    fluocinonide (LIDEX) 0.05 % external ointment    gabapentin (NEURONTIN) 300 MG capsule    HYDROcodone-acetaminophen (NORCO) 5-325 MG tablet    Multiple Vitamin (MULTI-DAY VITAMINS) TABS    nabumetone (RELAFEN) 500 MG tablet    sildenafil (REVATIO) 20 MG tablet     No current facility-administered medications for this visit.      Past Medical History:   Patient Active Problem List   Diagnosis    Backache    Allergic rhinitis    Major depressive disorder, recurrent, mild (H24)    Sinus arrhythmia    Sleep apnea    Diverticulitis of colon    Advanced directives, counseling/discussion    Essential and  other specified forms of tremor    Idiopathic hypersomnolence    Chronotropic incompetence with sinus node dysfunction (H)    Benign essential hypertension    Nonrheumatic mitral valve regurgitation    Spinal stenosis of lumbar region with neurogenic claudication    ADHD (attention deficit hyperactivity disorder), inattentive type     Past Medical History:   Diagnosis Date    Allergic rhinitis, cause unspecified     Backache, unspecified     Depressive disorder     Diverticulitis     Junctional ectopic contractions (H)     Mild intermittent asthma 9/28/2005    Narcolepsy     Osteoarthritis     Palpitations     Sleep apnea      CC Referred Self, MD  No address on file on close of this encounter.

## 2024-02-21 NOTE — NURSING NOTE
Dermatology Rooming Note    Salbador Begum's goals for this visit include:   Chief Complaint   Patient presents with    Derm Problem     Recheck spot on arm, spot on left ankle to check.      Alena Smith RN

## 2024-02-21 NOTE — LETTER
2/21/2024       RE: Salbador Begum  [address]   [place] [address]     Dear Colleague,    Thank you for referring your patient, Salbador Begum, to the [facility] [place] [facility]. Please see a copy of my visit note below.    [facility] Dermatology Note  Encounter Date: Feb 21, 2024  Office Visit     Dermatology Problem List:  1. Dermatitis, left ankle  - lidex ointment  2. HAK, L forearm, s/p bx 06/20/23, s/p cryo 2/21/24    ____________________________________________    Assessment & Plan:    # Hypertrophic actinic keratosis, left forearm  - Shave biopsy 6/20/23 showing hypertrophic AK.  Some residual lesion  - cryo today    # SK, left cheek.  - Benign, reassurance; no treatment required     # Dermatitis, left ankle  - suspect xerotic v nummular v stasis derm  - Advised daily moisturization with bland emollient.   - start lidex ointment BID prn     Procedures Performed:   - Cryotherapy procedure note, location(s): L dorsal forearm. After verbal consent and discussion of risks and benefits including, but not limited to, dyspigmentation/scar, blister, and pain, 1 lesion(s) was(were) treated with 1-2 mm freeze border for 1-2 cycles with liquid nitrogen. Post cryotherapy instructions were provided.    Follow-up: 3 months for next FBSE as planned, sooner if concerns.     Staff and Resident:     Frederick Hinkle MD PGY2    Staff Physician Comments:   I saw and evaluated the patient with the resident and I agree with the assessment and plan.  I was present for the entire minor procedure and examination.    Bozena Atkins MD    Department of Dermatology  [facility]: Phone: [phone], Fax: [phone]  2/27/2024     ____________________________________________    CC: Derm Problem (Recheck  spot on arm, spot on left ankle to check. )    HPI:  Mr. Salbador Begum is a(n) 68 year old male who presents today as a return patient for follow up of skin biopsy on L forearm    Follow up biopsy  - Had shave biopsy done 6/20/23 on L forearm lesion c/f ddx AK v SCC v BCC v ISK   - Biopsy revealed hypertrophic AK  - Has recurred, still present but not raised or painful    Dermatitis, L ankle  - L ankle has had dry/itchy rashover the last several naik  - Improves w/ topical cortisone typically, was worse of the winter this year. Not currently flared.    Spot check  - Small red/tan spot on L cheek, not pxful, itchy, ulcerated    Patient is otherwise feeling well, without additional skin concerns.    Labs Reviewed:  N/A    Physical Exam:  Vitals: There were no vitals taken for this visit.  SKIN: exam of face, forearms, hands, ankles  - There is xerosis, primarily of the hands and bilateral LEs/ankles.   - There is a waxy stuck on tan to brown papule on the L cheek.   - scaly papule left forearm at site of prior bx.  - No other lesions of concern on areas examined.     Medications:  Current Outpatient Medications   Medication    acetaminophen (TYLENOL) 650 MG CR tablet    albuterol (PROAIR HFA/PROVENTIL HFA/VENTOLIN HFA) 108 (90 Base) MCG/ACT inhaler    amLODIPine (NORVASC) 5 MG tablet    amphetamine-dextroamphetamine (ADDERALL XR) 10 MG 24 hr capsule    [START ON 3/1/2024] amphetamine-dextroamphetamine (ADDERALL XR) 10 MG 24 hr capsule    [START ON 4/1/2024] amphetamine-dextroamphetamine (ADDERALL XR) 10 MG 24 hr capsule    Ascorbic Acid (VITAMIN C PO)    buPROPion (WELLBUTRIN XL) 300 MG 24 hr tablet    calcium-vitamin D 500-125 MG-UNIT TABS    fluocinonide (LIDEX) 0.05 % external ointment    gabapentin (NEURONTIN) 300 MG capsule    HYDROcodone-acetaminophen (NORCO) 5-325 MG tablet    Multiple Vitamin (MULTI-DAY VITAMINS) TABS    nabumetone (RELAFEN) 500 MG tablet    sildenafil (REVATIO) 20 MG tablet     No  current facility-administered medications for this visit.      Past Medical History:   Patient Active Problem List   Diagnosis    Backache    Allergic rhinitis    Major depressive disorder, recurrent, mild (H24)    Sinus arrhythmia    Sleep apnea    Diverticulitis of colon    Advanced directives, counseling/discussion    Essential and other specified forms of tremor    Idiopathic hypersomnolence    Chronotropic incompetence with sinus node dysfunction (H)    Benign essential hypertension    Nonrheumatic mitral valve regurgitation    Spinal stenosis of lumbar region with neurogenic claudication    ADHD (attention deficit hyperactivity disorder), inattentive type     Past Medical History:   Diagnosis Date    Allergic rhinitis, cause unspecified     Backache, unspecified     Depressive disorder     Diverticulitis     Junctional ectopic contractions (H)     Mild intermittent asthma 9/28/2005    Narcolepsy     Osteoarthritis     Palpitations     Sleep apnea      CC Referred Self, MD  No address on file on close of this encounter.

## 2024-02-21 NOTE — Clinical Note
Nikos Mack - it was nice to work with you last week. Some of your notes, including this one were not finished. Can you please finish? I can't recall if we did freezing of the AK on his forearm? Or what the red spot on the cheek was? Can you update the note please, thank you!

## 2024-02-28 DIAGNOSIS — I10 BENIGN ESSENTIAL HYPERTENSION: ICD-10-CM

## 2024-02-28 DIAGNOSIS — M48.062 SPINAL STENOSIS OF LUMBAR REGION WITH NEUROGENIC CLAUDICATION: ICD-10-CM

## 2024-02-28 RX ORDER — GABAPENTIN 300 MG/1
300 CAPSULE ORAL 3 TIMES DAILY
Qty: 90 CAPSULE | Refills: 2 | Status: SHIPPED | OUTPATIENT
Start: 2024-02-28 | End: 2024-08-22

## 2024-02-28 RX ORDER — AMLODIPINE BESYLATE 5 MG/1
5 TABLET ORAL DAILY
Qty: 90 TABLET | Refills: 1 | Status: SHIPPED | OUTPATIENT
Start: 2024-02-28 | End: 2024-04-09

## 2024-03-01 NOTE — PATIENT INSTRUCTIONS
Cryotherapy    What is it?  Use of a very cold liquid, such as liquid nitrogen, to freeze and destroy abnormal skin cells that need to be removed    What should I expect?  Tenderness and redness  A small blister that might grow and fill with dark purple blood. There may be crusting.  More than one treatment may be needed if the lesions do not go away.    How do I care for the treated area?  Gently wash the area with your hands when bathing.  Use a thin layer of Vaseline to help with healing. You may use a Band-Aid.   The area should heal within 7-10 days and may leave behind a pink or lighter color.   Do not use an antibiotic or Neosporin ointment.   You may take acetaminophen (Tylenol) for pain.     Call your doctor if you have:  Severe pain  Signs of infection (warmth, redness, cloudy yellow drainage, and or a bad smell)  Questions or concerns    Who should I call with questions?      [facility]: [phone]      [facility]: [phone]      For urgent needs outside of business hours call the [facility] at [phone] and ask for the dermatology resident on call       Dry Skin    What is dry skin?  Common skin problem  Can be worse during the winter   Affects all ages  Occurs in people with or without other skin problems    What does it look like?  Fine lines in the skin become more visible   Rough feeling skin   Flaky skin  Most common on the arms and legs  Skin can become cracked, especially on the hands and feet    What are some problems caused by dry skin?   Itching  Rubbing or scratching can cause thickened, rough skin patches  Cracks in skin can be painful  Red, itchy, scaly skin (called eczema) can occur  Yellow crusting or pus could be signs of an infection    What causes dry skin?  A lack of water in the top layer of the skin  Too much soapy water,  hot water, or harsh chemicals  Aging and sun damage    How do I treat dry  skin?  Shower or bathe daily for under ten minutes with lukewarm water and mild soap.  Pat yourself dry with a towel gently and leave your skin slightly damp.  Use moisturizing cream or ointment right away.  Avoid lotions.    What kind of mild soap should I be using?  Camay , Dove , Tone , Neutrogena , Purpose , or Oil of Olay   A non-detergent cleanser, like Cetaphil , can be used.    What should I stay away from?  Scented soaps   Bath oils    What moisturizers should I be using?  Cetaphil Cream,CeraVe Cream, Vanicream, Aquaphilic, Eucerin, Aquaphor, or Vaseline   Always apply after showering or bathing.  Reapply throughout the day, if possible.  If dry skin affects your hands, always reapply after handwashing.    What else should I know?  Using a humidifier during winter months may help.  If dry skin gets worse or if eczema develops, a steroid cream may be needed.

## 2024-04-08 ASSESSMENT — PATIENT HEALTH QUESTIONNAIRE - PHQ9
SUM OF ALL RESPONSES TO PHQ QUESTIONS 1-9: 4
10. IF YOU CHECKED OFF ANY PROBLEMS, HOW DIFFICULT HAVE THESE PROBLEMS MADE IT FOR YOU TO DO YOUR WORK, TAKE CARE OF THINGS AT HOME, OR GET ALONG WITH OTHER PEOPLE: SOMEWHAT DIFFICULT
SUM OF ALL RESPONSES TO PHQ QUESTIONS 1-9: 4

## 2024-04-09 ENCOUNTER — OFFICE VISIT (OUTPATIENT)
Dept: FAMILY MEDICINE | Facility: CLINIC | Age: 69
End: 2024-04-09
Attending: FAMILY MEDICINE
Payer: MEDICARE

## 2024-04-09 VITALS
SYSTOLIC BLOOD PRESSURE: 138 MMHG | HEIGHT: 67 IN | DIASTOLIC BLOOD PRESSURE: 81 MMHG | RESPIRATION RATE: 18 BRPM | WEIGHT: 142.8 LBS | TEMPERATURE: 98.1 F | BODY MASS INDEX: 22.41 KG/M2 | HEART RATE: 55 BPM | OXYGEN SATURATION: 99 %

## 2024-04-09 DIAGNOSIS — F90.0 ADHD (ATTENTION DEFICIT HYPERACTIVITY DISORDER), INATTENTIVE TYPE: ICD-10-CM

## 2024-04-09 DIAGNOSIS — I10 BENIGN ESSENTIAL HYPERTENSION: ICD-10-CM

## 2024-04-09 DIAGNOSIS — M48.062 SPINAL STENOSIS OF LUMBAR REGION WITH NEUROGENIC CLAUDICATION: ICD-10-CM

## 2024-04-09 DIAGNOSIS — Z00.00 ENCOUNTER FOR MEDICARE ANNUAL WELLNESS EXAM: Primary | ICD-10-CM

## 2024-04-09 DIAGNOSIS — Z13.220 SCREENING FOR HYPERLIPIDEMIA: ICD-10-CM

## 2024-04-09 DIAGNOSIS — F33.0 MAJOR DEPRESSIVE DISORDER, RECURRENT, MILD (H): ICD-10-CM

## 2024-04-09 DIAGNOSIS — Z12.5 SCREENING PSA (PROSTATE SPECIFIC ANTIGEN): ICD-10-CM

## 2024-04-09 PROCEDURE — 36415 COLL VENOUS BLD VENIPUNCTURE: CPT | Performed by: FAMILY MEDICINE

## 2024-04-09 PROCEDURE — 99214 OFFICE O/P EST MOD 30 MIN: CPT | Mod: 25 | Performed by: FAMILY MEDICINE

## 2024-04-09 PROCEDURE — 91320 SARSCV2 VAC 30MCG TRS-SUC IM: CPT | Performed by: FAMILY MEDICINE

## 2024-04-09 PROCEDURE — 90480 ADMN SARSCOV2 VAC 1/ONLY CMP: CPT | Performed by: FAMILY MEDICINE

## 2024-04-09 PROCEDURE — 80053 COMPREHEN METABOLIC PANEL: CPT | Performed by: FAMILY MEDICINE

## 2024-04-09 PROCEDURE — 80061 LIPID PANEL: CPT | Performed by: FAMILY MEDICINE

## 2024-04-09 PROCEDURE — G0103 PSA SCREENING: HCPCS | Performed by: FAMILY MEDICINE

## 2024-04-09 PROCEDURE — G0439 PPPS, SUBSEQ VISIT: HCPCS | Performed by: FAMILY MEDICINE

## 2024-04-09 RX ORDER — DEXTROAMPHETAMINE SACCHARATE, AMPHETAMINE ASPARTATE MONOHYDRATE, DEXTROAMPHETAMINE SULFATE AND AMPHETAMINE SULFATE 2.5; 2.5; 2.5; 2.5 MG/1; MG/1; MG/1; MG/1
10 CAPSULE, EXTENDED RELEASE ORAL DAILY
Qty: 30 CAPSULE | Refills: 0 | Status: SHIPPED | OUTPATIENT
Start: 2024-07-01 | End: 2024-08-06

## 2024-04-09 RX ORDER — AMLODIPINE BESYLATE 5 MG/1
5 TABLET ORAL DAILY
Qty: 90 TABLET | Refills: 1 | Status: SHIPPED | OUTPATIENT
Start: 2024-08-01

## 2024-04-09 RX ORDER — DEXTROAMPHETAMINE SACCHARATE, AMPHETAMINE ASPARTATE MONOHYDRATE, DEXTROAMPHETAMINE SULFATE AND AMPHETAMINE SULFATE 2.5; 2.5; 2.5; 2.5 MG/1; MG/1; MG/1; MG/1
10 CAPSULE, EXTENDED RELEASE ORAL DAILY
Qty: 30 CAPSULE | Refills: 0 | Status: SHIPPED | OUTPATIENT
Start: 2024-06-01 | End: 2024-07-01

## 2024-04-09 RX ORDER — DEXTROAMPHETAMINE SACCHARATE, AMPHETAMINE ASPARTATE MONOHYDRATE, DEXTROAMPHETAMINE SULFATE AND AMPHETAMINE SULFATE 2.5; 2.5; 2.5; 2.5 MG/1; MG/1; MG/1; MG/1
10 CAPSULE, EXTENDED RELEASE ORAL DAILY
Qty: 30 CAPSULE | Refills: 0 | Status: CANCELLED | OUTPATIENT
Start: 2024-04-09

## 2024-04-09 RX ORDER — BUPROPION HYDROCHLORIDE 300 MG/1
TABLET ORAL
Qty: 90 TABLET | Refills: 1 | Status: SHIPPED | OUTPATIENT
Start: 2024-06-01

## 2024-04-09 RX ORDER — DEXTROAMPHETAMINE SACCHARATE, AMPHETAMINE ASPARTATE MONOHYDRATE, DEXTROAMPHETAMINE SULFATE AND AMPHETAMINE SULFATE 2.5; 2.5; 2.5; 2.5 MG/1; MG/1; MG/1; MG/1
10 CAPSULE, EXTENDED RELEASE ORAL DAILY
Qty: 30 CAPSULE | Refills: 0 | Status: SHIPPED | OUTPATIENT
Start: 2024-05-01 | End: 2024-05-31

## 2024-04-09 SDOH — HEALTH STABILITY: PHYSICAL HEALTH: ON AVERAGE, HOW MANY DAYS PER WEEK DO YOU ENGAGE IN MODERATE TO STRENUOUS EXERCISE (LIKE A BRISK WALK)?: 4 DAYS

## 2024-04-09 SDOH — HEALTH STABILITY: PHYSICAL HEALTH: ON AVERAGE, HOW MANY MINUTES DO YOU ENGAGE IN EXERCISE AT THIS LEVEL?: 60 MIN

## 2024-04-09 ASSESSMENT — SOCIAL DETERMINANTS OF HEALTH (SDOH): HOW OFTEN DO YOU GET TOGETHER WITH FRIENDS OR RELATIVES?: TWICE A WEEK

## 2024-04-09 ASSESSMENT — PAIN SCALES - GENERAL: PAINLEVEL: MILD PAIN (3)

## 2024-04-09 NOTE — PROGRESS NOTES
Preventive Care Visit  [facility] Jose Zuluaga MD, MD, Family Medicine  Apr 9, 2024      Assessment & Plan     Encounter for Medicare annual wellness exam   C/o some fogginess. Offered neuropsych referral. He would like to hold off on that for now.     ADHD (attention deficit hyperactivity disorder), inattentive type  Patient is tolerating current medication without any major side effects of concerns and current dose seems reasonable too.  Current medication regime is effective. Continue current treatment without any changes.   - amphetamine-dextroamphetamine (ADDERALL XR) 10 MG 24 hr capsule; Take 1 capsule (10 mg) by mouth daily for 30 days  - amphetamine-dextroamphetamine (ADDERALL XR) 10 MG 24 hr capsule; Take 1 capsule (10 mg) by mouth daily for 30 days  - amphetamine-dextroamphetamine (ADDERALL XR) 10 MG 24 hr capsule; Take 1 capsule (10 mg) by mouth daily for 30 days    Benign essential hypertension  Patient is tolerating current medication without any major side effects of concerns and current dose seems reasonable too.  Current medication regime is effective. Continue current treatment without any changes.   - amLODIPine (NORVASC) 5 MG tablet; Take 1 tablet (5 mg) by mouth daily    Major depressive disorder, recurrent, mild (H24)  Patient is tolerating current medication without any major side effects of concerns and current dose seems reasonable too.  Current medication regime is effective. Continue current treatment without any changes.   - buPROPion (WELLBUTRIN XL) 300 MG 24 hr tablet; TAKE 1 TABLET BY MOUTH EVERY DAY IN THE MORNING    Screening for hyperlipidemia     - Lipid panel reflex to direct LDL Non-fasting; Future  - Lipid panel reflex to direct LDL Non-fasting    Screening PSA (prostate specific antigen)     - Prostate Specific Antigen Screen; Future  - Prostate Specific Antigen Screen    Spinal stenosis of lumbar region with neurogenic claudication    Been to pain clinic. Both cervical and lumbar spinal stenosis. Hoping to get steroid shot for cervical region. Will reach out to pain clinic.   - Comprehensive metabolic panel; Future  - Comprehensive metabolic panel              Counseling  Appropriate preventive services were discussed with this patient, including applicable screening as appropriate for fall prevention, nutrition, physical activity, Tobacco-use cessation, weight loss and cognition.  Checklist reviewing preventive services available has been given to the patient.  Reviewed patient's diet, addressing concerns and/or questions.   He is at risk for psychosocial distress and has been provided with information to reduce risk.   The patient reports drinking more than one alcoholic drink per day and sometimes engages in binge or excessive drinking. The patient was counseled and given information about possible harmful effects of excessive alcohol intake as well as where to get help for alcohol problems. I have reviewed Opioid Use Disorder and Substance Use Disorder risk factors and made any needed referrals.           Papa Siu is a 68 year old, presenting for the following:  Recheck Medication and Medicare Visit        4/9/2024     2:57 PM   Additional Questions   Roomed by Jillian DANIEL     Health Care Directive  Patient does not have a Health Care Directive or Living Will: Patient states has Advance Directive and will bring in a copy to clinic.    History of Present Illness       Reason for visit:  Not sure.    He eats 2-3 servings of fruits and vegetables daily.He consumes 0 sweetened beverage(s) daily.He exercises with enough effort to increase his heart rate 30 to 60 minutes per day.  He exercises with enough effort to increase his heart rate 4 days per week.   He is taking medications regularly.    Neck pain - had cervical MRI. Multilevel cervical spondylosis and mild spinal stenosis. Thinking to try cortisone shot and will reach out to pain  clinic.     Right leg numbness is getting worse. More frequent and lasting longer. Getting up from seated position makes it worse. When standing numbness more pronounced. Walking too long no problem. Been to pain clinic - it was not helpful. Was advised shot if he is interested.     In the morning - puts pressure on elbow - and it can be painful. No swelling. Tip of elbow somewhat numb.   Left elbow only.     Feels sometime not all there after covid and feels it could be from long covid. Feels like brain fog. Lots of information not registering.     Mood is stable on wellbutrin. Thinking to move out and it is lot of work.    Gabapentin mostly 2 times per day. Norco - when pain is severe. Has 7 pills left from 10 which was given in January.     Relafen for pain control.         4/9/2024   General Health   How would you rate your overall physical health? Good   Feel stress (tense, anxious, or unable to sleep) Only a little   (!) STRESS CONCERN      4/9/2024   Nutrition   Diet: Regular (no restrictions)         4/9/2024   Exercise   Days per week of moderate/strenous exercise 4 days   Average minutes spent exercising at this level 60 min         4/9/2024   Social Factors   Frequency of gathering with friends or relatives Twice a week   Worry food won't last until get money to buy more Patient declined   Food not last or not have enough money for food? Patient declined   Do you have housing?  Patient declined   Are you worried about losing your housing? Patient declined   Lack of transportation? Patient declined   Unable to get utilities (heat,electricity)? Patient declined         4/9/2024   Fall Risk   Fallen 2 or more times in the past year? No   Trouble with walking or balance? Yes   Gait Speed Test (Document in seconds) 2.93   Gait Speed Test Interpretation Less than or equal to 5.00 seconds - PASS          4/9/2024   Activities of Daily Living- Home Safety   Needs help with the following daily activites None of  the above   Safety concerns in the home None of the above         4/9/2024   Dental   Dentist two times every year? Yes         4/9/2024   Hearing Screening   Hearing concerns? None of the above         4/9/2024   Driving Risk Screening   Patient/family members have concerns about driving No         4/9/2024   General Alertness/Fatigue Screening   Have you been more tired than usual lately? No         4/9/2024   Urinary Incontinence Screening   Bothered by leaking urine in past 6 months No         4/9/2024   TB Screening   Were you born outside of the [place]? No       Today's PHQ-9 Score:       4/8/2024     5:25 PM   PHQ-9 SCORE   PHQ-9 Total Score MyChart 4 (Minimal depression)   PHQ-9 Total Score 4         4/9/2024   Substance Use   Alcohol more than 3/day or more than 7/wk Yes   How often do you have a drink containing alcohol 4 or more times a week   How many alcohol drinks on typical day 1 or 2   How often do you have 5+ drinks at one occasion Never   Audit 2/3 Score 0   How often not able to stop drinking once started Never   How often failed to do what normally expected Never   How often needed first drink in am after a heavy drinking session Never   How often feeling of guilt or remorse after drinking Never   How often unable to remember what happened the night before Never   Have you or someone else been injured because of your drinking No   Has anyone been concerned or suggested you cut down on drinking No   TOTAL SCORE - AUDIT 4   Do you have a current opioid prescription? (!) YES   How severe/bad is pain from 1 to 10? 1/10   Do you use any other substances recreationally? No          No data to display              Low Risk (0-3)  Moderate Risk (4-7)  High Risk (>8)  Social History     Tobacco Use    Smoking status: Never    Smokeless tobacco: Never   Vaping Use    Vaping Use: Never used   Substance Use Topics    Alcohol use: Yes     Comment: 14 glass of wine weeks     Drug use: No           4/9/2024   AAA  Screening   Family history of Abdominal Aortic Aneurysm (AAA)? No   Last PSA:   PSA   Date Value Ref Range Status   02/01/2021 0.47 0 - 4 ug/L Final     Comment:     Assay Method:  Chemiluminescence using Siemens Vista analyzer     Prostate Specific Antigen Screen   Date Value Ref Range Status   03/01/2023 0.37 0.00 - 4.50 ng/mL Final     ASCVD Risk   The 10-year ASCVD risk score (Charla DIALLO, et al., 2019) is: 14.6%    Values used to calculate the score:      Age: 68 years      Sex: Male      Is Non- : No      Diabetic: No      Tobacco smoker: No      Systolic Blood Pressure: 138 mmHg      Is BP treated: Yes      HDL Cholesterol: 78 mg/dL      Total Cholesterol: 183 mg/dL            Reviewed and updated as needed this visit by Provider                      Current providers sharing in care for this patient include:  Patient Care Team:  Kieran Zuluaga MD as PCP - General (Family Medicine)  Dee Hernandez, RN as Specialty Care Coordinator (Cardiology)  Mary Jo Enriquez APRN CNP as Nurse Practitioner (Cardiovascular Disease)  Mary Jo Enriquez APRN CNP as Assigned Heart and Vascular Provider  Oliver Sifuentes MD as Resident (Neurology)  Kieran Zuluaga MD as Assigned PCP  Indira Griggs NP as Nurse Practitioner  Bozena Atkins MD as MD (Dermatology)  Filippo Watt DO as Assigned Neuroscience Provider  Bozena Atkins MD as Assigned Surgical Provider    The following health maintenance items are reviewed in Epic and correct as of today:  Health Maintenance   Topic Date Due    MEDICARE ANNUAL WELLNESS VISIT  03/01/2024    DTAP/TDAP/TD IMMUNIZATION (2 - Td or Tdap) 10/01/2024    PHQ-9  10/09/2024    ANNUAL REVIEW OF HM ORDERS  01/04/2025    FALL RISK ASSESSMENT  04/09/2025    GLUCOSE  05/01/2026    LIPID  03/01/2028    ADVANCE CARE PLANNING  03/01/2028    COLORECTAL CANCER SCREENING  02/01/2032    HEPATITIS C SCREENING  Completed     "DEPRESSION ACTION PLAN  Completed    INFLUENZA VACCINE  Completed    Pneumococcal Vaccine: 65+ Years  Completed    ZOSTER IMMUNIZATION  Completed    RSV VACCINE (Pregnancy & 60+)  Completed    COVID-19 Vaccine  Completed    IPV IMMUNIZATION  Aged Out    HPV IMMUNIZATION  Aged Out    MENINGITIS IMMUNIZATION  Aged Out    RSV MONOCLONAL ANTIBODY  Aged Out            Objective    Exam  /81 (BP Location: Right arm, Patient Position: Sitting, Cuff Size: Adult Regular)   Pulse 55   Temp 98.1  F (36.7  C) (Temporal)   Resp 18   Ht 1.705 m (5' 7.13\")   Wt 64.8 kg (142 lb 12.8 oz)   SpO2 99%   BMI 22.28 kg/m     Estimated body mass index is 22.28 kg/m  as calculated from the following:    Height as of this encounter: 1.705 m (5' 7.13\").    Weight as of this encounter: 64.8 kg (142 lb 12.8 oz).    Physical Exam  GENERAL: alert and no distress  EYES: Eyes grossly normal to inspection, PERRL and conjunctivae and sclerae normal  HENT: ear canals and TM's normal, nose and mouth without ulcers or lesions  NECK: no adenopathy, no asymmetry, masses, or scars  RESP: lungs clear to auscultation - no rales, rhonchi or wheezes  CV: regular rate and rhythm, normal S1 S2, no S3 or S4, no murmur, click or rub, no peripheral edema  ABDOMEN: soft, nontender, no hepatosplenomegaly, no masses and bowel sounds normal  MS: no gross musculoskeletal defects noted, no edema  SKIN: no suspicious lesions or rashes  NEURO: Normal strength and tone, mentation intact and speech normal  PSYCH: mentation appears normal, affect normal/bright        4/9/2024   Mini Cog   Clock Draw Score 2 Normal   3 Item Recall 3 objects recalled   Mini Cog Total Score 5          Signed Electronically by: Kieran Zuluaga MD, MD    "

## 2024-04-10 LAB
ALBUMIN SERPL BCG-MCNC: 4.7 G/DL (ref 3.5–5.2)
ALP SERPL-CCNC: 80 U/L (ref 40–150)
ALT SERPL W P-5'-P-CCNC: 26 U/L (ref 0–70)
ANION GAP SERPL CALCULATED.3IONS-SCNC: 12 MMOL/L (ref 7–15)
AST SERPL W P-5'-P-CCNC: 29 U/L (ref 0–45)
BILIRUB SERPL-MCNC: 0.3 MG/DL
BUN SERPL-MCNC: 18.7 MG/DL (ref 8–23)
CALCIUM SERPL-MCNC: 9.7 MG/DL (ref 8.8–10.2)
CHLORIDE SERPL-SCNC: 101 MMOL/L (ref 98–107)
CHOLEST SERPL-MCNC: 195 MG/DL
CREAT SERPL-MCNC: 1.11 MG/DL (ref 0.67–1.17)
DEPRECATED HCO3 PLAS-SCNC: 27 MMOL/L (ref 22–29)
EGFRCR SERPLBLD CKD-EPI 2021: 73 ML/MIN/1.73M2
FASTING STATUS PATIENT QL REPORTED: NO
GLUCOSE SERPL-MCNC: 86 MG/DL (ref 70–99)
HDLC SERPL-MCNC: 85 MG/DL
LDLC SERPL CALC-MCNC: 88 MG/DL
NONHDLC SERPL-MCNC: 110 MG/DL
POTASSIUM SERPL-SCNC: 4.4 MMOL/L (ref 3.4–5.3)
PROT SERPL-MCNC: 7.5 G/DL (ref 6.4–8.3)
PSA SERPL DL<=0.01 NG/ML-MCNC: 0.29 NG/ML (ref 0–4.5)
SODIUM SERPL-SCNC: 140 MMOL/L (ref 135–145)
TRIGL SERPL-MCNC: 112 MG/DL

## 2024-04-10 NOTE — PATIENT INSTRUCTIONS
Preventive Care Advice   This is general advice given by our system to help you stay healthy. However, your care team may have specific advice just for you. Please talk to your care team about your preventive care needs.  Nutrition  Eat 5 or more servings of fruits and vegetables each day.  Try wheat bread, brown rice and whole grain pasta (instead of white bread, rice, and pasta).  Get enough calcium and vitamin D. Check the label on foods and aim for 100% of the RDA (recommended daily allowance).  Lifestyle  Exercise at least 150 minutes each week   (30 minutes a day, 5 days a week).  Do muscle strengthening activities 2 days a week. These help control your weight and prevent disease.  No smoking.  Wear sunscreen to prevent skin cancer.  Have a dental exam and cleaning every 6 months.  Yearly exams  See your health care team every year to talk about:  Any changes in your health.  Any medicines your care team has prescribed.  Preventive care, family planning, and ways to prevent chronic diseases.  Shots (vaccines)   HPV shots (up to age 26), if you've never had them before.  Hepatitis B shots (up to age 59), if you've never had them before.  COVID-19 shot: Get this shot when it's due.  Flu shot: Get a flu shot every year.  Tetanus shot: Get a tetanus shot every 10 years.  Pneumococcal, hepatitis A, and RSV shots: Ask your care team if you need these based on your risk.  Shingles shot (for age 50 and up).  General health tests  Diabetes screening:  Starting at age 35, Get screened for diabetes at least every 3 years.  If you are younger than age 35, ask your care team if you should be screened for diabetes.  Cholesterol test: At age 39, start having a cholesterol test every 5 years, or more often if advised.  Bone density scan (DEXA): At age 50, ask your care team if you should have this scan for osteoporosis (brittle bones).  Hepatitis C: Get tested at least once in your life.  STIs (sexually transmitted  infections)  Before age 24: Ask your care team if you should be screened for STIs.  After age 24: Get screened for STIs if you're at risk. You are at risk for STIs (including HIV) if:  You are sexually active with more than one person.  You don't use condoms every time.  You or a partner was diagnosed with a sexually transmitted infection.  If you are at risk for HIV, ask about PrEP medicine to prevent HIV.  Get tested for HIV at least once in your life, whether you are at risk for HIV or not.  Cancer screening tests  Cervical cancer screening: If you have a cervix, begin getting regular cervical cancer screening tests at age 21. Most people who have regular screenings with normal results can stop after age 65. Talk about this with your provider.  Breast cancer scan (mammogram): If you've ever had breasts, begin having regular mammograms starting at age 40. This is a scan to check for breast cancer.  Colon cancer screening: It is important to start screening for colon cancer at age 45.  Have a colonoscopy test every 10 years (or more often if you're at risk) Or, ask your provider about stool tests like a FIT test every year or Cologuard test every 3 years.  To learn more about your testing options, visit: https://[email or web address]/236975.pdf.  For help making a decision, visit: https://bit.ly/sw26195.  Prostate cancer screening test: If you have a prostate and are age 55 to 69, ask your provider if you would benefit from a yearly prostate cancer screening test.  Lung cancer screening: If you are a current or former smoker age 50 to 80, ask your care team if ongoing lung cancer screenings are right for you.  For informational purposes only. Not to replace the advice of your health care provider. Copyright   2023 [employer]. All rights reserved. Clinically reviewed by the [facility]. [employer] 102663 - REV 01/24.    Preventing Falls: Care Instructions  Injuries and health  problems such as trouble walking or poor eyesight can increase your risk of falling. So can some medicines. But there are things you can do to help prevent falls. You can exercise to get stronger. You can also arrange your home to make it safer.    Talk to your doctor about the medicines you take. Ask if any of them increase the risk of falls and whether they can be changed or stopped.   Try to exercise regularly. It can help improve your strength and balance. This can help lower your risk of falling.     Practice fall safety and prevention.    Wear low-heeled shoes that fit well and give your feet good support. Talk to your doctor if you have foot problems that make this hard.  Carry a cellphone or wear a medical alert device that you can use to call for help.  Use stepladders instead of chairs to reach high objects. Don't climb if you're at risk for falls. Ask for help, if needed.  Wear the correct eyeglasses, if you need them.    Make your home safer.    Remove rugs, cords, clutter, and furniture from walkways.  Keep your house well lit. Use night-lights in hallways and bathrooms.  Install and use sturdy handrails on stairways.  Wear nonskid footwear, even inside. Don't walk barefoot or in socks without shoes.    Be safe outside.    Use handrails, curb cuts, and ramps whenever possible.  Keep your hands free by using a shoulder bag or backpack.  Try to walk in well-lit areas. Watch out for uneven ground, changes in pavement, and debris.  Be careful in the winter. Walk on the grass or gravel when sidewalks are slippery. Use de-icer on steps and walkways. Add non-slip devices to shoes.    Put grab bars and nonskid mats in your shower or tub and near the toilet. Try to use a shower chair or bath bench when bathing.   Get into a tub or shower by putting in your weaker leg first. Get out with your strong side first. Have a phone or medical alert device in the bathroom with you.   Where can you learn more?  Go to  "https://www.[employer].net/patiented  Enter G117 in the search box to learn more about \"Preventing Falls: Care Instructions.\"  Current as of: July 17, 2023               Content Version: 14.0    6061-3151 [employer].   Care instructions adapted under license by your healthcare professional. If you have questions about a medical condition or this instruction, always ask your healthcare professional. [employer] disclaims any warranty or liability for your use of this information.      Learning About Stress  What is stress?     Stress is your body's response to a hard situation. Your body can have a physical, emotional, or mental response. Stress is a fact of life for most people, and it affects everyone differently. What causes stress for you may not be stressful for someone else.  A lot of things can cause stress. You may feel stress when you go on a job interview, take a test, or run a race. This kind of short-term stress is normal and even useful. It can help you if you need to work hard or react quickly. For example, stress can help you finish an important job on time.  Long-term stress is caused by ongoing stressful situations or events. Examples of long-term stress include long-term health problems, ongoing problems at work, or conflicts in your family. Long-term stress can harm your health.  How does stress affect your health?  When you are stressed, your body responds as though you are in danger. It makes hormones that speed up your heart, make you breathe faster, and give you a burst of energy. This is called the fight-or-flight stress response. If the stress is over quickly, your body goes back to normal and no harm is done.  But if stress happens too often or lasts too long, it can have bad effects. Long-term stress can make you more likely to get sick, and it can make symptoms of some diseases worse. If you tense up when you are stressed, you may develop neck, shoulder, or low " back pain. Stress is linked to high blood pressure and heart disease.  Stress also harms your emotional health. It can make you lovett, tense, or depressed. Your relationships may suffer, and you may not do well at work or school.  What can you do to manage stress?  You can try these things to help manage stress:   Do something active. Exercise or activity can help reduce stress. Walking is a great way to get started. Even everyday activities such as housecleaning or yard work can help.  Try yoga or vanessa chi. These techniques combine exercise and meditation. You may need some training at first to learn them.  Do something you enjoy. For example, listen to music or go to a movie. Practice your hobby or do volunteer work.  Meditate. This can help you relax, because you are not worrying about what happened before or what may happen in the future.  Do guided imagery. Imagine yourself in any setting that helps you feel calm. You can use online videos, books, or a teacher to guide you.  Do breathing exercises. For example:  From a standing position, bend forward from the waist with your knees slightly bent. Let your arms dangle close to the floor.  Breathe in slowly and deeply as you return to a standing position. Roll up slowly and lift your head last.  Hold your breath for just a few seconds in the standing position.  Breathe out slowly and bend forward from the waist.  Let your feelings out. Talk, laugh, cry, and express anger when you need to. Talking with supportive friends or family, a counselor, or a eric leader about your feelings is a healthy way to relieve stress. Avoid discussing your feelings with people who make you feel worse.  Write. It may help to write about things that are bothering you. This helps you find out how much stress you feel and what is causing it. When you know this, you can find better ways to cope.  What can you do to prevent stress?  You might try some of these things to help prevent  "stress:  Manage your time. This helps you find time to do the things you want and need to do.  Get enough sleep. Your body recovers from the stresses of the day while you are sleeping.  Get support. Your family, friends, and community can make a difference in how you experience stress.  Limit your news feed. Avoid or limit time on social media or news that may make you feel stressed.  Do something active. Exercise or activity can help reduce stress. Walking is a great way to get started.  Where can you learn more?  Go to https://www.[employer].net/patiented  Enter N032 in the search box to learn more about \"Learning About Stress.\"  Current as of: October 24, 2023               Content Version: 14.0    9862-4725 [employer].   Care instructions adapted under license by your healthcare professional. If you have questions about a medical condition or this instruction, always ask your healthcare professional. [employer] disclaims any warranty or liability for your use of this information.      Learning About Alcohol Use Disorder  What is alcohol use disorder?  Alcohol use disorder means that a person drinks alcohol even though it causes harm to themselves or others. It can range from mild to severe. The more symptoms of this disorder you have, the more severe it may be. People who have it may find it hard to control their use of alcohol.  People who have this disorder may argue with others about how much they're drinking. Their job may be affected because of drinking. They may drink when it's dangerous or illegal, such as when they drive. They also may have a strong need, or craving, to drink. They may feel like they must drink just to get by. Their drinking may increase their risk of getting hurt or being in a car crash.  Over time, drinking too much alcohol may cause health problems. These may include high blood pressure, liver problems, or problems with digestion.  What are the " symptoms?  Maybe you've wondered about your alcohol habits or how to tell if your drinking is becoming a problem.  Here are some of the symptoms of alcohol use disorder. You may have it if you have two or more of the following symptoms:  You drink larger amounts of alcohol than you ever meant to. Or you've been drinking for a longer time than you ever meant to.  You can't cut down or control your use. Or you constantly wish you could cut down.  You spend a lot of time getting or drinking alcohol or recovering from its effects.  You have strong cravings for alcohol.  You can no longer do your main jobs at work, at school, or at home.  You keep drinking alcohol, even though your use hurts your relationships.  You have stopped doing important activities because of your alcohol use.  You drink alcohol in situations where doing so is dangerous.  You keep drinking alcohol even though you know it's causing health problems.  You need more and more alcohol to get the same effect, or you get less effect from the same amount over time. This is called tolerance.  You have uncomfortable symptoms when you stop drinking alcohol or use less. This is called withdrawal.  Alcohol use disorder can range from mild to severe. The more symptoms you have, the more severe the disorder may be.  You might not realize that your drinking is a problem. You might not drink large amounts when you drink. Or you might go for days or weeks between drinking episodes. But even if you don't drink very often, your drinking could still be harmful and put you at risk.  How is alcohol use disorder treated?  Getting help is up to you. But you don't have to do it alone. There are many people and kinds of treatments that can help.  Treatment for alcohol use disorder can include:  Group therapy, one or more types of counseling, and alcohol education.  Medicines that help to:  Reduce withdrawal symptoms and help you safely stop drinking.  Reduce cravings for  "alcohol.  Support groups. These groups include Alcoholics Anonymous and [employer] [facility] (Self-Management and Recovery Training).  Some people are able to stop or cut back on drinking with help from a counselor. People who have moderate to severe alcohol use disorder may need medical treatment. They may need to stay in a hospital or treatment center.  You may have a treatment team to help you. This team may include a psychologist or psychiatrist, counselors, doctors, social workers, nurses, and a . A  helps plan and manage your treatment.  Follow-up care is a key part of your treatment and safety. Be sure to make and go to all appointments, and call your doctor if you are having problems. It's also a good idea to know your test results and keep a list of the medicines you take.  Where can you learn more?  Go to https://www.[employer].net/patiented  Enter H758 in the search box to learn more about \"Learning About Alcohol Use Disorder.\"  Current as of: November 15, 2023               Content Version: 14.0    7808-7375 [employer].   Care instructions adapted under license by your healthcare professional. If you have questions about a medical condition or this instruction, always ask your healthcare professional. [employer] disclaims any warranty or liability for your use of this information.      Chronic Pain: Care Instructions  Your Care Instructions     Chronic pain is pain that lasts a long time (months or even years) and may or may not have a clear cause. It is different from acute pain, which usually does have a clear cause--like an injury or illness--and gets better over time. Chronic pain:  Lasts over time but may vary from day to day.  Does not go away despite efforts to end it.  May disrupt your sleep and lead to fatigue.  May cause depression or anxiety.  May make your muscles tense, causing more pain.  Can disrupt your work, hobbies, home life, and " relationships with friends and family.  Chronic pain is a very real condition. It is not just in your head. Treatment can help and usually includes several methods used together, such as medicines, physical therapy, exercise, and other treatments. Learning how to relax and changing negative thought patterns can also help you cope.  Chronic pain is complex. Taking an active role in your treatment will help you better manage your pain. Tell your doctor if you have trouble dealing with your pain. You may have to try several things before you find what works best for you.  Follow-up care is a key part of your treatment and safety. Be sure to make and go to all appointments, and call your doctor if you are having problems. It's also a good idea to know your test results and keep a list of the medicines you take.  How can you care for yourself at home?  Pace yourself. Break up large jobs into smaller tasks. Save harder tasks for days when you have less pain, or go back and forth between hard tasks and easier ones. Take rest breaks.  Relax, and reduce stress. Relaxation techniques such as deep breathing or meditation can help.  Keep moving. Gentle, daily exercise can help reduce pain over the long run. Try low- or no-impact exercises such as walking, swimming, and stationary biking. Do stretches to stay flexible.  Try heat, cold packs, and massage.  Get enough sleep. Chronic pain can make you tired and drain your energy. Talk with your doctor if you have trouble sleeping because of pain.  Think positive. Your thoughts can affect your pain level. Do things that you enjoy to distract yourself when you have pain instead of focusing on the pain. See a movie, read a book, listen to music, or spend time with a friend.  If you think you are depressed, talk to your doctor about treatment.  Keep a daily pain diary. Record how your moods, thoughts, sleep patterns, activities, and medicine affect your pain. You may find that your  pain is worse during or after certain activities or when you are feeling a certain emotion. Having a record of your pain can help you and your doctor find the best ways to treat your pain.  Take pain medicines exactly as directed.  If the doctor gave you a prescription medicine for pain, take it as prescribed.  If you are not taking a prescription pain medicine, ask your doctor if you can take an over-the-counter medicine.  Reducing constipation caused by pain medicine  Talk to your doctor about a laxative. If a laxative doesn't work, your doctor may suggest a prescription medicine.  Include fruits, vegetables, beans, and whole grains in your diet each day. These foods are high in fiber.  If your doctor recommends it, get more exercise. Walking is a good choice. Bit by bit, increase the amount you walk every day. Try for at least 30 minutes on most days of the week.  Schedule time each day for a bowel movement. A daily routine may help. Take your time and do not strain when having a bowel movement.  When should you call for help?   Call your doctor now or seek immediate medical care if:    Your pain gets worse or is out of control.     You feel down or blue, or you do not enjoy things like you once did. You may be depressed, which is common in people with chronic pain. Depression can be treated.     You have vomiting or cramps for more than 2 hours.   Watch closely for changes in your health, and be sure to contact your doctor if:    You cannot sleep because of pain.     You are very worried or anxious about your pain.     You have trouble taking your pain medicine.     You have any concerns about your pain medicine.     You have trouble with bowel movements, such as:  No bowel movement in 3 days.  Blood in the anal area, in your stool, or on the toilet paper.  Diarrhea for more than 24 hours.   Where can you learn more?  Go to https://www.healthwise.net/patiented  Enter N004 in the search box to learn more about  "\"Chronic Pain: Care Instructions.\"  Current as of: July 10, 2023               Content Version: 14.0    7028-8476 [employer].   Care instructions adapted under license by your healthcare professional. If you have questions about a medical condition or this instruction, always ask your healthcare professional. [employer] disclaims any warranty or liability for your use of this information.      "

## 2024-05-10 ENCOUNTER — MYC MEDICAL ADVICE (OUTPATIENT)
Dept: FAMILY MEDICINE | Facility: CLINIC | Age: 69
End: 2024-05-10
Payer: MEDICARE

## 2024-05-21 ENCOUNTER — OFFICE VISIT (OUTPATIENT)
Dept: ANESTHESIOLOGY | Facility: CLINIC | Age: 69
End: 2024-05-21
Payer: MEDICARE

## 2024-05-21 VITALS
WEIGHT: 142 LBS | BODY MASS INDEX: 22.29 KG/M2 | SYSTOLIC BLOOD PRESSURE: 130 MMHG | DIASTOLIC BLOOD PRESSURE: 83 MMHG | HEART RATE: 51 BPM | HEIGHT: 67 IN | OXYGEN SATURATION: 99 %

## 2024-05-21 DIAGNOSIS — M54.12 CERVICAL RADICULOPATHY: ICD-10-CM

## 2024-05-21 DIAGNOSIS — M47.812 CERVICAL SPONDYLOSIS WITHOUT MYELOPATHY: Primary | ICD-10-CM

## 2024-05-21 PROCEDURE — 99214 OFFICE O/P EST MOD 30 MIN: CPT | Performed by: ANESTHESIOLOGY

## 2024-05-21 RX ORDER — CYCLOBENZAPRINE HCL 10 MG
10 TABLET ORAL 3 TIMES DAILY PRN
Qty: 90 TABLET | Refills: 0 | Status: SHIPPED | OUTPATIENT
Start: 2024-05-21 | End: 2024-05-23

## 2024-05-21 ASSESSMENT — PAIN SCALES - GENERAL: PAINLEVEL: WORST PAIN (10)

## 2024-05-21 NOTE — NURSING NOTE
Patient presents with:  Follow Up: Follow-up Neck-Back Pain       Worst Pain (10)     Pain Medications       Analgesics Other Refills Start End     acetaminophen (TYLENOL) 650 MG CR tablet 1 8/4/2023 --    Sig - Route: Take 2 tablets (1,300 mg) by mouth 2 times daily - Oral    Class: E-Prescribe       Opioid Combinations Refills Start End     HYDROcodone-acetaminophen (NORCO) 5-325 MG tablet 0 1/23/2024 --    Sig - Route: Take 1 tablet by mouth 2 times daily as needed for pain - Oral    Class: E-Prescribe    Earliest Fill Date: 1/23/2024            What medications are you using for pain? Tylenol, Hydrocodone    (New patients only) Have you been seen by another pain clinic/ provider? no    (Return Patients only) What refills are you needing today? no

## 2024-05-21 NOTE — LETTER
5/21/2024       RE: Salbador Begum  [address] [place] [address]       Dear Colleague,    Thank you for referring your patient, Salbador Begum, to the [facility] [place] at [facility]. Please see a copy of my visit note below.    Pain clinic follow up note    HPI:   Salbador Begum is a 68 year old year old male  who presents to the pain clinic with low back and neck pain. He also has pain in the right pointer finger. He was seen in the clinic in Jan 2024 for low back pain. He was getting care for chronic pain at [facility].     Patient Supplied Answers To the  Pain Questionnaire      5/21/2024     9:24 AM    Pain -  Patient Entered Questionnaire/Answers   What number best describes your pain right now:  0 = No pain  to  10 = Worst pain imaginable 6   How would you describe the pain sharp    numbness    dull, aching   Which of the following worsen your pain standing    walking   Which of the following improve or reduce your pain lying down    sitting    walking    exercise    relaxation    thinking about something else   What number best describes your average pain for the past week:  0 = No pain  to  10 = Worst pain imaginable 10   What number best describes your LOWEST pain in past 24 hours:  0 = No pain  to  10 = Worst pain imaginable 0   What number best describes your WORST pain in past 24 hours:  0 = No pain  to  10 = Worst pain imaginable 10   When is your pain worst Constant   What non-medicine treatments have you already had for your pain physical therapy    spine injections (shots)    exercise             The patient reports his neck pain is new and started Fall 2023. Insidious onset. He had incidents in the past which resolved with therapy. Location left side upper neck. Turning to the left hurts. Right rotation is not as painful.   He has had tingling and numbness in the  hands. The patient underwent cervical MRI with neurology Dr. Watt. However the neck pain started in fall 2023.   Tingling mostly in the hands and some times comes up the forearm. Right> left  He discussed that the arthritis in the hands can be limiting. He avoids the right first finger knuckle bending in his activities.  The patient states he was in therapy for low back in July 2023. He had ROM and exercises with [facility] PT in February 2024. He reports continuing the home exercise program daily.     New imaging reviewed with the patient:    MRI Cervical spine 6/2/2023  C2-3:  No spinal canal or neural foraminal stenosis.     C3-4:  No spinal canal stenosis. Left uncinate spur and facet  arthropathy causing mild left foraminal stenosis. The right neural  foramen is patent.     C4-5:  Circumferential disc bulge with uncovertebral spurring, right  greater than left causing moderate right and mild left foraminal  stenosis.     C5-6:  Circumferential disc bulge with infolding of the ligamentum  flavum and uncovertebral spurring, right greater than left causing  mild left foraminal stenosis and mild-to-moderate right foraminal  stenosis. Spinal canal is mildly narrowed.     C6-7:  Circumferential disc osteophyte complex with infolding of  ligamentum flavum causing mild spinal canal stenosis. Uncovertebral  spurring with facet arthropathy causing mild foraminal stenosis  bilaterally.     C7-T1:  No spinal canal or neural foraminal stenosis.    EMG 5/18/2023 showed active b/l S1 radiculopathy (R>L), chronic b/l L4-5 radiculopathy, and right ulnar neuropathy that was mild.   Significant Medical History:   Past Medical History:   Diagnosis Date    Allergic rhinitis, cause unspecified     Backache, unspecified     Depressive disorder     Diverticulitis     Junctional ectopic contractions (H)     Mild intermittent asthma 9/28/2005    Narcolepsy     Osteoarthritis     Palpitations     Sleep apnea           Past  Surgical History:  Past Surgical History:   Procedure Laterality Date    APPENDECTOMY      ORTHOPEDIC SURGERY      left hip replacement, right ankle,    SEPTORHINOPLASTY  4/7/2011    Procedure:SEPTORHINOPLASTY; Septoplasty withNasal Reconstruction with  Grafts Harvested from [employer]; Surgeon:ALFREDO UMANA; Location: OR    [facility] NONSPECIFIC PROCEDURE      s/p Appendectomy          Family History:  Family History   Problem Relation Age of Onset    Arthritis Mother         osteo    Dementia Mother     Hypertension Mother     Cerebrovascular Disease Mother         Not confirmed    Seizure Disorder Mother     Rheumatoid Arthritis Father     Depression Father     Coronary Artery Disease Father     Depression Sister     Substance Abuse Sister     Myocardial Infarction Sister        Social History:  Social History     Socioeconomic History    Marital status:      Spouse name: Not on file    Number of children: Not on file    Years of education: Not on file    Highest education level: Not on file   Occupational History    Not on file   Tobacco Use    Smoking status: Never    Smokeless tobacco: Never   Vaping Use    Vaping status: Never Used   Substance and Sexual Activity    Alcohol use: Yes     Comment: 14 glass of wine weeks     Drug use: No    Sexual activity: Yes     Partners: Female     Birth control/protection: Post-menopausal     Comment: Other   Other Topics Concern    Parent/sibling w/ CABG, MI or angioplasty before 65F 55M? No   Social History Narrative    Social Documentation:        Balanced Diet: YES    Calcium intake: milk and food  per day, occas mtv    Caffeine: 1 cup per day    Exercise:  type of activity varies;  3-5 times per week    Sunscreen: No -     Seatbelts:  Yes    Self Breast Exam:  No - na    Self Testicular Exam: No -     Physical/Emotional/Sexual Abuse: No -     Do you feel safe in your environment? Yes        Cholesterol screen up to date: Yes    Eye Exam up to date: Yes     Dental Exam up to date: Yes    Pap smear up to date: Does Not Apply    Mammogram up to date: Does Not Apply    Dexa Scan up to date: Does Not Apply    Colonoscopy up to date: Due    Immunizations up to date: Yes    Glucose screen if over 40:  Yes                 Social Determinants of Health     Financial Resource Strain: Unknown (4/9/2024)    Financial Resource Strain     Within the past 12 months, have you or your family members you live with been unable to get utilities (heat, electricity) when it was really needed?: Patient declined   Food Insecurity: Unknown (4/9/2024)    Food Insecurity     Within the past 12 months, did you worry that your food would run out before you got money to buy more?: Patient declined     Within the past 12 months, did the food you bought just not last and you didn t have money to get more?: Patient declined   Transportation Needs: Unknown (4/9/2024)    Transportation Needs     Within the past 12 months, has lack of transportation kept you from medical appointments, getting your medicines, non-medical meetings or appointments, work, or from getting things that you need?: Patient declined   Physical Activity: Sufficiently Active (4/9/2024)    Exercise Vital Sign     Days of Exercise per Week: 4 days     Minutes of Exercise per Session: 60 min   Stress: No Stress Concern Present (4/9/2024)    [employer] - Occupational Stress Questionnaire     Feeling of Stress : Only a little   Social Connections: Unknown (4/9/2024)    Social Connection and Isolation Panel [NHANES]     Frequency of Communication with Friends and Family: Not on file     Frequency of Social Gatherings with Friends and Family: Twice a week     Attends Bahai Services: Not on file     Active Member of Clubs or Organizations: Not on file     Attends Club or Organization Meetings: Not on file     Marital Status: Not on file   Interpersonal Safety: Low Risk  (1/4/2024)    Interpersonal Safety      Do you feel physically and emotionally safe where you currently live?: Yes     Within the past 12 months, have you been hit, slapped, kicked or otherwise physically hurt by someone?: No     Within the past 12 months, have you been humiliated or emotionally abused in other ways by your partner or ex-partner?: No   Housing Stability: Unknown (4/9/2024)    Housing Stability     Do you have housing? : Patient declined     Are you worried about losing your housing?: Patient declined     Social History     Social History Narrative    Social Documentation:        Balanced Diet: YES    Calcium intake: milk and food  per day, occas mtv    Caffeine: 1 cup per day    Exercise:  type of activity varies;  3-5 times per week    Sunscreen: No -     Seatbelts:  Yes    Self Breast Exam:  No - na    Self Testicular Exam: No -     Physical/Emotional/Sexual Abuse: No -     Do you feel safe in your environment? Yes        Cholesterol screen up to date: Yes    Eye Exam up to date: Yes    Dental Exam up to date: Yes    Pap smear up to date: Does Not Apply    Mammogram up to date: Does Not Apply    Dexa Scan up to date: Does Not Apply    Colonoscopy up to date: Due    Immunizations up to date: Yes    Glucose screen if over 40:  Yes                      Allergies:  Allergies   Allergen Reactions    Metoclopramide Rash       Current Medications:   Current Outpatient Medications   Medication Sig Dispense Refill    acetaminophen (TYLENOL) 650 MG CR tablet Take 2 tablets (1,300 mg) by mouth 2 times daily 180 tablet 1    [START ON 8/1/2024] amLODIPine (NORVASC) 5 MG tablet Take 1 tablet (5 mg) by mouth daily 90 tablet 1    amphetamine-dextroamphetamine (ADDERALL XR) 10 MG 24 hr capsule Take 1 capsule (10 mg) by mouth daily for 30 days 30 capsule 0    [START ON 6/1/2024] amphetamine-dextroamphetamine (ADDERALL XR) 10 MG 24 hr capsule Take 1 capsule (10 mg) by mouth daily for 30 days 30 capsule 0    [START ON 7/1/2024]  "amphetamine-dextroamphetamine (ADDERALL XR) 10 MG 24 hr capsule Take 1 capsule (10 mg) by mouth daily for 30 days 30 capsule 0    Ascorbic Acid (VITAMIN C PO)       [START ON 6/1/2024] buPROPion (WELLBUTRIN XL) 300 MG 24 hr tablet TAKE 1 TABLET BY MOUTH EVERY DAY IN THE MORNING 90 tablet 1    calcium-vitamin D 500-125 MG-UNIT TABS Take 1 tablet by mouth daily      fluocinonide (LIDEX) 0.05 % external ointment Apply topically 2 times daily To lower ankle as needed 60 g 3    gabapentin (NEURONTIN) 300 MG capsule TAKE 1 CAPSULE (300 MG) BY MOUTH 3 TIMES DAILY PLEASE DISCONTINUE 100MG DOSE. 90 capsule 2    HYDROcodone-acetaminophen (NORCO) 5-325 MG tablet Take 1 tablet by mouth 2 times daily as needed for pain 10 tablet 0    Multiple Vitamin (MULTI-DAY VITAMINS) TABS Take  by mouth.      nabumetone (RELAFEN) 500 MG tablet TAKE 1 TABLET (500 MG) BY MOUTH 2 TIMES DAILY AS NEEDED FOR MODERATE PAIN (4-6) 180 tablet 3    sildenafil (REVATIO) 20 MG tablet TAKE 3 TO 5 TABLETS BY MOUTH 30 MINUTES BEFORE SEXUAL ACTIVITY AS NEEDED 90 tablet 1             Current Pain Medications:    Namebutone, gabapentin    Physical Exam:     /83 (BP Location: Right arm, Patient Position: Chair, Cuff Size: Adult Regular)   Pulse 51   Ht 1.705 m (5' 7.13\")   Wt 64.4 kg (142 lb)   SpO2 99%   BMI 22.15 kg/m      General Appearance: No distress, seated comfortably  Mood: Euthymic  HE ENT: Non constricted pupils  Respiratory: Non labored breathing  Skin: No rashes over exposed skin  MS: reduced musculature right palm compared to the left.   Strength 5/5 bilateral  Neuro: Cranial nerves 2-12 intact  Gait: non antalgic, ambulates with out assistance    Pain specific exam:    Spurlings negative  Facet loading negative  Ttp left upper cervical paraspinal area.     ASSESSMENT AND PLAN:     Encounter Diagnosis:  Cervical radiculopathy  Cervical disc degeneration  Bilateral Lumbo-sacral radiculopathy  Lumbar spondylosis     Salbador Begum " is a 68 year old year old male  who presents to the pain clinic with left neck pain to transition care from [facility] to [facility].     RECOMMENDATIONS:     1. Medications: We are prescribing the patient flexeril 10 mg TID. If the patient has sedative effects of flexeril he can contact pcp to change it to possibly robaxin 500 mg qid. Dosing, side effects, risks/benefits/alternatives were discussed with the patient in detail.    2. Procedure: We are scheduling the patient for C7-T1 epidural steroid injection with fluoroscopy in the procedure suite. Risks/benefits/alternatives were discussed.   We have discussed the option of L5-S1 RAFAEL and the patient would like to hold off as he feels pain in lower back is managed at this time.    3. Education: Continue PT home exercise program    Follow up: the patient will follow up with one of my colleagues.         Again, thank you for allowing me to participate in the care of your patient.      Sincerely,    Julisa Oakley MD

## 2024-05-21 NOTE — PATIENT INSTRUCTIONS
Medications:    cyclobenzaprine (FLEXERIL) 10 MG tablet.  Take 1 tablet (10 mg) by mouth 3 times daily as needed for muscle spasms.     *Please provide the clinic with a minium of 1 week notice, on all prescription refills.         Procedures:    Call to schedule your procedure: [phone] option #2    C7-T1 epidural steroid injection with fluoroscopy     Your pre-procedure instructions are below, please call our clinic if you have any questions.        Recommended Follow up:      Follow up as needed after the procedure.    To speak with a nurse, schedule/reschedule/cancel a clinic appointment, or request a medication refill call: [phone]    You can also reach us by [employer]: [email or web address]     Procedure Information:     Please call [phone] option #2 to schedule, reschedule, or cancel your procedure appointment.   Phones are answered Monday - Friday from 08:00 - 4:30pm.  Leave a voicemail with your name, birth date, and phone number if no one is available to take your call.        You no longer need to test for COVID- 19 prior to your procedure/surgery, unless your physician specifically requests that you test. If you experience COVID symptoms or have tested positive for COVID-19 within 14 days of your scheduled surgery or procedure, please update our office right away and your procedure may have to be postponed.       The procedure center staff will call you several days before the procedure to review important information that you will need to know for the day of the procedure.     Please contact the clinic if you have further questions about this information [phone].        Information related to Scheduling and Pre-Procedure Instructions:    If you must reschedule your procedure more than two times, you must follow up in clinic before rescheduling again.    Preparing for your procedure    CAUTION - FAILURE TO FOLLOW THESE PRE-PROCEDURE INSTRUCTIONS WILL RESULT IN YOUR  PROCEDURE BEING RESCHEDULED.    Your Procedure:  C7-T1 epidural steroid injection with fluoroscopy            You must have a  take you home after your procedure. Transportation by taxi or para-transit is okay as long as you have a responsible adult accompany you. You must provide your 's full name and contact number at time of check in.     Fasting Protocol Please have nothing to eat or drink 1 hour prior to arrival.   Medications If you take any medications, DO NOT STOP. Take your medications as usual the day of your procedure with a sip of water AT LEAST 2 HOURS PRIOR TO ARRIVAL.    Antibiotics If you are currently taking antibiotics, you must complete the entire dose 7 days prior to your scheduled procedure. You must be clear of any signs or symptoms of infection. If you begin antibiotics, please contact our clinic for instructions.     Fever, Chills, or Rash If you experience a fever of higher than 100 degrees, chills, rash, or open wounds during the one week before your procedure, please call the clinic to see if you may proceed with your procedure.      Medication Hold List  **Patients under Cardiology/Neurology care should consult their provider prior to the pain procedure to verify pre-procedure medication instructions. The information below contains general guidelines.**    Blood Thinners If you are taking daily ASPIRIN, PLAVIX, OR OTHER BLOOD THINNERS SUCH AS COUMADIN/WARFARIN, we will need your prescribing doctor to sign a release permitting you to stop these medications. Once approved by your prescribing doctor - STOP ALL BLOOD THINNERS BASED ON THE TIME TABLE BELOW PRIOR TO YOUR PROCEDURE. If you have been instructed to stop WARFARIN(COUMADIN), you must have an INR lab drawn the day before your procedure. Your INR must be within normal limits before we can perform your injection. MEDICATIONS CAN BE RESTARTED AFTER YOUR PROCEDURE.    24 HOUR HOLD  Lovenox (enoxaparin)  Agrylin  (Anagrelide)    3 DAY HOLD  Xarelto (rivaroxaban)    5 DAY HOLD  Coumadin (Warfarin)  Brilinta (ticagrelor) 7 DAY HOLD  Anacin, Bufferin, Ecotrin, Excedrin, Aggrenox (Aspirin)  Pradexa (Dabigatran)  Elmiron (Pentosan)  Plavix (Clopidogrel Bisulfate)  Pletal (Cilostazol)    10 DAY HOLD  Effient (Prasugel)    14 DAY HOLD  Ticlid (ticlopidine)        Non-steroidal Anti-inflammatories (NSAIDs) DO NOT TAKE any non-steroidal anti-inflammatory medications (NSAIDs) listed on the table below. MEDICATIONS CAN BE RESTARTED AFTER YOUR PROCEDURE. Celebrex is OK to take and does not need to be discontinued.     Medications to stop:  1 DAY HOLD  Advil, Motrin (Ibuprofen)  Voltaren (Diclofenac)  Toradol (Ketorolac)    3 DAY HOLD  Arthrotec (diciofenac sodium/misoprostol)  Clinoril (Sulindac)  Indocin (Indomethacin)  Lodine (Etodolac)  Vicoprofen (Hydrocodone and Ibuprofen)  Apixaban (Eliquis)    4 DAY HOLD  Mobic (Meloxicam)  Naprosyn (Naproxen)   7 DAY HOLD  Aleve (Naproxen sodium)  Darvon compound (contains aspirin)  Norgesic Forte (contains aspirin)  Oruvall (Ketoprofen)  Percodan (contains aspirin)  Relafen (Nabumetone)  Salsalate  Trilisate  Vitamin E (more than 400 mg per day)  Any medication containing aspirin    14 DAY HOLD  Daypro (Oxaprozin)  Feldene (Piroxicam)            To speak with a nurse, schedule/reschedule/cancel a clinic appointment, or request a medication refill call: [phone]    You can also reach us by [employer]: [email or web address]

## 2024-05-21 NOTE — NURSING NOTE
RN read through the instructions with the patient for the recommended procedure: C7-T1 epidural steroid injection with fluoroscopy (Completed)   Patient verbalized understanding to holding appropriate medication per protocol and was agreeable to NPO policy and needing a .    Anticoagulant: None reported    Recommended Follow Up:  PRN after procedure    Karla Rojas RN

## 2024-05-21 NOTE — PROGRESS NOTES
Pain clinic follow up note    HPI:   Salbador Begum is a 68 year old year old male  who presents to the pain clinic with low back and neck pain. He also has pain in the right pointer finger. He was seen in the clinic in Jan 2024 for low back pain. He was getting care for chronic pain at [facility].     Patient Supplied Answers To the  Pain Questionnaire      5/21/2024     9:24 AM    Pain -  Patient Entered Questionnaire/Answers   What number best describes your pain right now:  0 = No pain  to  10 = Worst pain imaginable 6   How would you describe the pain sharp    numbness    dull, aching   Which of the following worsen your pain standing    walking   Which of the following improve or reduce your pain lying down    sitting    walking    exercise    relaxation    thinking about something else   What number best describes your average pain for the past week:  0 = No pain  to  10 = Worst pain imaginable 10   What number best describes your LOWEST pain in past 24 hours:  0 = No pain  to  10 = Worst pain imaginable 0   What number best describes your WORST pain in past 24 hours:  0 = No pain  to  10 = Worst pain imaginable 10   When is your pain worst Constant   What non-medicine treatments have you already had for your pain physical therapy    spine injections (shots)    exercise             The patient reports his neck pain is new and started Fall 2023. Insidious onset. He had incidents in the past which resolved with therapy. Location left side upper neck. Turning to the left hurts. Right rotation is not as painful.   He has had tingling and numbness in the hands. The patient underwent cervical MRI with neurology Dr. Watt. However the neck pain started in fall 2023.   Tingling mostly in the hands and some times comes up the forearm. Right> left  He discussed that the arthritis in the hands can be limiting. He avoids the right first finger knuckle bending in his activities.  The patient states he was in  therapy for low back in July 2023. He had ROM and exercises with [facility] PT in February 2024. He reports continuing the home exercise program daily.     New imaging reviewed with the patient:    MRI Cervical spine 6/2/2023  C2-3:  No spinal canal or neural foraminal stenosis.     C3-4:  No spinal canal stenosis. Left uncinate spur and facet  arthropathy causing mild left foraminal stenosis. The right neural  foramen is patent.     C4-5:  Circumferential disc bulge with uncovertebral spurring, right  greater than left causing moderate right and mild left foraminal  stenosis.     C5-6:  Circumferential disc bulge with infolding of the ligamentum  flavum and uncovertebral spurring, right greater than left causing  mild left foraminal stenosis and mild-to-moderate right foraminal  stenosis. Spinal canal is mildly narrowed.     C6-7:  Circumferential disc osteophyte complex with infolding of  ligamentum flavum causing mild spinal canal stenosis. Uncovertebral  spurring with facet arthropathy causing mild foraminal stenosis  bilaterally.     C7-T1:  No spinal canal or neural foraminal stenosis.    EMG 5/18/2023 showed active b/l S1 radiculopathy (R>L), chronic b/l L4-5 radiculopathy, and right ulnar neuropathy that was mild.   Significant Medical History:   Past Medical History:   Diagnosis Date    Allergic rhinitis, cause unspecified     Backache, unspecified     Depressive disorder     Diverticulitis     Junctional ectopic contractions (H)     Mild intermittent asthma 9/28/2005    Narcolepsy     Osteoarthritis     Palpitations     Sleep apnea           Past Surgical History:  Past Surgical History:   Procedure Laterality Date    APPENDECTOMY      ORTHOPEDIC SURGERY      left hip replacement, right ankle,    SEPTORHINOPLASTY  4/7/2011    Procedure:SEPTORHINOPLASTY; Septoplasty withNasal Reconstruction with  Grafts Harvested from [employer]; Surgeon:ALFREDO UMANA; Location: OR    [facility] NONSPECIFIC  PROCEDURE      s/p Appendectomy          Family History:  Family History   Problem Relation Age of Onset    Arthritis Mother         osteo    Dementia Mother     Hypertension Mother     Cerebrovascular Disease Mother         Not confirmed    Seizure Disorder Mother     Rheumatoid Arthritis Father     Depression Father     Coronary Artery Disease Father     Depression Sister     Substance Abuse Sister     Myocardial Infarction Sister        Social History:  Social History     Socioeconomic History    Marital status:      Spouse name: Not on file    Number of children: Not on file    Years of education: Not on file    Highest education level: Not on file   Occupational History    Not on file   Tobacco Use    Smoking status: Never    Smokeless tobacco: Never   Vaping Use    Vaping status: Never Used   Substance and Sexual Activity    Alcohol use: Yes     Comment: 14 glass of wine weeks     Drug use: No    Sexual activity: Yes     Partners: Female     Birth control/protection: Post-menopausal     Comment: Other   Other Topics Concern    Parent/sibling w/ CABG, MI or angioplasty before 65F 55M? No   Social History Narrative    Social Documentation:        Balanced Diet: YES    Calcium intake: milk and food  per day, occas mtv    Caffeine: 1 cup per day    Exercise:  type of activity varies;  3-5 times per week    Sunscreen: No -     Seatbelts:  Yes    Self Breast Exam:  No - na    Self Testicular Exam: No -     Physical/Emotional/Sexual Abuse: No -     Do you feel safe in your environment? Yes        Cholesterol screen up to date: Yes    Eye Exam up to date: Yes    Dental Exam up to date: Yes    Pap smear up to date: Does Not Apply    Mammogram up to date: Does Not Apply    Dexa Scan up to date: Does Not Apply    Colonoscopy up to date: Due    Immunizations up to date: Yes    Glucose screen if over 40:  Yes                 Social Determinants of Health     Financial Resource Strain: Unknown (4/9/2024)    Financial  Resource Strain     Within the past 12 months, have you or your family members you live with been unable to get utilities (heat, electricity) when it was really needed?: Patient declined   Food Insecurity: Unknown (4/9/2024)    Food Insecurity     Within the past 12 months, did you worry that your food would run out before you got money to buy more?: Patient declined     Within the past 12 months, did the food you bought just not last and you didn t have money to get more?: Patient declined   Transportation Needs: Unknown (4/9/2024)    Transportation Needs     Within the past 12 months, has lack of transportation kept you from medical appointments, getting your medicines, non-medical meetings or appointments, work, or from getting things that you need?: Patient declined   Physical Activity: Sufficiently Active (4/9/2024)    Exercise Vital Sign     Days of Exercise per Week: 4 days     Minutes of Exercise per Session: 60 min   Stress: No Stress Concern Present (4/9/2024)    [employer] - Occupational Stress Questionnaire     Feeling of Stress : Only a little   Social Connections: Unknown (4/9/2024)    Social Connection and Isolation Panel [NHANES]     Frequency of Communication with Friends and Family: Not on file     Frequency of Social Gatherings with Friends and Family: Twice a week     Attends Alevism Services: Not on file     Active Member of Clubs or Organizations: Not on file     Attends Club or Organization Meetings: Not on file     Marital Status: Not on file   Interpersonal Safety: Low Risk  (1/4/2024)    Interpersonal Safety     Do you feel physically and emotionally safe where you currently live?: Yes     Within the past 12 months, have you been hit, slapped, kicked or otherwise physically hurt by someone?: No     Within the past 12 months, have you been humiliated or emotionally abused in other ways by your partner or ex-partner?: No   Housing Stability: Unknown (4/9/2024)     Housing Stability     Do you have housing? : Patient declined     Are you worried about losing your housing?: Patient declined     Social History     Social History Narrative    Social Documentation:        Balanced Diet: YES    Calcium intake: milk and food  per day, occas mtv    Caffeine: 1 cup per day    Exercise:  type of activity varies;  3-5 times per week    Sunscreen: No -     Seatbelts:  Yes    Self Breast Exam:  No - na    Self Testicular Exam: No -     Physical/Emotional/Sexual Abuse: No -     Do you feel safe in your environment? Yes        Cholesterol screen up to date: Yes    Eye Exam up to date: Yes    Dental Exam up to date: Yes    Pap smear up to date: Does Not Apply    Mammogram up to date: Does Not Apply    Dexa Scan up to date: Does Not Apply    Colonoscopy up to date: Due    Immunizations up to date: Yes    Glucose screen if over 40:  Yes                      Allergies:  Allergies   Allergen Reactions    Metoclopramide Rash       Current Medications:   Current Outpatient Medications   Medication Sig Dispense Refill    acetaminophen (TYLENOL) 650 MG CR tablet Take 2 tablets (1,300 mg) by mouth 2 times daily 180 tablet 1    [START ON 8/1/2024] amLODIPine (NORVASC) 5 MG tablet Take 1 tablet (5 mg) by mouth daily 90 tablet 1    amphetamine-dextroamphetamine (ADDERALL XR) 10 MG 24 hr capsule Take 1 capsule (10 mg) by mouth daily for 30 days 30 capsule 0    [START ON 6/1/2024] amphetamine-dextroamphetamine (ADDERALL XR) 10 MG 24 hr capsule Take 1 capsule (10 mg) by mouth daily for 30 days 30 capsule 0    [START ON 7/1/2024] amphetamine-dextroamphetamine (ADDERALL XR) 10 MG 24 hr capsule Take 1 capsule (10 mg) by mouth daily for 30 days 30 capsule 0    Ascorbic Acid (VITAMIN C PO)       [START ON 6/1/2024] buPROPion (WELLBUTRIN XL) 300 MG 24 hr tablet TAKE 1 TABLET BY MOUTH EVERY DAY IN THE MORNING 90 tablet 1    calcium-vitamin D 500-125 MG-UNIT TABS Take 1 tablet by mouth daily      fluocinonide  "(LIDEX) 0.05 % external ointment Apply topically 2 times daily To lower ankle as needed 60 g 3    gabapentin (NEURONTIN) 300 MG capsule TAKE 1 CAPSULE (300 MG) BY MOUTH 3 TIMES DAILY PLEASE DISCONTINUE 100MG DOSE. 90 capsule 2    HYDROcodone-acetaminophen (NORCO) 5-325 MG tablet Take 1 tablet by mouth 2 times daily as needed for pain 10 tablet 0    Multiple Vitamin (MULTI-DAY VITAMINS) TABS Take  by mouth.      nabumetone (RELAFEN) 500 MG tablet TAKE 1 TABLET (500 MG) BY MOUTH 2 TIMES DAILY AS NEEDED FOR MODERATE PAIN (4-6) 180 tablet 3    sildenafil (REVATIO) 20 MG tablet TAKE 3 TO 5 TABLETS BY MOUTH 30 MINUTES BEFORE SEXUAL ACTIVITY AS NEEDED 90 tablet 1             Current Pain Medications:    Namebutone, gabapentin    Physical Exam:     /83 (BP Location: Right arm, Patient Position: Chair, Cuff Size: Adult Regular)   Pulse 51   Ht 1.705 m (5' 7.13\")   Wt 64.4 kg (142 lb)   SpO2 99%   BMI 22.15 kg/m      General Appearance: No distress, seated comfortably  Mood: Euthymic  HE ENT: Non constricted pupils  Respiratory: Non labored breathing  Skin: No rashes over exposed skin  MS: reduced musculature right palm compared to the left.   Strength 5/5 bilateral  Neuro: Cranial nerves 2-12 intact  Gait: non antalgic, ambulates with out assistance    Pain specific exam:    Spurlings negative  Facet loading negative  Ttp left upper cervical paraspinal area.     ASSESSMENT AND PLAN:     Encounter Diagnosis:  Cervical radiculopathy  Cervical disc degeneration  Bilateral Lumbo-sacral radiculopathy  Lumbar spondylosis     Salbador Begum is a 68 year old year old male  who presents to the pain clinic with left neck pain to transition care from [facility] to [facility].     RECOMMENDATIONS:     1. Medications: We are prescribing the patient flexeril 10 mg TID. If the patient has sedative effects of flexeril he can contact pcp to change it to possibly robaxin 500 mg qid. Dosing, side effects, " risks/benefits/alternatives were discussed with the patient in detail.    2. Procedure: We are scheduling the patient for C7-T1 epidural steroid injection with fluoroscopy in the procedure suite. Risks/benefits/alternatives were discussed.   We have discussed the option of L5-S1 RAFAEL and the patient would like to hold off as he feels pain in lower back is managed at this time.    3. Education: Continue PT home exercise program    Follow up: the patient will follow up with one of my colleagues.     TAURUS Angela    Pain Medicine  Department of Anesthesiology  [facility]

## 2024-05-23 ENCOUNTER — MYC MEDICAL ADVICE (OUTPATIENT)
Dept: FAMILY MEDICINE | Facility: CLINIC | Age: 69
End: 2024-05-23

## 2024-05-23 DIAGNOSIS — M47.812 CERVICAL SPONDYLOSIS WITHOUT MYELOPATHY: Primary | ICD-10-CM

## 2024-05-23 RX ORDER — METHOCARBAMOL 500 MG/1
500 TABLET, FILM COATED ORAL 4 TIMES DAILY PRN
Qty: 120 TABLET | Refills: 3 | Status: SHIPPED | OUTPATIENT
Start: 2024-05-23

## 2024-06-04 ENCOUNTER — PREP FOR PROCEDURE (OUTPATIENT)
Dept: ANESTHESIOLOGY | Facility: CLINIC | Age: 69
End: 2024-06-04
Payer: MEDICARE

## 2024-06-04 ENCOUNTER — TELEPHONE (OUTPATIENT)
Dept: ANESTHESIOLOGY | Facility: CLINIC | Age: 69
End: 2024-06-04
Payer: MEDICARE

## 2024-06-04 PROBLEM — M54.12 CERVICAL RADICULOPATHY: Status: ACTIVE | Noted: 2024-05-21

## 2024-06-04 NOTE — TELEPHONE ENCOUNTER
RN reviewed patient chart. Pre procedure instructions were reviewed with patient.    Neyda Yoo RNCC

## 2024-06-04 NOTE — TELEPHONE ENCOUNTER
Called patient to schedule procedure with Dr. Ortiz    Date of Procedure: 7/11/24    Arrival time given: Yes: Arrival Time 12:30pm       Procedure Location: [facility]     Verified Location with Patient:  Yes  Address provided to the patient    Pre-op H&P Required:  No: Local anesthesia        Post-Op/Follow Up Appt:  Not Indicated in Request      Informed patient they will need a  to drive them home:  Yes    Patients : Son     Patient is aware that pre-op RN from the procedure center will call 2-3 days prior to scheduled procedure to confirm arrival time and review any instructions:  Yes       Additional Comments: N/A        Scarlet Guidry MA on 6/4/2024 at 11:47 AM      P: [phone]*

## 2024-06-06 NOTE — TELEPHONE ENCOUNTER
"Review of 5/21 visit with pain provider SMOOTH Hilton;  \"1. Medications: We are prescribing the patient flexeril 10 mg TID. If the patient has sedative effects of flexeril he can contact pcp to change it to possibly robaxin 500 mg qid. Dosing, side effects, risks/benefits/alternatives were discussed with the patient in detail.\"      Would you like VV to discuss further? Please advise-thanks!  "
Rx signed.  Writer responded via [employer].  Miranda FREIRE BSN, PHN, RN  [facility]  [phone]    
Signicast message sent to patient.  Miranda FREIRE BSN, PHN, RN  [facility]  [phone]    
Updated rx. Confirm pharmacy with patient and sign rx  
Yes

## 2024-06-12 ENCOUNTER — OFFICE VISIT (OUTPATIENT)
Dept: DERMATOLOGY | Facility: CLINIC | Age: 69
End: 2024-06-12
Payer: MEDICARE

## 2024-06-12 DIAGNOSIS — L81.4 LENTIGINES: ICD-10-CM

## 2024-06-12 DIAGNOSIS — L82.1 SEBORRHEIC KERATOSIS: ICD-10-CM

## 2024-06-12 DIAGNOSIS — D18.01 CHERRY ANGIOMA: ICD-10-CM

## 2024-06-12 DIAGNOSIS — L30.9 DERMATITIS: Primary | ICD-10-CM

## 2024-06-12 DIAGNOSIS — D49.2 NEOPLASM OF SKIN: ICD-10-CM

## 2024-06-12 DIAGNOSIS — D22.9 MULTIPLE BENIGN NEVI: ICD-10-CM

## 2024-06-12 PROCEDURE — 99214 OFFICE O/P EST MOD 30 MIN: CPT | Mod: 25 | Performed by: DERMATOLOGY

## 2024-06-12 PROCEDURE — 88305 TISSUE EXAM BY PATHOLOGIST: CPT | Mod: TC | Performed by: DERMATOLOGY

## 2024-06-12 PROCEDURE — 11102 TANGNTL BX SKIN SINGLE LES: CPT | Performed by: DERMATOLOGY

## 2024-06-12 PROCEDURE — 88305 TISSUE EXAM BY PATHOLOGIST: CPT | Mod: 26 | Performed by: DERMATOLOGY

## 2024-06-12 RX ORDER — TACROLIMUS 1 MG/G
OINTMENT TOPICAL 2 TIMES DAILY
Qty: 60 G | Refills: 3 | Status: SHIPPED | OUTPATIENT
Start: 2024-06-12

## 2024-06-12 RX ORDER — TACROLIMUS 1 MG/G
OINTMENT TOPICAL 2 TIMES DAILY
Qty: 60 G | Refills: 3 | Status: SHIPPED | OUTPATIENT
Start: 2024-06-12 | End: 2024-06-12

## 2024-06-12 ASSESSMENT — PAIN SCALES - GENERAL: PAINLEVEL: NO PAIN (0)

## 2024-06-12 NOTE — LETTER
6/12/2024       RE: Salbador Begum  [address]   [place] [address]     Dear Colleague,    Thank you for referring your patient, Salbador Begum, to the [facility] [place] at [facility]. Please see a copy of my visit note below.    [facility] Dermatology Note  Encounter Date: Jun 12, 2024  Office Visit     Dermatology Problem List:  FBSE: 6/12/24    #JOSETTE MIX boyd, S/p Biopsy performed on 6/12/24: Pending results   1. Dermatitis, left ankle  - lidex ointment  2. HAK, L forearm, s/p bx 06/20/23, s/p cryo 2/21/24  ____________________________________________    Assessment & Plan:     #NUB, R shin Ddx: BCC vs scar  - Shave Biopsy performed today, see below  - Photographed today     # Dermatitis, left ankle - chronic, stable  - suspect xerotic v nummular v stasis derm  - improved with lidex, will provided protopic for steroid sparing effects  - Continue daily moisturization with bland emollient.   - Can continue use of  lidex ointment BID prn  - Start use of Tacrolimus  0.1 % ointment BID prn    # Hand dermatitis. Chronic, active.  - Advised daily moisturization with bland emollient.   - Start lidex and/or protopic BID prn    # Benign lesions - SKs, cherry angiomas, lentigenes.  - No treatment required    # Multiple benign nevi.   - Monitor for ABCDEs of melanoma   - Continue sun protection - recommend SPF 30 or higher with frequent application   - Return sooner if noticing changing or symptomatic lesions      Procedures Performed:   - SHAVE BIOPSY PROCEDURE NOTE: After written informed consent was obtained, a time out was taken to identify the patient and the correct site for biopsy. The lesion on the R lower shin  was cleansed with a 70% isopropyl alcohol wipe, and then injected lidocaine 1% with epinephrine 1:100,000. Once anesthesia was ensured, the visible surface of the lesion was biopsied  "using a Otoniel blade in standard technique. Hemostasis was obtained with pressure and aluminum chloride 20% solution. The specimen was placed in a labeled formalin container and sent to pathology for sectioning and analysis. The wound was dressed with white petrolatum and an adhesive bandage. The patient tolerated the procedure well. Post-procedure instructions and recommendations were provided both verbally and in writing.      Follow-up: 1 year(s) in-person, or earlier for new or changing lesions    Staff and Scribe:     Scribe Disclosure:   I, BRANDON YANG, am serving as a scribe; to document services personally performed by Bozena Atkins MD -based on data collection and the provider's statements to me.     Provider Disclosure:   The documentation recorded by the scribe accurately reflects the services I personally performed and the decisions made by me.    Bozena Atkins MD    Department of Dermatology  [facility]: Phone: [phone], Fax: [phone]  6/18/2024      ____________________________________________    CC: Skin Check (Salbador is here today for a skin check. She states \" No new concerns today\")    HPI:  Mr. Salbador Begmu is a(n) 68 year old male who presents today as a return patient for [facility]. Last seen by myself and a resident on 2/21/24 where a bx proven HAK was treated with cryotherapy. At that time he was also given lidex ointment for his L ankle due to xerotic v nummular v stasis derm.     Today patient did not report any spots of concern.     Notes that his L ankle is responsive to his Lidex ointment,     Patient is otherwise feeling well, without additional skin concerns.     Labs Reviewed:  N/A    Physical Exam:  Vitals: There were no vitals taken for this visit.  SKIN: Total skin excluding the undergarment areas was performed. The exam included the head/face, neck, both arms, " chest, back, abdomen, both legs, digits and/or nails.   - L forearm there is a well healed biopsy scar.   - There are dome shaped bright red papules on the trunk and extremities .   - Multiple regular brown pigmented macules and papules are identified on the trunk and extremities. .   - Scattered brown macules on sun exposed areas.  - Waxy stuck on papules and plaques on trunk and extremities.    - R lower shin there is a 4.5 mm bright pink macule, non tender.  - scaling of fingertips/nail folds   - No other lesions of concern on areas examined.     Medications:  Current Outpatient Medications   Medication Sig Dispense Refill    acetaminophen (TYLENOL) 650 MG CR tablet Take 2 tablets (1,300 mg) by mouth 2 times daily 180 tablet 1    [START ON 8/1/2024] amLODIPine (NORVASC) 5 MG tablet Take 1 tablet (5 mg) by mouth daily 90 tablet 1    amphetamine-dextroamphetamine (ADDERALL XR) 10 MG 24 hr capsule Take 1 capsule (10 mg) by mouth daily for 30 days 30 capsule 0    [START ON 7/1/2024] amphetamine-dextroamphetamine (ADDERALL XR) 10 MG 24 hr capsule Take 1 capsule (10 mg) by mouth daily for 30 days 30 capsule 0    Ascorbic Acid (VITAMIN C PO)       buPROPion (WELLBUTRIN XL) 300 MG 24 hr tablet TAKE 1 TABLET BY MOUTH EVERY DAY IN THE MORNING 90 tablet 1    calcium-vitamin D 500-125 MG-UNIT TABS Take 1 tablet by mouth daily      gabapentin (NEURONTIN) 300 MG capsule TAKE 1 CAPSULE (300 MG) BY MOUTH 3 TIMES DAILY PLEASE DISCONTINUE 100MG DOSE. 90 capsule 2    HYDROcodone-acetaminophen (NORCO) 5-325 MG tablet Take 1 tablet by mouth 2 times daily as needed for pain 10 tablet 0    methocarbamol (ROBAXIN) 500 MG tablet Take 1 tablet (500 mg) by mouth 4 times daily as needed for muscle spasms 120 tablet 3    Multiple Vitamin (MULTI-DAY VITAMINS) TABS Take  by mouth.      nabumetone (RELAFEN) 500 MG tablet TAKE 1 TABLET (500 MG) BY MOUTH 2 TIMES DAILY AS NEEDED FOR MODERATE PAIN (4-6) 180 tablet 3    sildenafil (REVATIO) 20 MG  tablet TAKE 3 TO 5 TABLETS BY MOUTH 30 MINUTES BEFORE SEXUAL ACTIVITY AS NEEDED 90 tablet 1    fluocinonide (LIDEX) 0.05 % external ointment Apply topically 2 times daily To lower ankle as needed (Patient not taking: Reported on 6/12/2024) 60 g 3     No current facility-administered medications for this visit.      Past Medical History:   Patient Active Problem List   Diagnosis    Backache    Allergic rhinitis    Major depressive disorder, recurrent, mild (H24)    Sinus arrhythmia    Sleep apnea    Diverticulitis of colon    Advanced directives, counseling/discussion    Essential and other specified forms of tremor    Idiopathic hypersomnolence    Chronotropic incompetence with sinus node dysfunction    Benign essential hypertension    Nonrheumatic mitral valve regurgitation    Spinal stenosis of lumbar region with neurogenic claudication    ADHD (attention deficit hyperactivity disorder), inattentive type    Cervical radiculopathy     Past Medical History:   Diagnosis Date    Allergic rhinitis, cause unspecified     Backache, unspecified     Depressive disorder     Diverticulitis     Junctional ectopic contractions (H)     Mild intermittent asthma 9/28/2005    Narcolepsy     Osteoarthritis     Palpitations     Sleep apnea        CC Referred Self,

## 2024-06-12 NOTE — NURSING NOTE
"Dermatology Rooming Note    Salbador Begum's goals for this visit include:   Chief Complaint   Patient presents with    Skin Check     Salbador is here today for a skin check. She states \" No new concerns today\"      CHAGO Pennington   "

## 2024-06-12 NOTE — PROGRESS NOTES
[facility] Dermatology Note  Encounter Date: Jun 12, 2024  Office Visit     Dermatology Problem List:  FBSE: 6/12/24    #JOSETTE R boyd, S/p Biopsy performed on 6/12/24: Pending results   1. Dermatitis, left ankle  - lidex ointment  2. 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   - Can continue use of  lidex ointment BID prn  - Start use of Tacrolimus  0.1 % ointment BID prn    # Hand dermatitis. Chronic, active.  - Advised daily moisturization with bland emollient.   - Start lidex and/or protopic BID prn    # Benign lesions - SKs, cherry angiomas, lentigenes.  - No treatment required    # Multiple benign nevi.   - Monitor for ABCDEs of melanoma   - Continue sun protection - recommend SPF 30 or higher with frequent application   - Return sooner if noticing changing or symptomatic lesions      Procedures Performed:   - SHAVE BIOPSY PROCEDURE NOTE: After written informed consent was obtained, a time out was taken to identify the patient and the correct site for biopsy. The lesion on the R lower shin  was cleansed with a 70% isopropyl alcohol wipe, and then injected lidocaine 1% with epinephrine 1:100,000. Once anesthesia was ensured, the visible surface of the lesion was biopsied using a Otoniel blade in standard technique. Hemostasis was obtained with pressure and aluminum chloride 20% solution. The specimen was placed in a labeled formalin container and sent to pathology for sectioning and analysis. The wound was dressed with white petrolatum and an adhesive bandage. The patient tolerated the procedure well.  "Post-procedure instructions and recommendations were provided both verbally and in writing.      Follow-up: 1 year(s) in-person, or earlier for new or changing lesions    Staff and Scribe:     Scribe Disclosure:   I, BRANDON TREY, am serving as a scribe; to document services personally performed by Bozena Atkins MD -based on data collection and the provider's statements to me.     Provider Disclosure:   The documentation recorded by the scribe accurately reflects the services I personally performed and the decisions made by me.    Bozena Atkins MD    Department of Dermatology  [facility]: Phone: [phone], Fax: [phone]  6/18/2024      ____________________________________________    CC: Skin Check (Salbador is here today for a skin check. She states \" No new concerns today\")    HPI:  Mr. Salbador Begum is a(n) 68 year old male who presents today as a return patient for [facility]. Last seen by myself and a resident on 2/21/24 where a bx proven HAK was treated with cryotherapy. At that time he was also given lidex ointment for his L ankle due to xerotic v nummular v stasis derm.     Today patient did not report any spots of concern.     Notes that his L ankle is responsive to his Lidex ointment,     Patient is otherwise feeling well, without additional skin concerns.     Labs Reviewed:  N/A    Physical Exam:  Vitals: There were no vitals taken for this visit.  SKIN: Total skin excluding the undergarment areas was performed. The exam included the head/face, neck, both arms, chest, back, abdomen, both legs, digits and/or nails.   - L forearm there is a well healed biopsy scar.   - There are dome shaped bright red papules on the trunk and extremities .   - Multiple regular brown pigmented macules and papules are identified on the trunk and extremities. .   - Scattered brown macules on sun exposed areas.  - " Waxy stuck on papules and plaques on trunk and extremities.    - R lower shin there is a 4.5 mm bright pink macule, non tender.  - scaling of fingertips/nail folds   - No other lesions of concern on areas examined.     Medications:  Current Outpatient Medications   Medication Sig Dispense Refill    acetaminophen (TYLENOL) 650 MG CR tablet Take 2 tablets (1,300 mg) by mouth 2 times daily 180 tablet 1    [START ON 8/1/2024] amLODIPine (NORVASC) 5 MG tablet Take 1 tablet (5 mg) by mouth daily 90 tablet 1    amphetamine-dextroamphetamine (ADDERALL XR) 10 MG 24 hr capsule Take 1 capsule (10 mg) by mouth daily for 30 days 30 capsule 0    [START ON 7/1/2024] amphetamine-dextroamphetamine (ADDERALL XR) 10 MG 24 hr capsule Take 1 capsule (10 mg) by mouth daily for 30 days 30 capsule 0    Ascorbic Acid (VITAMIN C PO)       buPROPion (WELLBUTRIN XL) 300 MG 24 hr tablet TAKE 1 TABLET BY MOUTH EVERY DAY IN THE MORNING 90 tablet 1    calcium-vitamin D 500-125 MG-UNIT TABS Take 1 tablet by mouth daily      gabapentin (NEURONTIN) 300 MG capsule TAKE 1 CAPSULE (300 MG) BY MOUTH 3 TIMES DAILY PLEASE DISCONTINUE 100MG DOSE. 90 capsule 2    HYDROcodone-acetaminophen (NORCO) 5-325 MG tablet Take 1 tablet by mouth 2 times daily as needed for pain 10 tablet 0    methocarbamol (ROBAXIN) 500 MG tablet Take 1 tablet (500 mg) by mouth 4 times daily as needed for muscle spasms 120 tablet 3    Multiple Vitamin (MULTI-DAY VITAMINS) TABS Take  by mouth.      nabumetone (RELAFEN) 500 MG tablet TAKE 1 TABLET (500 MG) BY MOUTH 2 TIMES DAILY AS NEEDED FOR MODERATE PAIN (4-6) 180 tablet 3    sildenafil (REVATIO) 20 MG tablet TAKE 3 TO 5 TABLETS BY MOUTH 30 MINUTES BEFORE SEXUAL ACTIVITY AS NEEDED 90 tablet 1    fluocinonide (LIDEX) 0.05 % external ointment Apply topically 2 times daily To lower ankle as needed (Patient not taking: Reported on 6/12/2024) 60 g 3     No current facility-administered medications for this visit.      Past Medical History:    Patient Active Problem List   Diagnosis    Backache    Allergic rhinitis    Major depressive disorder, recurrent, mild (H24)    Sinus arrhythmia    Sleep apnea    Diverticulitis of colon    Advanced directives, counseling/discussion    Essential and other specified forms of tremor    Idiopathic hypersomnolence    Chronotropic incompetence with sinus node dysfunction    Benign essential hypertension    Nonrheumatic mitral valve regurgitation    Spinal stenosis of lumbar region with neurogenic claudication    ADHD (attention deficit hyperactivity disorder), inattentive type    Cervical radiculopathy     Past Medical History:   Diagnosis Date    Allergic rhinitis, cause unspecified     Backache, unspecified     Depressive disorder     Diverticulitis     Junctional ectopic contractions (H)     Mild intermittent asthma 9/28/2005    Narcolepsy     Osteoarthritis     Palpitations     Sleep apnea        CC Referred Self, MD  No address on file on close of this encounter.

## 2024-06-12 NOTE — PATIENT INSTRUCTIONS
Patient Education       Proper skin care from [facility]:    -Eliminate harsh soaps as they strip the natural oils from the skin, often resulting in dry itchy skin ( i.e. Dial, Zest, [place] Spring)  -Use mild soaps such as Cetaphil or Dove Sensitive Skin in the shower. You do not need to use soap on arms, legs, and trunk every time you shower unless visibly soiled.   -Avoid hot or cold showers.  -After showering, lightly dry off and apply moisturizing within 2-3 minutes. This will help trap moisture in the skin.   -Aggressive use of a moisturizer at least 1-2 times a day to the entire body (including -Vanicream, Cetaphil, Aquaphor or Cerave) and moisturize hands after every washing.  -We recommend using moisturizers that come in a tub that needs to be scooped out, not a pump. This has more of an oil base. It will hold moisture in your skin much better than a water base moisturizer. The above recommended are non-pore clogging.      Wear a sunscreen with at least SPF 30 on your face, ears, neck and V of the chest daily. Wear sunscreen on other areas of the body if those areas are exposed to the sun throughout the day. Sunscreens can contain physical and/or chemical blockers. Physical blockers are less likely to clog pores, these include zinc oxide and titanium dioxide. Reapply every two hour and after swimming.     Sunscreen examples: https://www.ewg.org/sunscreen/    UV radiation  UVA radiation remains constant throughout the day and throughout the year. It is a longer wavelength than UVB and therefore penetrates deeper into the skin leading to immediate and delayed tanning, photoaging, and skin cancer. 70-80% of UVA and UVB radiation occurs between the hours of 10am-2pm.  UVB radiation  UVB radiation causes the most harmful effects and is more significant during the summer months. However, snow and ice can reflect UVB radiation leading to skin damage during the winter months as well. UVB radiation is  responsible for tanning, burning, inflammation, delayed erythema (pinkness), pigmentation (brown spots), and skin cancer.     I recommend self monthly full body exams and yearly full body exams with a dermatology provider. If you develop a new or changing lesion please follow up for examination. Most skin cancers are pink and scaly or pink and pearly. However, we do see blue/brown/black skin cancers.  Consider the ABCDEs of melanoma when giving yourself your monthly full body exam ( don't forget the groin, buttocks, feet, toes, etc). A-asymmetry, B-borders, C-color, D-diameter, E-elevation or evolving. If you see any of these changes please follow up in clinic. If you cannot see your back I recommend purchasing a hand held mirror to use with a larger wall mirror.       Checking for Skin Cancer  You can find cancer early by checking your skin each month. There are 3 kinds of skin cancer. They are melanoma, basal cell carcinoma, and squamous cell carcinoma. Doing monthly skin checks is the best way to find new marks or skin changes. Follow the instructions below for checking your skin.   The ABCDEs of checking moles for melanoma   Check your moles or growths for signs of melanoma using ABCDE:   Asymmetry: the sides of the mole or growth don t match  Border: the edges are ragged, notched, or blurred  Color: the color within the mole or growth varies  Diameter: the mole or growth is larger than 6 mm (size of a pencil eraser)  Evolving: the size, shape, or color of the mole or growth is changing (evolving is not shown in the images below)    Checking for other types of skin cancer  Basal cell carcinoma or squamous cell carcinoma have symptoms such as:     A spot or mole that looks different from all other marks on your skin  Changes in how an area feels, such as itching, tenderness, or pain  Changes in the skin's surface, such as oozing, bleeding, or scaliness  A sore that does not heal  New swelling or redness beyond  the border of a mole    Who s at risk?  Anyone can get skin cancer. But you are at greater risk if you have:   Fair skin, light-colored hair, or light-colored eyes  Many moles or abnormal moles on your skin  A history of sunburns from sunlight or tanning beds  A family history of skin cancer  A history of exposure to radiation or chemicals  A weakened immune system  If you have had skin cancer in the past, you are at risk for recurring skin cancer.   How to check your skin  Do your monthly skin checkups in front of a full-length mirror. Check all parts of your body, including your:   Head (ears, face, neck, and scalp)  Torso (front, back, and sides)  Arms (tops, undersides, upper, and lower armpits)  Hands (palms, backs, and fingers, including under the nails)  Buttocks and genitals  Legs (front, back, and sides)  Feet (tops, soles, toes, including under the nails, and between toes)  If you have a lot of moles, take digital photos of them each month. Make sure to take photos both up close and from a distance. These can help you see if any moles change over time.   Most skin changes are not cancer. But if you see any changes in your skin, call your doctor right away. Only he or she can diagnose a problem. If you have skin cancer, seeing your doctor can be the first step toward getting the treatment that could save your life.   T[employer]o last reviewed this educational content on 4/1/2019 2000-2020 [employer]. [address] [place] [address], [place] [address]. All rights reserved. This information is not intended as a substitute for professional medical care. Always follow your healthcare professional's instructions.        Wound Care After a Biopsy    What is a skin biopsy?  A skin biopsy allows the doctor to examine a very small piece of tissue under the microscope to determine the diagnosis and the best treatment for the skin condition. A local anesthetic (numbing medicine)  is injected with a very small  needle into the skin area to be tested. A small piece of skin is taken from the area. Sometimes a suture (stitch) is used.     What are the risks of a skin biopsy?  I will experience scar, bleeding, swelling, pain, crusting and redness. I may experience incomplete removal or recurrence. Risks of this procedure are excessive bleeding, bruising, infection, nerve damage, numbness, thick (hypertrophic or keloidal) scar and non-diagnostic biopsy.    How should I care for my wound for the first 24 hours?  Keep the wound dry and covered for 24 hours  If it bleeds, hold direct pressure on the area for 15 minutes. If bleeding does not stop then go to the emergency room  Avoid strenuous exercise the first 1-2 days or as your doctor instructs you    How should I care for the wound after 24 hours?  After 24 hours, remove the bandage  You may bathe or shower as normal  If you had a scalp biopsy, you can shampoo as usual and can use shower water to clean the biopsy site daily  Clean the wound twice a day with gentle soap and water  Do not scrub, be gentle  Apply white petroleum/Vaseline after cleaning the wound with a cotton swab or a clean finger, and keep the site covered with a Bandaid /bandage. Bandages are not necessary with a scalp biopsy  If you are unable to cover the site with a Bandaid /bandage, re-apply ointment 2-3 times a day to keep the site moist. Moisture will help with healing  Avoid strenuous activity for first 1-2 days  Avoid lakes, rivers, pools, and oceans until the stitches are removed or the site is healed    How do I clean my wound?  Wash hands thoroughly with soap or use hand  before all wound care  Clean the wound with gentle soap and water  Apply white petroleum/Vaseline  to wound after it is clean  Replace the Bandaid /bandage to keep the wound covered for the first few days or as instructed by your doctor  If you had a scalp biopsy, warm shower water to the area on a daily basis should  suffice    What should I use to clean my wound?   Cotton-tipped applicators (Qtips )  White petroleum jelly (Vaseline ). Use a clean new container and use Q-tips to apply.  Bandaids   as needed  Gentle soap     How should I care for my wound long term?  Do not get your wound dirty  Keep up with wound care for one week or until the area is healed.  A small scab will form and fall off by itself when the area is completely healed. The area will be red and will become pink in color as it heals. Sun protection is very important for how your scar will turn out. Sunscreen with an SPF 30 or greater is recommended once the area is healed.  If you have stitches, stitches need to be removed in 10 days. You may return to our clinic for this or you may have it done locally at your doctor s office.  You should have some soreness but it should be mild and slowly go away over several days. Talk to your doctor about using tylenol for pain,    When should I call my doctor?  If you have increased:   Pain or swelling  Pus or drainage (clear or slightly yellow drainage is ok)  Temperature over 100F  Spreading redness or warmth around wound    When will I hear about my results?  The biopsy results can take 2-3 weeks to come back. The clinic will call you with the results, send you a [employer] message, or have you schedule a follow-up clinic or phone time to discuss the results. Contact our clinics if you do not hear from us in 3 weeks.     Who should I call with questions?  [facility]: [phone]   [facility]: [phone]  For urgent needs outside of business hours call the [facility] at [phone] and ask for the dermatology resident on call

## 2024-06-12 NOTE — NURSING NOTE
Lidocaine-epinephrine 1-1:848362 % injection   0.5 mL once for one use, starting 6/12/2024 ending 6/12/2024,  2mL disp, R-0, injection  Injected by Ebenezer DANIEL CMA

## 2024-06-16 LAB
PATH REPORT.COMMENTS IMP SPEC: NORMAL
PATH REPORT.COMMENTS IMP SPEC: NORMAL
PATH REPORT.FINAL DX SPEC: NORMAL
PATH REPORT.GROSS SPEC: NORMAL
PATH REPORT.MICROSCOPIC SPEC OTHER STN: NORMAL
PATH REPORT.RELEVANT HX SPEC: NORMAL

## 2024-06-28 ENCOUNTER — MYC MEDICAL ADVICE (OUTPATIENT)
Dept: FAMILY MEDICINE | Facility: CLINIC | Age: 69
End: 2024-06-28
Payer: MEDICARE

## 2024-07-02 NOTE — TELEPHONE ENCOUNTER
Tick Bite-A-OH protocol reviewed. Replied via Gazz[employer] and asked that patient call for further triage of sx.    LEVI AndersonN, RN (she/her)  [facility] RN

## 2024-07-03 ENCOUNTER — MYC REFILL (OUTPATIENT)
Dept: FAMILY MEDICINE | Facility: CLINIC | Age: 69
End: 2024-07-03
Payer: MEDICARE

## 2024-07-03 DIAGNOSIS — N52.9 ERECTILE DYSFUNCTION, UNSPECIFIED ERECTILE DYSFUNCTION TYPE: ICD-10-CM

## 2024-07-03 NOTE — PROGRESS NOTES
[facility] Follow-up    Date of visit: 7/10/2024    Chief complaint:   Chief Complaint   Patient presents with    Follow Up     Follow-up Cervical Neck-Spondylosis previous Dr Oakley Pt-Has procedure injection with Dr Ortiz 7/11     Last Clinic Visit with Dr Oakley 5/21/2024  RECOMMENDATIONS:      1. Medications: We are prescribing the patient flexeril 10 mg TID. If the patient has sedative effects of flexeril he can contact pcp to change it to possibly robaxin 500 mg qid. Dosing, side effects, risks/benefits/alternatives were discussed with the patient in detail.     2. Procedure: We are scheduling the patient for C7-T1 epidural steroid injection with fluoroscopy in the procedure suite. Risks/benefits/alternatives were discussed.   We have discussed the option of L5-S1 RAFAEL and the patient would like to hold off as he feels pain in lower back is managed at this time.     3. Education: Continue PT home exercise program     Follow up: the patient will follow up with one of my colleagues.     Interval history:  Since his last visit, Salbador Begum reports:  - has upcoming PERNELL with Dr Ortiz 7/11/2024  - pain is axial neck pain   - pain in hand that appears to be OA. Discussed using diclofenac for this  - went through steps of RAFAEL, risks, benefits    Pain scores:  Pain intensity currently is 8 on a scale of 0-10.     Current pain medications include:  Nabumetone   Gabapentin  Tylenol     Previous medication treatments included:  Norco  Advil  Percocet  Venlafaxine  Flexeril  Tizanidine     Other treatments have included:  Salbador Begum has been seen at a pain clinic in the past.    PT: yes - helpful   Injections: has had 5 injections, last was 3/29/2023 (L4-5 ILESI) - last for months  Surgery: denies      Side Effects: no side effect    Medications:  Current Outpatient Medications   Medication Sig Dispense Refill    acetaminophen (TYLENOL) 650 MG CR tablet  Detail Level: Zone "Take 2 tablets (1,300 mg) by mouth 2 times daily 180 tablet 1    [START ON 8/1/2024] amLODIPine (NORVASC) 5 MG tablet Take 1 tablet (5 mg) by mouth daily 90 tablet 1    amphetamine-dextroamphetamine (ADDERALL XR) 10 MG 24 hr capsule Take 1 capsule (10 mg) by mouth daily for 30 days 30 capsule 0    Ascorbic Acid (VITAMIN C PO)       buPROPion (WELLBUTRIN XL) 300 MG 24 hr tablet TAKE 1 TABLET BY MOUTH EVERY DAY IN THE MORNING 90 tablet 1    calcium-vitamin D 500-125 MG-UNIT TABS Take 1 tablet by mouth daily      fluocinonide (LIDEX) 0.05 % external ointment Apply topically 2 times daily To lower ankle as needed 60 g 3    gabapentin (NEURONTIN) 300 MG capsule TAKE 1 CAPSULE (300 MG) BY MOUTH 3 TIMES DAILY PLEASE DISCONTINUE 100MG DOSE. 90 capsule 2    HYDROcodone-acetaminophen (NORCO) 5-325 MG tablet Take 1 tablet by mouth 2 times daily as needed for pain 10 tablet 0    methocarbamol (ROBAXIN) 500 MG tablet Take 1 tablet (500 mg) by mouth 4 times daily as needed for muscle spasms 120 tablet 3    Multiple Vitamin (MULTI-DAY VITAMINS) TABS Take  by mouth.      nabumetone (RELAFEN) 500 MG tablet TAKE 1 TABLET (500 MG) BY MOUTH 2 TIMES DAILY AS NEEDED FOR MODERATE PAIN (4-6) 180 tablet 3    sildenafil (REVATIO) 20 MG tablet TAKE 3 TO 5 TABLETS BY MOUTH 30 MINUTES BEFORE SEXUAL ACTIVITY AS NEEDED 90 tablet 0    tacrolimus (PROTOPIC) 0.1 % external ointment Apply topically 2 times daily To hands and rash as needed 60 g 3       Medical History: any changes in medical history since they were last seen? No    Physical Exam:  Blood pressure (!) 144/81, pulse 60, height 1.705 m (5' 7.13\"), weight 64.4 kg (142 lb), SpO2 99%.  Constitutional: Well developed, NAD  Head: normocephalic. Atraumatic.   Eyes: no redness or jaundice noted   CV: warm, well perfused extremities   Skin: no suspicious lesions or rashes   Psychiatric: mentation appears normal and affect   Focused MSK exam: moves all extremities without difficulty. TTP of " cervical paraspinal muscles     MRI Cervical spine 6/2/2023  C2-3:  No spinal canal or neural foraminal stenosis.     C3-4:  No spinal canal stenosis. Left uncinate spur and facet  arthropathy causing mild left foraminal stenosis. The right neural  foramen is patent.     C4-5:  Circumferential disc bulge with uncovertebral spurring, right  greater than left causing moderate right and mild left foraminal  stenosis.     C5-6:  Circumferential disc bulge with infolding of the ligamentum  flavum and uncovertebral spurring, right greater than left causing  mild left foraminal stenosis and mild-to-moderate right foraminal  stenosis. Spinal canal is mildly narrowed.     C6-7:  Circumferential disc osteophyte complex with infolding of  ligamentum flavum causing mild spinal canal stenosis. Uncovertebral  spurring with facet arthropathy causing mild foraminal stenosis  bilaterally.     C7-T1:  No spinal canal or neural foraminal stenosis.     EMG 5/18/2023 showed active b/l S1 radiculopathy (R>L), chronic b/l L4-5 radiculopathy, and right ulnar neuropathy that was mild.     Assessment:   Encounter Diagnosis:  Cervical radiculopathy  Cervical disc degeneration  Bilateral Lumbo-sacral radiculopathy  Lumbar spondylosis  Osteoarthritis      Salbador Begum is a 68 year old year old male  who presents to the pain clinic with left neck pain to transition care from [facility] to [facility]. He is scheduled for a cervical RAFAEL tomorrow. Today we went through steps of the procedure, risks, benefits, and alternative treatments. All of his questions and concerns were addressed.     Plan:  Physical Therapy:  Continue HEP  Diagnostic Studies:  none  Medication Management:  Can try OTC topical diclofenac up to four times a day for hand pain  Further procedures recommended: Can consider cervical MBB to RFA in the future  Recommendations to PCP: none  Follow up: for procedure 7/11/2024 and with myself or Dr Ortiz as needed after  procedure    Mckay Bradley MD  Interventional Pain Fellow  [facility]     I saw and examined the patient with the Pain Fellow/Resident. I have reviewed and agree with the resident's note and plan of care and made changes and corrections directly to the body of the note.    TIME SPENT:  BY FELLOW/RESIDENT ALONE 20 MIN  BY MYSELF AND FELLOW/RESIDENT TOGETHER 20 MIN      Keyla Espana MD  Pain Medicine, Department of Anesthesiology  , [facility]

## 2024-07-05 RX ORDER — SILDENAFIL CITRATE 20 MG/1
TABLET ORAL
Qty: 90 TABLET | Refills: 0 | Status: SHIPPED | OUTPATIENT
Start: 2024-07-05 | End: 2024-08-04

## 2024-07-08 ENCOUNTER — TELEPHONE (OUTPATIENT)
Dept: FAMILY MEDICINE | Facility: CLINIC | Age: 69
End: 2024-07-08
Payer: MEDICARE

## 2024-07-08 ASSESSMENT — PAIN SCALES - PAIN ENJOYMENT GENERAL ACTIVITY SCALE (PEG)
PEG_TOTALSCORE: 5.67
INTERFERED_GENERAL_ACTIVITY: 2
AVG_PAIN_PASTWEEK: 8
INTERFERED_ENJOYMENT_LIFE: 7

## 2024-07-09 NOTE — TELEPHONE ENCOUNTER
Retail Pharmacy Prior Authorization Team   Phone: [phone]    PRIOR AUTHORIZATION DENIED    Medication: SILDENAFIL CITRATE 20 MG PO TABS  Insurance Company: [employer] [place] - Phone [phone] Fax [phone]  Denial Date: 7/6/2024  Denial Reason(s): DRUGS USED FOR ERECTILE DYSFUNCTION ARE EXCLUDED FROM COVERAGE      Appeal Information: IF THE PROVIDER WOULD LIKE TO APPEAL THIS DECISION PLEASE PROVIDE THE PA TEAM WITH A LETTER OF MEDICAL NECESSITY        Patient Notified: NO

## 2024-07-10 ENCOUNTER — OFFICE VISIT (OUTPATIENT)
Dept: ANESTHESIOLOGY | Facility: CLINIC | Age: 69
End: 2024-07-10
Payer: MEDICARE

## 2024-07-10 VITALS
HEIGHT: 67 IN | BODY MASS INDEX: 22.29 KG/M2 | DIASTOLIC BLOOD PRESSURE: 81 MMHG | SYSTOLIC BLOOD PRESSURE: 144 MMHG | WEIGHT: 142 LBS | HEART RATE: 60 BPM | OXYGEN SATURATION: 99 %

## 2024-07-10 DIAGNOSIS — M47.812 CERVICAL SPONDYLOSIS WITHOUT MYELOPATHY: Primary | ICD-10-CM

## 2024-07-10 DIAGNOSIS — M79.18 MYOFASCIAL PAIN: ICD-10-CM

## 2024-07-10 DIAGNOSIS — M54.12 CERVICAL RADICULOPATHY: ICD-10-CM

## 2024-07-10 PROCEDURE — 99213 OFFICE O/P EST LOW 20 MIN: CPT | Mod: GC | Performed by: ANESTHESIOLOGY

## 2024-07-10 ASSESSMENT — PAIN SCALES - GENERAL: PAINLEVEL: EXTREME PAIN (8)

## 2024-07-10 NOTE — NURSING NOTE
Patient presents with:  Follow Up: Follow-up Cervical Neck-Spondylosis previous Dr Oakley Pt-Has procedure injection with Dr Ortiz 7/11      Extreme Pain (8)     Pain Medications       Analgesics Other Refills Start End     acetaminophen (TYLENOL) 650 MG CR tablet 1 8/4/2023 --    Sig - Route: Take 2 tablets (1,300 mg) by mouth 2 times daily - Oral    Class: E-Prescribe       Opioid Combinations Refills Start End     HYDROcodone-acetaminophen (NORCO) 5-325 MG tablet 0 1/23/2024 --    Sig - Route: Take 1 tablet by mouth 2 times daily as needed for pain - Oral    Class: E-Prescribe    Earliest Fill Date: 1/23/2024            What medications are you using for pain? Hydrocodone, gabapentin, Tylenol, Ralafen    (New patients only) Have you been seen by another pain clinic/ provider? yes    (Return Patients only) What refills are you needing today? No

## 2024-07-10 NOTE — LETTER
7/10/2024       RE: Salbador Garyronnie  [address]   [address]  [facility] [address]     Dear Colleague,    Thank you for referring your patient, Salbador Begum, to the [facility]IVE PAIN [facility]NAGEMENT [place] at [facility]. Please see a copy of my visit note below.                              [facility] Follow-up    Date of visit: 7/10/2024    Chief complaint:   Chief Complaint   Patient presents with     Follow Up     Follow-up Cervical Neck-Spondylosis previous Dr Oakley Pt-Has procedure injection with Dr Ortiz 7/11     Last Clinic Visit with Dr Oakley 5/21/2024  RECOMMENDATIONS:      1. Medications: We are prescribing the patient flexeril 10 mg TID. If the patient has sedative effects of flexeril he can contact pcp to change it to possibly robaxin 500 mg qid. Dosing, side effects, risks/benefits/alternatives were discussed with the patient in detail.     2. Procedure: We are scheduling the patient for C7-T1 epidural steroid injection with fluoroscopy in the procedure suite. Risks/benefits/alternatives were discussed.   We have discussed the option of L5-S1 RAFAEL and the patient would like to hold off as he feels pain in lower back is managed at this time.     3. Education: Continue PT home exercise program     Follow up: the patient will follow up with one of my colleagues.     Interval history:  Since his last visit, Salbador Alejo Cookieronnie reports:  - has upcoming PERNELL with Dr Ortiz 7/11/2024  - pain is axial neck pain   - pain in hand that appears to be OA. Discussed using diclofenac for this  - went through steps of RAFAEL, risks, benefits    Pain scores:  Pain intensity currently is 8 on a scale of 0-10.     Current pain medications include:  Nabumetone   Gabapentin  Tylenol     Previous medication treatments included:  Norco  Advil  Percocet  Venlafaxine  Flexeril  Tizanidine     Other  treatments have included:  Salbador Begum has been seen at a pain clinic in the past.    PT: yes - helpful   Injections: has had 5 injections, last was 3/29/2023 (L4-5 ILESI) - last for months  Surgery: denies      Side Effects: no side effect    Medications:  Current Outpatient Medications   Medication Sig Dispense Refill     acetaminophen (TYLENOL) 650 MG CR tablet Take 2 tablets (1,300 mg) by mouth 2 times daily 180 tablet 1     [START ON 8/1/2024] amLODIPine (NORVASC) 5 MG tablet Take 1 tablet (5 mg) by mouth daily 90 tablet 1     amphetamine-dextroamphetamine (ADDERALL XR) 10 MG 24 hr capsule Take 1 capsule (10 mg) by mouth daily for 30 days 30 capsule 0     Ascorbic Acid (VITAMIN C PO)        buPROPion (WELLBUTRIN XL) 300 MG 24 hr tablet TAKE 1 TABLET BY MOUTH EVERY DAY IN THE MORNING 90 tablet 1     calcium-vitamin D 500-125 MG-UNIT TABS Take 1 tablet by mouth daily       fluocinonide (LIDEX) 0.05 % external ointment Apply topically 2 times daily To lower ankle as needed 60 g 3     gabapentin (NEURONTIN) 300 MG capsule TAKE 1 CAPSULE (300 MG) BY MOUTH 3 TIMES DAILY PLEASE DISCONTINUE 100MG DOSE. 90 capsule 2     HYDROcodone-acetaminophen (NORCO) 5-325 MG tablet Take 1 tablet by mouth 2 times daily as needed for pain 10 tablet 0     methocarbamol (ROBAXIN) 500 MG tablet Take 1 tablet (500 mg) by mouth 4 times daily as needed for muscle spasms 120 tablet 3     Multiple Vitamin (MULTI-DAY VITAMINS) TABS Take  by mouth.       nabumetone (RELAFEN) 500 MG tablet TAKE 1 TABLET (500 MG) BY MOUTH 2 TIMES DAILY AS NEEDED FOR MODERATE PAIN (4-6) 180 tablet 3     sildenafil (REVATIO) 20 MG tablet TAKE 3 TO 5 TABLETS BY MOUTH 30 MINUTES BEFORE SEXUAL ACTIVITY AS NEEDED 90 tablet 0     tacrolimus (PROTOPIC) 0.1 % external ointment Apply topically 2 times daily To hands and rash as needed 60 g 3       Medical History: any changes in medical history since they were last seen? No    Physical Exam:  Blood pressure (!)  "144/81, pulse 60, height 1.705 m (5' 7.13\"), weight 64.4 kg (142 lb), SpO2 99%.  Constitutional: Well developed, NAD  Head: normocephalic. Atraumatic.   Eyes: no redness or jaundice noted   CV: warm, well perfused extremities   Skin: no suspicious lesions or rashes   Psychiatric: mentation appears normal and affect   Focused MSK exam: moves all extremities without difficulty. TTP of cervical paraspinal muscles     MRI Cervical spine 6/2/2023  C2-3:  No spinal canal or neural foraminal stenosis.     C3-4:  No spinal canal stenosis. Left uncinate spur and facet  arthropathy causing mild left foraminal stenosis. The right neural  foramen is patent.     C4-5:  Circumferential disc bulge with uncovertebral spurring, right  greater than left causing moderate right and mild left foraminal  stenosis.     C5-6:  Circumferential disc bulge with infolding of the ligamentum  flavum and uncovertebral spurring, right greater than left causing  mild left foraminal stenosis and mild-to-moderate right foraminal  stenosis. Spinal canal is mildly narrowed.     C6-7:  Circumferential disc osteophyte complex with infolding of  ligamentum flavum causing mild spinal canal stenosis. Uncovertebral  spurring with facet arthropathy causing mild foraminal stenosis  bilaterally.     C7-T1:  No spinal canal or neural foraminal stenosis.     EMG 5/18/2023 showed active b/l S1 radiculopathy (R>L), chronic b/l L4-5 radiculopathy, and right ulnar neuropathy that was mild.     Assessment:   Encounter Diagnosis:  Cervical radiculopathy  Cervical disc degeneration  Bilateral Lumbo-sacral radiculopathy  Lumbar spondylosis  Osteoarthritis      Salbador Begum is a 68 year old year old male  who presents to the pain clinic with left neck pain to transition care from [facility] to [facility]. He is scheduled for a cervical RAFAEL tomorrow. Today we went through steps of the procedure, risks, benefits, and alternative treatments. All of his questions and " concerns were addressed.     Plan:  Physical Therapy:  Continue HEP  Diagnostic Studies:  none  Medication Management:  Can try OTC topical diclofenac up to four times a day for hand pain  Further procedures recommended: Can consider cervical MBB to RFA in the future  Recommendations to PCP: none  Follow up: for procedure 7/11/2024 and with myself or Dr Ortiz as needed after procedure    Mckay Bradley MD  Interventional Pain Fellow  [facility]     I saw and examined the patient with the Pain Fellow/Resident. I have reviewed and agree with the resident's note and plan of care and made changes and corrections directly to the body of the note.    TIME SPENT:  BY FELLOW/RESIDENT ALONE 20 MIN  BY MYSELF AND FELLOW/RESIDENT TOGETHER 20 MIN      Keyla Espana MD  Pain Medicine, Department of Anesthesiology  , [facility]

## 2024-07-10 NOTE — PATIENT INSTRUCTIONS
Treatment planning:    Recommendations will be written in the providers note for your Primary Care provider (OR other providers in your care team) to review and make changes to your therapies based on their discretion.       Recommended Follow up:      Follow up with Dr Ortiz or Dr Espana after injection as needed       To speak with a nurse, schedule/reschedule/cancel a clinic appointment, or request a medication refill call: [phone].    You can also reach us by [email or web address]: [email or web address]

## 2024-07-11 ENCOUNTER — ANCILLARY PROCEDURE (OUTPATIENT)
Dept: RADIOLOGY | Facility: AMBULATORY SURGERY CENTER | Age: 69
End: 2024-07-11
Attending: ANESTHESIOLOGY
Payer: MEDICARE

## 2024-07-11 ENCOUNTER — HOSPITAL ENCOUNTER (OUTPATIENT)
Facility: AMBULATORY SURGERY CENTER | Age: 69
Discharge: HOME | End: 2024-07-11
Attending: ANESTHESIOLOGY | Admitting: ANESTHESIOLOGY
Payer: MEDICARE

## 2024-07-11 VITALS
TEMPERATURE: 98 F | SYSTOLIC BLOOD PRESSURE: 138 MMHG | HEIGHT: 69 IN | RESPIRATION RATE: 17 BRPM | WEIGHT: 142 LBS | BODY MASS INDEX: 21.03 KG/M2 | HEART RATE: 53 BPM | OXYGEN SATURATION: 99 % | DIASTOLIC BLOOD PRESSURE: 92 MMHG

## 2024-07-11 DIAGNOSIS — M54.12 CERVICAL RADICULOPATHY: ICD-10-CM

## 2024-07-11 PROCEDURE — 62321 NJX INTERLAMINAR CRV/THRC: CPT

## 2024-07-11 RX ORDER — BUPIVACAINE HYDROCHLORIDE 2.5 MG/ML
INJECTION, SOLUTION EPIDURAL; INFILTRATION; INTRACAUDAL PRN
Status: DISCONTINUED | OUTPATIENT
Start: 2024-07-11 | End: 2024-07-11 | Stop reason: HOSPADM

## 2024-07-11 RX ORDER — LIDOCAINE HYDROCHLORIDE 10 MG/ML
INJECTION, SOLUTION EPIDURAL; INFILTRATION; INTRACAUDAL; PERINEURAL PRN
Status: DISCONTINUED | OUTPATIENT
Start: 2024-07-11 | End: 2024-07-11 | Stop reason: HOSPADM

## 2024-07-11 RX ORDER — DEXAMETHASONE SODIUM PHOSPHATE 10 MG/ML
INJECTION INTRAMUSCULAR; INTRAVENOUS PRN
Status: DISCONTINUED | OUTPATIENT
Start: 2024-07-11 | End: 2024-07-11 | Stop reason: HOSPADM

## 2024-07-11 RX ORDER — IOPAMIDOL 408 MG/ML
INJECTION, SOLUTION INTRATHECAL PRN
Status: DISCONTINUED | OUTPATIENT
Start: 2024-07-11 | End: 2024-07-11 | Stop reason: HOSPADM

## 2024-07-11 NOTE — DISCHARGE INSTRUCTIONS
PAIN INJECTION HOME CARE INSTRUCTIONS  Activity  -Rest today  -Do not work today  -Resume normal activity tomorrow  -DO NOT shower for 24 hours  -DO NOT remove bandaid for 24 hours    Pain  -You may experience soreness at the injection site for one or two days  -You may use an ice pack for 20 minutes every 2 hours for the first 24 hours  -You may use a heating pad after the first 24 hours  -You may use Tylenol (acetaminophen) every 4 hours or other pain medicines as directed by your physician    You may experience numbness radiating into your legs or arms (depending on the procedure location). This numbness may last several hours. Until sensation returns to normal; please use caution in walking, climbing stairs, and stepping out of your vehicle, etc.      Safety  Sedation medicine, if given, may remain active for many hours. It is important for the next 24 hours that you do not:  -Drive a car  -Operate machines or power tools  -Consume alcohol, including beer  -Sign any important papers or legal documents      Common side effects of steroids:  Not everyone will experience corticosteroid side effects. If side effects are experienced, they will gradually subside in the 7-10 day period following an injection. Most common side effects include:  -Flushed face and/or chest  -Feeling of warmth, particularly in the face but could be an overall feeling of warmth  -Increased blood sugar in diabetic patients  -Menstrual irregularities my occur. If taking hormone-based birth control an alternate method of birth control is recommended  -Sleep disturbances and/or mood swings are possible  -Leg cramps    Please contact us if you have:  -Severe pain  -Fever more than 101.5 degrees Fahrenheit  -Signs of infection at the injection site (redness, swelling, or drainage)    FOR PAIN CENTER PATIENTS:  If you have questions, please contact the Pain Clinic at [phone] Option #1 between the hours of 7:00 am and 3:00 pm Monday through  Friday. After office hours you can contact the on call provider by dialing [phone]. If you need immediate attention, we recommend that you go to a hospital emergency room or dial [phone].      FOR PM&R PATIENTS:  For patients seen by the PM and R service, please call [phone]. (Monday through Friday 8a-5p.  After business hours and weekends call [phone] and ask for the PM and R doctor on call). If you need to fax a pain diary to PM&R the fax number is [phone]. If you are unable to fax, uploading to [employer] is encouraged, then send to provider. If you have procedure scheduling questions please call [phone] Option #2.            75

## 2024-07-11 NOTE — OP NOTE
Patient: Salbador Begum Age: 68 year old   MRN: [id number] Attending: Dr. Ortiz     Date of Visit: July 11, 2024      PAIN MEDICINE CLINIC PROCEDURE NOTE    ATTENDING CLINICIAN:    Jan Ortiz MD    PREPROCEDURE DIAGNOSES:  1.  Cervical radiculopathy  2.  Chronic neck pain radiating to the upper extremity    PROCEDURE(S) PERFORMED:  1.  Interlaminar cervical epidural steroid injection (C7-T1)  2.  Fluoroscopic guidance for the above-named procedure(s)      ANESTHESIA:  Local.    BLOOD LOSS:  Minimal.    DRAINS AND SPECIMENS:  None.    COMPLICATIONS:  None.    INDICATIONS:  Salbador Begum is a 68 year old male with a history of  chronic neck pain radiating to the upper extremity.      The patient stated that the patient was in their usual state of health and denied recent anticoagulant use or recent infections.  Therefore, the plan is for the above mentioned procedure.     Procedure Details:  The patient was met in the procedure room, where the patient was identified by name, medical record number and date of birth.  All of the patient s last minute questions were answered. Written informed consent was obtained and saved in the electronic medical record, after the risks, benefits, and alternatives were discussed with the patient.      A formal time-out procedure was performed, as per protocol, including patient name, title of procedure, and site of procedure, and all in the room concurred.  Routine monitors were applied.      The patient was placed in the prone position on the procedure room table.  All pressure points were checked and comfortably padded.  Routine monitors were placed.  Vital signs were stable.    A chlorhexidine prep was completed followed by sterile draping per standard procedure.     The AP fluoroscopic view was optimized for approach at  C7-T1 interspace.  The skin over the interspace was infiltrated with 4-5 mL of 1% Lidocaine using a 25 gauge, 1.5 inch needle.  A 22-gauge 3-1/2  inch Tuohy needle was advanced midline between C7-T1 interlaminar space using the loss of resistance technique with preservative free normal saline with fluoroscopic guidance. Mutiple AP, contralateral oblique, and lateral fluoroscopic images are taken as Tuohy needle was advanced to the epidural space. The epidural space was identified, without evidence of blood, cerebrospinal fluid, or parasthesia throughout. Needle tip placement within the epidural space was further confirmed with 1-2 mL of nonionic contrast agent, with the epidural space visualized in the AP, contralateral oblique, and lateral fluoroscopic view(s) with appropriate spread of the agent with fat vacuolization and no intravascular or intrathecal spread noted.  Next, 6 mL of a treatment solution containing 1 mL of preservative dexamethasone 10mg/ml, 1 mL 0.25% and 4 mL preservative free normal saline was administered. The needle was withdrawn.    Light pressure was held at the puncture site(s) to prevent ecchymosis and oozing.  The patient's skin was cleansed, and hemostasis was confirmed.  Band-aids were applied to the needle injection site(s).      Condition:    The patient remained awake and alert throughout the procedure.  The patient tolerated the procedure well and was monitored for approximately 15 minutes afterward in the post procedure area.  There were no immediate post procedure complications noted.  The patient was then discharged to home as per protocol.      Pre-procedure pain score: 3/10  Post-procedure pain score: 1/10

## 2024-07-18 ENCOUNTER — TELEPHONE (OUTPATIENT)
Dept: CARDIOLOGY | Facility: CLINIC | Age: 69
End: 2024-07-18
Payer: COMMERCIAL

## 2024-07-18 NOTE — TELEPHONE ENCOUNTER
Left Voicemail (1st Attempt) for the patient to call back and schedule the following:    Appointment type:  RTN EP  Provider: BAO OLIVARES   Return date: 11/09/24  Specialty phone number: [phone] OPT 1   Additional appointment(s) needed: N/A   Additonal Notes: N/A

## 2024-07-20 DIAGNOSIS — M48.062 SPINAL STENOSIS OF LUMBAR REGION WITH NEUROGENIC CLAUDICATION: ICD-10-CM

## 2024-07-22 RX ORDER — NABUMETONE 500 MG/1
500 TABLET, FILM COATED ORAL 2 TIMES DAILY PRN
Qty: 180 TABLET | Refills: 3 | Status: SHIPPED | OUTPATIENT
Start: 2024-07-22

## 2024-08-04 ENCOUNTER — MYC REFILL (OUTPATIENT)
Dept: FAMILY MEDICINE | Facility: CLINIC | Age: 69
End: 2024-08-04
Payer: MEDICARE

## 2024-08-04 DIAGNOSIS — N52.9 ERECTILE DYSFUNCTION, UNSPECIFIED ERECTILE DYSFUNCTION TYPE: ICD-10-CM

## 2024-08-05 ENCOUNTER — MYC REFILL (OUTPATIENT)
Dept: FAMILY MEDICINE | Facility: CLINIC | Age: 69
End: 2024-08-05
Payer: MEDICARE

## 2024-08-05 DIAGNOSIS — F90.0 ADHD (ATTENTION DEFICIT HYPERACTIVITY DISORDER), INATTENTIVE TYPE: ICD-10-CM

## 2024-08-05 RX ORDER — SILDENAFIL CITRATE 20 MG/1
TABLET ORAL
Qty: 90 TABLET | Refills: 0 | Status: SHIPPED | OUTPATIENT
Start: 2024-08-05

## 2024-08-05 RX ORDER — DEXTROAMPHETAMINE SACCHARATE, AMPHETAMINE ASPARTATE MONOHYDRATE, DEXTROAMPHETAMINE SULFATE AND AMPHETAMINE SULFATE 2.5; 2.5; 2.5; 2.5 MG/1; MG/1; MG/1; MG/1
10 CAPSULE, EXTENDED RELEASE ORAL DAILY
Qty: 30 CAPSULE | Refills: 0 | OUTPATIENT
Start: 2024-08-05

## 2024-08-05 NOTE — TELEPHONE ENCOUNTER
Medication Question or Refill    Contacts       Contact Date/Time Type Contact Phone/Fax    08/05/2024 02:32 PM CDT Phone (Incoming) Salbador Begum (Self) [phone] (M)            What medication are you calling about (include dose and sig)?:     amphetamine-dextroamphetamine (ADDERALL XR) 10 MG 24 hr capsule       Preferred Pharmacy:      [place] [address] [place] [facility] [place] [place] [facility] [address]  [address] [place] [address]  Phone: [phone] Fax: [phone]          Controlled Substance Agreement on file:   CSA -- Patient Level:    CSA: None found at the patient level.       Who prescribed the medication?:  Kieran Zuluaga      Do you need a refill? YES, please refill  until seen, pt state need about three week worth of medication     When did you use the medication last? Today     Patient offered an appointment? Yes: 8/27/2024- 3:10pm         Do you have any questions or concerns?  No      Could we send this information to you in [facility] or would you prefer to receive a phone call?:   Patient would prefer a phone call   Okay to leave a detailed message?: Yes at Cell number on file:    Telephone Information:   Mobile [phone]

## 2024-08-05 NOTE — TELEPHONE ENCOUNTER
Denied. Controlled substance refill requires some sort of visit - office or virtual. Please inform patient and help patient to schedule an appointment. Thank you.

## 2024-08-06 RX ORDER — DEXTROAMPHETAMINE SACCHARATE, AMPHETAMINE ASPARTATE MONOHYDRATE, DEXTROAMPHETAMINE SULFATE AND AMPHETAMINE SULFATE 2.5; 2.5; 2.5; 2.5 MG/1; MG/1; MG/1; MG/1
10 CAPSULE, EXTENDED RELEASE ORAL DAILY
Qty: 21 CAPSULE | Refills: 0 | Status: SHIPPED | OUTPATIENT
Start: 2024-08-06 | End: 2024-08-27 | Stop reason: DRUGHIGH

## 2024-08-22 ENCOUNTER — MYC REFILL (OUTPATIENT)
Dept: FAMILY MEDICINE | Facility: CLINIC | Age: 69
End: 2024-08-22
Payer: MEDICARE

## 2024-08-22 DIAGNOSIS — M48.062 SPINAL STENOSIS OF LUMBAR REGION WITH NEUROGENIC CLAUDICATION: ICD-10-CM

## 2024-08-23 RX ORDER — GABAPENTIN 300 MG/1
300 CAPSULE ORAL 3 TIMES DAILY
Qty: 90 CAPSULE | Refills: 5 | Status: SHIPPED | OUTPATIENT
Start: 2024-08-23

## 2024-08-27 ENCOUNTER — OFFICE VISIT (OUTPATIENT)
Dept: FAMILY MEDICINE | Facility: CLINIC | Age: 69
End: 2024-08-27
Payer: MEDICARE

## 2024-08-27 ENCOUNTER — MYC REFILL (OUTPATIENT)
Dept: FAMILY MEDICINE | Facility: CLINIC | Age: 69
End: 2024-08-27

## 2024-08-27 VITALS
HEIGHT: 69 IN | WEIGHT: 141.4 LBS | SYSTOLIC BLOOD PRESSURE: 130 MMHG | OXYGEN SATURATION: 98 % | TEMPERATURE: 98.1 F | DIASTOLIC BLOOD PRESSURE: 72 MMHG | HEART RATE: 60 BPM | RESPIRATION RATE: 16 BRPM | BODY MASS INDEX: 20.94 KG/M2

## 2024-08-27 DIAGNOSIS — F90.0 ADHD (ATTENTION DEFICIT HYPERACTIVITY DISORDER), INATTENTIVE TYPE: ICD-10-CM

## 2024-08-27 DIAGNOSIS — F90.0 ADHD (ATTENTION DEFICIT HYPERACTIVITY DISORDER), INATTENTIVE TYPE: Primary | ICD-10-CM

## 2024-08-27 DIAGNOSIS — M47.812 CERVICAL SPONDYLOSIS WITHOUT MYELOPATHY: ICD-10-CM

## 2024-08-27 DIAGNOSIS — M48.062 SPINAL STENOSIS OF LUMBAR REGION WITH NEUROGENIC CLAUDICATION: ICD-10-CM

## 2024-08-27 PROCEDURE — G2211 COMPLEX E/M VISIT ADD ON: HCPCS | Performed by: FAMILY MEDICINE

## 2024-08-27 PROCEDURE — 99214 OFFICE O/P EST MOD 30 MIN: CPT | Performed by: FAMILY MEDICINE

## 2024-08-27 RX ORDER — DEXTROAMPHETAMINE SACCHARATE, AMPHETAMINE ASPARTATE MONOHYDRATE, DEXTROAMPHETAMINE SULFATE AND AMPHETAMINE SULFATE 5; 5; 5; 5 MG/1; MG/1; MG/1; MG/1
20 CAPSULE, EXTENDED RELEASE ORAL DAILY
Qty: 30 CAPSULE | Refills: 0 | Status: CANCELLED | OUTPATIENT
Start: 2024-08-27

## 2024-08-27 RX ORDER — DEXTROAMPHETAMINE SACCHARATE, AMPHETAMINE ASPARTATE MONOHYDRATE, DEXTROAMPHETAMINE SULFATE AND AMPHETAMINE SULFATE 5; 5; 5; 5 MG/1; MG/1; MG/1; MG/1
20 CAPSULE, EXTENDED RELEASE ORAL DAILY
Qty: 30 CAPSULE | Refills: 0 | Status: SHIPPED | OUTPATIENT
Start: 2024-10-26 | End: 2024-08-27

## 2024-08-27 RX ORDER — DEXTROAMPHETAMINE SACCHARATE, AMPHETAMINE ASPARTATE MONOHYDRATE, DEXTROAMPHETAMINE SULFATE AND AMPHETAMINE SULFATE 5; 5; 5; 5 MG/1; MG/1; MG/1; MG/1
20 CAPSULE, EXTENDED RELEASE ORAL DAILY
Qty: 30 CAPSULE | Refills: 0 | Status: SHIPPED | OUTPATIENT
Start: 2024-08-27 | End: 2024-08-27

## 2024-08-27 RX ORDER — DEXTROAMPHETAMINE SACCHARATE, AMPHETAMINE ASPARTATE MONOHYDRATE, DEXTROAMPHETAMINE SULFATE AND AMPHETAMINE SULFATE 2.5; 2.5; 2.5; 2.5 MG/1; MG/1; MG/1; MG/1
10 CAPSULE, EXTENDED RELEASE ORAL DAILY
Qty: 21 CAPSULE | Refills: 0 | Status: CANCELLED | OUTPATIENT
Start: 2024-08-27

## 2024-08-27 RX ORDER — HYDROCODONE BITARTRATE AND ACETAMINOPHEN 5; 325 MG/1; MG/1
1 TABLET ORAL 2 TIMES DAILY PRN
Qty: 10 TABLET | Refills: 0 | Status: SHIPPED | OUTPATIENT
Start: 2024-08-27

## 2024-08-27 RX ORDER — DEXTROAMPHETAMINE SACCHARATE, AMPHETAMINE ASPARTATE MONOHYDRATE, DEXTROAMPHETAMINE SULFATE AND AMPHETAMINE SULFATE 5; 5; 5; 5 MG/1; MG/1; MG/1; MG/1
20 CAPSULE, EXTENDED RELEASE ORAL DAILY
Qty: 30 CAPSULE | Refills: 0 | Status: SHIPPED | OUTPATIENT
Start: 2024-09-26 | End: 2024-08-27

## 2024-08-27 ASSESSMENT — PAIN SCALES - GENERAL: PAINLEVEL: NO PAIN (0)

## 2024-08-27 NOTE — TELEPHONE ENCOUNTER
Patient calling back and [facility] has Adderall in stock.  [place] and will need 2 of the 3 prescriptions to NOT have a fill date on them.    Message routed to Dr Zuluaga for refill request.    Lucila Boggs RN  [facility]

## 2024-08-27 NOTE — TELEPHONE ENCOUNTER
I do need to provide fill date for controlled substance.   So - does which medications he needs to be sent to [facility] pharmacy? For this month only or for future refills? Please clarify and reroute.

## 2024-08-27 NOTE — TELEPHONE ENCOUNTER
Medication Question     Contacts       Contact Date/Time Type Contact Phone/Fax    08/27/2024 04:45 PM CDT Phone (Incoming) Salbador Begum (Self) [phone] (M)        What medication are you calling about (include dose and sig)?:     amphetamine-dextroamphetamine (ADDERALL XR) 20 MG 24 hr capsule 30 capsule 0 8/27/2024 9/26/2024   Sig:   Take 1 capsule (20 mg) by mouth daily.     Route:   Oral       Preferred Pharmacy:Phone: [phone] Fax: [phone]  [facility] [address] [place] [facility] - [place], [place] - [address]  [address] [facility] [address]  Phone: [phone] Fax: [phone]    Controlled Substance Agreement on file:   CSA -- Patient Level:    CSA: None found at the patient level.     Who prescribed the medication?: Dr Zuluaga    Do you have any concerns?  Yes: Patient unable to get Adderall at [facility] due to nationwide shortage.  Patient will call other pharmacies and see if medication available.  Patient will call clinic back to have medication sent to new pharmacy.

## 2024-08-27 NOTE — PROGRESS NOTES
Assessment & Plan     ADHD (attention deficit hyperactivity disorder), inattentive type  We agreed to bump ADHD medication.  Currently on 10mg adderall.  Offered 15 mg.  He would like to increase it to 20. 3 months of rx given.  [place] and it is okay to get his second months of Rx sooner.    Spinal stenosis of lumbar region with neurogenic claudication  Infrequent use.  Understands controlled substance.  Refills only during office visit.  - HYDROcodone-acetaminophen (NORCO) 5-325 MG tablet; Take 1 tablet by mouth 2 times daily as needed for pain.    Cervical spondylosis without myelopathy  See above  - HYDROcodone-acetaminophen (NORCO) 5-325 MG tablet; Take 1 tablet by mouth 2 times daily as needed for pain.                Papa Siu is a 68 year old, presenting for the following health issues:  Recheck Medication and Plantar Wart      8/27/2024     3:12 PM   Additional Questions   Roomed by Chandni moncada     History of Present Illness       Reason for visit:  I need to refill Rx for Adderall    He eats 2-3 servings of fruits and vegetables daily.He consumes 0 sweetened beverage(s) daily.He exercises with enough effort to increase his heart rate 30 to 60 minutes per day.  He exercises with enough effort to increase his heart rate 4 days per week.   He is taking medications regularly.     Neurologist - progressing tingling in leg. Cold water dripping, insect crawling sensation.   Hands go numb time to time.   Epidural in neck did not last too long. Had EMG in the past.   Not much pain in back but tingling and numbness present.   Walking helps.     Going out of country and will be walking a lot. Would like to keep some norco handy.     Still around 5-6 pill from previous 10 pills left which was given in January.     Adhd - feels symptoms are not best controlled. Moved recently. Trying to organize office space but can't organize his thoughts. Wondering about higher dose.         Objective    BP  "130/72 (BP Location: Right arm, Patient Position: Sitting, Cuff Size: Adult Regular)   Pulse 60   Temp 98.1  F (36.7  C) (Temporal)   Resp 16   Ht 1.753 m (5' 9\")   Wt 64.1 kg (141 lb 6.4 oz)   SpO2 98%   BMI 20.88 kg/m    Body mass index is 20.88 kg/m .  Physical Exam           The longitudinal plan of care for the diagnosis(es)/condition(s) as documented were addressed during this visit. Due to the added complexity in care, I will continue to support Salbador in the subsequent management and with ongoing continuity of care.    Signed Electronically by: Kieran Zuluaga MD    "

## 2024-08-27 NOTE — TELEPHONE ENCOUNTER
Pt states he is okay [facility]. He states he just needs a travel override/early fill approval for next month given he will be traveling. Included this in September rx note to pharmacy pended below, but unsure if any additional steps are needed given this is a controlled substance.    NAYANA Leach, BSN, RN (she/her)  [facility] RN

## 2024-08-28 RX ORDER — DEXTROAMPHETAMINE SACCHARATE, AMPHETAMINE ASPARTATE MONOHYDRATE, DEXTROAMPHETAMINE SULFATE AND AMPHETAMINE SULFATE 5; 5; 5; 5 MG/1; MG/1; MG/1; MG/1
20 CAPSULE, EXTENDED RELEASE ORAL DAILY
Qty: 30 CAPSULE | Refills: 0 | Status: SHIPPED | OUTPATIENT
Start: 2024-09-26

## 2024-08-28 RX ORDER — DEXTROAMPHETAMINE SACCHARATE, AMPHETAMINE ASPARTATE MONOHYDRATE, DEXTROAMPHETAMINE SULFATE AND AMPHETAMINE SULFATE 5; 5; 5; 5 MG/1; MG/1; MG/1; MG/1
20 CAPSULE, EXTENDED RELEASE ORAL DAILY
Qty: 30 CAPSULE | Refills: 0 | Status: SHIPPED | OUTPATIENT
Start: 2024-08-28

## 2024-08-28 RX ORDER — DEXTROAMPHETAMINE SACCHARATE, AMPHETAMINE ASPARTATE MONOHYDRATE, DEXTROAMPHETAMINE SULFATE AND AMPHETAMINE SULFATE 5; 5; 5; 5 MG/1; MG/1; MG/1; MG/1
20 CAPSULE, EXTENDED RELEASE ORAL DAILY
Qty: 30 CAPSULE | Refills: 0 | Status: SHIPPED | OUTPATIENT
Start: 2024-10-26

## 2024-08-28 NOTE — TELEPHONE ENCOUNTER
Writer responded via [employer].  LEVI SiegelN, RN-BC  [facility]

## 2024-09-03 ENCOUNTER — MYC MEDICAL ADVICE (OUTPATIENT)
Dept: FAMILY MEDICINE | Facility: CLINIC | Age: 69
End: 2024-09-03
Payer: MEDICARE

## 2024-10-01 ENCOUNTER — MYC REFILL (OUTPATIENT)
Dept: FAMILY MEDICINE | Facility: CLINIC | Age: 69
End: 2024-10-01
Payer: MEDICARE

## 2024-10-01 DIAGNOSIS — F33.0 MAJOR DEPRESSIVE DISORDER, RECURRENT, MILD (H): ICD-10-CM

## 2024-10-01 DIAGNOSIS — F90.0 ADHD (ATTENTION DEFICIT HYPERACTIVITY DISORDER), INATTENTIVE TYPE: ICD-10-CM

## 2024-10-01 DIAGNOSIS — M48.062 SPINAL STENOSIS OF LUMBAR REGION WITH NEUROGENIC CLAUDICATION: ICD-10-CM

## 2024-10-01 RX ORDER — BUPROPION HYDROCHLORIDE 300 MG/1
TABLET ORAL
Qty: 90 TABLET | Refills: 1 | OUTPATIENT
Start: 2024-10-01

## 2024-10-01 RX ORDER — GABAPENTIN 300 MG/1
300 CAPSULE ORAL 3 TIMES DAILY
Qty: 90 CAPSULE | Refills: 5 | OUTPATIENT
Start: 2024-10-01

## 2024-10-01 RX ORDER — DEXTROAMPHETAMINE SACCHARATE, AMPHETAMINE ASPARTATE MONOHYDRATE, DEXTROAMPHETAMINE SULFATE AND AMPHETAMINE SULFATE 5; 5; 5; 5 MG/1; MG/1; MG/1; MG/1
20 CAPSULE, EXTENDED RELEASE ORAL DAILY
Qty: 30 CAPSULE | Refills: 0 | OUTPATIENT
Start: 2024-10-01

## 2024-10-01 NOTE — PROGRESS NOTES
ELECTROPHYSIOLOGY CLINIC VISIT    Assessment/Recommendations   Assessment/Plan:    Mr. Begum is a 68 year old male who has a past medical history significant for SND, MR, diverticulitis, narcolepsy, AIMEE (uses CPAP), OA, and depression.     He has had known SND mainly manifesting as chronotropic incompetence. He has previously underwent a thorough cardiac workup. He continues to have stable symptoms with intermittent exertional fatigue and SOB. We discussed given stability of his symptoms, we can defer pacing at this time. He understands that a pacemaker may be in his future, and will let us know if he has any worsening frequency/severity of symptoms that become lifestyle limiting.  Will get an updated echo to look at his MR.     Follow up in 2 years or sooner if need arises.        History of Present Illness/Subjective    Mr. Salbador Begum is a 68 year old male who comes in today for EP follow-up of SND.    Mr. Begum is a 68 year old male who has a past medical history significant for SND, MR, diverticulitis, narcolepsy, AIMEE (uses CPAP), OA, and depression.       He has had symptoms of dyspnea on exertion associated with blunted HR response to exercise. He was also having a sensation of irregular HRs with exercise. He had a treadmill study that showed excellent exercise capacity. The report mentions PACs and PVCs during exercise and rest. The note I sent him mentions atrial fibrillation during exercise. I will have the stress test ECGs pulled to review again.     Overall, he reports he has been feeling well. He was on vacation, walking up hills and climbing stairs without difficulty. He will occasion notice some shortness of breath with exercise or stairs but this is much less frequent than before. He initially noted this symptom last winter while cross country skiing. He is wondering if it will return this winter.     [id number]: Jogging again, stairs don't bother him like they use to. It had been a couple  years since he was in to see me and felt he should touch base.      [id number]:  Mr. Begum feels well since his last visit in 09/2015. He continues to bicycle, jog, ski, and run frequently. His symptoms of dizziness/lightheadedness have not precluded him from his personal hobbies or his professional work as an  for the [place] [employer]. He did not a period of dyspnea on exertion over the last few summers that actually improved when winter came about. Mr. Begum feels that his exercise tolerance is now at baseline. Mr. Begum still has some dizziness/lightheadedness upon waking, but he prevents this by standing slowly. He had a single episode of pre-syncope several months ago. No syncope, chest pain, exertional dyspnea at present.     2/10/17: Since last visit, he was admitted under ED observation for chest pain rule out on 11/20/16. Serial troponin x3 negative, stress echocardiogram with normal baseline echo and appropriate rise in LV systolic function at peak exercise. He did not have any symptoms during the exercise portion of the study. Since discharge, he has felt well, and denies any limitations to exercise capacity such as chest pain/pressure or dyspnea. He is not experiencing palpitations. He is not on a B-blocker as his resting HR is very low at baseline. He exercises regularly, including NordicTrak and running. He feels well and denies any complaints currently. Unfortunately, his wife was recently diagnosed with frontotemporal dementia, so he plans to retire soon and travel back to [facility] more frequently.      [id number]: He reports no increase in his palpitations.  He has noticed that his exercise abilities are beginning to decline, more than he would anticipate related to simple aging.  He notices that on the days where he is feeling a worse with exercise his heart rate tends to be in the 110s to may be 120 using his Fitbit.  Other days he is able to get his heart rate to the 140s 150s and feels much  better with exercise.  He has had no syncope or near syncope.     11/2021: we had planned on a permanent pacemaker for chronotropic incompetence but he elected to hold on that. Today he reports he has been doing well. His wife is not in memory care due to early onset dementia. He has began running again. It takes some time to get his heart rate up but eventually reaches the 140s. He has no complaints about his exercise abilities at this time.    EP Visit 11/9/22: He presents today for follow up. He reports feeling at baseline. He continues to jog and be active. He does have some symptoms of chronotropic incompetence at times, namely fatigue/PHELPS, but thus far has not been too limiting to him. He denies chest discomfort, palpitations, abdominal fullness/bloating or peripheral edema, shortness of breath, paroxysmal nocturnal dyspnea, orthopnea, lightheadedness, dizziness, pre-syncope, or syncope. Current cardiac medications include: Norvasc.     He presents today for follow up. He reports feeling at baseline. He notes some exertional chronotropic symptoms with inclines/stair, but otherwise is able to be active. He denies chest discomfort, palpitations, abdominal fullness/bloating or peripheral edema, paroxysmal nocturnal dyspnea, orthopnea, lightheadedness, dizziness, pre-syncope, or syncope. Presenting 12 lead ECG shows SB Vent Rate 58 bpm,  ms, QRS 78 ms, QTc 378 ms. Current cardiac medications include: Norvasc.       I have reviewed and updated the patient's Past Medical History, Social History, Family History and Medication List.     Cardiographics (Personally Reviewed) :   11/2021 Echo:   Interpretation Summary  Global and regional left ventricular function is normal with an EF of 55-60%.  The right ventricle is normal size. Global right ventricular function is  normal.  Mild to moderate mitral insufficiency is present.  IVC diameter and respiratory changes fall into an intermediate range  suggesting an RA  pressure of 8 mmHg.  No pericardial effusion is present.       Physical Examination   BP (!) 158/90 (BP Location: Right arm, Patient Position: Chair, Cuff Size: Adult Regular)   Pulse 64   Wt 63.7 kg (140 lb 6.4 oz)   SpO2 100%   BMI 20.73 kg/m    Wt Readings from Last 3 Encounters:   08/27/24 64.1 kg (141 lb 6.4 oz)   07/11/24 64.4 kg (142 lb)   07/10/24 64.4 kg (142 lb)       CONSITUTIONAL: no acute distress  HEENT: no icterus, no redness or discharge, neck supple  CV: no visible edema of visualized extremities. No JVD.   RESPIRATORY: respirations nonlabored, no cough  NEURO: AA&Ox3, speech fluent/appropriate, motor grossly nonfocal  PSYCH: cooperative, affect appropriate  DERM: no rashes on visualized face/neck/upper extremities       Medications  Allergies   Current Outpatient Medications   Medication Sig Dispense Refill    acetaminophen (TYLENOL) 650 MG CR tablet Take 2 tablets (1,300 mg) by mouth 2 times daily 180 tablet 1    amLODIPine (NORVASC) 5 MG tablet Take 1 tablet (5 mg) by mouth daily 90 tablet 1    amphetamine-dextroamphetamine (ADDERALL XR) 20 MG 24 hr capsule Take 1 capsule (20 mg) by mouth daily. 30 capsule 0    amphetamine-dextroamphetamine (ADDERALL XR) 20 MG 24 hr capsule Take 1 capsule (20 mg) by mouth daily. Do not start before September 26, 2024. 30 capsule 0    [START ON 10/26/2024] amphetamine-dextroamphetamine (ADDERALL XR) 20 MG 24 hr capsule Take 1 capsule (20 mg) by mouth daily. Do not start before October 26, 2024. 30 capsule 0    Ascorbic Acid (VITAMIN C PO)       buPROPion (WELLBUTRIN XL) 300 MG 24 hr tablet TAKE 1 TABLET BY MOUTH EVERY DAY IN THE MORNING 90 tablet 1    calcium-vitamin D 500-125 MG-UNIT TABS Take 1 tablet by mouth daily      fluocinonide (LIDEX) 0.05 % external ointment Apply topically 2 times daily To lower ankle as needed 60 g 3    gabapentin (NEURONTIN) 300 MG capsule Take 1 capsule (300 mg) by mouth 3 times daily. 90 capsule 5    HYDROcodone-acetaminophen  (NORCO) 5-325 MG tablet Take 1 tablet by mouth 2 times daily as needed for pain. 10 tablet 0    methocarbamol (ROBAXIN) 500 MG tablet Take 1 tablet (500 mg) by mouth 4 times daily as needed for muscle spasms 120 tablet 3    Multiple Vitamin (MULTI-DAY VITAMINS) TABS Take  by mouth.      nabumetone (RELAFEN) 500 MG tablet TAKE 1 TABLET (500 MG) BY MOUTH 2 TIMES DAILY AS NEEDED FOR MODERATE PAIN (4-6) 180 tablet 3    sildenafil (REVATIO) 20 MG tablet TAKE 3 TO 5 TABLETS BY MOUTH 30 MINUTES BEFORE SEXUAL ACTIVITY AS NEEDED 90 tablet 0    tacrolimus (PROTOPIC) 0.1 % external ointment Apply topically 2 times daily To hands and rash as needed 60 g 3    Allergies   Allergen Reactions    Metoclopramide Rash         Lab Results (Personally Reviewed)    Chemistry/lipid CBC Cardiac Enzymes/BNP/TSH/INR   Lab Results   Component Value Date    BUN 18.7 04/09/2024     04/09/2024    CO2 27 04/09/2024     Creatinine   Date Value Ref Range Status   04/09/2024 1.11 0.67 - 1.17 mg/dL Final   09/18/2020 1.02 0.66 - 1.25 mg/dL Final       Lab Results   Component Value Date    CHOL 195 04/09/2024    HDL 85 04/09/2024    LDL 88 04/09/2024      Lab Results   Component Value Date    WBC 3.3 (L) 05/01/2023    HGB 14.1 05/01/2023    HCT 40.7 05/01/2023    MCV 93 05/01/2023     (L) 05/01/2023    Lab Results   Component Value Date    TSH 0.87 05/15/2023    INR 1.00 04/13/2010        The patient states understanding and is agreeable with the plan.   AYDEN Thompson CNP  Electrophysiology Consult Service  Securely message with [employer]   Text page via [employer] Paging/Directory

## 2024-10-02 ENCOUNTER — OFFICE VISIT (OUTPATIENT)
Dept: CARDIOLOGY | Facility: CLINIC | Age: 69
End: 2024-10-02
Attending: NURSE PRACTITIONER
Payer: MEDICARE

## 2024-10-02 VITALS
HEART RATE: 64 BPM | WEIGHT: 140.4 LBS | OXYGEN SATURATION: 100 % | SYSTOLIC BLOOD PRESSURE: 158 MMHG | BODY MASS INDEX: 20.73 KG/M2 | DIASTOLIC BLOOD PRESSURE: 90 MMHG

## 2024-10-02 DIAGNOSIS — I34.0 NONRHEUMATIC MITRAL VALVE REGURGITATION: ICD-10-CM

## 2024-10-02 DIAGNOSIS — R00.1 BRADYCARDIA: Primary | ICD-10-CM

## 2024-10-02 PROCEDURE — 93010 ELECTROCARDIOGRAM REPORT: CPT | Performed by: INTERNAL MEDICINE

## 2024-10-02 PROCEDURE — G0463 HOSPITAL OUTPT CLINIC VISIT: HCPCS | Performed by: NURSE PRACTITIONER

## 2024-10-02 PROCEDURE — 93005 ELECTROCARDIOGRAM TRACING: CPT

## 2024-10-02 PROCEDURE — 99214 OFFICE O/P EST MOD 30 MIN: CPT | Performed by: NURSE PRACTITIONER

## 2024-10-02 ASSESSMENT — PAIN SCALES - GENERAL: PAINLEVEL: NO PAIN (0)

## 2024-10-02 NOTE — LETTER
10/2/2024      RE: Salbador Begum  [address]   [address]  [place] [address]       Dear Colleague,    Thank you for the opportunity to participate in the care of your patient, Salbador Begum, at the [facility] [place] [facility]. Please see a copy of my visit note below.        ELECTROPHYSIOLOGY CLINIC VISIT    Assessment/Recommendations   Assessment/Plan:    Mr. Begum is a 68 year old male who has a past medical history significant for SND, MR, diverticulitis, narcolepsy, AIMEE (uses CPAP), OA, and depression.     He has had known SND mainly manifesting as chronotropic incompetence. He has previously underwent a thorough cardiac workup. He continues to have stable symptoms with intermittent exertional fatigue and SOB. We discussed given stability of his symptoms, we can defer pacing at this time. He understands that a pacemaker may be in his future, and will let us know if he has any worsening frequency/severity of symptoms that become lifestyle limiting.  Will get an updated echo to look at his MR.     Follow up in 2 years or sooner if need arises.        History of Present Illness/Subjective    Mr. Salbador Begum is a 68 year old male who comes in today for EP follow-up of SND.    Mr. Begum is a 68 year old male who has a past medical history significant for SND, MR, diverticulitis, narcolepsy, AIMEE (uses CPAP), OA, and depression.       He has had symptoms of dyspnea on exertion associated with blunted HR response to exercise. He was also having a sensation of irregular HRs with exercise. He had a treadmill study that showed excellent exercise capacity. The report mentions PACs and PVCs during exercise and rest. The note I sent him mentions atrial fibrillation during exercise. I will have the stress test ECGs pulled to review again.     Overall, he reports he has been feeling well. He was on vacation, walking up hills  and climbing stairs without difficulty. He will occasion notice some shortness of breath with exercise or stairs but this is much less frequent than before. He initially noted this symptom last winter while cross country skiing. He is wondering if it will return this winter.     [id number]: Jogging again, stairs don't bother him like they use to. It had been a couple years since he was in to see me and felt he should touch base.      [id number]:  Mr. Begum feels well since his last visit in 09/2015. He continues to bicycle, jog, ski, and run frequently. His symptoms of dizziness/lightheadedness have not precluded him from his personal hobbies or his professional work as an  for the [place] [employer]. He did not a period of dyspnea on exertion over the last few summers that actually improved when winter came about. Mr. Begum feels that his exercise tolerance is now at baseline. Mr. Begum still has some dizziness/lightheadedness upon waking, but he prevents this by standing slowly. He had a single episode of pre-syncope several months ago. No syncope, chest pain, exertional dyspnea at present.     2/10/17: Since last visit, he was admitted under ED observation for chest pain rule out on 11/20/16. Serial troponin x3 negative, stress echocardiogram with normal baseline echo and appropriate rise in LV systolic function at peak exercise. He did not have any symptoms during the exercise portion of the study. Since discharge, he has felt well, and denies any limitations to exercise capacity such as chest pain/pressure or dyspnea. He is not experiencing palpitations. He is not on a B-blocker as his resting HR is very low at baseline. He exercises regularly, including Nord[employer] and running. He feels well and denies any complaints currently. Unfortunately, his wife was recently diagnosed with frontotemporal dementia, so he plans to retire soon and travel back to [place] more frequently.      [id number]: He reports no  increase in his palpitations.  He has noticed that his exercise abilities are beginning to decline, more than he would anticipate related to simple aging.  He notices that on the days where he is feeling a worse with exercise his heart rate tends to be in the 110s to may be 120 using his Fitbit.  Other days he is able to get his heart rate to the 140s 150s and feels much better with exercise.  He has had no syncope or near syncope.     11/2021: we had planned on a permanent pacemaker for chronotropic incompetence but he elected to hold on that. Today he reports he has been doing well. His wife is not in memory care due to early onset dementia. He has began running again. It takes some time to get his heart rate up but eventually reaches the 140s. He has no complaints about his exercise abilities at this time.    EP Visit 11/9/22: He presents today for follow up. He reports feeling at baseline. He continues to jog and be active. He does have some symptoms of chronotropic incompetence at times, namely fatigue/PHELPS, but thus far has not been too limiting to him. He denies chest discomfort, palpitations, abdominal fullness/bloating or peripheral edema, shortness of breath, paroxysmal nocturnal dyspnea, orthopnea, lightheadedness, dizziness, pre-syncope, or syncope. Current cardiac medications include: Norvasc.     He presents today for follow up. He reports feeling at baseline. He notes some exertional chronotropic symptoms with inclines/stair, but otherwise is able to be active. He denies chest discomfort, palpitations, abdominal fullness/bloating or peripheral edema, paroxysmal nocturnal dyspnea, orthopnea, lightheadedness, dizziness, pre-syncope, or syncope. Presenting 12 lead ECG shows SB Vent Rate 58 bpm,  ms, QRS 78 ms, QTc 378 ms. Current cardiac medications include: Norvasc.       I have reviewed and updated the patient's Past Medical History, Social History, Family History and Medication List.      Cardiographics (Personally Reviewed) :   11/2021 Echo:   Interpretation Summary  Global and regional left ventricular function is normal with an EF of 55-60%.  The right ventricle is normal size. Global right ventricular function is  normal.  Mild to moderate mitral insufficiency is present.  IVC diameter and respiratory changes fall into an intermediate range  suggesting an RA pressure of 8 mmHg.  No pericardial effusion is present.       Physical Examination   BP (!) 158/90 (BP Location: Right arm, Patient Position: Chair, Cuff Size: Adult Regular)   Pulse 64   Wt 63.7 kg (140 lb 6.4 oz)   SpO2 100%   BMI 20.73 kg/m    Wt Readings from Last 3 Encounters:   08/27/24 64.1 kg (141 lb 6.4 oz)   07/11/24 64.4 kg (142 lb)   07/10/24 64.4 kg (142 lb)       CONSITUTIONAL: no acute distress  HEENT: no icterus, no redness or discharge, neck supple  CV: no visible edema of visualized extremities. No JVD.   RESPIRATORY: respirations nonlabored, no cough  NEURO: AA&Ox3, speech fluent/appropriate, motor grossly nonfocal  PSYCH: cooperative, affect appropriate  DERM: no rashes on visualized face/neck/upper extremities       Medications  Allergies   Current Outpatient Medications   Medication Sig Dispense Refill     acetaminophen (TYLENOL) 650 MG CR tablet Take 2 tablets (1,300 mg) by mouth 2 times daily 180 tablet 1     amLODIPine (NORVASC) 5 MG tablet Take 1 tablet (5 mg) by mouth daily 90 tablet 1     amphetamine-dextroamphetamine (ADDERALL XR) 20 MG 24 hr capsule Take 1 capsule (20 mg) by mouth daily. 30 capsule 0     amphetamine-dextroamphetamine (ADDERALL XR) 20 MG 24 hr capsule Take 1 capsule (20 mg) by mouth daily. Do not start before September 26, 2024. 30 capsule 0     [START ON 10/26/2024] amphetamine-dextroamphetamine (ADDERALL XR) 20 MG 24 hr capsule Take 1 capsule (20 mg) by mouth daily. Do not start before October 26, 2024. 30 capsule 0     Ascorbic Acid (VITAMIN C PO)        buPROPion (WELLBUTRIN XL) 300  MG 24 hr tablet TAKE 1 TABLET BY MOUTH EVERY DAY IN THE MORNING 90 tablet 1     calcium-vitamin D 500-125 MG-UNIT TABS Take 1 tablet by mouth daily       fluocinonide (LIDEX) 0.05 % external ointment Apply topically 2 times daily To lower ankle as needed 60 g 3     gabapentin (NEURONTIN) 300 MG capsule Take 1 capsule (300 mg) by mouth 3 times daily. 90 capsule 5     HYDROcodone-acetaminophen (NORCO) 5-325 MG tablet Take 1 tablet by mouth 2 times daily as needed for pain. 10 tablet 0     methocarbamol (ROBAXIN) 500 MG tablet Take 1 tablet (500 mg) by mouth 4 times daily as needed for muscle spasms 120 tablet 3     Multiple Vitamin (MULTI-DAY VITAMINS) TABS Take  by mouth.       nabumetone (RELAFEN) 500 MG tablet TAKE 1 TABLET (500 MG) BY MOUTH 2 TIMES DAILY AS NEEDED FOR MODERATE PAIN (4-6) 180 tablet 3     sildenafil (REVATIO) 20 MG tablet TAKE 3 TO 5 TABLETS BY MOUTH 30 MINUTES BEFORE SEXUAL ACTIVITY AS NEEDED 90 tablet 0     tacrolimus (PROTOPIC) 0.1 % external ointment Apply topically 2 times daily To hands and rash as needed 60 g 3    Allergies   Allergen Reactions     Metoclopramide Rash         Lab Results (Personally Reviewed)    Chemistry/lipid CBC Cardiac Enzymes/BNP/TSH/INR   Lab Results   Component Value Date    BUN 18.7 04/09/2024     04/09/2024    CO2 27 04/09/2024     Creatinine   Date Value Ref Range Status   04/09/2024 1.11 0.67 - 1.17 mg/dL Final   09/18/2020 1.02 0.66 - 1.25 mg/dL Final       Lab Results   Component Value Date    CHOL 195 04/09/2024    HDL 85 04/09/2024    LDL 88 04/09/2024      Lab Results   Component Value Date    WBC 3.3 (L) 05/01/2023    HGB 14.1 05/01/2023    HCT 40.7 05/01/2023    MCV 93 05/01/2023     (L) 05/01/2023    Lab Results   Component Value Date    TSH 0.87 05/15/2023    INR 1.00 04/13/2010        The patient states understanding and is agreeable with the plan.   Mary Jo Hanson, APRN CNP  Electrophysiology Consult Service  Securely message with [employer]    Text page via [facility] Paging/Directory           Please do not hesitate to contact me if you have any questions/concerns.     Sincerely,     AYDEN Sanchez CNP

## 2024-10-02 NOTE — NURSING NOTE
Chief Complaint   Patient presents with    Follow Up     2 yr follow-up SND, mitral valve regurg.     Vitals were taken, medications reconciled, and EKG was performed.    Francesco Huitron EMT  9:57 AM    
No

## 2024-10-03 ENCOUNTER — TELEPHONE (OUTPATIENT)
Dept: CARDIOLOGY | Facility: CLINIC | Age: 69
End: 2024-10-03
Payer: MEDICARE

## 2024-10-03 LAB
ATRIAL RATE - MUSE: 58 BPM
DIASTOLIC BLOOD PRESSURE - MUSE: NORMAL MMHG
INTERPRETATION ECG - MUSE: NORMAL
P AXIS - MUSE: 73 DEGREES
PR INTERVAL - MUSE: 182 MS
QRS DURATION - MUSE: 78 MS
QT - MUSE: 386 MS
QTC - MUSE: 378 MS
R AXIS - MUSE: 16 DEGREES
SYSTOLIC BLOOD PRESSURE - MUSE: NORMAL MMHG
T AXIS - MUSE: 30 DEGREES
VENTRICULAR RATE- MUSE: 58 BPM

## 2024-10-03 NOTE — TELEPHONE ENCOUNTER
10/3/2024 6:25PM Ivet Ramos  Patient confirmed scheduled appointment:  Date: 10/4/2024  Time: 8:30AM  Visit type: Echo  Provider: AYDEN Martin CNP  Location: [address] [employer], [place] [address]  Testing/imaging: Pt requesting EKG also  Additional notes: 10/3 Scheduled echo 10/4 at [facility]. Sent message to Jhonny Grissom and Tricia to get EKG order for 10/4 and to see if pt will be given contrast dye. JOSEPH Ramos 10/3/2024 6:25PM

## 2024-10-03 NOTE — TELEPHONE ENCOUNTER
Health Call Center    Phone Message    May a detailed message be left on voicemail: yes     Reason for Call: Other: Patient received his ECG result from 10/2/24 and is very concerned with the result. Please call him back ASAP. Thank you      Action Taken: Other: cardiology    Travel Screening: Not Applicable  Thank you!  [facility]       Date of Service:

## 2024-10-03 NOTE — TELEPHONE ENCOUNTER
Assured pt that EKG was WNL, no concerns about this reading. He would like echo scheduled. Will have clinic coordinators contact him for scheduling.     Jhonny Grissom, RN   Cardiology Nurse Coordinator

## 2024-10-04 ENCOUNTER — MYC MEDICAL ADVICE (OUTPATIENT)
Dept: FAMILY MEDICINE | Facility: CLINIC | Age: 69
End: 2024-10-04

## 2024-10-04 ENCOUNTER — ANCILLARY PROCEDURE (OUTPATIENT)
Dept: CARDIOLOGY | Facility: CLINIC | Age: 69
End: 2024-10-04
Attending: NURSE PRACTITIONER
Payer: MEDICARE

## 2024-10-04 DIAGNOSIS — I34.0 NONRHEUMATIC MITRAL VALVE REGURGITATION: ICD-10-CM

## 2024-10-04 LAB — LVEF ECHO: NORMAL

## 2024-10-04 PROCEDURE — 93306 TTE W/DOPPLER COMPLETE: CPT | Performed by: INTERNAL MEDICINE

## 2024-10-07 NOTE — TELEPHONE ENCOUNTER
Writer responded via [employer].  LEVI SiegelN, RN-BC  [facility]

## 2024-10-07 NOTE — TELEPHONE ENCOUNTER
No concerning EKG or echo result. No specific intervention needed unless recommended by cardiology.

## 2024-10-14 NOTE — TELEPHONE ENCOUNTER
Dr. Zuluaga --    Please review and advise: EKG resuls    Please see [email or web address] patient message.      LEVI KhalilN RN  [facility]         Patient's wife aware

## 2024-11-14 DIAGNOSIS — I10 BENIGN ESSENTIAL HYPERTENSION: ICD-10-CM

## 2024-11-14 RX ORDER — AMLODIPINE BESYLATE 5 MG/1
5 TABLET ORAL DAILY
Qty: 90 TABLET | Refills: 1 | OUTPATIENT
Start: 2024-11-14

## 2024-11-15 ENCOUNTER — MYC REFILL (OUTPATIENT)
Dept: FAMILY MEDICINE | Facility: CLINIC | Age: 69
End: 2024-11-15
Payer: MEDICARE

## 2024-11-15 DIAGNOSIS — I10 BENIGN ESSENTIAL HYPERTENSION: ICD-10-CM

## 2024-11-15 DIAGNOSIS — F33.0 MAJOR DEPRESSIVE DISORDER, RECURRENT, MILD (H): ICD-10-CM

## 2024-11-15 RX ORDER — AMLODIPINE BESYLATE 5 MG/1
5 TABLET ORAL DAILY
Qty: 90 TABLET | Refills: 0 | Status: SHIPPED | OUTPATIENT
Start: 2024-11-15

## 2024-11-19 RX ORDER — BUPROPION HYDROCHLORIDE 300 MG/1
TABLET ORAL
Qty: 90 TABLET | Refills: 0 | Status: SHIPPED | OUTPATIENT
Start: 2024-11-19

## 2024-11-26 ASSESSMENT — PATIENT HEALTH QUESTIONNAIRE - PHQ9
SUM OF ALL RESPONSES TO PHQ QUESTIONS 1-9: 2
10. IF YOU CHECKED OFF ANY PROBLEMS, HOW DIFFICULT HAVE THESE PROBLEMS MADE IT FOR YOU TO DO YOUR WORK, TAKE CARE OF THINGS AT HOME, OR GET ALONG WITH OTHER PEOPLE: SOMEWHAT DIFFICULT
SUM OF ALL RESPONSES TO PHQ QUESTIONS 1-9: 2

## 2024-11-27 ENCOUNTER — VIRTUAL VISIT (OUTPATIENT)
Dept: FAMILY MEDICINE | Facility: CLINIC | Age: 69
End: 2024-11-27
Attending: FAMILY MEDICINE
Payer: COMMERCIAL

## 2024-11-27 DIAGNOSIS — M48.062 SPINAL STENOSIS OF LUMBAR REGION WITH NEUROGENIC CLAUDICATION: Primary | ICD-10-CM

## 2024-11-27 DIAGNOSIS — F90.0 ADHD (ATTENTION DEFICIT HYPERACTIVITY DISORDER), INATTENTIVE TYPE: ICD-10-CM

## 2024-11-27 PROCEDURE — G2211 COMPLEX E/M VISIT ADD ON: HCPCS | Mod: 95 | Performed by: FAMILY MEDICINE

## 2024-11-27 PROCEDURE — 99214 OFFICE O/P EST MOD 30 MIN: CPT | Mod: 95 | Performed by: FAMILY MEDICINE

## 2024-11-27 RX ORDER — DEXTROAMPHETAMINE SACCHARATE, AMPHETAMINE ASPARTATE MONOHYDRATE, DEXTROAMPHETAMINE SULFATE AND AMPHETAMINE SULFATE 5; 5; 5; 5 MG/1; MG/1; MG/1; MG/1
20 CAPSULE, EXTENDED RELEASE ORAL DAILY
Qty: 30 CAPSULE | Refills: 0 | Status: SHIPPED | OUTPATIENT
Start: 2024-12-27

## 2024-11-27 RX ORDER — DEXTROAMPHETAMINE SACCHARATE, AMPHETAMINE ASPARTATE MONOHYDRATE, DEXTROAMPHETAMINE SULFATE AND AMPHETAMINE SULFATE 5; 5; 5; 5 MG/1; MG/1; MG/1; MG/1
20 CAPSULE, EXTENDED RELEASE ORAL DAILY
Qty: 30 CAPSULE | Refills: 0 | Status: SHIPPED | OUTPATIENT
Start: 2024-11-27

## 2024-11-27 RX ORDER — DEXTROAMPHETAMINE SACCHARATE, AMPHETAMINE ASPARTATE MONOHYDRATE, DEXTROAMPHETAMINE SULFATE AND AMPHETAMINE SULFATE 5; 5; 5; 5 MG/1; MG/1; MG/1; MG/1
20 CAPSULE, EXTENDED RELEASE ORAL DAILY
Qty: 30 CAPSULE | Refills: 0 | Status: SHIPPED | OUTPATIENT
Start: 2025-01-26

## 2024-11-27 NOTE — PROGRESS NOTES
Salbador is a 69 year old who is being evaluated via a billable video visit.    How would you like to obtain your AVS? MyChart  If the video visit is dropped, the invitation should be resent by: Send to e-mail at: [email or web address]  Will anyone else be joining your video visit? No      Assessment & Plan     Spinal stenosis of lumbar region with neurogenic claudication  Doing okay from his back pain standpoint but having radicular symptoms, neuropathy symptoms, balance and gait issue.  He has been to spine surgery, PMR, neurology.  We discussed follow-up with spine specialist.  He would like to get a referral at the [facility] [place].  - Spine  Referral; Future    ADHD (attention deficit hyperactivity disorder), inattentive type  Patient is tolerating current medication without any major side effects of concerns and current dose seems reasonable too.  Current medication regime is effective. Continue current treatment without any changes.   - amphetamine-dextroamphetamine (ADDERALL XR) 20 MG 24 hr capsule; Take 1 capsule (20 mg) by mouth daily.  - amphetamine-dextroamphetamine (ADDERALL XR) 20 MG 24 hr capsule; Take 1 capsule (20 mg) by mouth daily.  - amphetamine-dextroamphetamine (ADDERALL XR) 20 MG 24 hr capsule; Take 1 capsule (20 mg) by mouth daily.                Subjective   Salbador is a 69 year old, presenting for the following health issues:  Recheck Medication      11/27/2024     9:30 AM   Additional Questions   Roomed by Chandni Lala Start Time: 10:03 AM    History of Present Illness       Back Pain:  He presents for follow up of back pain. Patient's back pain is a chronic problem.  Location of back pain:  Right lower back, left lower back, left side of neck, right buttock and left buttock  Description of back pain: cramping  Back pain spreads: nowhere    Since patient first noticed back pain, pain is: always present, but gets better and worse  Does back pain interfere with his  job:  Not applicable       Reason for visit:  Need to renew Rx for Adderall. Also have questions about spinal stenosis and neurogenic claudication.    He eats 2-3 servings of fruits and vegetables daily.He consumes 0 sweetened beverage(s) daily.He exercises with enough effort to increase his heart rate 60 or more minutes per day.  He exercises with enough effort to increase his heart rate 4 days per week.   He is taking medications regularly.     Been to cardiologist.     Back and nerve issues. Lower leg and feet paresthesia is getting worse. Left foot weakness is getting worse. Been to surgery team, been to pain clinic. Balance is getting worse. Back pain is not there for the most part.   Left calf hurts after walking.     Also question about memory. Feels it may be attention issue vs cognitive issue. Partner is not worried about him. Mostly about scheduling. Neuropsych 2 yrs ago and it was fine.     Checking bp at home periodically. Last few times - 125/73, 134/84. 127/80, 133/83.         Objective           Vitals:  No vitals were obtained today due to virtual visit.    Physical Exam   GENERAL: alert and no distress  EYES: Eyes grossly normal to inspection.  No discharge or erythema, or obvious scleral/conjunctival abnormalities.  RESP: No audible wheeze, cough, or visible cyanosis.    SKIN: Visible skin clear. No significant rash, abnormal pigmentation or lesions.  NEURO: Cranial nerves grossly intact.  Mentation and speech appropriate for age.  PSYCH: Appropriate affect, tone, and pace of words  The longitudinal plan of care for the diagnosis(es)/condition(s) as documented were addressed during this visit. Due to the added complexity in care, I will continue to support Salbador in the subsequent management and with ongoing continuity of care.      Video-Visit Details    Type of service:  Video Visit   Video End Time:10:16 AM  Originating Location (pt. Location): Home    Distant Location (provider location):   On-site  Platform used for Video Visit: Elsie  Signed Electronically by: Kieran Zuluaga MD, MD

## 2024-12-01 NOTE — TELEPHONE ENCOUNTER
Action December 1, 2024 2:25 PM MT   Action Taken Sent a request for imaging from [facility] and [facility].        DIAGNOSIS: Spinal stenosis of lumbar region with neurogenic claudication [M48.062]  - Primary   APPOINTMENT DATE: 12/03/2024   NOTES STATUS DETAILS   OFFICE NOTE from referring provider Internal 11/27/2024 - Kieran Zuluaga MD - [facility]   OFFICE NOTE from other specialist [facility]  Internal 12/01/2023 - Low Ogden MD - [facility]    11/07/2023 - Filippo Watt DO - [facility]  Neurology    05/18/2023 - Celso Serrano MD - [facility]    04/21/2022 - Carly Gordon M.D. - [facility]   OPERATIVE REPORT PLEASE HAVE PATIENT SIGN AN LAURA IN CLINIC Hx of Surgery   EMG (for Spine) Internal 05/18/2023   INJECTIONS DONE IN RADIOLOGY PACS [facility]:  03/29/2023 - L4-5 YF   MRI PACS Internal    [facility]:  11/17/2023, 03/16/2023, 09/16/2017 - L Spine   XRAYS (IMAGES & REPORTS) PACS [facility]:  07/18/2023 - :L Spine

## 2024-12-02 DIAGNOSIS — M54.50 LUMBAR PAIN: Primary | ICD-10-CM

## 2024-12-03 ENCOUNTER — PRE VISIT (OUTPATIENT)
Dept: ORTHOPEDICS | Facility: CLINIC | Age: 69
End: 2024-12-03

## 2024-12-03 ENCOUNTER — ANCILLARY PROCEDURE (OUTPATIENT)
Dept: GENERAL RADIOLOGY | Facility: CLINIC | Age: 69
End: 2024-12-03
Attending: ORTHOPAEDIC SURGERY
Payer: COMMERCIAL

## 2024-12-03 ENCOUNTER — OFFICE VISIT (OUTPATIENT)
Dept: ORTHOPEDICS | Facility: CLINIC | Age: 69
End: 2024-12-03
Attending: FAMILY MEDICINE
Payer: COMMERCIAL

## 2024-12-03 VITALS — WEIGHT: 145 LBS | HEIGHT: 69 IN | BODY MASS INDEX: 21.48 KG/M2

## 2024-12-03 DIAGNOSIS — M48.062 SPINAL STENOSIS OF LUMBAR REGION WITH NEUROGENIC CLAUDICATION: ICD-10-CM

## 2024-12-03 DIAGNOSIS — M54.50 LUMBAR PAIN: ICD-10-CM

## 2024-12-03 PROCEDURE — 99204 OFFICE O/P NEW MOD 45 MIN: CPT | Mod: GC | Performed by: ORTHOPAEDIC SURGERY

## 2024-12-03 PROCEDURE — 72110 X-RAY EXAM L-2 SPINE 4/>VWS: CPT | Performed by: STUDENT IN AN ORGANIZED HEALTH CARE EDUCATION/TRAINING PROGRAM

## 2024-12-03 NOTE — LETTER
12/3/2024      Salbador Begum  [address]   [address]  [place] [address]      Dear Colleague,    Thank you for referring your patient, Salbador Begum, to the [facility] [place]. Please see a copy of my visit note below.    Spine Surgery Consultation    REFERRING PHYSICIAN: Kieran Zuluaga   PRIMARY CARE PHYSICIAN: Kieran Zuluaga           Chief Complaint:   Consult (New patient consult for lumbar spine.  )      History of Present Illness:  Symptom Profile Including: location of symptoms, onset, severity, exacerbating/alleviating factors, previous treatments:        Salbador Begum is a 69 year old male who presents with chief complaint of b/l leg numbness . Reports it is progressive over the past couple months and primarily in his middle 3 toes bilaterally. The numbness is predominantly on the lateral aspect of his legs but is circumferential as well. His back pain is not too bothersome, denies any radicular pain into his bilateral legs. Pain worsens with going from sitting to standing. Feels the numbness is interfering with his walking, has not tripped yet. Has progressed substantially over the past 2 months, foot is tingly all the time.  Also endorses weakness in his left leg, feels he is not able to toe off when walking. No saddle anesthesia, bowel/bladder issues. Also has a hx cervical spine pain but reports this is not currently bothersome. Unable to jog because numbness worsens with activity.  Has previously followed at [facility] for his spine.    Past treatments tried:  - Physical therapy: has been doing PT for years, last PT for his spine was one year ago   - Injections: 3-4 injections in lumbar spine, last 2022 or 2023. Unsure if they were helpful. Cervical spine injection C7-T1 7/24  - Medications: gabapentin taken 300 BID, rx for TID. Also takes tylenol         Past Medical History:     Past Medical History:   Diagnosis Date      Allergic rhinitis, cause unspecified      Backache, unspecified      Depressive disorder      Diverticulitis      Junctional ectopic contractions (H)      Mild intermittent asthma 9/28/2005     Narcolepsy      Osteoarthritis      Palpitations      Sleep apnea             Past Surgical History:     Past Surgical History:   Procedure Laterality Date     APPENDECTOMY       INJECT EPIDURAL CERVICAL N/A 7/11/2024    Procedure: C7-T1 epidural steroid injection with fluoroscopy;  Surgeon: Jan Ortiz MD;  Location: [facility] [place] OR     ORTHOPEDIC SURGERY      left hip replacement, right ankle,     SEPTORHINOPLASTY  4/7/2011    Procedure:SEPTORHINOPLASTY; Septoplasty withNasal Reconstruction with  Grafts Harvested from [employer]; Surgeon:ALFREDO UMANA; Location: OR     [facility] NONSPECIFIC PROCEDURE      s/p Appendectomy            Social History:     Social History     Tobacco Use     Smoking status: Never     Smokeless tobacco: Never   Substance Use Topics     Alcohol use: Yes     Comment: 14 glass of wine weeks             Family History:     Family History   Problem Relation Age of Onset     Arthritis Mother         osteo     Dementia Mother      Hypertension Mother      Cerebrovascular Disease Mother         Not confirmed     Seizure Disorder Mother      Rheumatoid Arthritis Father      Depression Father      Coronary Artery Disease Father      Depression Sister      Substance Abuse Sister      Myocardial Infarction Sister             Allergies:     Allergies   Allergen Reactions     Metoclopramide Rash            Medications:     Current Outpatient Medications   Medication Sig Dispense Refill     acetaminophen (TYLENOL) 650 MG CR tablet Take 2 tablets (1,300 mg) by mouth 2 times daily 180 tablet 1     amLODIPine (NORVASC) 5 MG tablet Take 1 tablet (5 mg) by mouth daily. 90 tablet 0     amphetamine-dextroamphetamine (ADDERALL XR) 20 MG 24 hr capsule Take 1 capsule (20 mg) by mouth daily. 30 capsule 0     [START ON  "12/27/2024] amphetamine-dextroamphetamine (ADDERALL XR) 20 MG 24 hr capsule Take 1 capsule (20 mg) by mouth daily. 30 capsule 0     [START ON 1/26/2025] amphetamine-dextroamphetamine (ADDERALL XR) 20 MG 24 hr capsule Take 1 capsule (20 mg) by mouth daily. 30 capsule 0     Ascorbic Acid (VITAMIN C PO)        buPROPion (WELLBUTRIN XL) 300 MG 24 hr tablet TAKE 1 TABLET BY MOUTH EVERY DAY IN THE MORNING 90 tablet 0     calcium-vitamin D 500-125 MG-UNIT TABS Take 1 tablet by mouth daily       fluocinonide (LIDEX) 0.05 % external ointment Apply topically 2 times daily To lower ankle as needed 60 g 3     gabapentin (NEURONTIN) 300 MG capsule Take 1 capsule (300 mg) by mouth 3 times daily. 90 capsule 5     HYDROcodone-acetaminophen (NORCO) 5-325 MG tablet Take 1 tablet by mouth 2 times daily as needed for pain. 10 tablet 0     methocarbamol (ROBAXIN) 500 MG tablet Take 1 tablet (500 mg) by mouth 4 times daily as needed for muscle spasms (Patient not taking: Reported on 10/2/2024) 120 tablet 3     Multiple Vitamin (MULTI-DAY VITAMINS) TABS Take  by mouth.       nabumetone (RELAFEN) 500 MG tablet TAKE 1 TABLET (500 MG) BY MOUTH 2 TIMES DAILY AS NEEDED FOR MODERATE PAIN (4-6) 180 tablet 3     sildenafil (REVATIO) 20 MG tablet TAKE 3 TO 5 TABLETS BY MOUTH 30 MINUTES BEFORE SEXUAL ACTIVITY AS NEEDED 90 tablet 0     tacrolimus (PROTOPIC) 0.1 % external ointment Apply topically 2 times daily To hands and rash as needed 60 g 3     No current facility-administered medications for this visit.             Review of Systems:     A 10 point ROS was performed and reviewed. Specific responses to these questions are noted at the end of the document.         Physical Exam:   Vitals: Ht 1.74 m (5' 8.5\")   Wt 65.8 kg (145 lb)   BMI 21.72 kg/m    Constitutional: awake, alert, cooperative, no apparent distress, appears stated age.    Eyes: The sclera are white.  Ears, Nose, Throat: The trachea is midline.  Psychiatric: The patient has a " normal affect.  Respiratory: breathing non-labored  Cardiovascular: The extremities are warm and perfused.  Skin: no obvious rashes or lesions.  Musculoskeletal, Neurologic, and Spine:    Lumbar Spine:    Appearance - No gross stepoffs or deformities    Motor -     L2-3: Hip flexion 5/5 R and 5/5 L strength          L3/4:  Knee extension R 5/5 and L 5/5 strength         L4/5:  Foot dorsiflexion R 5/5 L 5/5 and       EHL dorsiflexion R 4/5 L 4/5 strength         S1:  Plantarflexion/Peroneal Muscles  R 5/5 and L 5/5 strength.  Unable to single-leg heel rise bilaterally, left worse than right.    Sensation: intact to light touch L3-S1 distribution BLE but endorsing numbness in L4, L5, and S1 distribution.    Alignment:  Patient stands with a neutral standing sagittal balance.           Imaging:   We ordered and independently reviewed new radiographs at this clinic visit. The results were discussed with the patient.  Findings include:    MRI lumbar spine obtained demonstrates grade 1 spondylolisthesis at L3-4.  Severe central stenosis at L2-3, L3-4.  Lateral recess stenosis bilaterally at L4-5, L5-S1.  Severe neuroforaminal stenosis at bilateral L5-S1, left worse than right.             Assessment and Plan:   Assessment:  69 year old male with mild to moderately symptomatic lumbar spinal stenosis.  His imaging today is notable for fairly severe central stenosis at L3-4 as well as lateral recess stenosis bilaterally at L4-5 and L5-S1.  He also has severe neuroforaminal stenosis at bilateral L5-S1.  Discussed the natural history of the condition including the nonoperative and operative treatment options.  Discussed that nonoperative treatment consists of medications, physical therapy, and injections.  The patient has tried all of these with minimal improvement in his symptoms.  Given the progressive nature of his symptoms and concern for weakness with heel raise, discussed surgical options including fusion versus  decompression alone.  Discussed recommendation for decompression alone consisting of laminectomy at L3-4, L4-5, and L5-S1 and foraminotomy at bilateral L5-S1.  Discussed that given that he does not have substantial back pain, he may be displeased with the resulting stiffness that would result from an L3-S1 fusion.  As such, the more conservative route would be to proceed with decompression alone first understanding that there is a risk for progression of disease that ultimately requires a fusion.  The patient expressed understanding and would like to continue conservative treatment at this time.  He will follow-up on an as-needed basis.     Plan:  Continue physical therapy, gabapentin  Follow-up as needed  3.  Will let us know if he wishes to pursue surgery.    Risks of this surgery include risk of infection, risk of dural tear resulting in CSF leak which might result in headaches, or possible need for lumbar drain, or possible revision surgery in the setting of a persistent leak. Risk of seroma or hematoma requiring revision surgery. Possible nerve root injury resulting in numbness weakness or paralysis into the leg. Possible radiculitis which could result in similar symptoms or could result in significant neurogenic type pain into the leg. Risk of incomplete decompression which might require revision surgery in the future.  Risk of pars fracture or postoperative instability requiring conversion to a fusion in the future. Risk of adjacent segment problems requiring surgery in the future. Risk of incomplete relief of symptoms possibly requiring revision surgery in the future. There is a risk of blood clots in the legs or the lungs.  Furthermore, although rare, there are risks of major vessel or major organ injury from the surgery, and risks of the anesthetic including stroke heart attack and death.      Yisel Cordova MD  Orthopedic Surgery Resident    Patient was seen and examined with Dr. Finley who independently  evaluated the patient and agrees with the assessment and exam.     Respectfully,  Tomy Finley MD  Spine Surgery  [facility]    Attending MD (Dr. Tomy Finley) : I personally performed greater than 50% of the effort related to this patient visit and am responsible for the medical decision making and billing in this case.  I met with the patient, reviewed and verified the history and physical exam of the patient and discussed the patient's management with the other clinical providers involved in this patient's care including any involved residents or physicians assistants. I also personally reviewed the imaging and I personally formulated the treatment plan and diagnosis in their entirety.  I reviewed the above note and agree with the documented findings and plan of care, which were communicated to the patient.      Tomy Finley MD           Again, thank you for allowing me to participate in the care of your patient.        Sincerely,        Tomy Finley MD

## 2024-12-03 NOTE — NURSING NOTE
"Reason For Visit:   Chief Complaint   Patient presents with    Consult     New patient consult for lumbar spine.         Primary MD: Kieran Zuluaga  Ref. MD: Kieran Zuluaga MD    ?  No  Occupation Retired  Currently working? No.  Work status?  Retired.  Date of injury: NA  Type of injury: Chronic.  Date of surgery: NA  Type of surgery: NA.  Smoker: No  Request smoking cessation information: No    Ht 1.74 m (5' 8.5\")   Wt 65.8 kg (145 lb)   BMI 21.72 kg/m      Pain Assessment  Patient Currently in Pain: No    Oswestry (GEMA) Questionnaire        3/17/2021     1:00 PM   OSWESTRY DISABILITY INDEX   Count 8   Sum 5   Oswestry Score (%) 12.5 %            Neck Disability Index (NDI) Questionnaire        2/14/2024     9:36 AM   Neck Disability Index (NDI)   Neck Disability Index: Count 10   NDI: Total Score = SUM (points for all 10 findings) 10   Neck Disability in Percent = (Total Score) / 50 * 100 20 (%)              Visual Analog Pain Scale  Back Pain Scale 0-10: 0  Right leg pain: 0  Left leg pain: 0  Neck Pain Scale 0-10: 0  Right arm pain: 0  Left arm pain: 0    Promis 10 Assessment         No data to display                         Damian Ellis, ATC    "

## 2025-01-08 ENCOUNTER — MYC REFILL (OUTPATIENT)
Dept: FAMILY MEDICINE | Facility: CLINIC | Age: 70
End: 2025-01-08
Payer: MEDICARE

## 2025-01-08 DIAGNOSIS — M48.062 SPINAL STENOSIS OF LUMBAR REGION WITH NEUROGENIC CLAUDICATION: ICD-10-CM

## 2025-01-08 DIAGNOSIS — M47.812 CERVICAL SPONDYLOSIS WITHOUT MYELOPATHY: ICD-10-CM

## 2025-01-08 RX ORDER — HYDROCODONE BITARTRATE AND ACETAMINOPHEN 5; 325 MG/1; MG/1
1 TABLET ORAL 2 TIMES DAILY PRN
Qty: 10 TABLET | Refills: 0 | Status: SHIPPED | OUTPATIENT
Start: 2025-01-08

## 2025-01-09 ENCOUNTER — THERAPY VISIT (OUTPATIENT)
Dept: PHYSICAL THERAPY | Facility: CLINIC | Age: 70
End: 2025-01-09
Payer: COMMERCIAL

## 2025-01-09 DIAGNOSIS — I10 BENIGN ESSENTIAL HYPERTENSION: ICD-10-CM

## 2025-01-09 DIAGNOSIS — M54.42 CHRONIC BILATERAL LOW BACK PAIN WITH BILATERAL SCIATICA: Primary | ICD-10-CM

## 2025-01-09 DIAGNOSIS — G89.29 CHRONIC BILATERAL LOW BACK PAIN WITH BILATERAL SCIATICA: Primary | ICD-10-CM

## 2025-01-09 DIAGNOSIS — M54.41 CHRONIC BILATERAL LOW BACK PAIN WITH BILATERAL SCIATICA: Primary | ICD-10-CM

## 2025-01-09 RX ORDER — AMLODIPINE BESYLATE 5 MG/1
5 TABLET ORAL DAILY
Qty: 90 TABLET | Refills: 0 | OUTPATIENT
Start: 2025-01-09

## 2025-01-09 NOTE — PROGRESS NOTES
PHYSICAL THERAPY EVALUATION  Type of Visit: Evaluation              Subjective         Presenting condition or subjective complaint: Lumbar spinePt presents to PT with a chief complaint of chronic LBP with radiation into B LE's associated with stenosis (severe central stenosis at L3-4 as well as lateral recess stenosis bilaterally at L4-5 and L5-S1 and severe neuroforaminal stenosis at bilateral L5-S1). Pt has a chief complaint of worsening bilateral LE numbness/tingling (R>L) (middle 3 toes, lower leg, lateral thigh, and hip) that worsens with standing and walking. Pt has felt that he may fall at times due to the worsening numbness. Pt does have secondary LBP at bilateral lower lumbar spine/gluteals that comes and goes but worsens with transitional movements.     Pt has attended in PT in the past with improvements in back pain, however patient has developed increased B radicular symptoms over the past year. Pt recently evaluated by Dr. Finley at [facility] with surgical recommendation of decompression alone consisting of laminectomy at L3-4, L4-5, and L5-S1 and foraminotomy at bilateral L5-S1.      Date of onset: 11/29/23    Relevant medical history:     Dates & types of surgery:      Prior diagnostic imaging/testing results: MRI; CT scan; X-ray     Prior therapy history for the same diagnosis, illness or injury: Yes        Employment:    retired  Hobbies/Interests:      Patient goals for therapy: All my normal physical activitiesimproved pain, less numbness    Pain assessment: See objective evaluation for additional pain details     Objective   LUMBAR SPINE EVALUATION  PAIN: Pain Level at Rest: 0/10  Pain Level with Use: 2/10  Pain Location: bilateral lumbar spine (numbness at B LE's)  Pain Quality: Aching, Tingling, and numbness  Pain Frequency: intermittent  Pain is Worst: evening  Pain is Exacerbated By: walking, standing  Pain is Relieved By: stretch  Pain Progression: Worsened  INTEGUMENTARY (edema, incisions):    POSTURE:   GAIT:   Weightbearing Status:   Assistive Device(s):   Gait Deviations:   BALANCE/PROPRIOCEPTION:   WEIGHTBEARING ALIGNMENT:   NON-WEIGHTBEARING ALIGNMENT:    ROM:   (Degrees) Left AROM Left PROM  Right AROM Right PROM   Hip Flexion       Hip Extension       Hip Abduction       Hip Adduction       Hip Internal Rotation       Hip External Rotation       Knee Flexion       Knee Extension       Lumbar Side glide WNL WNL   Lumbar Flexion WNL    Lumbar Extension Min loss   Pain:   End feel:   PELVIC/SI SCREEN:   STRENGTH:     MYOTOMES: WNL  DTR S:   CORD SIGNS:   DERMATOMES:  decreased sensation at bilateral 3 toes  NEURAL TENSION:  mild inc neural tension w/ slump B   FLEXIBILITY:  Loss of lumbar ext  LUMBAR/HIP Special Tests:    PELVIS/SI SPECIAL TESTS:   FUNCTIONAL TESTS:   PALPATION:   SPINAL SEGMENTAL CONCLUSIONS:  Hypomobile/pain at L5      Assessment & Plan   CLINICAL IMPRESSIONS  Medical Diagnosis: Lumbar Stenosis    Treatment Diagnosis: Lumbar Stenosis   Impression/Assessment: Patient is a 69 year old male with LBP complaints.  The following significant findings have been identified: Pain, Decreased ROM/flexibility, Decreased joint mobility, Decreased strength, and Impaired muscle performance. These impairments interfere with their ability to perform recreational activities, household chores, household mobility, and community mobility as compared to previous level of function.     Clinical Decision Making (Complexity):  Clinical Presentation: Stable/Uncomplicated  Clinical Presentation Rationale: based on medical and personal factors listed in PT evaluation  Clinical Decision Making (Complexity): Low complexity    PLAN OF CARE  Treatment Interventions:  Interventions: Manual Therapy, Neuromuscular Re-education, Therapeutic Activity, Therapeutic Exercise    Long Term Goals     PT Goal 1  Goal Identifier: Standing  Goal Description: pt will be able to stand for >30 min w/o increased radicular sxs  causing him to sit  Rationale: to maximize safety and independence with performance of ADLs and functional tasks;to maximize safety and independence with self cares  Goal Progress: pain/tingling with standing for >10 min  Target Date: 04/03/25      Frequency of Treatment: 1x week  Duration of Treatment: 4 weeks then 2 x month for 8 weeks    Recommended Referrals to Other Professionals:   Education Assessment:   Learner/Method: Patient;No Barriers to Learning    Risks and benefits of evaluation/treatment have been explained.   Patient/Family/caregiver agrees with Plan of Care.     Evaluation Time:     PT Eval, Low Complexity Minutes (95394): 20       Signing Clinician: Adam Lewis, PT        [facility]                                                                                   OUTPATIENT PHYSICAL THERAPY      PLAN OF TREATMENT FOR OUTPATIENT REHABILITATION   Patient's Last Name, First Name, BUCKSalbador Lamb YOB: 1955   Provider's Name   [facility]   Medical Record No.  [id number]     Onset Date: 11/29/23  Start of Care Date: 01/09/25     Medical Diagnosis:  Lumbar Stenosis      PT Treatment Diagnosis:  Lumbar Stenosis Plan of Treatment  Frequency/Duration: 1x week/ 4 weeks then 2 x month for 8 weeks    Certification date from 01/09/25 to 04/03/25         See note for plan of treatment details and functional goals     Adam Lewis, PT                         I CERTIFY THE NEED FOR THESE SERVICES FURNISHED UNDER        THIS PLAN OF TREATMENT AND WHILE UNDER MY CARE     (Physician attestation of this document indicates review and certification of the therapy plan).              Referring Provider:  Tomy Finley    Initial Assessment  See Epic Evaluation- Start of Care Date: 01/09/25

## 2025-02-03 ENCOUNTER — THERAPY VISIT (OUTPATIENT)
Dept: PHYSICAL THERAPY | Facility: CLINIC | Age: 70
End: 2025-02-03
Payer: COMMERCIAL

## 2025-02-03 DIAGNOSIS — G89.29 CHRONIC BILATERAL LOW BACK PAIN WITH BILATERAL SCIATICA: Primary | ICD-10-CM

## 2025-02-03 DIAGNOSIS — M54.41 CHRONIC BILATERAL LOW BACK PAIN WITH BILATERAL SCIATICA: Primary | ICD-10-CM

## 2025-02-03 DIAGNOSIS — M54.42 CHRONIC BILATERAL LOW BACK PAIN WITH BILATERAL SCIATICA: Primary | ICD-10-CM

## 2025-02-03 PROCEDURE — 97012 MECHANICAL TRACTION THERAPY: CPT | Mod: GP | Performed by: PHYSICAL THERAPIST

## 2025-02-03 PROCEDURE — 97110 THERAPEUTIC EXERCISES: CPT | Mod: GP | Performed by: PHYSICAL THERAPIST

## 2025-02-04 ENCOUNTER — TELEPHONE (OUTPATIENT)
Dept: FAMILY MEDICINE | Facility: CLINIC | Age: 70
End: 2025-02-04
Payer: MEDICARE

## 2025-02-04 DIAGNOSIS — M47.812 CERVICAL SPONDYLOSIS WITHOUT MYELOPATHY: Primary | ICD-10-CM

## 2025-02-04 NOTE — TELEPHONE ENCOUNTER
----- Message from Eboni AGUSTIN sent at 2/3/2025  3:49 PM CST -----  Regarding: Physical Therapy Order  Hi there!  Would you be willing to put in an updated order for this patient? His previous order  in January. It would still be for the Cervical spondylosis without myelopathy diagnosis.    Thanks!

## 2025-02-11 DIAGNOSIS — I10 BENIGN ESSENTIAL HYPERTENSION: ICD-10-CM

## 2025-02-11 DIAGNOSIS — F33.0 MAJOR DEPRESSIVE DISORDER, RECURRENT, MILD: ICD-10-CM

## 2025-02-11 RX ORDER — AMLODIPINE BESYLATE 5 MG/1
5 TABLET ORAL DAILY
Qty: 90 TABLET | Refills: 0 | Status: SHIPPED | OUTPATIENT
Start: 2025-02-11

## 2025-02-11 RX ORDER — BUPROPION HYDROCHLORIDE 300 MG/1
TABLET ORAL
Qty: 90 TABLET | Refills: 0 | Status: SHIPPED | OUTPATIENT
Start: 2025-02-11

## 2025-02-26 ENCOUNTER — E-VISIT (OUTPATIENT)
Dept: FAMILY MEDICINE | Facility: CLINIC | Age: 70
End: 2025-02-26
Payer: COMMERCIAL

## 2025-02-26 ENCOUNTER — MYC REFILL (OUTPATIENT)
Dept: FAMILY MEDICINE | Facility: CLINIC | Age: 70
End: 2025-02-26
Payer: MEDICARE

## 2025-02-26 DIAGNOSIS — F90.0 ADHD (ATTENTION DEFICIT HYPERACTIVITY DISORDER), INATTENTIVE TYPE: ICD-10-CM

## 2025-02-26 RX ORDER — DEXTROAMPHETAMINE SACCHARATE, AMPHETAMINE ASPARTATE MONOHYDRATE, DEXTROAMPHETAMINE SULFATE AND AMPHETAMINE SULFATE 5; 5; 5; 5 MG/1; MG/1; MG/1; MG/1
20 CAPSULE, EXTENDED RELEASE ORAL DAILY
Qty: 30 CAPSULE | Refills: 0 | Status: SHIPPED | OUTPATIENT
Start: 2025-04-27

## 2025-02-26 RX ORDER — DEXTROAMPHETAMINE SACCHARATE, AMPHETAMINE ASPARTATE MONOHYDRATE, DEXTROAMPHETAMINE SULFATE AND AMPHETAMINE SULFATE 5; 5; 5; 5 MG/1; MG/1; MG/1; MG/1
20 CAPSULE, EXTENDED RELEASE ORAL DAILY
Qty: 30 CAPSULE | Refills: 0 | Status: SHIPPED | OUTPATIENT
Start: 2025-03-28

## 2025-02-26 RX ORDER — DEXTROAMPHETAMINE SACCHARATE, AMPHETAMINE ASPARTATE MONOHYDRATE, DEXTROAMPHETAMINE SULFATE AND AMPHETAMINE SULFATE 5; 5; 5; 5 MG/1; MG/1; MG/1; MG/1
20 CAPSULE, EXTENDED RELEASE ORAL DAILY
Qty: 30 CAPSULE | Refills: 0 | Status: SHIPPED | OUTPATIENT
Start: 2025-02-26

## 2025-02-26 RX ORDER — DEXTROAMPHETAMINE SACCHARATE, AMPHETAMINE ASPARTATE MONOHYDRATE, DEXTROAMPHETAMINE SULFATE AND AMPHETAMINE SULFATE 5; 5; 5; 5 MG/1; MG/1; MG/1; MG/1
20 CAPSULE, EXTENDED RELEASE ORAL DAILY
Qty: 30 CAPSULE | Refills: 0 | OUTPATIENT
Start: 2025-02-26

## 2025-02-26 ASSESSMENT — ANXIETY QUESTIONNAIRES
8. IF YOU CHECKED OFF ANY PROBLEMS, HOW DIFFICULT HAVE THESE MADE IT FOR YOU TO DO YOUR WORK, TAKE CARE OF THINGS AT HOME, OR GET ALONG WITH OTHER PEOPLE?: SOMEWHAT DIFFICULT
7. FEELING AFRAID AS IF SOMETHING AWFUL MIGHT HAPPEN: NOT AT ALL
GAD7 TOTAL SCORE: 3
3. WORRYING TOO MUCH ABOUT DIFFERENT THINGS: SEVERAL DAYS
4. TROUBLE RELAXING: SEVERAL DAYS
5. BEING SO RESTLESS THAT IT IS HARD TO SIT STILL: NOT AT ALL
2. NOT BEING ABLE TO STOP OR CONTROL WORRYING: NOT AT ALL
GAD7 TOTAL SCORE: 3
IF YOU CHECKED OFF ANY PROBLEMS ON THIS QUESTIONNAIRE, HOW DIFFICULT HAVE THESE PROBLEMS MADE IT FOR YOU TO DO YOUR WORK, TAKE CARE OF THINGS AT HOME, OR GET ALONG WITH OTHER PEOPLE: SOMEWHAT DIFFICULT
7. FEELING AFRAID AS IF SOMETHING AWFUL MIGHT HAPPEN: NOT AT ALL
GAD7 TOTAL SCORE: 3
6. BECOMING EASILY ANNOYED OR IRRITABLE: NOT AT ALL
1. FEELING NERVOUS, ANXIOUS, OR ON EDGE: SEVERAL DAYS

## 2025-02-26 ASSESSMENT — PATIENT HEALTH QUESTIONNAIRE - PHQ9
SUM OF ALL RESPONSES TO PHQ QUESTIONS 1-9: 0
SUM OF ALL RESPONSES TO PHQ QUESTIONS 1-9: 0

## 2025-02-26 NOTE — PATIENT INSTRUCTIONS
I have refilled your medication. Follow up in 3 months.   Orders Placed This Encounter   Medications     amphetamine-dextroamphetamine (ADDERALL XR) 20 MG 24 hr capsule     Sig: Take 1 capsule (20 mg) by mouth daily.     Dispense:  30 capsule     Refill:  0     amphetamine-dextroamphetamine (ADDERALL XR) 20 MG 24 hr capsule     Sig: Take 1 capsule (20 mg) by mouth daily.     Dispense:  30 capsule     Refill:  0     amphetamine-dextroamphetamine (ADDERALL XR) 20 MG 24 hr capsule     Sig: Take 1 capsule (20 mg) by mouth daily.     Dispense:  30 capsule     Refill:  0          View your full visit summary for details by clicking on the link below. Your pharmacist will be able to address any questions you may have about the medication.      Thank you for choosing us for your care.

## 2025-04-13 ENCOUNTER — APPOINTMENT (OUTPATIENT)
Dept: GENERAL RADIOLOGY | Facility: CLINIC | Age: 70
End: 2025-04-13
Attending: EMERGENCY MEDICINE
Payer: MEDICARE

## 2025-04-13 ENCOUNTER — HOSPITAL ENCOUNTER (EMERGENCY)
Facility: CLINIC | Age: 70
Discharge: HOME | End: 2025-04-13
Attending: EMERGENCY MEDICINE | Admitting: EMERGENCY MEDICINE
Payer: MEDICARE

## 2025-04-13 ENCOUNTER — NURSE TRIAGE (OUTPATIENT)
Dept: NURSING | Facility: CLINIC | Age: 70
End: 2025-04-13
Payer: MEDICARE

## 2025-04-13 VITALS
SYSTOLIC BLOOD PRESSURE: 133 MMHG | RESPIRATION RATE: 18 BRPM | DIASTOLIC BLOOD PRESSURE: 71 MMHG | HEART RATE: 50 BPM | TEMPERATURE: 98 F | OXYGEN SATURATION: 99 %

## 2025-04-13 DIAGNOSIS — S60.222A CONTUSION OF DORSUM OF LEFT HAND: ICD-10-CM

## 2025-04-13 DIAGNOSIS — S50.12XA CONTUSION OF LEFT FOREARM, INITIAL ENCOUNTER: ICD-10-CM

## 2025-04-13 DIAGNOSIS — S20.212A CHEST WALL CONTUSION, LEFT, INITIAL ENCOUNTER: ICD-10-CM

## 2025-04-13 DIAGNOSIS — S50.812A ABRASION OF LEFT FOREARM, INITIAL ENCOUNTER: ICD-10-CM

## 2025-04-13 DIAGNOSIS — S80.01XA CONTUSION OF RIGHT KNEE, INITIAL ENCOUNTER: ICD-10-CM
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
## 2025-04-13 PROCEDURE — 76705 ECHO EXAM OF ABDOMEN: CPT | Mod: 26 | Performed by: EMERGENCY MEDICINE

## 2025-04-13 PROCEDURE — 250N000013 HC RX MED GY IP 250 OP 250 PS 637: Performed by: EMERGENCY MEDICINE

## 2025-04-13 PROCEDURE — 99284 EMERGENCY DEPT VISIT MOD MDM: CPT | Mod: 25 | Performed by: EMERGENCY MEDICINE

## 2025-04-13 PROCEDURE — 76705 ECHO EXAM OF ABDOMEN: CPT | Performed by: EMERGENCY MEDICINE

## 2025-04-13 PROCEDURE — 71101 X-RAY EXAM UNILAT RIBS/CHEST: CPT | Mod: LT

## 2025-04-13 PROCEDURE — 73562 X-RAY EXAM OF KNEE 3: CPT | Mod: 26 | Performed by: RADIOLOGY

## 2025-04-13 PROCEDURE — 73562 X-RAY EXAM OF KNEE 3: CPT | Mod: RT

## 2025-04-13 PROCEDURE — 71101 X-RAY EXAM UNILAT RIBS/CHEST: CPT | Mod: 26 | Performed by: RADIOLOGY

## 2025-04-13 PROCEDURE — 73130 X-RAY EXAM OF HAND: CPT | Mod: 26 | Performed by: RADIOLOGY

## 2025-04-13 PROCEDURE — 73130 X-RAY EXAM OF HAND: CPT | Mod: LT

## 2025-04-13 PROCEDURE — 73090 X-RAY EXAM OF FOREARM: CPT | Mod: 26 | Performed by: RADIOLOGY

## 2025-04-13 PROCEDURE — 73090 X-RAY EXAM OF FOREARM: CPT | Mod: LT

## 2025-04-13 RX ORDER — IBUPROFEN 600 MG/1
600 TABLET, FILM COATED ORAL
Status: COMPLETED | OUTPATIENT
Start: 2025-04-13 | End: 2025-04-13

## 2025-04-13 RX ORDER — HYDROCODONE BITARTRATE AND ACETAMINOPHEN 5; 325 MG/1; MG/1
1 TABLET ORAL EVERY 6 HOURS PRN
Qty: 8 TABLET | Refills: 0 | Status: SHIPPED | OUTPATIENT
Start: 2025-04-13 | End: 2025-04-17

## 2025-04-13 RX ORDER — ACETAMINOPHEN 325 MG/1
650 TABLET ORAL
Status: COMPLETED | OUTPATIENT
Start: 2025-04-13 | End: 2025-04-13

## 2025-04-13 RX ADMIN — IBUPROFEN 600 MG: 600 TABLET, FILM COATED ORAL at 20:12

## 2025-04-13 RX ADMIN — ACETAMINOPHEN 650 MG: 325 TABLET, FILM COATED ORAL at 20:11

## 2025-04-13 ASSESSMENT — COLUMBIA-SUICIDE SEVERITY RATING SCALE - C-SSRS
6. HAVE YOU EVER DONE ANYTHING, STARTED TO DO ANYTHING, OR PREPARED TO DO ANYTHING TO END YOUR LIFE?: NO
1. IN THE PAST MONTH, HAVE YOU WISHED YOU WERE DEAD OR WISHED YOU COULD GO TO SLEEP AND NOT WAKE UP?: NO
2. HAVE YOU ACTUALLY HAD ANY THOUGHTS OF KILLING YOURSELF IN THE PAST MONTH?: NO

## 2025-04-13 ASSESSMENT — ACTIVITIES OF DAILY LIVING (ADL)
ADLS_ACUITY_SCORE: 41
ADLS_ACUITY_SCORE: 41
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
## 2025-04-17 ENCOUNTER — OFFICE VISIT (OUTPATIENT)
Dept: FAMILY MEDICINE | Facility: CLINIC | Age: 70
End: 2025-04-17
Attending: FAMILY MEDICINE
Payer: COMMERCIAL

## 2025-04-17 VITALS
RESPIRATION RATE: 16 BRPM | OXYGEN SATURATION: 99 % | DIASTOLIC BLOOD PRESSURE: 58 MMHG | TEMPERATURE: 97.1 F | SYSTOLIC BLOOD PRESSURE: 128 MMHG | BODY MASS INDEX: 21.82 KG/M2 | HEIGHT: 68 IN | WEIGHT: 144 LBS | HEART RATE: 52 BPM

## 2025-04-17 DIAGNOSIS — N52.9 ERECTILE DYSFUNCTION, UNSPECIFIED ERECTILE DYSFUNCTION TYPE: ICD-10-CM

## 2025-04-17 DIAGNOSIS — Z00.00 ENCOUNTER FOR MEDICARE ANNUAL WELLNESS EXAM: Primary | ICD-10-CM

## 2025-04-17 DIAGNOSIS — Z00.00 ENCOUNTER FOR MEDICARE ANNUAL WELLNESS EXAM: ICD-10-CM

## 2025-04-17 DIAGNOSIS — S20.212D CONTUSION OF LEFT CHEST WALL, SUBSEQUENT ENCOUNTER: ICD-10-CM

## 2025-04-17 DIAGNOSIS — M47.812 CERVICAL SPONDYLOSIS WITHOUT MYELOPATHY: ICD-10-CM

## 2025-04-17 DIAGNOSIS — Z12.5 SCREENING PSA (PROSTATE SPECIFIC ANTIGEN): ICD-10-CM

## 2025-04-17 DIAGNOSIS — Z13.220 SCREENING FOR HYPERLIPIDEMIA: ICD-10-CM

## 2025-04-17 DIAGNOSIS — F90.0 ADHD (ATTENTION DEFICIT HYPERACTIVITY DISORDER), INATTENTIVE TYPE: ICD-10-CM

## 2025-04-17 DIAGNOSIS — M48.062 SPINAL STENOSIS OF LUMBAR REGION WITH NEUROGENIC CLAUDICATION: ICD-10-CM

## 2025-04-17 DIAGNOSIS — F33.0 MAJOR DEPRESSIVE DISORDER, RECURRENT, MILD: ICD-10-CM

## 2025-04-17 DIAGNOSIS — I10 BENIGN ESSENTIAL HYPERTENSION: Primary | ICD-10-CM

## 2025-04-17 DIAGNOSIS — I10 BENIGN ESSENTIAL HYPERTENSION: ICD-10-CM

## 2025-04-17 LAB
ALBUMIN SERPL BCG-MCNC: 4 G/DL (ref 3.5–5.2)
ALP SERPL-CCNC: 75 U/L (ref 40–150)
ALT SERPL W P-5'-P-CCNC: 20 U/L (ref 0–70)
ANION GAP SERPL CALCULATED.3IONS-SCNC: 9 MMOL/L (ref 7–15)
AST SERPL W P-5'-P-CCNC: 36 U/L (ref 0–45)
BILIRUB SERPL-MCNC: 0.5 MG/DL
BUN SERPL-MCNC: 24.1 MG/DL (ref 8–23)
CALCIUM SERPL-MCNC: 9.3 MG/DL (ref 8.8–10.4)
CHLORIDE SERPL-SCNC: 102 MMOL/L (ref 98–107)
CHOLEST SERPL-MCNC: 167 MG/DL
CREAT SERPL-MCNC: 1.19 MG/DL (ref 0.67–1.17)
EGFRCR SERPLBLD CKD-EPI 2021: 67 ML/MIN/1.73M2
FASTING STATUS PATIENT QL REPORTED: NO
FASTING STATUS PATIENT QL REPORTED: NO
GLUCOSE SERPL-MCNC: 109 MG/DL (ref 70–99)
HCO3 SERPL-SCNC: 25 MMOL/L (ref 22–29)
HDLC SERPL-MCNC: 84 MG/DL
LDLC SERPL CALC-MCNC: 68 MG/DL
NONHDLC SERPL-MCNC: 83 MG/DL
POTASSIUM SERPL-SCNC: 4.9 MMOL/L (ref 3.4–5.3)
PROT SERPL-MCNC: 6.7 G/DL (ref 6.4–8.3)
PSA SERPL DL<=0.01 NG/ML-MCNC: 0.3 NG/ML (ref 0–4.5)
SODIUM SERPL-SCNC: 136 MMOL/L (ref 135–145)
TRIGL SERPL-MCNC: 75 MG/DL

## 2025-04-17 PROCEDURE — G0103 PSA SCREENING: HCPCS | Performed by: FAMILY MEDICINE

## 2025-04-17 PROCEDURE — 80053 COMPREHEN METABOLIC PANEL: CPT | Performed by: FAMILY MEDICINE

## 2025-04-17 PROCEDURE — 36415 COLL VENOUS BLD VENIPUNCTURE: CPT | Performed by: FAMILY MEDICINE

## 2025-04-17 PROCEDURE — 80061 LIPID PANEL: CPT | Performed by: FAMILY MEDICINE

## 2025-04-17 RX ORDER — GABAPENTIN 300 MG/1
300 CAPSULE ORAL 3 TIMES DAILY
Qty: 90 CAPSULE | Refills: 5 | Status: SHIPPED | OUTPATIENT
Start: 2025-04-17

## 2025-04-17 RX ORDER — CYCLOBENZAPRINE HCL 10 MG
10 TABLET ORAL 3 TIMES DAILY PRN
COMMUNITY
Start: 2025-04-17

## 2025-04-17 RX ORDER — SILDENAFIL CITRATE 20 MG/1
TABLET ORAL
Qty: 90 TABLET | Refills: 0 | Status: SHIPPED | OUTPATIENT
Start: 2025-04-17

## 2025-04-17 RX ORDER — DEXTROAMPHETAMINE SACCHARATE, AMPHETAMINE ASPARTATE MONOHYDRATE, DEXTROAMPHETAMINE SULFATE AND AMPHETAMINE SULFATE 5; 5; 5; 5 MG/1; MG/1; MG/1; MG/1
20 CAPSULE, EXTENDED RELEASE ORAL DAILY
Qty: 30 CAPSULE | Refills: 0 | Status: SHIPPED | OUTPATIENT
Start: 2025-06-27

## 2025-04-17 RX ORDER — HYDROCODONE BITARTRATE AND ACETAMINOPHEN 5; 325 MG/1; MG/1
1 TABLET ORAL EVERY 6 HOURS PRN
Qty: 8 TABLET | Refills: 0 | Status: CANCELLED | OUTPATIENT
Start: 2025-04-17

## 2025-04-17 RX ORDER — BUPROPION HYDROCHLORIDE 300 MG/1
TABLET ORAL
Qty: 90 TABLET | Refills: 3 | Status: SHIPPED | OUTPATIENT
Start: 2025-04-17

## 2025-04-17 RX ORDER — DEXTROAMPHETAMINE SACCHARATE, AMPHETAMINE ASPARTATE MONOHYDRATE, DEXTROAMPHETAMINE SULFATE AND AMPHETAMINE SULFATE 5; 5; 5; 5 MG/1; MG/1; MG/1; MG/1
20 CAPSULE, EXTENDED RELEASE ORAL DAILY
Qty: 30 CAPSULE | Refills: 0 | Status: SHIPPED | OUTPATIENT
Start: 2025-05-27

## 2025-04-17 RX ORDER — AMLODIPINE BESYLATE 5 MG/1
5 TABLET ORAL DAILY
Qty: 90 TABLET | Refills: 3 | Status: SHIPPED | OUTPATIENT
Start: 2025-04-17

## 2025-04-17 RX ORDER — HYDROCODONE BITARTRATE AND ACETAMINOPHEN 5; 325 MG/1; MG/1
1 TABLET ORAL 2 TIMES DAILY PRN
Qty: 8 TABLET | Refills: 0 | Status: SHIPPED | OUTPATIENT
Start: 2025-04-17

## 2025-04-17 SDOH — HEALTH STABILITY: PHYSICAL HEALTH: ON AVERAGE, HOW MANY DAYS PER WEEK DO YOU ENGAGE IN MODERATE TO STRENUOUS EXERCISE (LIKE A BRISK WALK)?: 4 DAYS

## 2025-04-17 ASSESSMENT — PATIENT HEALTH QUESTIONNAIRE - PHQ9
10. IF YOU CHECKED OFF ANY PROBLEMS, HOW DIFFICULT HAVE THESE PROBLEMS MADE IT FOR YOU TO DO YOUR WORK, TAKE CARE OF THINGS AT HOME, OR GET ALONG WITH OTHER PEOPLE: NOT DIFFICULT AT ALL
SUM OF ALL RESPONSES TO PHQ QUESTIONS 1-9: 1
SUM OF ALL RESPONSES TO PHQ QUESTIONS 1-9: 1

## 2025-04-17 ASSESSMENT — PAIN SCALES - GENERAL: PAINLEVEL_OUTOF10: MILD PAIN (2)

## 2025-04-17 ASSESSMENT — SOCIAL DETERMINANTS OF HEALTH (SDOH): HOW OFTEN DO YOU GET TOGETHER WITH FRIENDS OR RELATIVES?: TWICE A WEEK

## 2025-04-17 NOTE — PATIENT INSTRUCTIONS
Patient Education   Preventive Care Advice   This is general advice given by our system to help you stay healthy. However, your care team may have specific advice just for you. Please talk to your care team about your preventive care needs.  Nutrition  Eat 5 or more servings of fruits and vegetables each day.  Try wheat bread, brown rice and whole grain pasta (instead of white bread, rice, and pasta).  Get enough calcium and vitamin D. Check the label on foods and aim for 100% of the RDA (recommended daily allowance).  Lifestyle  Exercise at least 150 minutes each week  (30 minutes a day, 5 days a week).  Do muscle strengthening activities 2 days a week. These help control your weight and prevent disease.  No smoking.  Wear sunscreen to prevent skin cancer.  Have a dental exam and cleaning every 6 months.  Yearly exams  See your health care team every year to talk about:  Any changes in your health.  Any medicines your care team has prescribed.  Preventive care, family planning, and ways to prevent chronic diseases.  Shots (vaccines)   HPV shots (up to age 26), if you've never had them before.  Hepatitis B shots (up to age 59), if you've never had them before.  COVID-19 shot: Get this shot when it's due.  Flu shot: Get a flu shot every year.  Tetanus shot: Get a tetanus shot every 10 years.  Pneumococcal, hepatitis A, and RSV shots: Ask your care team if you need these based on your risk.  Shingles shot (for age 50 and up)  General health tests  Diabetes screening:  Starting at age 35, Get screened for diabetes at least every 3 years.  If you are younger than age 35, ask your care team if you should be screened for diabetes.  Cholesterol test: At age 39, start having a cholesterol test every 5 years, or more often if advised.  Bone density scan (DEXA): At age 50, ask your care team if you should have this scan for osteoporosis (brittle bones).  Hepatitis C: Get tested at least once in your life.  STIs (sexually  transmitted infections)  Before age 24: Ask your care team if you should be screened for STIs.  After age 24: Get screened for STIs if you're at risk. You are at risk for STIs (including HIV) if:  You are sexually active with more than one person.  You don't use condoms every time.  You or a partner was diagnosed with a sexually transmitted infection.  If you are at risk for HIV, ask about PrEP medicine to prevent HIV.  Get tested for HIV at least once in your life, whether you are at risk for HIV or not.  Cancer screening tests  Cervical cancer screening: If you have a cervix, begin getting regular cervical cancer screening tests starting at age 21.  Breast cancer scan (mammogram): If you've ever had breasts, begin having regular mammograms starting at age 40. This is a scan to check for breast cancer.  Colon cancer screening: It is important to start screening for colon cancer at age 45.  Have a colonoscopy test every 10 years (or more often if you're at risk) Or, ask your provider about stool tests like a FIT test every year or Cologuard test every 3 years.  To learn more about your testing options, visit:   .  For help making a decision, visit:   https://bit.ly/zr84758.  Prostate cancer screening test: If you have a prostate, ask your care team if a prostate cancer screening test (PSA) at age 55 is right for you.  Lung cancer screening: If you are a current or former smoker ages 50 to 80, ask your care team if ongoing lung cancer screenings are right for you.  For informational purposes only. Not to replace the advice of your health care provider. Copyright   2023 [facility]. All rights reserved. Clinically reviewed by the [facility]. [employer] 660366 - REV 01/24.  Preventing Falls: Care Instructions  Injuries and health problems such as trouble walking or poor eyesight can increase your risk of falling. So can some medicines. But there are things you can do to help  "prevent falls. You can exercise to get stronger. You can also arrange your home to make it safer.    Talk to your doctor about the medicines you take. Ask if any of them increase the risk of falls and whether they can be changed or stopped.   Try to exercise regularly. It can help improve your strength and balance. This can help lower your risk of falling.         Practice fall safety and prevention.   Wear low-heeled shoes that fit well and give your feet good support. Talk to your doctor if you have foot problems that make this hard.  Carry a cellphone or wear a medical alert device that you can use to call for help.  Use stepladders instead of chairs to reach high objects. Don't climb if you're at risk for falls. Ask for help, if needed.  Wear the correct eyeglasses, if you need them.        Make your home safer.   Remove rugs, cords, clutter, and furniture from walkways.  Keep your house well lit. Use night-lights in hallways and bathrooms.  Install and use sturdy handrails on stairways.  Wear nonskid footwear, even inside. Don't walk barefoot or in socks without shoes.        Be safe outside.   Use handrails, curb cuts, and ramps whenever possible.  Keep your hands free by using a shoulder bag or backpack.  Try to walk in well-lit areas. Watch out for uneven ground, changes in pavement, and debris.  Be careful in the winter. Walk on the grass or gravel when sidewalks are slippery. Use de-icer on steps and walkways. Add non-slip devices to shoes.    Put grab bars and nonskid mats in your shower or tub and near the toilet. Try to use a shower chair or bath bench when bathing.   Get into a tub or shower by putting in your weaker leg first. Get out with your strong side first. Have a phone or medical alert device in the bathroom with you.   Where can you learn more?  Go to https://www.[employer].net/patiented  Enter G117 in the search box to learn more about \"Preventing Falls: Care Instructions.\"  Current as of: " July 31, 2024  Content Version: 14.4    9790-9634 [employer].   Care instructions adapted under license by your healthcare professional. If you have questions about a medical condition or this instruction, always ask your healthcare professional. [employer] disclaims any warranty or liability for your use of this information.    Hearing Loss: Care Instructions  Overview     Hearing loss is a sudden or slow decrease in how well you hear. It can range from slight to profound. Permanent hearing loss can occur with aging. It also can happen when you are exposed long-term to loud noise. Examples include listening to loud music, riding motorcycles, or being around other loud machines.  Hearing loss can affect your work and home life. It can make you feel lonely or depressed. You may feel that you have lost your independence. But hearing aids and other devices can help you hear better and feel connected to others.  Follow-up care is a key part of your treatment and safety. Be sure to make and go to all appointments, and call your doctor if you are having problems. It's also a good idea to know your test results and keep a list of the medicines you take.  How can you care for yourself at home?  Avoid loud noises whenever possible. This helps keep your hearing from getting worse.  Always wear hearing protection around loud noises.  Wear a hearing aid as directed.  A professional can help you pick a hearing aid that will work best for you.  You can also get hearing aids over the counter for mild to moderate hearing loss.  Have hearing tests as your doctor suggests. They can show whether your hearing has changed. Your hearing aid may need to be adjusted.  Use other devices as needed. These may include:  Telephone amplifiers and hearing aids that can connect to a television, stereo, radio, or microphone.  Devices that use lights or vibrations. These alert you to the doorbell, a ringing telephone, or a  "baby monitor.  Television closed-captioning. This shows the words at the bottom of the screen. Most new TVs can do this.  TTY (text telephone). This lets you type messages back and forth on the telephone instead of talking or listening. These devices are also called TDD. When messages are typed on the keyboard, they are sent over the phone line to a receiving TTY. The message is shown on a monitor.  Use text messaging, social media, and email if it is hard for you to communicate by telephone.  Try to learn a listening technique called speechreading. It is not lipreading. You pay attention to people's gestures, expressions, posture, and tone of voice. These clues can help you understand what a person is saying. Face the person you are talking to, and have them face you. Make sure the lighting is good. You need to see the other person's face clearly.  Think about counseling if you need help to adjust to your hearing loss.  When should you call for help?  Watch closely for changes in your health, and be sure to contact your doctor if:    You think your hearing is getting worse.     You have new symptoms, such as dizziness or nausea.   Where can you learn more?  Go to https://www.[employer].net/patiented  Enter R798 in the search box to learn more about \"Hearing Loss: Care Instructions.\"  Current as of: October 27, 2024  Content Version: 14.4    0204-3393 [employer].   Care instructions adapted under license by your healthcare professional. If you have questions about a medical condition or this instruction, always ask your healthcare professional. [employer] disclaims any warranty or liability for your use of this information.    Learning About Stress  What is stress?     Stress is your body's response to a hard situation. Your body can have a physical, emotional, or mental response. Stress is a fact of life for most people, and it affects everyone differently. What causes stress for you may not " be stressful for someone else.  A lot of things can cause stress. You may feel stress when you go on a job interview, take a test, or run a race. This kind of short-term stress is normal and even useful. It can help you if you need to work hard or react quickly. For example, stress can help you finish an important job on time.  Long-term stress is caused by ongoing stressful situations or events. Examples of long-term stress include long-term health problems, ongoing problems at work, or conflicts in your family. Long-term stress can harm your health.  How does stress affect your health?  When you are stressed, your body responds as though you are in danger. It makes hormones that speed up your heart, make you breathe faster, and give you a burst of energy. This is called the fight-or-flight stress response. If the stress is over quickly, your body goes back to normal and no harm is done.  But if stress happens too often or lasts too long, it can have bad effects. Long-term stress can make you more likely to get sick, and it can make symptoms of some diseases worse. If you tense up when you are stressed, you may develop neck, shoulder, or low back pain. Stress is linked to high blood pressure and heart disease.  Stress also harms your emotional health. It can make you lovett, tense, or depressed. Your relationships may suffer, and you may not do well at work or school.  What can you do to manage stress?  You can try these things to help manage stress:   Do something active. Exercise or activity can help reduce stress. Walking is a great way to get started. Even everyday activities such as housecleaning or yard work can help.  Try yoga or vanessa chi. These techniques combine exercise and meditation. You may need some training at first to learn them.  Do something you enjoy. For example, listen to music or go to a movie. Practice your hobby or do volunteer work.  Meditate. This can help you relax, because you are not  "worrying about what happened before or what may happen in the future.  Do guided imagery. Imagine yourself in any setting that helps you feel calm. You can use online videos, books, or a teacher to guide you.  Do breathing exercises. For example:  From a standing position, bend forward from the waist with your knees slightly bent. Let your arms dangle close to the floor.  Breathe in slowly and deeply as you return to a standing position. Roll up slowly and lift your head last.  Hold your breath for just a few seconds in the standing position.  Breathe out slowly and bend forward from the waist.  Let your feelings out. Talk, laugh, cry, and express anger when you need to. Talking with supportive friends or family, a counselor, or a eric leader about your feelings is a healthy way to relieve stress. Avoid discussing your feelings with people who make you feel worse.  Write. It may help to write about things that are bothering you. This helps you find out how much stress you feel and what is causing it. When you know this, you can find better ways to cope.  What can you do to prevent stress?  You might try some of these things to help prevent stress:  Manage your time. This helps you find time to do the things you want and need to do.  Get enough sleep. Your body recovers from the stresses of the day while you are sleeping.  Get support. Your family, friends, and community can make a difference in how you experience stress.  Limit your news feed. Avoid or limit time on social media or news that may make you feel stressed.  Do something active. Exercise or activity can help reduce stress. Walking is a great way to get started.  Where can you learn more?  Go to https://www.[employer].net/patiented  Enter N032 in the search box to learn more about \"Learning About Stress.\"  Current as of: October 24, 2024  Content Version: 14.4    8462-7418 [facility] [employer].   Care instructions adapted under license by your " "healthcare professional. If you have questions about a medical condition or this instruction, always ask your healthcare professional. [employer] disclaims any warranty or liability for your use of this information.    9 Ways to Cut Back on Drinking  Maybe you've found yourself drinking more alcohol than you'd prefer. If you want to cut back, here are some ideas to try.    Think before you drink.  Do you really want a drink, or is it just a habit? If you're used to having a drink at a certain time, try doing something else then.     Look for substitutes.  Find some no-alcohol drinks that you enjoy, like flavored seltzer water, tea with honey, or tonic with a slice of lime. Or try alcohol-free beer or \"virgin\" cocktails (without the alcohol).     Drink more water.  Use water to quench your thirst. Drink a glass of water before you have any alcohol. Have another glass along with every drink or between drinks.     Shrink your drink.  For example, have a bottle of beer instead of a pint. Use a smaller glass for wine. Choose drinks with lower alcohol content (ABV%). Or use less liquor and more mixer in cocktails.     Slow down.  It's easy to drink quickly and without thinking about it. Pay attention, and make each drink last longer.     Do the math.  Total up how much you spend on alcohol each month. How much is that a year? If you cut back, what could you do with the money you save?     Take a break.  Choose a day or two each week when you won't drink at all. Notice how you feel on those days, physically and emotionally. How did you sleep? Do you feel better? Over time, add more break days.     Count calories.  Would you like to lose some weight? For some people that's a good motivator for cutting back. Figure out how many calories are in each drink. How many does that add up to in a day? In a week? In a month?     Practice saying no.  Be ready when someone offers you a drink. Try: \"Thanks, I've had enough.\" " "Or \"Thanks, but I'm cutting back.\" Or \"No, thanks. I feel better when I drink less.\"   Current as of: August 20, 2024  Content Version: 14.4    5566-3761 [employer].   Care instructions adapted under license by your healthcare professional. If you have questions about a medical condition or this instruction, always ask your healthcare professional. [employer] disclaims any warranty or liability for your use of this information.  Substance Use Disorder: Care Instructions  Overview     You can improve your life and health by stopping your use of alcohol or drugs. When you don't drink or use drugs, you may feel and sleep better. You may get along better with your family, friends, and coworkers. There are medicines and programs that can help with substance use disorder.  How can you care for yourself at home?  Here are some ways to help you stay sober and prevent relapse.  If you have been given medicine to help keep you sober or reduce your cravings, be sure to take it exactly as prescribed.  Talk to your doctor about programs that can help you stop using drugs or drinking alcohol.  Do not keep alcohol or drugs in your home.  Plan ahead. Think about what you'll say if other people ask you to drink or use drugs. Try not to spend time with people who drink or use drugs.  Use the time and money spent on drinking or drugs to do something that's important to you.  Preventing a relapse  Have a plan to deal with relapse. Learn to recognize changes in your thinking that lead you to drink or use drugs. Get help before you start to drink or use drugs again.  Try to stay away from situations, friends, or places that may lead you to drink or use drugs.  If you feel the need to drink alcohol or use drugs again, seek help right away. Call a trusted friend or family member. Some people get support from organizations such as Narcotics Anonymous or [employer] or from treatment facilities.  If you relapse, " get help as soon as you can. Some people make a plan with another person that outlines what they want that person to do for them if they relapse. The plan usually includes how to handle the relapse and who to notify in case of relapse.  Don't give up. Remember that a relapse doesn't mean that you have failed. Use the experience to learn the triggers that lead you to drink or use drugs. Then quit again. Recovery is a lifelong process. Many people have several relapses before they are able to quit for good.  Follow-up care is a key part of your treatment and safety. Be sure to make and go to all appointments, and call your doctor if you are having problems. It's also a good idea to know your test results and keep a list of the medicines you take.  When should you call for help?   Call 911  anytime you think you may need emergency care. For example, call if you or someone else:    Has overdosed or has withdrawal signs. Be sure to tell the emergency workers that you are or someone else is using or trying to quit using drugs. Overdose or withdrawal signs may include:  Losing consciousness.  Seizure.  Seeing or hearing things that aren't there (hallucinations).     Is thinking or talking about suicide or harming others.   Where to get help 24 hours a day, 7 days a week   If you or someone you know talks about suicide, self-harm, a mental health crisis, a substance use crisis, or any other kind of emotional distress, get help right away. You can:    Call the Suicide and Crisis Lifeline at 988.     Call 5-004-224-TALK (1-250.301.7843).     Text HOME to 513315 to access the Crisis Text Line.   Consider saving these numbers in your phone.  Go to [email or web address] for more information or to chat online.  Call your doctor now or seek immediate medical care if:    You are having withdrawal symptoms. These may include nausea or vomiting, sweating, shakiness, and anxiety.   Watch closely for changes in your health, and be sure to  "contact your doctor if:    You have a relapse.     You need more help or support to stop.   Where can you learn more?  Go to https://www.[employer].net/patiented  Enter H573 in the search box to learn more about \"Substance Use Disorder: Care Instructions.\"  Current as of: August 20, 2024  Content Version: 14.4    3225-0199 [employer].   Care instructions adapted under license by your healthcare professional. If you have questions about a medical condition or this instruction, always ask your healthcare professional. [employer] disclaims any warranty or liability for your use of this information.    Chronic Pain: Care Instructions  Your Care Instructions     Chronic pain is pain that lasts a long time (months or even years) and may or may not have a clear cause. It is different from acute pain, which usually does have a clear cause--like an injury or illness--and gets better over time. Chronic pain:  Lasts over time but may vary from day to day.  Does not go away despite efforts to end it.  May disrupt your sleep and lead to fatigue.  May cause depression or anxiety.  May make your muscles tense, causing more pain.  Can disrupt your work, hobbies, home life, and relationships with friends and family.  Chronic pain is a very real condition. It is not just in your head. Treatment can help and usually includes several methods used together, such as medicines, physical therapy, exercise, and other treatments. Learning how to relax and changing negative thought patterns can also help you cope.  Chronic pain is complex. Taking an active role in your treatment will help you better manage your pain. Tell your doctor if you have trouble dealing with your pain. You may have to try several things before you find what works best for you.  Follow-up care is a key part of your treatment and safety. Be sure to make and go to all appointments, and call your doctor if you are having problems. It's also a good " idea to know your test results and keep a list of the medicines you take.  How can you care for yourself at home?  Pace yourself. Break up large jobs into smaller tasks. Save harder tasks for days when you have less pain, or go back and forth between hard tasks and easier ones. Take rest breaks.  Relax, and reduce stress. Relaxation techniques such as deep breathing or meditation can help.  Keep moving. Gentle, daily exercise can help reduce pain over the long run. Try low- or no-impact exercises such as walking, swimming, and stationary biking. Do stretches to stay flexible.  Try heat, cold packs, and massage.  Get enough sleep. Chronic pain can make you tired and drain your energy. Talk with your doctor if you have trouble sleeping because of pain.  Think positive. Your thoughts can affect your pain level. Do things that you enjoy to distract yourself when you have pain instead of focusing on the pain. See a movie, read a book, listen to music, or spend time with a friend.  If you think you are depressed, talk to your doctor about treatment.  Keep a daily pain diary. Record how your moods, thoughts, sleep patterns, activities, and medicine affect your pain. You may find that your pain is worse during or after certain activities or when you are feeling a certain emotion. Having a record of your pain can help you and your doctor find the best ways to treat your pain.  Take pain medicines exactly as directed.  If the doctor gave you a prescription medicine for pain, take it as prescribed.  If you are not taking a prescription pain medicine, ask your doctor if you can take an over-the-counter medicine.  Reducing constipation caused by pain medicine  Talk to your doctor about a laxative. If a laxative doesn't work, your doctor may suggest a prescription medicine.  Include fruits, vegetables, beans, and whole grains in your diet each day. These foods are high in fiber.  If your doctor recommends it, get more exercise.  "Walking is a good choice. Bit by bit, increase the amount you walk every day. Try for at least 30 minutes on most days of the week.  Schedule time each day for a bowel movement. A daily routine may help. Take your time and do not strain when having a bowel movement.  When should you call for help?   Call your doctor now or seek immediate medical care if:    Your pain gets worse or is out of control.     You feel down or blue, or you do not enjoy things like you once did. You may be depressed, which is common in people with chronic pain. Depression can be treated.     You have vomiting or cramps for more than 2 hours.   Watch closely for changes in your health, and be sure to contact your doctor if:    You cannot sleep because of pain.     You are very worried or anxious about your pain.     You have trouble taking your pain medicine.     You have any concerns about your pain medicine.     You have trouble with bowel movements, such as:  No bowel movement in 3 days.  Blood in the anal area, in your stool, or on the toilet paper.  Diarrhea for more than 24 hours.   Where can you learn more?  Go to [email or web address]  Enter N004 in the search box to learn more about \"Chronic Pain: Care Instructions.\"  Current as of: July 31, 2024  Content Version: 14.4    4565-2958 [employer].   Care instructions adapted under license by your healthcare professional. If you have questions about a medical condition or this instruction, always ask your healthcare professional. [employer] disclaims any warranty or liability for your use of this information.       "
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
## 2025-04-18 NOTE — RESULT ENCOUNTER NOTE
Your kidney function is slightly worse than the baseline.  Continue to push fluid.  No other labs are needed at this point but we will recheck your labs when I see you next time.  Prostate cancer screening test and cholesterol screening test are within normal limit.    Kieran Zuluaga MD  [facility]

## 2025-04-21 ENCOUNTER — DOCUMENTATION ONLY (OUTPATIENT)
Dept: OTHER | Facility: CLINIC | Age: 70
End: 2025-04-21
Payer: MEDICARE

## 2025-05-03 NOTE — NURSING NOTE
Is the patient currently in the state of [place]? YES    Visit mode:VIDEO    If the visit is dropped, the patient can be reconnected by: VIDEO VISIT: Text to cell phone:   Telephone Information:   Mobile [phone]       Will anyone else be joining the visit? NO  (If patient encounters technical issues they should call [phone] :150956)    How would you like to obtain your AVS? MyChart    Are changes needed to the allergy or medication list? No    Reason for visit: Follow Up    Amber YUAN      
LACERATION

## 2025-05-19 DIAGNOSIS — N52.9 ERECTILE DYSFUNCTION, UNSPECIFIED ERECTILE DYSFUNCTION TYPE: ICD-10-CM

## 2025-05-20 ENCOUNTER — OFFICE VISIT (OUTPATIENT)
Dept: FAMILY MEDICINE | Facility: CLINIC | Age: 70
End: 2025-05-20
Payer: MEDICARE

## 2025-05-20 VITALS
DIASTOLIC BLOOD PRESSURE: 94 MMHG | SYSTOLIC BLOOD PRESSURE: 158 MMHG | RESPIRATION RATE: 16 BRPM | OXYGEN SATURATION: 97 % | TEMPERATURE: 98.1 F | BODY MASS INDEX: 21.52 KG/M2 | HEART RATE: 110 BPM | HEIGHT: 68 IN | WEIGHT: 142 LBS

## 2025-05-20 DIAGNOSIS — R41.3 SHORT-TERM MEMORY LOSS: Primary | ICD-10-CM

## 2025-05-20 DIAGNOSIS — H90.5 SENSORINEURAL HEARING LOSS (SNHL), UNSPECIFIED LATERALITY: ICD-10-CM

## 2025-05-20 DIAGNOSIS — H61.23 BILATERAL IMPACTED CERUMEN: ICD-10-CM

## 2025-05-20 DIAGNOSIS — F10.90 ALCOHOL USE DISORDER: ICD-10-CM

## 2025-05-20 DIAGNOSIS — F12.90 MARIJUANA USE: ICD-10-CM

## 2025-05-20 PROCEDURE — 69209 REMOVE IMPACTED EAR WAX UNI: CPT | Mod: 50 | Performed by: FAMILY MEDICINE

## 2025-05-20 PROCEDURE — 99213 OFFICE O/P EST LOW 20 MIN: CPT | Mod: 25 | Performed by: FAMILY MEDICINE

## 2025-05-20 PROCEDURE — 3080F DIAST BP >= 90 MM HG: CPT | Performed by: FAMILY MEDICINE

## 2025-05-20 PROCEDURE — 3077F SYST BP >= 140 MM HG: CPT | Performed by: FAMILY MEDICINE

## 2025-05-20 RX ORDER — SILDENAFIL CITRATE 20 MG/1
TABLET ORAL
Qty: 90 TABLET | Refills: 0 | Status: SHIPPED | OUTPATIENT
Start: 2025-05-20

## 2025-05-20 NOTE — PATIENT INSTRUCTIONS
[facility] neuropsychologist for adults:    Dr. Macarena Lopez - [phone]   Dr. Duong Gipson - [phone]   Dr. Fang Grier - [phone]   Dr. Deborah Long - [phone]   Dr. Tanya Segovia - [phone]   Dr. Mary Jo Salinas-Sulaiman - [phone], [email or web address]   Dr. Raghu Pearson - [phone], [email or web address]   [facility] - [phone], [email or web address]   [facility] Neurology, [email or web address]   [facility] - [phone], [email or web address]     =======================================  FYI:     System will autorelease results as soon as they are available. I usually wait for all results to be ready before sending you a comment or message.  Be assured I will review and comment on all of your results as soon as I can.     I am at [facility]  ([phone]) usually Monday, Tuesday and Wednesday.   Messages, evisits, phone calls received on Thursday and Friday may not get responded very urgently but I will try to respond as soon as possible.     You can schedule a video visit through this link Video Visits ([email or web address])     Sign up for [employer] ([email or web address]).  This will allow you to review your results, securely communicate with a provider, and schedule virtual visits as well.

## 2025-05-20 NOTE — NURSING NOTE
RN ear assessment completed post ear irrigation. Post Ear Irrigation exam completed and cerumen plug removed, tympanic membrane intact, and no bleeding noted.  Pain assessment completed.  Patient tolerated procedure:  yes.    LEVI AndersonN, RN (she/her)  [facility] RN

## 2025-05-20 NOTE — PROGRESS NOTES
Assessment & Plan     Short-term memory loss  Longstanding symptoms.  Has had neuroevaluation in the past and neuropsych evaluation.  Workup was negative.  His underlying ADHD certainly can contribute.  We also discussed avoiding excessive alcohol use and marijuana as may adversely affect those.  He understood.  As per his request we will do another neuro referral for neuropsych evaluation.  We agreed to hold off on repeat labs.  Reviewed previous labs.  - Adult Neurology  Referral; Future    Alcohol use disorder  Strongly recommended him to cut back on alcohol use.  He understood.  Discussed naltrexone.  He does not think is quite necessary.    Marijuana use  Discussed it may affect mentation.  He understands.  Strongly recommended him to back off from regular use    Sensorineural hearing loss (SNHL), unspecified laterality  Ear wash by MA today.  Audiology referral given.  - Adult Audiology  Referral; Future    Bilateral impacted cerumen  Ear wash by MA today.  - HI REMOVAL IMPACTED CERUMEN IRRIGATION/LVG UNILAT                Papa Siu is a 69 year old, presenting for the following health issues:  Memory Loss      5/20/2025     1:15 PM   Additional Questions   Roomed by RAINER Gabriel     History of Present Illness       Reason for visit:  I want to have my hearing checked; discuss memory problems  Symptom onset:  More than a month  Symptoms include:  Short-term memory problems; not hearing things (clearly)  Symptom intensity:  Moderate  Symptom progression:  Worsening  Had these symptoms before:  No  What makes it worse:  NA  What makes it better:  NA   He is taking medications regularly.      Long term use of omeprazole. Wondering about it.  Using it every 3 days or so.     Short term memory - comes up frequently with himself and with his partner. Forgets several hours or yesterday information. Long term memory no issue. Some short term memory remembers fine. Partner gets frustrated.  "    Neurospsych evaluation few years ago and it was fine. Wondering if it is related adhd? Tries to use reminder, calendar to improve memory, making notes on the phone.     Marijuana, 2-3 drinks daily.     Needs hearing test.         Objective    BP (!) 158/94   Pulse 110   Temp 98.1  F (36.7  C) (Oral)   Resp 16   Ht 1.715 m (5' 7.5\")   Wt 64.4 kg (142 lb)   SpO2 97%   BMI 21.91 kg/m    Body mass index is 21.91 kg/m .  Physical Exam   Both ears canal blocked with ear wax.           The longitudinal plan of care for the diagnosis(es)/condition(s) as documented were addressed during this visit. Due to the added complexity in care, I will continue to support Salbador in the subsequent management and with ongoing continuity of care.  Signed Electronically by: Kieran Zuluaga MD, MD    "

## 2025-05-21 ENCOUNTER — PATIENT OUTREACH (OUTPATIENT)
Dept: CARE COORDINATION | Facility: CLINIC | Age: 70
End: 2025-05-21
Payer: MEDICARE

## 2025-05-29 ENCOUNTER — OFFICE VISIT (OUTPATIENT)
Dept: AUDIOLOGY | Facility: CLINIC | Age: 70
End: 2025-05-29
Payer: COMMERCIAL

## 2025-05-29 DIAGNOSIS — H93.13 TINNITUS OF BOTH EARS: ICD-10-CM

## 2025-05-29 DIAGNOSIS — H90.42 SENSORINEURAL HEARING LOSS (SNHL) OF LEFT EAR WITH UNRESTRICTED HEARING OF RIGHT EAR: Primary | ICD-10-CM

## 2025-05-29 DIAGNOSIS — H90.5 SENSORINEURAL HEARING LOSS (SNHL), UNSPECIFIED LATERALITY: ICD-10-CM

## 2025-05-29 NOTE — PROGRESS NOTES
AUDIOLOGY REPORT    SUBJECTIVE:  Salbador Begum is a 69 year old male who was seen in the Audiology Clinic at the [facility] for audiologic evaluation, referred by Kieran Zuluaga M.D. The patient reports that his significant other has some concerns regarding his hearing status, he feels that he overall hears well. He notes intermittent bilateral tinnitus. He also reports recent instances of left otalgia when laying on the ear. No history of ear surgeries or significant noise exposure reported. The patient denies  bilateral drainage, bilateral aural fullness, and dizziness. He reports that his father had a hearing loss.  The patient notes difficulty with communication in a variety of listening situations.      OBJECTIVE:  Otoscopic exam indicates ears are clear of cerumen bilaterally     Pure Tone Thresholds assessed using conventional audiometry with good  reliability from 250-8000 Hz bilaterally using insert earphones and circumaural headphones     RIGHT:  Normal hearing sensitivity    LEFT:    normal sloping to mild sensorineural hearing loss    Tympanogram:    RIGHT: normal eardrum mobility    LEFT:   normal eardrum mobility    Speech Reception Threshold:    RIGHT: 15 dB HL    LEFT:   10 dB HL    Word Recognition Score:     RIGHT: 100% at 55 dB HL using NU-6 recorded word list.    LEFT:   100% at 55 dB HL using NU-6 recorded word list.      ASSESSMENT:    Today's results revealed a left sensorineural hearing loss. Today s results were discussed with the patient in detail. While there is a hearing loss, the patient is not an ideal candidate yet for a left hearing aid. It was recommended that he repeat the hearing evaluation to monitor hearing status, he was in agreement with the plan.    PLAN:  Patient was counseled regarding hearing loss and impact on communication.  Handout on good communication strategies was given to patient. It is recommended that  the patient repeat the hearing evaluation in one year, sooner if concerns arise. This appointment was scheduled prior to him leaving the clinic today.  Please call this clinic with questions regarding these results or recommendations.        Foster Pizarro  Audiologist  [place] License  #[id number]

## 2025-06-20 NOTE — PROGRESS NOTES
[facility] Dermatology Note  Encounter Date: Jun 25, 2025  Office Visit     Dermatology Problem List:  FBSE: 6/25/25    1. Dermatitis, left ankle  - Lidex ointment    2. HAK, L forearm, s/p bx 06/20/23, s/p cryo 2/21/24    3. Benign bx:  -  Superficial dermal fibrosis consistent with scar, R lower shin. 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   - Can continue use of  lidex ointment BID prn  - can continue use of Tacrolimus 0.1 % ointment BID prn    # Fibrous papules, on nose (x2)  Notes that these have been present for years without changes. They sometimes bleed when washes face.   - reviewed warning signs of skin cancer and advised to notify us if these occur  - Discussed that these are benign lesions, no treatment required    # Multiple benign nevi.   - Monitor for ABCDEs of melanoma   - Continue sun protection - recommend SPF 30 or higher with frequent application   - Return sooner if noticing changing or symptomatic lesions    # Benign lesions - SKs, cherry angiomas, lentigenes.  - No treatment required      Procedures Performed:   None     Follow-up: 1 year(s) in-person, or earlier for new or changing lesions    Staff and Medical Student:    Jackie Martinez, MS4    Staffed by Dr. Atkins    Staff Physician:  I was present with the medical student who participated in the service and in the documentation of the note. I have verified the history and personally performed the physical exam and medical decision making. I agree with the assessment and plan of care as documented in the note.       Bozena Atkins MD    Department of Dermatology  [facility]:  Phone: [phone], Fax: [phone]  6/26/2025    ____________________________________________    CC: Skin Check (Here today for a skin check and dermatitis follow up. Spots of concern on nose.)    HPI:  Mr. Salbador Begum is a(n) 69 year old male who presents today as a return patient for FBSE.    Has a couple papules on the nose that bleed.     Rash on ankle is doing ok, using cream when it comes up. Better with warmer weather. Once a month, puts on a cream. Will use leftover cream on fingertips to prevent finger splitting. Using Eucerin as moisturizer.     Uses sunscreen when he bikes.     No personal history of skin cancer or melanoma. Dad with many skin biopsies, unclear if he had any skin cancer. Doesn't think it was melanoma.      Patient is otherwise feeling well, without additional skin concerns.     Labs Reviewed:  Derm path 06/12/2024  Final Diagnosis   Right lower shin:  - Superficial dermal fibrosis consistent with scar - (see description)       Physical exam:  Vitals: There were no vitals taken for this visit.  GEN: This is a well developed, well-nourished male in no acute distress, in a pleasant mood.    SKIN: Full body skin exam excluding the genitals was performed including face, scalp, neck, ears, chest, back, bilateral arms, hands, bilateral legs, feet, and buttocks.   - There are dome shaped bright red papules on the trunk and extremities.   - bland papules on nose  Multiple regular brown pigmented macules and papules are identified on the trunk and extremities.   There are fine lines and dyspigmentation on sun exposed areas of the face and chest.  Scattered brown macules on sun exposed areas.  There are waxy stuck on tan to brown papules on the back, arms.  - No other lesions of concern on areas examined.     Medications:  Current Outpatient Medications   Medication Sig Dispense Refill    acetaminophen (TYLENOL) 650 MG CR tablet Take 2 tablets (1,300 mg) by mouth 2 times daily 180  tablet 1    amLODIPine (NORVASC) 5 MG tablet Take 1 tablet (5 mg) by mouth daily. 90 tablet 3    amphetamine-dextroamphetamine (ADDERALL XR) 20 MG 24 hr capsule Take 1 capsule (20 mg) by mouth daily. 30 capsule 0    [START ON 6/27/2025] amphetamine-dextroamphetamine (ADDERALL XR) 20 MG 24 hr capsule Take 1 capsule (20 mg) by mouth daily. 30 capsule 0    amphetamine-dextroamphetamine (ADDERALL XR) 20 MG 24 hr capsule Take 1 capsule (20 mg) by mouth daily. 30 capsule 0    Ascorbic Acid (VITAMIN C PO)       buPROPion (WELLBUTRIN XL) 300 MG 24 hr tablet TAKE 1 TABLET BY MOUTH EVERY DAY IN THE MORNING 90 tablet 3    calcium-vitamin D 500-125 MG-UNIT TABS Take 1 tablet by mouth daily      cyclobenzaprine (FLEXERIL) 10 MG tablet Take 1 tablet (10 mg) by mouth 3 times daily as needed for muscle spasms.      fluocinonide (LIDEX) 0.05 % external ointment Apply topically 2 times daily To lower ankle as needed 60 g 3    gabapentin (NEURONTIN) 300 MG capsule Take 1 capsule (300 mg) by mouth 3 times daily. 90 capsule 5    HYDROcodone-acetaminophen (NORCO) 5-325 MG tablet Take 1 tablet by mouth 2 times daily as needed for pain. 8 tablet 0    Multiple Vitamin (MULTI-DAY VITAMINS) TABS Take  by mouth.      nabumetone (RELAFEN) 500 MG tablet TAKE 1 TABLET (500 MG) BY MOUTH 2 TIMES DAILY AS NEEDED FOR MODERATE PAIN (4-6) 180 tablet 3    sildenafil (REVATIO) 20 MG tablet TAKE 3 TO 5 TABLETS BY MOUTH ONCE DAILY 30 MINUTES BEFORE SEXUAL ACTIVITY AS NEEDED 90 tablet 0    tacrolimus (PROTOPIC) 0.1 % external ointment Apply topically 2 times daily To hands and rash as needed 60 g 3     No current facility-administered medications for this visit.      Past Medical History:   Patient Active Problem List   Diagnosis    Backache    Allergic rhinitis    Major depressive disorder, recurrent, mild    Sinus arrhythmia    Sleep apnea    Diverticulitis of colon    Essential and other specified forms of tremor    Idiopathic hypersomnolence     Chronotropic incompetence with sinus node dysfunction    Benign essential hypertension    Nonrheumatic mitral valve regurgitation    Spinal stenosis of lumbar region with neurogenic claudication    ADHD (attention deficit hyperactivity disorder), inattentive type    Cervical radiculopathy    Cervical spondylosis without myelopathy    Alcohol use disorder    Marijuana use     Past Medical History:   Diagnosis Date    Allergic rhinitis, cause unspecified     Backache, unspecified     Depressive disorder     Diverticulitis     Junctional ectopic contractions (H)     Mild intermittent asthma 9/28/2005    Narcolepsy     Osteoarthritis     Palpitations     Sleep apnea        CC Referred Self, MD  No address on file on close of this encounter.

## 2025-06-25 ENCOUNTER — TELEPHONE (OUTPATIENT)
Dept: DERMATOLOGY | Facility: CLINIC | Age: 70
End: 2025-06-25

## 2025-06-25 ENCOUNTER — OFFICE VISIT (OUTPATIENT)
Dept: DERMATOLOGY | Facility: CLINIC | Age: 70
End: 2025-06-25
Attending: DERMATOLOGY
Payer: MEDICARE

## 2025-06-25 DIAGNOSIS — L30.9 DERMATITIS: ICD-10-CM

## 2025-06-25 DIAGNOSIS — D18.01 CHERRY ANGIOMA: ICD-10-CM

## 2025-06-25 DIAGNOSIS — Z12.83 SKIN CANCER SCREENING: Primary | ICD-10-CM

## 2025-06-25 DIAGNOSIS — D22.39 FIBROUS PAPULE OF NOSE: ICD-10-CM

## 2025-06-25 DIAGNOSIS — D22.9 MULTIPLE BENIGN NEVI: ICD-10-CM

## 2025-06-25 DIAGNOSIS — L82.1 SEBORRHEIC KERATOSIS: ICD-10-CM

## 2025-06-25 DIAGNOSIS — L81.4 LENTIGINES: ICD-10-CM

## 2025-06-25 ASSESSMENT — PAIN SCALES - GENERAL: PAINLEVEL_OUTOF10: NO PAIN (0)

## 2025-06-25 NOTE — TELEPHONE ENCOUNTER
6/25 pt came to the pod to schedule an annual return dermatology with Dr. Atkins. Pt wants to schedule for July, July schedule not open. Scheduled pt on 06/19/2026. Pt wants coordinator to call back when July schedule comes out.

## 2025-06-25 NOTE — PATIENT INSTRUCTIONS
Return to clinic in 1 year or sooner as needed      Checking for Skin Cancer  You can help find cancer early by checking your skin each month. There are 3 main kinds of skin cancer: melanoma, basal cell carcinoma, and squamous cell carcinoma. Doing monthly skin checks is the best way to find new marks, sores, or skin changes. Follow these instructions for checking your skin.   The ABCDEs of checking moles for melanoma   Check your moles or growths for signs of melanoma using ABCDE:   Asymmetry: The sides of the mole or growth don t match.  Border: The edges are ragged, notched, or blurred.  Color: The color within the mole or growth varies. It could be black, brown, tan, white, or shades of red, gray, or blue.  Diameter: The mole or growth is larger than   inch or 6 mm (size of a pencil eraser).  Evolving: The size, shape, texture, or color of the mole or growth is changing.     ABCDE's of moles on light skin.        ABCDE's of moles on dark skin may be harder to identify.     Checking for other types of skin cancer  Basal cell carcinoma or squamous cell carcinoma cause symptoms like:     A spot or mole that looks different from all other marks on your skin  Changes in how an area feels, such as itching, tenderness, or pain  Changes in the skin's surface, such as oozing, bleeding, or scaliness  A sore that doesn't heal  New swelling, redness, or spread of color beyond the border of a mole    Who s at risk?  Anyone of any skin color can get skin cancer. But you're at greater risk if you have:   Fair skin that freckles easily and burns instead of tanning  Light-colored or red hair  Light-colored eyes  Many moles or abnormal moles on your skin  A long history of unprotected exposure to sunlight or tanning beds  A history of many blistering sunburns as a child or teen  A family history of skin cancer  Been exposed to radiation or chemicals  A weakened immune system  Been exposed to arsenic  If you've had skin cancer in  the past, you're at high risk of having it again.   How to check your skin  Do your monthly skin checkups in front of a full-length mirror. Use a room with good lighting so it's easier to see. Use a hand mirror to look at hard-to-see places like your buttocks and back. You can also have a trusted friend or family member help you with these checks. Check every part of your body, including your:   Head (ears, face, neck, and scalp)  Torso (front, back, sides, and under breasts)  Arms (tops, undersides, and armpits)  Hands (palms, backs, and fingers, including under the nails)  Lower back, buttocks, and genitals  Legs (front, back, and sides)  Feet (tops, soles, toes, including under the nails, and between toes)  Watch for new spots on your skin or a spot that's changing in color, shape, size.   If you have a lot of moles, take digital photos of them each month. Make sure to take photos both up close and from a distance. These can help you see if any moles change over time.   Know your skin  Most skin changes aren't cancer. But if you see any changes in your skin, call your healthcare provider right away. Only they can tell you if a change is a problem. If you have skin cancer, seeing your provider can be the first step to getting the treatment that could save your life.   [employer] last reviewed this educational content on 10/1/2021    8567-7820 [employer]. All rights reserved. This information is not intended as a substitute for professional medical care. Always follow your healthcare professional's instructions.

## 2025-06-25 NOTE — LETTER
6/25/2025       RE: Salbador Begum  [address]  [place] [facility] [address]     Dear Colleague,    Thank you for referring your patient, Salbador Begum, to the [facility] [place] [facility]. Please see a copy of my visit note below.    [facility] Dermatology Note  Encounter Date: Jun 25, 2025  Office Visit     Dermatology Problem List:  FBSE: 6/25/25    1. Dermatitis, left ankle  - Lidex ointment    2. HAK, L forearm, s/p bx 06/20/23, s/p cryo 2/21/24    3. Benign bx:  -  Superficial dermal fibrosis consistent with scar, R lower shin. 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   - Can continue use of  lidex ointment BID prn  - can continue use of Tacrolimus 0.1 % ointment BID prn    # Fibrous papules, on nose (x2)  Notes that these have been present for years without changes. They sometimes bleed when washes face.   - reviewed warning signs of skin cancer and advised to notify us if these occur  - Discussed that these are benign lesions, no treatment required    # Multiple benign nevi.   - Monitor for ABCDEs of melanoma   - Continue sun protection - recommend SPF 30 or higher with frequent application   - Return sooner if noticing changing or symptomatic lesions    # Benign lesions - SKs, cherry angiomas, lentigenes.  - No treatment required      Procedures Performed:   None     Follow-up: 1 year(s) in-person, or earlier for new or changing lesions    Staff and Medical Student:    Jackie Martinez, MS4    Staffed by Dr. Atkins    Staff Physician:  I was present with the medical student who participated in the service and in the documentation of the  note. I have verified the history and personally performed the physical exam and medical decision making. I agree with the assessment and plan of care as documented in the note.       Bozena Atkins MD    Department of Dermatology  [facility]: Phone: [phone], Fax: [phone]  6/26/2025    ____________________________________________    CC: Skin Check (Here today for a skin check and dermatitis follow up. Spots of concern on nose.)    HPI:  Mr. Salbador Begum is a(n) 69 year old male who presents today as a return patient for FBSE.    Has a couple papules on the nose that bleed.     Rash on ankle is doing ok, using cream when it comes up. Better with warmer weather. Once a month, puts on a cream. Will use leftover cream on fingertips to prevent finger splitting. Using Eucerin as moisturizer.     Uses sunscreen when he bikes.     No personal history of skin cancer or melanoma. Dad with many skin biopsies, unclear if he had any skin cancer. Doesn't think it was melanoma.      Patient is otherwise feeling well, without additional skin concerns.     Labs Reviewed:  Derm path 06/12/2024  Final Diagnosis   Right lower shin:  - Superficial dermal fibrosis consistent with scar - (see description)       Physical exam:  Vitals: There were no vitals taken for this visit.  GEN: This is a well developed, well-nourished male in no acute distress, in a pleasant mood.    SKIN: Full body skin exam excluding the genitals was performed including face, scalp, neck, ears, chest, back, bilateral arms, hands, bilateral legs, feet, and buttocks.   - There are dome shaped bright red papules on the trunk and extremities.   - bland papules on nose  Multiple regular brown pigmented macules and papules are identified on the trunk and extremities.   There are fine lines and dyspigmentation on sun exposed areas of the face and  chest.  Scattered brown macules on sun exposed areas.  There are waxy stuck on tan to brown papules on the back, arms.  - No other lesions of concern on areas examined.     Medications:  Current Outpatient Medications   Medication Sig Dispense Refill     acetaminophen (TYLENOL) 650 MG CR tablet Take 2 tablets (1,300 mg) by mouth 2 times daily 180 tablet 1     amLODIPine (NORVASC) 5 MG tablet Take 1 tablet (5 mg) by mouth daily. 90 tablet 3     amphetamine-dextroamphetamine (ADDERALL XR) 20 MG 24 hr capsule Take 1 capsule (20 mg) by mouth daily. 30 capsule 0     [START ON 6/27/2025] amphetamine-dextroamphetamine (ADDERALL XR) 20 MG 24 hr capsule Take 1 capsule (20 mg) by mouth daily. 30 capsule 0     amphetamine-dextroamphetamine (ADDERALL XR) 20 MG 24 hr capsule Take 1 capsule (20 mg) by mouth daily. 30 capsule 0     Ascorbic Acid (VITAMIN C PO)        buPROPion (WELLBUTRIN XL) 300 MG 24 hr tablet TAKE 1 TABLET BY MOUTH EVERY DAY IN THE MORNING 90 tablet 3     calcium-vitamin D 500-125 MG-UNIT TABS Take 1 tablet by mouth daily       cyclobenzaprine (FLEXERIL) 10 MG tablet Take 1 tablet (10 mg) by mouth 3 times daily as needed for muscle spasms.       fluocinonide (LIDEX) 0.05 % external ointment Apply topically 2 times daily To lower ankle as needed 60 g 3     gabapentin (NEURONTIN) 300 MG capsule Take 1 capsule (300 mg) by mouth 3 times daily. 90 capsule 5     HYDROcodone-acetaminophen (NORCO) 5-325 MG tablet Take 1 tablet by mouth 2 times daily as needed for pain. 8 tablet 0     Multiple Vitamin (MULTI-DAY VITAMINS) TABS Take  by mouth.       nabumetone (RELAFEN) 500 MG tablet TAKE 1 TABLET (500 MG) BY MOUTH 2 TIMES DAILY AS NEEDED FOR MODERATE PAIN (4-6) 180 tablet 3     sildenafil (REVATIO) 20 MG tablet TAKE 3 TO 5 TABLETS BY MOUTH ONCE DAILY 30 MINUTES BEFORE SEXUAL ACTIVITY AS NEEDED 90 tablet 0     tacrolimus (PROTOPIC) 0.1 % external ointment Apply topically 2 times daily To hands and rash as needed 60 g 3      No current facility-administered medications for this visit.      Past Medical History:   Patient Active Problem List   Diagnosis     Backache     Allergic rhinitis     Major depressive disorder, recurrent, mild     Sinus arrhythmia     Sleep apnea     Diverticulitis of colon     Essential and other specified forms of tremor     Idiopathic hypersomnolence     Chronotropic incompetence with sinus node dysfunction     Benign essential hypertension     Nonrheumatic mitral valve regurgitation     Spinal stenosis of lumbar region with neurogenic claudication     ADHD (attention deficit hyperactivity disorder), inattentive type     Cervical radiculopathy     Cervical spondylosis without myelopathy     Alcohol use disorder     Marijuana use     Past Medical History:   Diagnosis Date     Allergic rhinitis, cause unspecified      Backache, unspecified      Depressive disorder      Diverticulitis      Junctional ectopic contractions (H)      Mild intermittent asthma 9/28/2005     Narcolepsy      Osteoarthritis      Palpitations      Sleep apnea        CC Referred Self, MD  No address on file on close of this encounter.     Again, thank you for allowing me to participate in the care of your patient.      Sincerely,    Bozena Atkins MD

## 2025-06-25 NOTE — NURSING NOTE
Dermatology Rooming Note    Salbador Begum's goals for this visit include:   Chief Complaint   Patient presents with    Skin Check     Here today for a skin check and dermatitis follow up. Spots of concern on nose.     Alena Smith RN

## 2025-07-30 ENCOUNTER — MYC REFILL (OUTPATIENT)
Dept: FAMILY MEDICINE | Facility: CLINIC | Age: 70
End: 2025-07-30
Payer: MEDICARE

## 2025-07-30 DIAGNOSIS — F90.0 ADHD (ATTENTION DEFICIT HYPERACTIVITY DISORDER), INATTENTIVE TYPE: ICD-10-CM

## 2025-07-31 RX ORDER — DEXTROAMPHETAMINE SACCHARATE, AMPHETAMINE ASPARTATE MONOHYDRATE, DEXTROAMPHETAMINE SULFATE AND AMPHETAMINE SULFATE 5; 5; 5; 5 MG/1; MG/1; MG/1; MG/1
20 CAPSULE, EXTENDED RELEASE ORAL DAILY
Qty: 30 CAPSULE | Refills: 0 | Status: SHIPPED | OUTPATIENT
Start: 2025-07-31

## 2025-08-06 ASSESSMENT — ANXIETY QUESTIONNAIRES
7. FEELING AFRAID AS IF SOMETHING AWFUL MIGHT HAPPEN: NOT AT ALL
GAD7 TOTAL SCORE: 10
1. FEELING NERVOUS, ANXIOUS, OR ON EDGE: MORE THAN HALF THE DAYS
8. IF YOU CHECKED OFF ANY PROBLEMS, HOW DIFFICULT HAVE THESE MADE IT FOR YOU TO DO YOUR WORK, TAKE CARE OF THINGS AT HOME, OR GET ALONG WITH OTHER PEOPLE?: VERY DIFFICULT
GAD7 TOTAL SCORE: 10
6. BECOMING EASILY ANNOYED OR IRRITABLE: MORE THAN HALF THE DAYS
4. TROUBLE RELAXING: MORE THAN HALF THE DAYS
2. NOT BEING ABLE TO STOP OR CONTROL WORRYING: MORE THAN HALF THE DAYS
3. WORRYING TOO MUCH ABOUT DIFFERENT THINGS: MORE THAN HALF THE DAYS
5. BEING SO RESTLESS THAT IT IS HARD TO SIT STILL: NOT AT ALL
IF YOU CHECKED OFF ANY PROBLEMS ON THIS QUESTIONNAIRE, HOW DIFFICULT HAVE THESE PROBLEMS MADE IT FOR YOU TO DO YOUR WORK, TAKE CARE OF THINGS AT HOME, OR GET ALONG WITH OTHER PEOPLE: VERY DIFFICULT

## 2025-08-07 ENCOUNTER — OFFICE VISIT (OUTPATIENT)
Facility: CLINIC | Age: 70
End: 2025-08-07
Payer: MEDICARE

## 2025-08-07 DIAGNOSIS — F90.0 ADHD (ATTENTION DEFICIT HYPERACTIVITY DISORDER), INATTENTIVE TYPE: Primary | ICD-10-CM

## 2025-08-07 PROCEDURE — 90791 PSYCH DIAGNOSTIC EVALUATION: CPT

## 2025-08-28 ENCOUNTER — MYC REFILL (OUTPATIENT)
Dept: FAMILY MEDICINE | Facility: CLINIC | Age: 70
End: 2025-08-28
Payer: MEDICARE

## 2025-08-28 ENCOUNTER — MYC REFILL (OUTPATIENT)
Dept: FAMILY MEDICINE | Facility: CLINIC | Age: 70
End: 2025-08-28

## 2025-08-28 DIAGNOSIS — F90.0 ADHD (ATTENTION DEFICIT HYPERACTIVITY DISORDER), INATTENTIVE TYPE: ICD-10-CM

## 2025-08-28 DIAGNOSIS — M47.812 CERVICAL SPONDYLOSIS WITHOUT MYELOPATHY: ICD-10-CM

## 2025-08-28 DIAGNOSIS — M48.062 SPINAL STENOSIS OF LUMBAR REGION WITH NEUROGENIC CLAUDICATION: ICD-10-CM

## 2025-08-28 RX ORDER — DEXTROAMPHETAMINE SACCHARATE, AMPHETAMINE ASPARTATE MONOHYDRATE, DEXTROAMPHETAMINE SULFATE AND AMPHETAMINE SULFATE 5; 5; 5; 5 MG/1; MG/1; MG/1; MG/1
20 CAPSULE, EXTENDED RELEASE ORAL DAILY
Qty: 30 CAPSULE | Refills: 0 | OUTPATIENT
Start: 2025-08-28

## 2025-09-02 RX ORDER — HYDROCODONE BITARTRATE AND ACETAMINOPHEN 5; 325 MG/1; MG/1
1 TABLET ORAL 2 TIMES DAILY PRN
Qty: 8 TABLET | Refills: 0 | Status: SHIPPED | OUTPATIENT
Start: 2025-09-02

## (undated) DEVICE — PREP CHLORAPREP W/ORANGE TINT 10.5ML 260715

## (undated) DEVICE — GLOVE BIOGEL PI MICRO SZ 7.0 48570

## (undated) DEVICE — SYR 07ML EPIDURAL LOSS OF RESISTANCE PULSATOR 4905

## (undated) DEVICE — TRAY PAIN INJECTION 97A 640